# Patient Record
Sex: MALE | Race: WHITE | NOT HISPANIC OR LATINO | Employment: OTHER | URBAN - METROPOLITAN AREA
[De-identification: names, ages, dates, MRNs, and addresses within clinical notes are randomized per-mention and may not be internally consistent; named-entity substitution may affect disease eponyms.]

---

## 2018-01-04 ENCOUNTER — TRANSCRIBE ORDERS (OUTPATIENT)
Dept: ADMINISTRATIVE | Facility: HOSPITAL | Age: 63
End: 2018-01-04

## 2018-01-04 DIAGNOSIS — C78.01 SECONDARY MALIGNANT NEOPLASM OF RIGHT LUNG (HCC): Primary | ICD-10-CM

## 2018-01-10 ENCOUNTER — HOSPITAL ENCOUNTER (OUTPATIENT)
Dept: CT IMAGING | Facility: HOSPITAL | Age: 63
Discharge: HOME | End: 2018-01-10
Admitting: FAMILY MEDICINE

## 2018-01-10 ENCOUNTER — HOSPITAL ENCOUNTER (OUTPATIENT)
Dept: CT IMAGING | Facility: HOSPITAL | Age: 63
Discharge: HOME | End: 2018-01-10

## 2018-01-10 ENCOUNTER — HOSPITAL ENCOUNTER (OUTPATIENT)
Dept: GENERAL RADIOLOGY | Facility: HOSPITAL | Age: 63
Discharge: HOME | End: 2018-01-10

## 2018-01-10 ENCOUNTER — OFFICE VISIT (OUTPATIENT)
Dept: PULMONOLOGY | Facility: CLINIC | Age: 63
End: 2018-01-10

## 2018-01-10 VITALS
BODY MASS INDEX: 46.53 KG/M2 | HEART RATE: 84 BPM | SYSTOLIC BLOOD PRESSURE: 130 MMHG | OXYGEN SATURATION: 94 % | DIASTOLIC BLOOD PRESSURE: 88 MMHG | RESPIRATION RATE: 20 BRPM | TEMPERATURE: 98.8 F | HEIGHT: 60 IN | WEIGHT: 237 LBS

## 2018-01-10 VITALS — WEIGHT: 238.8 LBS | HEIGHT: 70 IN | BODY MASS INDEX: 34.19 KG/M2

## 2018-01-10 VITALS
RESPIRATION RATE: 24 BRPM | OXYGEN SATURATION: 92 % | DIASTOLIC BLOOD PRESSURE: 72 MMHG | HEART RATE: 78 BPM | SYSTOLIC BLOOD PRESSURE: 115 MMHG

## 2018-01-10 DIAGNOSIS — R05.9 COUGH: ICD-10-CM

## 2018-01-10 DIAGNOSIS — Z98.890 STATUS POST BIOPSY: ICD-10-CM

## 2018-01-10 DIAGNOSIS — Z01.818 PREOPERATIVE CLEARANCE: ICD-10-CM

## 2018-01-10 DIAGNOSIS — R93.89 ABNORMAL CHEST CT: Primary | ICD-10-CM

## 2018-01-10 DIAGNOSIS — C78.01 SECONDARY MALIGNANT NEOPLASM OF RIGHT LUNG (HCC): ICD-10-CM

## 2018-01-10 PROBLEM — Z78.9 NON-SMOKER: Status: ACTIVE | Noted: 2018-01-10

## 2018-01-10 LAB
APTT PPP: 25.6 SECONDS (ref 24–31)
INR PPP: 1.04
PROTHROMBIN TIME: 11.3 SECONDS (ref 9.6–11.5)

## 2018-01-10 PROCEDURE — 71045 X-RAY EXAM CHEST 1 VIEW: CPT

## 2018-01-10 PROCEDURE — 88341 IMHCHEM/IMCYTCHM EA ADD ANTB: CPT | Performed by: RADIOLOGY

## 2018-01-10 PROCEDURE — 85730 THROMBOPLASTIN TIME PARTIAL: CPT | Performed by: RADIOLOGY

## 2018-01-10 PROCEDURE — 94729 DIFFUSING CAPACITY: CPT | Performed by: INTERNAL MEDICINE

## 2018-01-10 PROCEDURE — 85610 PROTHROMBIN TIME: CPT | Performed by: RADIOLOGY

## 2018-01-10 PROCEDURE — 25010000002 PROMETHAZINE PER 50 MG: Performed by: RADIOLOGY

## 2018-01-10 PROCEDURE — 94375 RESPIRATORY FLOW VOLUME LOOP: CPT | Performed by: INTERNAL MEDICINE

## 2018-01-10 PROCEDURE — 88305 TISSUE EXAM BY PATHOLOGIST: CPT | Performed by: RADIOLOGY

## 2018-01-10 PROCEDURE — 88173 CYTOPATH EVAL FNA REPORT: CPT | Performed by: RADIOLOGY

## 2018-01-10 PROCEDURE — 77012 CT SCAN FOR NEEDLE BIOPSY: CPT

## 2018-01-10 PROCEDURE — 99204 OFFICE O/P NEW MOD 45 MIN: CPT | Performed by: INTERNAL MEDICINE

## 2018-01-10 PROCEDURE — 88342 IMHCHEM/IMCYTCHM 1ST ANTB: CPT | Performed by: RADIOLOGY

## 2018-01-10 PROCEDURE — 94726 PLETHYSMOGRAPHY LUNG VOLUMES: CPT | Performed by: INTERNAL MEDICINE

## 2018-01-10 RX ORDER — ALBUTEROL SULFATE 90 UG/1
2 AEROSOL, METERED RESPIRATORY (INHALATION) EVERY 4 HOURS PRN
Status: ON HOLD | COMMUNITY
End: 2018-01-20

## 2018-01-10 RX ORDER — MEPERIDINE HYDROCHLORIDE 50 MG/ML
50 INJECTION INTRAMUSCULAR; INTRAVENOUS; SUBCUTANEOUS ONCE
Status: DISCONTINUED | OUTPATIENT
Start: 2018-01-10 | End: 2018-01-11 | Stop reason: HOSPADM

## 2018-01-10 RX ORDER — MEPERIDINE HYDROCHLORIDE 50 MG/ML
50 INJECTION INTRAMUSCULAR; INTRAVENOUS; SUBCUTANEOUS ONCE
Status: DISCONTINUED | OUTPATIENT
Start: 2018-01-10 | End: 2018-01-10 | Stop reason: SDUPTHER

## 2018-01-10 RX ORDER — LIDOCAINE HYDROCHLORIDE 10 MG/ML
20 INJECTION, SOLUTION INFILTRATION; PERINEURAL ONCE
Status: COMPLETED | OUTPATIENT
Start: 2018-01-10 | End: 2018-01-10

## 2018-01-10 RX ORDER — ALPRAZOLAM 0.5 MG/1
0.5 TABLET ORAL ONCE
Status: COMPLETED | OUTPATIENT
Start: 2018-01-10 | End: 2018-01-10

## 2018-01-10 RX ORDER — PROMETHAZINE HYDROCHLORIDE 25 MG/ML
6.25 INJECTION, SOLUTION INTRAMUSCULAR; INTRAVENOUS ONCE
Status: COMPLETED | OUTPATIENT
Start: 2018-01-10 | End: 2018-01-10

## 2018-01-10 RX ADMIN — PROMETHAZINE HYDROCHLORIDE 6.25 MG: 25 INJECTION INTRAMUSCULAR; INTRAVENOUS at 17:50

## 2018-01-10 RX ADMIN — LIDOCAINE HYDROCHLORIDE 20 ML: 10 INJECTION, SOLUTION INFILTRATION; PERINEURAL at 16:15

## 2018-01-10 RX ADMIN — MEPERIDINE HYDROCHLORIDE 50 MG: 50 INJECTION, SOLUTION INTRAMUSCULAR; INTRAVENOUS; SUBCUTANEOUS at 17:50

## 2018-01-10 RX ADMIN — ALPRAZOLAM 0.5 MG: 0.5 TABLET ORAL at 13:22

## 2018-01-10 NOTE — PLAN OF CARE
Problem: Patient Care Overview (Adult)  Goal: Plan of Care Review  Outcome: Ongoing (interventions implemented as appropriate)   01/10/18 1246   Coping/Psychosocial Response Interventions   Plan Of Care Reviewed With patient;spouse;sibling   Patient Care Overview   Progress no change

## 2018-01-10 NOTE — PLAN OF CARE
Problem: Perioperative Period (Adult)  Goal: Signs and Symptoms of Listed Potential Problems Will be Absent or Manageable (Perioperative Period)  Outcome: Ongoing (interventions implemented as appropriate)   01/10/18 1244   Perioperative Period   Problems Assessed (Perioperative Period) all   Problems Present (Perioperative Period) hypoxia/hypoxemia;perioperative injury;physiologic stress response;situational response

## 2018-01-10 NOTE — PLAN OF CARE
Problem: Patient Care Overview (Adult)  Goal: Discharge Needs Assessment  Outcome: Ongoing (interventions implemented as appropriate)   01/10/18 1244   Discharge Needs Assessment   Concerns To Be Addressed no discharge needs identified   Readmission Within The Last 30 Days no previous admission in last 30 days   Equipment Needed After Discharge none   Discharge Disposition home or self-care   Current Health   Anticipated Changes Related to Illness none   Self-Care   Equipment Currently Used at Home none   Living Environment   Transportation Available family or friend will provide

## 2018-01-10 NOTE — PROGRESS NOTES
PULMONARY  NOTE    Chief Complaint     Abnormal CT scan of the chest, chest pain, dyspnea    History of Present Illness     62-year-old white male referred for evaluation of an abnormal CT scan of the chest.    About a week ago he felt dyspneic and had pleuritic chest discomfort.  The symptoms persisted and he subsequently underwent a chest x-ray which was abnormal and followed by a CT scan of the chest, abdomen, and pelvis.  He was found to have multiple bilateral well-circumscribed pulmonary nodules with one more predominant, irregular shaped lesion in the right upper lobe.  He has been referred to our office and also to see Dr. Gustavo Bang, oncology today    The patient is a lifelong nonsmoker.  He does have a history of smokeless tobacco use which stopped in 2001.    He has no prior history of known lung disease and has no chronic medical conditions that require any type of medication therapy.    He has experienced a recent upper respiratory tract infection which she feels has slowly improved.  He had a cough which is now better and some sputum production but no hemoptysis.  He was treated with a Z-Maksim and a course of prednisone for his URI symptoms.  He has had no hemoptysis.    He has noted no weight loss.    He is a .  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  He does work with hay  He doesn't have any chickens or work around birds    Patient Active Problem List   Diagnosis   • Abnormal chest CT (Bilateral nodules and RUL Mass)   • Non-smoker     Allergies   Allergen Reactions   • Ibuprofen Itching     Prickly rash on heaD   • Penicillins Rash   • Sulfa Antibiotics Rash       Current Outpatient Prescriptions:   •  albuterol (PROVENTIL HFA;VENTOLIN HFA) 108 (90 Base) MCG/ACT inhaler, Inhale 2 puffs Every 4 (Four) Hours As Needed for Wheezing., Disp: , Rfl:   •  cetirizine (zyrTEC) 10 MG tablet, Take 10 mg by mouth Daily., Disp: , Rfl:   No current  "facility-administered medications for this visit.   MEDICATION LIST AND ALLERGIES REVIEWED.    Family History   Problem Relation Age of Onset   • Cancer Mother    • Emphysema Father    • Cancer Sister    • Cancer Maternal Grandmother    • Cancer Paternal Grandmother      Social History   Substance Use Topics   • Smoking status: Never Smoker   • Smokeless tobacco: Current User     Types: Chew   • Alcohol use No     Social History     Social History Narrative        Retired from the [employer]    A .    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    Chew tobacco up to 2001     FAMILY AND SOCIAL HISTORY REVIEWED.    Review of Systems  ALSO REFER TO SCANNED ROS SHEET FROM SAME DATE.    /88 (BP Location: Right arm, Patient Position: Sitting)  Pulse 84  Temp 98.8 °F (37.1 °C)  Resp 20  Ht 69 cm (27.17\")  Wt 108 kg (237 lb)  SpO2 94%  .8 kg/m2  Physical Exam   Constitutional: He is oriented to person, place, and time. He appears well-developed. No distress.   HENT:   Head: Normocephalic and atraumatic.   Neck: No thyromegaly present.   Cardiovascular: Normal rate, regular rhythm and normal heart sounds.    No murmur heard.  Pulmonary/Chest: Effort normal and breath sounds normal. No stridor.   Abdominal: Soft. Bowel sounds are normal.   Musculoskeletal: Normal range of motion. He exhibits no edema.   Lymphadenopathy:     He has no cervical adenopathy.        Right: No supraclavicular and no epitrochlear adenopathy present.        Left: No supraclavicular and no epitrochlear adenopathy present.   Neurological: He is alert and oriented to person, place, and time.   Skin: Skin is warm and dry. He is not diaphoretic.   Psychiatric: He has a normal mood and affect. His behavior is normal.   Nursing note and vitals reviewed.      Results     PFTs reveal no airway obstruction.  A mild restriction.  And a normal diffusion capacity.    CT scan of the chest, " abdomen, and pelvis was reviewed on PACS.  These are outside films.  Noted were large bilateral pulmonary nodules with a predominant right upper lobe mass.  No bulky mediastinal adenopathy    Problem List       ICD-10-CM ICD-9-CM   1. Abnormal chest CT (Bilateral nodules and RUL Mass) R93.8 793.2   2. Non-smoker Z01.818 V72.84       Discussion     Reviewed his test results.  He has a CT scan of the chest this certainly worrisome for malignancy.  Other considerations in the differential diagnosis included a fungal infection, such as blastomycosis.  He doesn't have evidence of a renal mass, pancreatic mass, or other primary source of malignancy below the diaphragm.    We discussed diagnostic options including surgical lung biopsy, transthoracic needle aspiration, and bronchoscopy with navigational assistance.  I also talked with Dr. Talha Petty, radiologist, today who is willing to attempt a percutaneous needle biopsy under CT direction.  If this approach can establish a diagnosis, I think it is probably the safest and easiest way to approach a biopsy.    The possibility of needing an additional diagnostic procedure, such as a bronchoscopy or an open lung biopsy was discussed with the patient and his family who appear to understand.    The left iliac crest appears abnormal on the CT scan but does not appear to be a pathologic bony process by my interpretation.  I think that would be a low yield area for biopsy    We will plan for him to undergo a needle biopsy today and decide what further workup needs to be pursued based on the results of that procedure.    Gustavo Haque MD  Note electronically signed    CC: Gustavo Pruitt MD

## 2018-01-11 ENCOUNTER — CONSULT (OUTPATIENT)
Dept: ONCOLOGY | Facility: CLINIC | Age: 63
End: 2018-01-11

## 2018-01-11 ENCOUNTER — TELEPHONE (OUTPATIENT)
Dept: INTERVENTIONAL RADIOLOGY/VASCULAR | Facility: HOSPITAL | Age: 63
End: 2018-01-11

## 2018-01-11 VITALS
HEART RATE: 94 BPM | HEIGHT: 69 IN | WEIGHT: 238 LBS | SYSTOLIC BLOOD PRESSURE: 136 MMHG | DIASTOLIC BLOOD PRESSURE: 84 MMHG | RESPIRATION RATE: 20 BRPM | TEMPERATURE: 97.9 F | BODY MASS INDEX: 35.25 KG/M2

## 2018-01-11 DIAGNOSIS — Z78.9 NON-SMOKER: ICD-10-CM

## 2018-01-11 DIAGNOSIS — R93.89 ABNORMAL CHEST CT: Primary | ICD-10-CM

## 2018-01-11 PROCEDURE — 99205 OFFICE O/P NEW HI 60 MIN: CPT | Performed by: INTERNAL MEDICINE

## 2018-01-11 RX ORDER — ALPRAZOLAM 1 MG/1
1 TABLET ORAL 2 TIMES DAILY PRN
Qty: 60 TABLET | Refills: 2 | Status: ON HOLD | OUTPATIENT
Start: 2018-01-11 | End: 2018-01-20

## 2018-01-11 NOTE — TELEPHONE ENCOUNTER
@[id number])@    Other Comments:

## 2018-01-11 NOTE — PROGRESS NOTES
CHIEF COMPLAINT:   New patient with apparent metastatic right-sided lung cancer-see below.    Problem List:  Patient Active Problem List   Diagnosis   • Abnormal chest CT (Bilateral nodules and RUL Mass)   • Non-smoker           HISTORY OF PRESENT ILLNESS:    This very pleasant 62-year-old  gentleman from [facility], nonsmoker, is here due to his newly diagnosed malignancy.    He has enjoyed robust health throughout his life.  He's never been a smoker.  He's been involved in her culture.  He worked for the [employer] for close to 4 decades.  He retired from that.  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  He's had some agricultural exposure over the years but nothing major.  He has never had any kind of secondhand smoke exposure since he was a child and his father smoked.    He recently developed a dry cough and some shortness of breath.  A chest x-ray was abnormal in the right lung mass and he underwent a CT scan of the chest abdomen pelvis approximately a week ago.  This revealed a several centimeter right upper right lung field mass with associated multiple tiny bilateral nodular densities consistent with metastatic disease.  He's had no hemoptysis.  He's had no weight loss.  He's had no abdominal pain or any new bony symptoms.  He has some osteoarthritic symptoms which are unchanged from baseline.  He was placed on a course of prednisone which he has just finished.  That really didn't do anything.  He was referred and underwent a CT directed needle biopsy yesterday which shows malignancy compatible with non-small cell lung cancer, favor adenocarcinoma with full and final stains pending.    He had an incidental basal cell carcinoma excised from the posterior aspect of his neck yesterday    Past Medical History:   Diagnosis Date   • Lung nodule    • Shortness of breath     RECENT ONSET       Current Outpatient Prescriptions on File Prior to Visit   Medication Sig Dispense Refill   • albuterol (PROVENTIL  HFA;VENTOLIN HFA) 108 (90 Base) MCG/ACT inhaler Inhale 2 puffs Every 4 (Four) Hours As Needed for Wheezing.     • cetirizine (zyrTEC) 10 MG tablet Take 10 mg by mouth Daily.       Current Facility-Administered Medications on File Prior to Visit   Medication Dose Route Frequency Provider Last Rate Last Dose   • [COMPLETED] ALPRAZolam (XANAX) tablet 0.5 mg  0.5 mg Oral Once Fidel Petty MD   0.5 mg at 01/10/18 1322   • [COMPLETED] lidocaine (XYLOCAINE) 1 % injection 20 mL  20 mL Subcutaneous Once Fidel Petty MD   20 mL at 01/10/18 1615   • [COMPLETED] promethazine (PHENERGAN) injection 6.25 mg  6.25 mg Intravenous Once Fidel Petty MD   6.25 mg at 01/10/18 1750   • [DISCONTINUED] HYDROcod Polst-CPM Polst ER (TUSSIONEX PENNKINETIC) 10-8 MG/5ML ER suspension 5 mL  5 mL Oral Once Fidel Petty MD       • [COMPLETED] meperidine (DEMEROL) injection 50 mg  50 mg Intravenous Once Fidel Petty MD   50 mg at 01/10/18 1750   • [DISCONTINUED] meperidine (DEMEROL) injection 50 mg  50 mg Intravenous Once Fidel Petty MD           Allergies   Allergen Reactions   • Ibuprofen Itching     Prickly rash on heaD   • Penicillins Rash   • Sulfa Antibiotics Rash       Past Surgical History:   Procedure Laterality Date   • COLLATERAL LIGAMENT REPAIR, KNEE     • KNEE ARTHROSCOPY     • LASIK     • LUNG BIOPSY Right 01/10/2018    Dr. Petty @ [facility]   • SKIN CANCER EXCISION Bilateral     BASAL CELL ON NECK YESTERDAY       OB History   No data available       Social History     Social History   • Marital status:      Spouse name: N/A   • Number of children: N/A   • Years of education: N/A     Social History Main Topics   • Smoking status: Never Smoker   • Smokeless tobacco: Former User     Quit date: 1999   • Alcohol use No   • Drug use: No   • Sexual activity: Defer     Other Topics Concern   • None     Social History Narrative        Retired from the [employer]    A .    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    Chew tobacco up to 2001       Family History   Problem Relation Age of Onset   • Cancer Mother    • Breast cancer Mother    • Emphysema Father    • Cancer Sister    • Breast cancer Sister    • Cancer Maternal Grandmother    • Breast cancer Maternal Grandmother    • Cancer Paternal Grandmother    • Breast cancer Paternal Grandmother    • Brain cancer Paternal Grandfather        REVIEW OF SYSTEMS:  Review of Systems   Constitutional: Negative for activity change and appetite change.        Energy level is been good.  Appetite and weight are been stable.  Other than his nonproductive cough and his shortness of breath he's had no new symptoms recently.   HENT: Negative for mouth sores, sinus pressure and voice change.    Eyes: Negative for visual disturbance.   Respiratory: Negative for shortness of breath.    Cardiovascular: Negative for chest pain.   Gastrointestinal: Negative for abdominal pain and vomiting.   Genitourinary: Negative for dysuria.   Musculoskeletal: Negative for arthralgias and myalgias.   Skin: Negative for color change.   Neurological: Negative for dizziness, syncope and headaches.   Hematological: Negative for adenopathy.   Psychiatric/Behavioral: Negative for confusion, sleep disturbance and suicidal ideas. The patient is not nervous/anxious.        .  /84  Pulse 94  Temp 97.9 °F (36.6 °C)  Resp 20  Ht 175.3 cm (69\")  Wt 108 kg (238 lb)  BMI 35.15 kg/m2    Physical  Exam:  Physical Exam   Constitutional: He is oriented to person, place, and time. He appears well-developed and well-nourished. No distress.   Propulsed appearing gentleman in no distress at all.  He appears healthy.  He does not have any digital clubbing   HENT:   Head: Normocephalic.   Mouth/Throat: Oropharynx is clear and moist.   Eyes: Conjunctivae and EOM are normal. No scleral icterus.   Neck: Normal range of motion. Neck supple. No thyromegaly present.   Cardiovascular: " Normal rate, regular rhythm and normal heart sounds.    No murmur heard.  Pulmonary/Chest: Effort normal and breath sounds normal. He has no wheezes. He has no rales.   Abdominal: Soft. Bowel sounds are normal. He exhibits no distension and no mass. There is no tenderness.   Musculoskeletal: He exhibits no edema or tenderness.   Lymphadenopathy:     He has no cervical adenopathy.   Neurological: He is alert and oriented to person, place, and time. He displays normal reflexes. No cranial nerve deficit.   Skin: Skin is warm and dry. No rash noted.   He has a fresh incision is clean and dry at the base of his neck posteriorly, consistent with the excision of a basal cell carcinoma performed just yesterday.   Psychiatric: He has a normal mood and affect. Judgment normal.   Vitals reviewed.        Performance Status: 0    Assessment/Plan     Very pleasant gentleman who unfortunately has what appears to be stage IV non-small cell lung cancer, probably adenocarcinoma.  I suspect his right upper lung field nodular density is his primary and he has what appears to be multiple bilateral pulmonary metastasis.  I will complete his staging with PET/CT.  He also needs an MRI of his brain given his advanced disease at presentation.    I talked with Dr. Conte of pathology and the patient does not have enough tissues to submit for next generation sequencing, etc.  I have therefore spoken with Dr. Syed Haque of pulmonary who will see the patient and perform bronchoscopy in an effort to obtain more tissue for appropriate testing.    The patient is a nonsmoker so his possibility of a  mutation is higher.  Obviously I will look for that and also check other esoterica  such as PDL 1 expression, etc.    All the above discussed with the patient his sister and his wife.  I spent more than an hour with them.       Gustavo Catalan MD    1/11/2018

## 2018-01-15 ENCOUNTER — TELEPHONE (OUTPATIENT)
Dept: ONCOLOGY | Facility: CLINIC | Age: 63
End: 2018-01-15

## 2018-01-15 ENCOUNTER — TRANSCRIBE ORDERS (OUTPATIENT)
Dept: PULMONOLOGY | Facility: CLINIC | Age: 63
End: 2018-01-15

## 2018-01-15 ENCOUNTER — HOSPITAL ENCOUNTER (OUTPATIENT)
Facility: HOSPITAL | Age: 63
Setting detail: HOSPITAL OUTPATIENT SURGERY
End: 2018-01-15
Attending: INTERNAL MEDICINE | Admitting: INTERNAL MEDICINE

## 2018-01-15 DIAGNOSIS — R91.8 LUNG MASS: Primary | ICD-10-CM

## 2018-01-15 DIAGNOSIS — C34.91 NON-SMALL CELL LUNG CANCER, RIGHT (HCC): Primary | ICD-10-CM

## 2018-01-15 NOTE — TELEPHONE ENCOUNTER
----- Message -----     From: Fela Herrera MA     Sent: 1/12/2018   1:37 PM       To: Nichelle Finney  Subject: Bronch Ref                                       Patient's spouse, Nelida, called wanting to check status on ref to Dr. Haque for a bronchoscopy.  LOV-1/11/2018  I informed Nelida that ref is most likely still in the process of being scheduled but I will send our ref coordinator a msg and will hopefully get a call back by the end of today on update.    ----- Message from Nichelle Finney sent at 1/12/2018  1:53 PM EST -----  Regarding: RE: Bronch Ref  Contact: [phone]  That was ordered by Dr. Haque's office not us. They need to call their office. Pulmonary.     1/15/2018  Called Nelida back to notify her of above and gave Dr. Haque's office Phone#.

## 2018-01-15 NOTE — TELEPHONE ENCOUNTER
----- Message from Pari Guan sent at 1/15/2018 10:09 AM EST -----  Regarding: JOSE-PHONE CALL  Contact: [phone]  Patient called and he needs Yoko to call him back. That is all the information given said she would know what this is about.

## 2018-01-15 NOTE — TELEPHONE ENCOUNTER
Patient requesting referral to [facility].  Also, asking if Bronchoscopy would be needed or would [facility] do testing differently.  Per Dr. Catalan, approved referral to [facility].  Dr. Catalan states probably will not get into [facility] sooner than 2 weeks from now. He would go ahead and get bronchoscopy this week.  We will make sure [facility] gets all results. Mr. Chambers stated understanding and will keep bronchoscopy appt.

## 2018-01-17 ENCOUNTER — LAB (OUTPATIENT)
Dept: LAB | Facility: HOSPITAL | Age: 63
End: 2018-01-17

## 2018-01-17 ENCOUNTER — APPOINTMENT (OUTPATIENT)
Dept: CT IMAGING | Facility: HOSPITAL | Age: 63
End: 2018-01-17

## 2018-01-17 ENCOUNTER — HOSPITAL ENCOUNTER (OUTPATIENT)
Dept: PET IMAGING | Facility: HOSPITAL | Age: 63
Discharge: HOME | End: 2018-01-17
Attending: INTERNAL MEDICINE

## 2018-01-17 ENCOUNTER — HOSPITAL ENCOUNTER (INPATIENT)
Facility: HOSPITAL | Age: 63
LOS: 4 days | Discharge: HOME | End: 2018-01-21
Attending: INTERNAL MEDICINE | Admitting: INTERNAL MEDICINE

## 2018-01-17 ENCOUNTER — ANESTHESIA EVENT (OUTPATIENT)
Dept: GASTROENTEROLOGY | Facility: HOSPITAL | Age: 63
End: 2018-01-17

## 2018-01-17 ENCOUNTER — HOSPITAL ENCOUNTER (OUTPATIENT)
Dept: MRI IMAGING | Facility: HOSPITAL | Age: 63
Discharge: HOME | End: 2018-01-17
Attending: INTERNAL MEDICINE

## 2018-01-17 ENCOUNTER — HOSPITAL ENCOUNTER (OUTPATIENT)
Dept: CT IMAGING | Facility: HOSPITAL | Age: 63
End: 2018-01-17
Attending: INTERNAL MEDICINE

## 2018-01-17 DIAGNOSIS — R91.1 LUNG NODULE: ICD-10-CM

## 2018-01-17 DIAGNOSIS — R93.89 ABNORMAL CHEST CT: ICD-10-CM

## 2018-01-17 DIAGNOSIS — J95.811 POSTPROCEDURAL PNEUMOTHORAX: ICD-10-CM

## 2018-01-17 DIAGNOSIS — Z78.9 NON-SMOKER: ICD-10-CM

## 2018-01-17 DIAGNOSIS — J95.811 POSTPROCEDURAL PNEUMOTHORAX: Primary | ICD-10-CM

## 2018-01-17 PROBLEM — C79.9 METASTATIC CANCER (HCC): Status: ACTIVE | Noted: 2018-01-17

## 2018-01-17 LAB
ALBUMIN SERPL-MCNC: 4.3 G/DL (ref 3.2–4.8)
ALBUMIN/GLOB SERPL: 1.4 G/DL (ref 1.5–2.5)
ALP SERPL-CCNC: 124 U/L (ref 25–100)
ALT SERPL W P-5'-P-CCNC: 79 U/L (ref 7–40)
ANION GAP SERPL CALCULATED.3IONS-SCNC: 8 MMOL/L (ref 3–11)
AST SERPL-CCNC: 40 U/L (ref 0–33)
BASOPHILS # BLD AUTO: 0.04 10*3/MM3 (ref 0–0.2)
BASOPHILS NFR BLD AUTO: 0.5 % (ref 0–1)
BILIRUB SERPL-MCNC: 0.6 MG/DL (ref 0.3–1.2)
BUN BLD-MCNC: 15 MG/DL (ref 9–23)
BUN/CREAT SERPL: 13.6 (ref 7–25)
CALCIUM SPEC-SCNC: 9.3 MG/DL (ref 8.7–10.4)
CHLORIDE SERPL-SCNC: 105 MMOL/L (ref 99–109)
CO2 SERPL-SCNC: 27 MMOL/L (ref 20–31)
CREAT BLD-MCNC: 1.1 MG/DL (ref 0.6–1.3)
CREAT BLDA-MCNC: 1.1 MG/DL (ref 0.6–1.3)
DEPRECATED RDW RBC AUTO: 46.9 FL (ref 37–54)
EOSINOPHIL # BLD AUTO: 0.11 10*3/MM3 (ref 0–0.3)
EOSINOPHIL NFR BLD AUTO: 1.4 % (ref 0–3)
ERYTHROCYTE [DISTWIDTH] IN BLOOD BY AUTOMATED COUNT: 14 % (ref 11.3–14.5)
GFR SERPL CREATININE-BSD FRML MDRD: 68 ML/MIN/1.73
GLOBULIN UR ELPH-MCNC: 3 GM/DL
GLUCOSE BLD-MCNC: 102 MG/DL (ref 70–100)
GLUCOSE BLDC GLUCOMTR-MCNC: 108 MG/DL (ref 70–130)
HCT VFR BLD AUTO: 49 % (ref 38.9–50.9)
HGB BLD-MCNC: 16 G/DL (ref 13.1–17.5)
IMM GRANULOCYTES # BLD: 0.01 10*3/MM3 (ref 0–0.03)
IMM GRANULOCYTES NFR BLD: 0.1 % (ref 0–0.6)
LYMPHOCYTES # BLD AUTO: 1.03 10*3/MM3 (ref 0.6–4.8)
LYMPHOCYTES NFR BLD AUTO: 13.1 % (ref 24–44)
MCH RBC QN AUTO: 30 PG (ref 27–31)
MCHC RBC AUTO-ENTMCNC: 32.7 G/DL (ref 32–36)
MCV RBC AUTO: 91.8 FL (ref 80–99)
MONOCYTES # BLD AUTO: 0.85 10*3/MM3 (ref 0–1)
MONOCYTES NFR BLD AUTO: 10.8 % (ref 0–12)
NEUTROPHILS # BLD AUTO: 5.82 10*3/MM3 (ref 1.5–8.3)
NEUTROPHILS NFR BLD AUTO: 74.1 % (ref 41–71)
PLATELET # BLD AUTO: 216 10*3/MM3 (ref 150–450)
PMV BLD AUTO: 11.4 FL (ref 6–12)
POTASSIUM BLD-SCNC: 4 MMOL/L (ref 3.5–5.5)
PROT SERPL-MCNC: 7.3 G/DL (ref 5.7–8.2)
RBC # BLD AUTO: 5.34 10*6/MM3 (ref 4.2–5.76)
SODIUM BLD-SCNC: 140 MMOL/L (ref 132–146)
WBC NRBC COR # BLD: 7.86 10*3/MM3 (ref 3.5–10.8)

## 2018-01-17 PROCEDURE — 36415 COLL VENOUS BLD VENIPUNCTURE: CPT

## 2018-01-17 PROCEDURE — 82962 GLUCOSE BLOOD TEST: CPT

## 2018-01-17 PROCEDURE — 0W9930Z DRAINAGE OF RIGHT PLEURAL CAVITY WITH DRAINAGE DEVICE, PERCUTANEOUS APPROACH: ICD-10-PCS | Performed by: RADIOLOGY

## 2018-01-17 PROCEDURE — 99223 1ST HOSP IP/OBS HIGH 75: CPT | Performed by: INTERNAL MEDICINE

## 2018-01-17 PROCEDURE — A9577 INJ MULTIHANCE: HCPCS | Performed by: INTERNAL MEDICINE

## 2018-01-17 PROCEDURE — 82565 ASSAY OF CREATININE: CPT

## 2018-01-17 PROCEDURE — 0 GADOBENATE DIMEGLUMINE 529 MG/ML SOLUTION: Performed by: INTERNAL MEDICINE

## 2018-01-17 PROCEDURE — A9552 F18 FDG: HCPCS | Performed by: INTERNAL MEDICINE

## 2018-01-17 PROCEDURE — 70553 MRI BRAIN STEM W/O & W/DYE: CPT

## 2018-01-17 PROCEDURE — 80053 COMPREHEN METABOLIC PANEL: CPT

## 2018-01-17 PROCEDURE — 77012 CT SCAN FOR NEEDLE BIOPSY: CPT

## 2018-01-17 PROCEDURE — 85025 COMPLETE CBC W/AUTO DIFF WBC: CPT

## 2018-01-17 PROCEDURE — 78816 PET IMAGE W/CT FULL BODY: CPT

## 2018-01-17 PROCEDURE — 0 FLUDEOXYGLUCOSE F18 SOLUTION: Performed by: INTERNAL MEDICINE

## 2018-01-17 RX ORDER — HYDROCODONE BITARTRATE AND ACETAMINOPHEN 5; 325 MG/1; MG/1
1 TABLET ORAL EVERY 6 HOURS PRN
Status: DISCONTINUED | OUTPATIENT
Start: 2018-01-17 | End: 2018-01-21 | Stop reason: HOSPADM

## 2018-01-17 RX ORDER — ALBUTEROL SULFATE 2.5 MG/3ML
2.5 SOLUTION RESPIRATORY (INHALATION) EVERY 6 HOURS PRN
Status: DISCONTINUED | OUTPATIENT
Start: 2018-01-17 | End: 2018-01-21 | Stop reason: HOSPADM

## 2018-01-17 RX ORDER — LIDOCAINE HYDROCHLORIDE 10 MG/ML
10 INJECTION, SOLUTION EPIDURAL; INFILTRATION; INTRACAUDAL; PERINEURAL ONCE
Status: COMPLETED | OUTPATIENT
Start: 2018-01-17 | End: 2018-01-17

## 2018-01-17 RX ORDER — MAGNESIUM SULFATE HEPTAHYDRATE 40 MG/ML
4 INJECTION, SOLUTION INTRAVENOUS AS NEEDED
Status: CANCELLED | OUTPATIENT
Start: 2018-01-17

## 2018-01-17 RX ORDER — LIDOCAINE HYDROCHLORIDE 10 MG/ML
0.5 INJECTION, SOLUTION EPIDURAL; INFILTRATION; INTRACAUDAL; PERINEURAL ONCE AS NEEDED
Status: CANCELLED | OUTPATIENT
Start: 2018-01-17

## 2018-01-17 RX ORDER — CETIRIZINE HYDROCHLORIDE 10 MG/1
10 TABLET ORAL DAILY
Status: DISCONTINUED | OUTPATIENT
Start: 2018-01-17 | End: 2018-01-21 | Stop reason: HOSPADM

## 2018-01-17 RX ORDER — HYDROCODONE BITARTRATE AND ACETAMINOPHEN 5; 325 MG/1; MG/1
1 TABLET ORAL EVERY 6 HOURS PRN
Status: CANCELLED | OUTPATIENT
Start: 2018-01-17 | End: 2018-01-27

## 2018-01-17 RX ORDER — MAGNESIUM SULFATE HEPTAHYDRATE 40 MG/ML
2 INJECTION, SOLUTION INTRAVENOUS AS NEEDED
Status: CANCELLED | OUTPATIENT
Start: 2018-01-17

## 2018-01-17 RX ORDER — FAMOTIDINE 20 MG/1
20 TABLET, FILM COATED ORAL ONCE
Status: CANCELLED | OUTPATIENT
Start: 2018-01-17 | End: 2018-01-17

## 2018-01-17 RX ORDER — ALPRAZOLAM 1 MG/1
1 TABLET ORAL 2 TIMES DAILY PRN
Status: DISCONTINUED | OUTPATIENT
Start: 2018-01-17 | End: 2018-01-21 | Stop reason: HOSPADM

## 2018-01-17 RX ORDER — SODIUM CHLORIDE 0.9 % (FLUSH) 0.9 %
1-10 SYRINGE (ML) INJECTION AS NEEDED
Status: CANCELLED | OUTPATIENT
Start: 2018-01-17

## 2018-01-17 RX ORDER — MAGNESIUM SULFATE HEPTAHYDRATE 40 MG/ML
2 INJECTION, SOLUTION INTRAVENOUS AS NEEDED
Status: DISCONTINUED | OUTPATIENT
Start: 2018-01-17 | End: 2018-01-21 | Stop reason: HOSPADM

## 2018-01-17 RX ORDER — FAMOTIDINE 10 MG/ML
20 INJECTION, SOLUTION INTRAVENOUS ONCE
Status: CANCELLED | OUTPATIENT
Start: 2018-01-17 | End: 2018-01-17

## 2018-01-17 RX ORDER — SODIUM CHLORIDE, SODIUM LACTATE, POTASSIUM CHLORIDE, CALCIUM CHLORIDE 600; 310; 30; 20 MG/100ML; MG/100ML; MG/100ML; MG/100ML
9 INJECTION, SOLUTION INTRAVENOUS CONTINUOUS
Status: CANCELLED | OUTPATIENT
Start: 2018-01-17

## 2018-01-17 RX ORDER — POTASSIUM CHLORIDE 7.45 MG/ML
10 INJECTION INTRAVENOUS
Status: DISCONTINUED | OUTPATIENT
Start: 2018-01-17 | End: 2018-01-21 | Stop reason: HOSPADM

## 2018-01-17 RX ORDER — POTASSIUM CHLORIDE 7.45 MG/ML
10 INJECTION INTRAVENOUS
Status: CANCELLED | OUTPATIENT
Start: 2018-01-17

## 2018-01-17 RX ORDER — MAGNESIUM SULFATE HEPTAHYDRATE 40 MG/ML
4 INJECTION, SOLUTION INTRAVENOUS AS NEEDED
Status: DISCONTINUED | OUTPATIENT
Start: 2018-01-17 | End: 2018-01-21 | Stop reason: HOSPADM

## 2018-01-17 RX ADMIN — LIDOCAINE HYDROCHLORIDE 10 ML: 10 INJECTION, SOLUTION INFILTRATION; PERINEURAL at 16:22

## 2018-01-17 RX ADMIN — HYDROCODONE BITARTRATE AND ACETAMINOPHEN 1 TABLET: 5; 325 TABLET ORAL at 21:47

## 2018-01-17 RX ADMIN — GADOBENATE DIMEGLUMINE 20 ML: 529 INJECTION, SOLUTION INTRAVENOUS at 15:00

## 2018-01-17 RX ADMIN — FLUDEOXYGLUCOSE F18 1 DOSE: 300 INJECTION INTRAVENOUS at 11:18

## 2018-01-17 NOTE — H&P
PULMONARY ADMISSION  NOTE    Chief Complaint     Iatrogenic pneumothorax    History of Present Illness     62-year-old white male recently found to have bilateral pulmonary nodules underwent a CT FNA of a right sided lesion last week.  Pathology revealed non-small cell cancer.  As part of his workup, he underwent a PET/CT scan today which revealed a moderately sized right pneumothorax presumably related to the CT FNA done last week.  The patient is being admitted at this time for a chest tube placement and management of his pneumothorax.    He indicates that since his needle biopsy last week he has noted some dyspnea although was dyspneic even before the needle biopsy.  He had some blood streaked sputum after the biopsy procedure but that has improved.    The patient is a nonsmoker.  He underwent a PET/CT scan today which revealed hypermetabolic lesions in the lung as well as in the bone.    Due to the need for genetic testing, patient was scheduled or a navigational bronchoscopy tomorrow.  Current plans to have a navigational CT scan are on hold given his pneumothorax.    Problem List, Surgical History, Family, Social History, and ROS     Patient Active Problem List   Diagnosis   • Non-smoker   • Metastatic cancer (Lung and bone mets)   • Iatrogenic pneumothorax (Right)     Past Surgical History:   Procedure Laterality Date   • COLLATERAL LIGAMENT REPAIR, KNEE     • KNEE ARTHROSCOPY     • LASIK     • LUNG BIOPSY Right 01/10/2018    Dr. Petty @ [facility]   • SKIN CANCER EXCISION Bilateral     BASAL CELL ON NECK YESTERDAY       Allergies   Allergen Reactions   • Ibuprofen Itching     Prickly rash on heaD   • Penicillins Rash   • Sulfa Antibiotics Rash     Current Outpatient Prescriptions on File Prior to Visit   Medication Sig   • albuterol (PROVENTIL HFA;VENTOLIN HFA) 108 (90 Base) MCG/ACT inhaler Inhale 2 puffs Every 4 (Four) Hours As Needed for Wheezing.   • ALPRAZolam (XANAX) 1 MG tablet Take 1 tablet by mouth 2 (Two)  Times a Day As Needed for Anxiety.   • cetirizine (zyrTEC) 10 MG tablet Take 10 mg by mouth Daily.   • HYDROcod Polst-CPM Polst ER (TUSSIONEX PENNKINETIC ER) 10-8 MG/5ML ER suspension Take 5 mL by mouth Every 12 (Twelve) Hours As Needed for Cough.     Current Facility-Administered Medications on File Prior to Visit   Medication   • [COMPLETED] fludeoxyglucose F18 (Fludeoxyglucose F18) injection 1 dose   • [COMPLETED] gadobenate dimeglumine (MULTIHANCE) injection 20 mL     MEDICATION LIST AND ALLERGIES REVIEWED.    Family History   Problem Relation Age of Onset   • Cancer Mother    • Breast cancer Mother    • Emphysema Father    • Cancer Sister    • Breast cancer Sister    • Cancer Maternal Grandmother    • Breast cancer Maternal Grandmother    • Cancer Paternal Grandmother    • Breast cancer Paternal Grandmother    • Brain cancer Paternal Grandfather      Social History   Substance Use Topics   • Smoking status: Never Smoker   • Smokeless tobacco: Former User     Quit date: 1999   • Alcohol use No     Social History     Social History Narrative        Retired from the [employer]    A .    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    Chew tobacco up to 2001     FAMILY AND SOCIAL HISTORY REVIEWED.    Review of Systems  ALL OTHER SYSTEMS REVIEWED AND ARE NEGATIVE.    Physical Exam and Clinical Information   There were no vitals taken for this visit.  Physical Exam:   GENERAL: Awake, no distress   HEENT: No adenopathy or thyromegaly   LUNGS: Decreased breath sounds on the right compared to left, no wheezes   HEART: Regular rate and rhythm without murmurs   ABDOMEN: Soft, nontender   EXTREMITIES: Palpable pulses, no cyanosis   NEURO/PSYCH: Awake and alert.  Follows commands.  No neurologic deficit      Results from last 7 days  Lab Units 01/17/18  1145   WBC 10*3/mm3 7.86   HEMOGLOBIN g/dL 16.0   PLATELETS 10*3/mm3 216       Results from last 7 days  Lab Units  01/17/18  1413 01/17/18  1145   SODIUM mmol/L  --  140   POTASSIUM mmol/L  --  4.0   CO2 mmol/L  --  27.0   BUN mg/dL  --  15   CREATININE mg/dL 1.10 1.10   GLUCOSE mg/dL  --  102*     Estimated Creatinine Clearance: 84.3 mL/min (by C-G formula based on Cr of 1.1).          No results found for: LACTATE    IMAGES: A PET/CT scan was reviewed on PACS.  Moderate right pneumothorax noted as well as metastatic disease    I reviewed the patient's results and images    Assesment      Iatrogenic right pneumothorax  Metastatic non-small cell cancer  Nonsmoker     Plan/Recommendations     Chest tube placement radiology  Chest tube to suction/pleura Vac  Check chest x-ray in the morning and if the lung is fully expanded then obtained a [employer] CT scan and plan for navigational bronchoscopy with transbronchial biopsies  Follow-up on MRI of the brain    GIBRAN Haque MD  Pulmonary and Critical Care Medicine     CC: Gustavo Pruitt MD

## 2018-01-18 ENCOUNTER — DOCUMENTATION (OUTPATIENT)
Dept: OTHER | Facility: HOSPITAL | Age: 63
End: 2018-01-18

## 2018-01-18 ENCOUNTER — ANESTHESIA (OUTPATIENT)
Dept: GASTROENTEROLOGY | Facility: HOSPITAL | Age: 63
End: 2018-01-18

## 2018-01-18 ENCOUNTER — APPOINTMENT (OUTPATIENT)
Dept: GENERAL RADIOLOGY | Facility: HOSPITAL | Age: 63
End: 2018-01-18

## 2018-01-18 ENCOUNTER — APPOINTMENT (OUTPATIENT)
Dept: CT IMAGING | Facility: HOSPITAL | Age: 63
End: 2018-01-18

## 2018-01-18 LAB
ALBUMIN SERPL-MCNC: 4.2 G/DL (ref 3.2–4.8)
ALBUMIN/GLOB SERPL: 1.4 G/DL (ref 1.5–2.5)
ALP SERPL-CCNC: 121 U/L (ref 25–100)
ALT SERPL W P-5'-P-CCNC: 79 U/L (ref 7–40)
ANION GAP SERPL CALCULATED.3IONS-SCNC: 3 MMOL/L (ref 3–11)
AST SERPL-CCNC: 43 U/L (ref 0–33)
BILIRUB SERPL-MCNC: 0.6 MG/DL (ref 0.3–1.2)
BUN BLD-MCNC: 14 MG/DL (ref 9–23)
BUN/CREAT SERPL: 12.7 (ref 7–25)
CA-I SERPL ISE-MCNC: 1.3 MMOL/L (ref 1.12–1.32)
CALCIUM SPEC-SCNC: 9.4 MG/DL (ref 8.7–10.4)
CHLORIDE SERPL-SCNC: 107 MMOL/L (ref 99–109)
CO2 SERPL-SCNC: 29 MMOL/L (ref 20–31)
CREAT BLD-MCNC: 1.1 MG/DL (ref 0.6–1.3)
DEPRECATED RDW RBC AUTO: 46.5 FL (ref 37–54)
ERYTHROCYTE [DISTWIDTH] IN BLOOD BY AUTOMATED COUNT: 13.8 % (ref 11.3–14.5)
GFR SERPL CREATININE-BSD FRML MDRD: 68 ML/MIN/1.73
GLOBULIN UR ELPH-MCNC: 3.1 GM/DL
GLUCOSE BLD-MCNC: 124 MG/DL (ref 70–100)
HCT VFR BLD AUTO: 49 % (ref 38.9–50.9)
HGB BLD-MCNC: 16 G/DL (ref 13.1–17.5)
MAGNESIUM SERPL-MCNC: 2 MG/DL (ref 1.3–2.7)
MCH RBC QN AUTO: 30 PG (ref 27–31)
MCHC RBC AUTO-ENTMCNC: 32.7 G/DL (ref 32–36)
MCV RBC AUTO: 91.8 FL (ref 80–99)
PHOSPHATE SERPL-MCNC: 3.3 MG/DL (ref 2.4–5.1)
PLATELET # BLD AUTO: 198 10*3/MM3 (ref 150–450)
PMV BLD AUTO: 10.2 FL (ref 6–12)
POTASSIUM BLD-SCNC: 4.3 MMOL/L (ref 3.5–5.5)
PROT SERPL-MCNC: 7.3 G/DL (ref 5.7–8.2)
RBC # BLD AUTO: 5.34 10*6/MM3 (ref 4.2–5.76)
SODIUM BLD-SCNC: 139 MMOL/L (ref 132–146)
TSH SERPL DL<=0.05 MIU/L-ACNC: 1.26 MIU/ML (ref 0.35–5.35)
WBC NRBC COR # BLD: 6.88 10*3/MM3 (ref 3.5–10.8)

## 2018-01-18 PROCEDURE — 76000 FLUOROSCOPY <1 HR PHYS/QHP: CPT

## 2018-01-18 PROCEDURE — 0BDC8ZX EXTRACTION OF RIGHT UPPER LUNG LOBE, VIA NATURAL OR ARTIFICIAL OPENING ENDOSCOPIC, DIAGNOSTIC: ICD-10-PCS | Performed by: INTERNAL MEDICINE

## 2018-01-18 PROCEDURE — 87206 SMEAR FLUORESCENT/ACID STAI: CPT | Performed by: INTERNAL MEDICINE

## 2018-01-18 PROCEDURE — 31624 DX BRONCHOSCOPE/LAVAGE: CPT | Performed by: INTERNAL MEDICINE

## 2018-01-18 PROCEDURE — 87070 CULTURE OTHR SPECIMN AEROBIC: CPT | Performed by: INTERNAL MEDICINE

## 2018-01-18 PROCEDURE — 31623 DX BRONCHOSCOPE/BRUSH: CPT | Performed by: INTERNAL MEDICINE

## 2018-01-18 PROCEDURE — 88341 IMHCHEM/IMCYTCHM EA ADD ANTB: CPT | Performed by: INTERNAL MEDICINE

## 2018-01-18 PROCEDURE — 87205 SMEAR GRAM STAIN: CPT | Performed by: INTERNAL MEDICINE

## 2018-01-18 PROCEDURE — C1726 CATH, BAL DIL, NON-VASCULAR: HCPCS | Performed by: INTERNAL MEDICINE

## 2018-01-18 PROCEDURE — 80053 COMPREHEN METABOLIC PANEL: CPT | Performed by: INTERNAL MEDICINE

## 2018-01-18 PROCEDURE — 71250 CT THORAX DX C-: CPT

## 2018-01-18 PROCEDURE — 31628 BRONCHOSCOPY/LUNG BX EACH: CPT | Performed by: INTERNAL MEDICINE

## 2018-01-18 PROCEDURE — 25010000002 PROPOFOL 10 MG/ML EMULSION: Performed by: NURSE ANESTHETIST, CERTIFIED REGISTERED

## 2018-01-18 PROCEDURE — 84100 ASSAY OF PHOSPHORUS: CPT | Performed by: INTERNAL MEDICINE

## 2018-01-18 PROCEDURE — 31627 NAVIGATIONAL BRONCHOSCOPY: CPT | Performed by: INTERNAL MEDICINE

## 2018-01-18 PROCEDURE — 07D78ZX EXTRACTION OF THORAX LYMPHATIC, VIA NATURAL OR ARTIFICIAL OPENING ENDOSCOPIC, DIAGNOSTIC: ICD-10-PCS | Performed by: INTERNAL MEDICINE

## 2018-01-18 PROCEDURE — 71045 X-RAY EXAM CHEST 1 VIEW: CPT

## 2018-01-18 PROCEDURE — 31654 BRONCH EBUS IVNTJ PERPH LES: CPT | Performed by: INTERNAL MEDICINE

## 2018-01-18 PROCEDURE — 25010000002 NEOSTIGMINE PER 0.5 MG: Performed by: NURSE ANESTHETIST, CERTIFIED REGISTERED

## 2018-01-18 PROCEDURE — 87116 MYCOBACTERIA CULTURE: CPT | Performed by: INTERNAL MEDICINE

## 2018-01-18 PROCEDURE — 88173 CYTOPATH EVAL FNA REPORT: CPT | Performed by: INTERNAL MEDICINE

## 2018-01-18 PROCEDURE — 88112 CYTOPATH CELL ENHANCE TECH: CPT | Performed by: INTERNAL MEDICINE

## 2018-01-18 PROCEDURE — 25010000002 ONDANSETRON PER 1 MG: Performed by: NURSE ANESTHETIST, CERTIFIED REGISTERED

## 2018-01-18 PROCEDURE — 31633 BRONCHOSCOPY/NEEDLE BX ADDL: CPT | Performed by: INTERNAL MEDICINE

## 2018-01-18 PROCEDURE — 25010000002 DEXAMETHASONE PER 1 MG: Performed by: NURSE ANESTHETIST, CERTIFIED REGISTERED

## 2018-01-18 PROCEDURE — 83735 ASSAY OF MAGNESIUM: CPT | Performed by: INTERNAL MEDICINE

## 2018-01-18 PROCEDURE — 88342 IMHCHEM/IMCYTCHM 1ST ANTB: CPT | Performed by: INTERNAL MEDICINE

## 2018-01-18 PROCEDURE — 85027 COMPLETE CBC AUTOMATED: CPT | Performed by: INTERNAL MEDICINE

## 2018-01-18 PROCEDURE — 88305 TISSUE EXAM BY PATHOLOGIST: CPT | Performed by: INTERNAL MEDICINE

## 2018-01-18 PROCEDURE — 0BDF8ZX EXTRACTION OF RIGHT LOWER LUNG LOBE, VIA NATURAL OR ARTIFICIAL OPENING ENDOSCOPIC, DIAGNOSTIC: ICD-10-PCS | Performed by: INTERNAL MEDICINE

## 2018-01-18 PROCEDURE — 82330 ASSAY OF CALCIUM: CPT | Performed by: INTERNAL MEDICINE

## 2018-01-18 PROCEDURE — 99233 SBSQ HOSP IP/OBS HIGH 50: CPT | Performed by: INTERNAL MEDICINE

## 2018-01-18 PROCEDURE — 84443 ASSAY THYROID STIM HORMONE: CPT | Performed by: INTERNAL MEDICINE

## 2018-01-18 PROCEDURE — 87102 FUNGUS ISOLATION CULTURE: CPT | Performed by: INTERNAL MEDICINE

## 2018-01-18 PROCEDURE — 31653 BRONCH EBUS SAMPLNG 3/> NODE: CPT | Performed by: INTERNAL MEDICINE

## 2018-01-18 RX ORDER — LIDOCAINE HYDROCHLORIDE 10 MG/ML
0.5 INJECTION, SOLUTION EPIDURAL; INFILTRATION; INTRACAUDAL; PERINEURAL ONCE AS NEEDED
Status: DISCONTINUED | OUTPATIENT
Start: 2018-01-18 | End: 2018-01-18 | Stop reason: HOSPADM

## 2018-01-18 RX ORDER — PROPOFOL 10 MG/ML
VIAL (ML) INTRAVENOUS AS NEEDED
Status: DISCONTINUED | OUTPATIENT
Start: 2018-01-18 | End: 2018-01-18 | Stop reason: SURG

## 2018-01-18 RX ORDER — GLYCOPYRROLATE 0.2 MG/ML
INJECTION INTRAMUSCULAR; INTRAVENOUS AS NEEDED
Status: DISCONTINUED | OUTPATIENT
Start: 2018-01-18 | End: 2018-01-18 | Stop reason: SURG

## 2018-01-18 RX ORDER — SODIUM CHLORIDE 0.9 % (FLUSH) 0.9 %
1-10 SYRINGE (ML) INJECTION AS NEEDED
Status: DISCONTINUED | OUTPATIENT
Start: 2018-01-18 | End: 2018-01-18 | Stop reason: HOSPADM

## 2018-01-18 RX ORDER — ONDANSETRON 2 MG/ML
INJECTION INTRAMUSCULAR; INTRAVENOUS AS NEEDED
Status: DISCONTINUED | OUTPATIENT
Start: 2018-01-18 | End: 2018-01-18 | Stop reason: SURG

## 2018-01-18 RX ORDER — DEXAMETHASONE SODIUM PHOSPHATE 4 MG/ML
INJECTION, SOLUTION INTRA-ARTICULAR; INTRALESIONAL; INTRAMUSCULAR; INTRAVENOUS; SOFT TISSUE AS NEEDED
Status: DISCONTINUED | OUTPATIENT
Start: 2018-01-18 | End: 2018-01-18 | Stop reason: SURG

## 2018-01-18 RX ORDER — FAMOTIDINE 20 MG/1
20 TABLET, FILM COATED ORAL ONCE
Status: DISCONTINUED | OUTPATIENT
Start: 2018-01-18 | End: 2018-01-18

## 2018-01-18 RX ORDER — FAMOTIDINE 10 MG/ML
20 INJECTION, SOLUTION INTRAVENOUS ONCE
Status: DISCONTINUED | OUTPATIENT
Start: 2018-01-18 | End: 2018-01-18 | Stop reason: HOSPADM

## 2018-01-18 RX ORDER — ATRACURIUM BESYLATE 10 MG/ML
INJECTION, SOLUTION INTRAVENOUS AS NEEDED
Status: DISCONTINUED | OUTPATIENT
Start: 2018-01-18 | End: 2018-01-18 | Stop reason: SURG

## 2018-01-18 RX ORDER — ONDANSETRON 2 MG/ML
4 INJECTION INTRAMUSCULAR; INTRAVENOUS ONCE AS NEEDED
Status: DISCONTINUED | OUTPATIENT
Start: 2018-01-18 | End: 2018-01-18 | Stop reason: HOSPADM

## 2018-01-18 RX ORDER — SODIUM CHLORIDE, SODIUM LACTATE, POTASSIUM CHLORIDE, CALCIUM CHLORIDE 600; 310; 30; 20 MG/100ML; MG/100ML; MG/100ML; MG/100ML
9 INJECTION, SOLUTION INTRAVENOUS CONTINUOUS
Status: DISCONTINUED | OUTPATIENT
Start: 2018-01-18 | End: 2018-01-21 | Stop reason: HOSPADM

## 2018-01-18 RX ORDER — SCOLOPAMINE TRANSDERMAL SYSTEM 1 MG/1
1 PATCH, EXTENDED RELEASE TRANSDERMAL
Status: DISCONTINUED | OUTPATIENT
Start: 2018-01-18 | End: 2018-01-18 | Stop reason: HOSPADM

## 2018-01-18 RX ORDER — LIDOCAINE HYDROCHLORIDE 10 MG/ML
INJECTION, SOLUTION INFILTRATION; PERINEURAL AS NEEDED
Status: DISCONTINUED | OUTPATIENT
Start: 2018-01-18 | End: 2018-01-18 | Stop reason: SURG

## 2018-01-18 RX ADMIN — ATRACURIUM BESYLATE 10 MG: 10 INJECTION, SOLUTION INTRAVENOUS at 11:13

## 2018-01-18 RX ADMIN — GLYCOPYRROLATE 0.6 MG: 0.2 INJECTION, SOLUTION INTRAMUSCULAR; INTRAVENOUS at 11:54

## 2018-01-18 RX ADMIN — DEXAMETHASONE SODIUM PHOSPHATE 4 MG: 4 INJECTION, SOLUTION INTRAMUSCULAR; INTRAVENOUS at 10:50

## 2018-01-18 RX ADMIN — PROPOFOL 180 MG: 10 INJECTION, EMULSION INTRAVENOUS at 10:38

## 2018-01-18 RX ADMIN — HYDROCODONE POLISTIREX AND CHLORPHENIRAMINE POLISTIREX 5 ML: 10; 8 SUSPENSION, EXTENDED RELEASE ORAL at 23:33

## 2018-01-18 RX ADMIN — Medication 4 MG: at 11:54

## 2018-01-18 RX ADMIN — ATRACURIUM BESYLATE 30 MG: 10 INJECTION, SOLUTION INTRAVENOUS at 10:39

## 2018-01-18 RX ADMIN — HYDROCODONE BITARTRATE AND ACETAMINOPHEN 1 TABLET: 5; 325 TABLET ORAL at 07:46

## 2018-01-18 RX ADMIN — ONDANSETRON 4 MG: 2 INJECTION INTRAMUSCULAR; INTRAVENOUS at 11:37

## 2018-01-18 RX ADMIN — SCOPALAMINE 1 PATCH: 1 PATCH, EXTENDED RELEASE TRANSDERMAL at 10:24

## 2018-01-18 RX ADMIN — LIDOCAINE HYDROCHLORIDE 100 MG: 10 INJECTION, SOLUTION INFILTRATION; PERINEURAL at 10:38

## 2018-01-18 RX ADMIN — SODIUM CHLORIDE, POTASSIUM CHLORIDE, SODIUM LACTATE AND CALCIUM CHLORIDE 9 ML/HR: 600; 310; 30; 20 INJECTION, SOLUTION INTRAVENOUS at 10:06

## 2018-01-18 NOTE — ANESTHESIA PROCEDURE NOTES
Airway  Urgency: elective    Airway not difficult    General Information and Staff    Patient location during procedure: OR  CRNA: BRODIE LA    Indications and Patient Condition  Indications for airway management: airway protection    Preoxygenated: yes  MILS not maintained throughout  Mask difficulty assessment: 2 - vent by mask + OA or adjuvant +/- NMBA    Final Airway Details  Final airway type: endotracheal airway      Successful airway: ETT  Cuffed: yes   Successful intubation technique: direct laryngoscopy  Facilitating devices/methods: intubating stylet  Endotracheal tube insertion site: oral  Blade: Devon  Blade size: #3  ETT size: 9.0 mm  Cormack-Lehane Classification: grade IIb - view of arytenoids or posterior of glottis only  Placement verified by: chest auscultation and capnometry   Measured from: lips  ETT to lips (cm): 21  Number of attempts at approach: 1    Additional Comments  Negative epigastric sounds, Breath sound equal bilaterally with symmetric chest rise and fall

## 2018-01-18 NOTE — ANESTHESIA POSTPROCEDURE EVALUATION
Patient: Rajan DOMINGUEZ Trish    Procedure Summary     Date Anesthesia Start Anesthesia Stop Room / Location    01/18/18 1032   [facility]       Procedure Diagnosis Surgeon Provider    RANDALL AND BRONCHOSCOPY WITH ENDOBRONCHIAL ULTRASOUND WITH FLUORO (N/A Bronchus) No diagnosis on file. MD Vance Monk MD          Anesthesia Type: general  Last vitals  BP   125/88   Temp   97.2   Pulse   93   Resp   16   SpO2   96     Post Anesthesia Care and Evaluation    Patient location during evaluation: PACU  Patient participation: complete - patient participated  Level of consciousness: awake and alert  Pain score: 0  Pain management: adequate  Airway patency: patent  Anesthetic complications: No anesthetic complications  PONV Status: none  Cardiovascular status: hemodynamically stable and acceptable  Respiratory status: nonlabored ventilation, acceptable and nasal cannula  Hydration status: acceptable

## 2018-01-18 NOTE — ANESTHESIA PREPROCEDURE EVALUATION
Anesthesia Evaluation     Patient summary reviewed and Nursing notes reviewed   history of anesthetic complications: PONV  NPO Solid Status: > 8 hours  NPO Liquid Status: > 8 hours     Airway   Mallampati: II  TM distance: >3 FB  Neck ROM: full  no difficulty expected  Dental - normal exam     Pulmonary    (+) shortness of breath,     ROS comment: Lung nodule  Pneumothorax  Cardiovascular         Neuro/Psych  GI/Hepatic/Renal/Endo      Musculoskeletal     Abdominal    Substance History      OB/GYN          Other                                              Anesthesia Plan    ASA 2     general     intravenous induction   Anesthetic plan and risks discussed with patient.    Plan discussed with CRNA.

## 2018-01-18 NOTE — OP NOTE
Bronchoscopy Procedure Note    Pre-op Diagnosis: Widely metastatic Non-small cell cancer with need for additional tissue    Post-op Diagnosis: Same    Surgeon: Gustavo Haque MD    Anesthesia: Renal anesthesia    Operation: Flexible fiberoptic bronchoscopy with transbronchial biopsies from multiple lobes utilizing navigational bronchoscopy, BAL, brush, and transbronchial needle aspiration from station 7 and station 10 R    Findings: No discrete endobronchial lesions    Specimen(s): Multiple transbronchial biopsies from the right upper lobe and right lower lobe from multiple nodular densities.  BAL and cytology brushing specimen from the right upper lobe.  Transbronchial needle aspiration from station 7 and station 10 R.  Touch prep samples from right upper lobe transbronchial biopsies for immediate intraoperative pathology review    Estimated Blood Loss: Minimal/None    Complications: No immediate    Indications and History:  Rajan Chambers is a 62 y.o. male  with a recent diagnosis of widely metastatic non-small cell cancer.  Additional tissue is required to further establish a treatment plan.  The risks, benefits, complications, treatment options and expected outcomes were discussed with the patient.  The possibilities of reaction to medication, pulmonary aspiration, perforation of a viscus, bleeding, failure to diagnose a condition and creating a complication requiring transfusion or operation were discussed with the patient who freely signed the consent.      Description of Procedure:  The patient was seen in the Holding Room and the site of surgery properly noted/marked.  The patient was taken to the Endoscopy Suite, identified as Rajan Chambers  and the procedure verified as Flexible Fiberoptic Bronchoscopy.  A Time Out was held and the above information confirmed.    After the induction of general anesthesia the patient was intubated with a 9.0 endotracheal tube by the anesthesiologist.    The  therapeutic scope was introduced via the endotracheal tube which confirmed good position of the distal one third of the trachea.    The scope was advanced into the right mainstem bronchus.  The right upper lobe, bronchus intermedius, right middle lobe, right lower lobe, and superior segment of the right lower lobe was evaluated and no endobronchial lesion was identified.  Minimal clear secretions were noted.    The scope was advanced into the left mainstem bronchus.  The left upper lobe, lingula, left lower lobe, and superior segment left lower lobe revealed no endobronchial lesions were identified.  Again, minimal clear secretions.    Utilizing the navigational software and the LG probe, registration was performed.  Then utilizing several pre-plotted courses, the LG probe and the working channel were advanced out to several of the parenchymal lesions that were previously identified on CT scan.  Radial ultrasound was utilized to confirm positioning of the catheter and biopsy forceps within solid tissue.  Multiple transbronchial biopsies were obtained from the right upper lobe, several of which were submitted to pathology for immediate review.  Tumor was identified in the specimens and additional biopsies were obtained for further testing.    Same was pursued for several lesions in the right lower lobe to ensure adequate tissue sampling.  There is minimal bleeding of an 70 active bleeding at the end of this portion of the procedure.    The scope was removed and the endobronchial ultrasound scope was introduced and advanced to station 7 were multiple passes were obtained with a 19-gauge transbronchial needle.  Scope was advanced to station 10 R were additional samples were obtained as well.  Only small lymph nodes were noted at station 4.    Fluoroscopy was used throughout the procedure under my direct supervision without a radiologist present.  Fluoroscopy was also used and the procedure to exclude a  pneumothorax.    The patient tolerated the procedure well and there are no immediate complications.    The Patient was taken to the post op recovery area in satisfactory condition.    Attestation: I performed the procedure    Gustavo Haque MD, [facility]

## 2018-01-18 NOTE — PROGRESS NOTES
Pulmonary Service Follow-up      LOS: 1 day     Mr. Rajan Chambers, 62 y.o. male is followed for: Iatrogenic pneumothorax     Subjective - Interval History     No problem overnight  No air leak from CT    The patient's relevant past medical, surgical and social history were reviewed and updated in Epic as appropriate.     Objective     Medications:    [MAR Hold] cetirizine 10 mg Oral Daily     Intake/Output       01/17/18 0700 - 01/18/18 0659 01/18/18 0700 - 01/19/18 0659    Intake (ml) 240 600    Output (ml) -- --    Net (ml) 240 600    Last Weight 104 kg (230 lb) 104 kg (230 lb)        Vital Sign Min/Max for last 24 hours  Temp  Min: 97.2 °F (36.2 °C)  Max: 99 °F (37.2 °C)   BP  Min: 121/81  Max: 156/100   Pulse  Min: 79  Max: 94   Resp  Min: 16  Max: 20   SpO2  Min: 92 %  Max: 97 %   Flow (L/min)  Min: 2  Max: 4        Physical Exam:   GENERAL: Awake, no distress   HEENT: No discrete palpable adenopathy or thyromegaly   LUNGS: Few rhonchi in the right, no wheezes.  Right chest tube site without subcutaneous emphysema   HEART: Regular rate and rhythm without murmurs   ABDOMEN: Soft, nontender   EXTREMITIES: Palpable pulses.  No edema   NEURO/PSYCH: Awake and alert.  Follows commands.  No deficits      Results from last 7 days  Lab Units 01/18/18  0629 01/17/18  1145   WBC 10*3/mm3 6.88 7.86   HEMOGLOBIN g/dL 16.0 16.0   PLATELETS 10*3/mm3 198 216       Results from last 7 days  Lab Units 01/18/18  0629 01/17/18  1413 01/17/18  1145   SODIUM mmol/L 139  --  140   POTASSIUM mmol/L 4.3  --  4.0   CO2 mmol/L 29.0  --  27.0   BUN mg/dL 14  --  15   CREATININE mg/dL 1.10 1.10 1.10   MAGNESIUM mg/dL 2.0  --   --    PHOSPHORUS mg/dL 3.3  --   --    GLUCOSE mg/dL 124*  --  102*     Estimated Creatinine Clearance: 84.1 mL/min (by C-G formula based on Cr of 1.1).               Images: Lung looks fully expanded on today's film.  Planning CT scan of the chest obtained and reveals bilateral nodule and mass lesions as  previously noted    I reviewed the patient's results and images.     Impression      Hospital Problem List     * (Principal)Iatrogenic pneumothorax (Right)    Non-smoker    Metastatic cancer (Lung and bone mets)               Plan        Plans are for navigational bronchoscopy and transbronchial biopsies to obtain additional specimens for further histopathologic and genetic testing.    Initial plans were for the patient to have his chest tube removed either today or tomorrow and discharge home.  However, the family now tells me that appointment to be seen at [facility] on Tuesday and a plane reservations to fly there over the weekend.  I would not recommend flying with a recent pneumothorax.  Therefore, our plan is to probably leave the chest tube in and send him out with a Heimlich valve and for the chest tube to remain in until any flying has completed or if they want to have it removed at [facility].       I discussed the patient's findings and my recommendations with patient, family and nursing staff       GIBRAN Haque MD  Pulmonary and Critical Care Medicine

## 2018-01-18 NOTE — PLAN OF CARE
Problem: Patient Care Overview (Adult)  Goal: Plan of Care Review  Outcome: Ongoing (interventions implemented as appropriate)   01/18/18 0533   Coping/Psychosocial Response Interventions   Plan Of Care Reviewed With patient   Patient Care Overview   Progress no change   Outcome Evaluation   Outcome Summary/Follow up Plan VSS, pt has chest tube for pneumothorax has been npo since 0000 for navigational bronchoscopy later today.       Problem: Chest Tube Drainage Device (Adult)  Goal: Signs and Symptoms of Listed Potential Problems Will be Absent or Manageable (Chest Tube Drainage Device)  Outcome: Ongoing (interventions implemented as appropriate)

## 2018-01-18 NOTE — PROGRESS NOTES
Discharge Planning Assessment  [facility]     Patient Name: Rajan Chambers  MRN: [id number]  Today's Date: 1/18/2018    Admit Date: 1/17/2018          Discharge Needs Assessment       01/18/18 1456    Living Environment    Lives With spouse    Living Arrangements house    Home Accessibility stairs within home    Number of Stairs to Enter Home 1    Number of Stairs Within Home --   x 2 flights    Type of Financial/Environmental Concern none    Transportation Available car;family or friend will provide    Living Environment    Provides Primary Care For no one    Quality Of Family Relationships supportive;helpful;involved    Able to Return to Prior Living Arrangements yes    Discharge Needs Assessment    Concerns To Be Addressed no discharge needs identified;denies needs/concerns at this time    Readmission Within The Last 30 Days no previous admission in last 30 days    Anticipated Changes Related to Illness none    Equipment Currently Used at Home none    Equipment Needed After Discharge none    Discharge Disposition home or self-care    Discharge Contact Information if Applicable Nelida Chambers, spouse  [phone]  Ana Elise, sister  [phone]            Discharge Plan       01/18/18 0264    Case Management/Social Work Plan    Plan Home    Patient/Family In Agreement With Plan yes    Additional Comments Spoke with patient and family at bedside regarding discharge planning.  Patient denies use of Home Health or DME.  Patient has prescription coverage.  Patient lives with his wife in a multilevel house with 1 step to access.  Patient denies concerns regarding home safety after discharge.  Patient plan is to discharge home via car with family to transport when medically ready.        Discharge Placement     No information found        Expected Discharge Date and Time     Expected Discharge Date Expected Discharge Time    Jan 20, 2018               Demographic Summary       01/18/18 9448    Referral Information     Admission Type inpatient    Arrived From home or self-care    Referral Source admission list    Reason For Consult discharge planning    Record Reviewed clinical discipline documentation;history and physical;patient profile    Primary Care Physician Information    Name Gustavo Clifford MD            Functional Status       01/18/18 0038    Functional Status Current    Current Functional Level Comment Per Nursing Assessment    Functional Status Prior    Ambulation 0-->independent    Transferring 0-->independent    Toileting 0-->independent    Bathing 0-->independent    Dressing 0-->independent    Eating 0-->independent    Communication 0-->understands/communicates without difficulty    Swallowing 0-->swallows foods/liquids without difficulty    IADL    Medications independent    Meal Preparation independent    Housekeeping independent    Laundry independent    Shopping independent    Oral Care independent    Activity Tolerance    Current Activity Limitations none    Usual Activity Tolerance excellent    Current Activity Tolerance moderate    Employment/Financial    Employment/Finance Comments Enrique GUARDADO            Psychosocial     None            Abuse/Neglect     None            Legal     None            Substance Abuse     None            Patient Forms     None          Pari Pastor, RN

## 2018-01-18 NOTE — PAYOR COMM NOTE
"Rajan Dumas (62 y.o. Male)   Ref # [id number]  Tanya Haque RN, BSN  Phone # [phone]  Fax # [phone]    Date of Birth Social Security Number Address Home Phone MRN    1955  [address]  [place] [address] [phone] [id number]    Christianity Marital Status          Alevism        Admission Date Admission Type Admitting Provider Attending Provider Department, Room/Bed    1/17/18 Emergency Gustavo Haque MD Thompson, John Randall, MD [facility] 6A, N614/1    Discharge Date Discharge Disposition Discharge Destination                      Attending Provider: Gustavo Haque MD     Allergies:  Ibuprofen, Penicillins, Sulfa Antibiotics    Isolation:  None   Infection:  None   Code Status:  FULL    Ht:  177.8 cm (70\")   Wt:  104 kg (230 lb)    Admission Cmt:  None   Principal Problem:  Iatrogenic pneumothorax (Right) [J95.811]                 Active Insurance as of 1/17/2018     Primary Coverage     Payor Plan Insurance Group Employer/Plan Group    Critical access hospital BLUE CROSS [id number]     Payor Plan Address Payor Plan Phone Number Effective From Effective To    [address]  1/1/2016     [place] [address]       Subscriber Name Subscriber Birth Date Member ID       RAJAN DUMAS 1955 [id number]                 Emergency Contacts      (Rel.) Home Phone Work Phone Mobile Phone    Marci Dumas (Spouse) -- -- [phone]    Ana Elise (Sister) -- -- [phone]            History & Physical     No notes of this type exist for this encounter.        Emergency Department Notes     No notes of this type exist for this encounter.           Operative/Procedure Notes (last 72 hours) (Notes from 1/15/2018  3:02 PM through 1/18/2018  3:02 PM)      Gustavo Haque MD at 1/18/2018 12:45 PM  Version 1 of 1         Bronchoscopy Procedure Note    Pre-op Diagnosis: Widely metastatic Non-small cell cancer with need for additional " tissue    Post-op Diagnosis: Same    Surgeon: Gustavo Haque MD    Anesthesia: Renal anesthesia    Operation: Flexible fiberoptic bronchoscopy with transbronchial biopsies from multiple lobes utilizing navigational bronchoscopy, BAL, brush, and transbronchial needle aspiration from station 7 and station 10 R    Findings: No discrete endobronchial lesions    Specimen(s): Multiple transbronchial biopsies from the right upper lobe and right lower lobe from multiple nodular densities.  BAL and cytology brushing specimen from the right upper lobe.  Transbronchial needle aspiration from station 7 and station 10 R.  Touch prep samples from right upper lobe transbronchial biopsies for immediate intraoperative pathology review    Estimated Blood Loss: Minimal/None    Complications: No immediate    Indications and History:  Rajan Chambers is a 62 y.o. male  with a recent diagnosis of widely metastatic non-small cell cancer.  Additional tissue is required to further establish a treatment plan.  The risks, benefits, complications, treatment options and expected outcomes were discussed with the patient.  The possibilities of reaction to medication, pulmonary aspiration, perforation of a viscus, bleeding, failure to diagnose a condition and creating a complication requiring transfusion or operation were discussed with the patient who freely signed the consent.      Description of Procedure:  The patient was seen in the Holding Room and the site of surgery properly noted/marked.  The patient was taken to the Endoscopy Suite, identified as Rajan Chambers  and the procedure verified as Flexible Fiberoptic Bronchoscopy.  A Time Out was held and the above information confirmed.    After the induction of general anesthesia the patient was intubated with a 9.0 endotracheal tube by the anesthesiologist.    The therapeutic scope was introduced via the endotracheal tube which confirmed good position of the distal one third of the  trachea.    The scope was advanced into the right mainstem bronchus.  The right upper lobe, bronchus intermedius, right middle lobe, right lower lobe, and superior segment of the right lower lobe was evaluated and no endobronchial lesion was identified.  Minimal clear secretions were noted.    The scope was advanced into the left mainstem bronchus.  The left upper lobe, lingula, left lower lobe, and superior segment left lower lobe revealed no endobronchial lesions were identified.  Again, minimal clear secretions.    Utilizing the navigational software and the LG probe, registration was performed.  Then utilizing several pre-plotted courses, the LG probe and the working channel were advanced out to several of the parenchymal lesions that were previously identified on CT scan.  Radial ultrasound was utilized to confirm positioning of the catheter and biopsy forceps within solid tissue.  Multiple transbronchial biopsies were obtained from the right upper lobe, several of which were submitted to pathology for immediate review.  Tumor was identified in the specimens and additional biopsies were obtained for further testing.    Same was pursued for several lesions in the right lower lobe to ensure adequate tissue sampling.  There is minimal bleeding of an 70 active bleeding at the end of this portion of the procedure.    The scope was removed and the endobronchial ultrasound scope was introduced and advanced to station 7 were multiple passes were obtained with a 19-gauge transbronchial needle.  Scope was advanced to station 10 R were additional samples were obtained as well.  Only small lymph nodes were noted at station 4.    Fluoroscopy was used throughout the procedure under my direct supervision without a radiologist present.  Fluoroscopy was also used and the procedure to exclude a pneumothorax.    The patient tolerated the procedure well and there are no immediate complications.    The Patient was taken to the post  op recovery area in satisfactory condition.    Attestation: I performed the procedure    [facility], [facility]         Electronically signed by Gustavo Haque MD at 1/18/2018 12:52 PM           Physician Progress Notes (last 72 hours) (Notes from 1/15/2018  3:02 PM through 1/18/2018  3:02 PM)      Gustavo Haque MD at 1/18/2018 12:40 PM  Version 1 of 1         Pulmonary Service Follow-up      LOS: 1 day     Mr. Rajan Chambers, 62 y.o. male is followed for: Iatrogenic pneumothorax     Subjective - Interval History     No problem overnight  No air leak from CT    The patient's relevant past medical, surgical and social history were reviewed and updated in Epic as appropriate.     Objective     Medications:    [MAR Hold] cetirizine 10 mg Oral Daily     Intake/Output       01/17/18 0700 - 01/18/18 0659 01/18/18 0700 - 01/19/18 0659    Intake (ml) 240 600    Output (ml) -- --    Net (ml) 240 600    Last Weight 104 kg (230 lb) 104 kg (230 lb)        Vital Sign Min/Max for last 24 hours  Temp  Min: 97.2 °F (36.2 °C)  Max: 99 °F (37.2 °C)   BP  Min: 121/81  Max: 156/100   Pulse  Min: 79  Max: 94   Resp  Min: 16  Max: 20   SpO2  Min: 92 %  Max: 97 %   Flow (L/min)  Min: 2  Max: 4        Physical Exam:   GENERAL: Awake, no distress   HEENT: No discrete palpable adenopathy or thyromegaly   LUNGS: Few rhonchi in the right, no wheezes.  Right chest tube site without subcutaneous emphysema   HEART: Regular rate and rhythm without murmurs   ABDOMEN: Soft, nontender   EXTREMITIES: Palpable pulses.  No edema   NEURO/PSYCH: Awake and alert.  Follows commands.  No deficits      Results from last 7 days  Lab Units 01/18/18  0629 01/17/18  1145   WBC 10*3/mm3 6.88 7.86   HEMOGLOBIN g/dL 16.0 16.0   PLATELETS 10*3/mm3 198 216       Results from last 7 days  Lab Units 01/18/18  0629 01/17/18  1413 01/17/18  1145   SODIUM mmol/L 139  --  140   POTASSIUM mmol/L 4.3  --  4.0   CO2 mmol/L 29.0  --  27.0   BUN mg/dL 14   --  15   CREATININE mg/dL 1.10 1.10 1.10   MAGNESIUM mg/dL 2.0  --   --    PHOSPHORUS mg/dL 3.3  --   --    GLUCOSE mg/dL 124*  --  102*     Estimated Creatinine Clearance: 84.1 mL/min (by C-G formula based on Cr of 1.1).               Images: Lung looks fully expanded on today's film.  Planning CT scan of the chest obtained and reveals bilateral nodule and mass lesions as previously noted    I reviewed the patient's results and images.     Impression      Hospital Problem List     * (Principal)Iatrogenic pneumothorax (Right)    Non-smoker    Metastatic cancer (Lung and bone mets)               Plan        Plans are for navigational bronchoscopy and transbronchial biopsies to obtain additional specimens for further histopathologic and genetic testing.    Initial plans were for the patient to have his chest tube removed either today or tomorrow and discharge home.  However, the family now tells me that appointment to be seen at [facility] on Tuesday and a plane reservations to fly there over the weekend.  I would not recommend flying with a recent pneumothorax.  Therefore, our plan is to probably leave the chest tube in and send him out with a Heimlich valve and for the chest tube to remain in until any flying has completed or if they want to have it removed at [facility].       I discussed the patient's findings and my recommendations with patient, family and nursing staff       GIBRAN Haque MD  Pulmonary and Critical Care Medicine        Electronically signed by Gustavo Haque MD at 1/18/2018 12:44 PM        Consult Notes (last 72 hours) (Notes from 1/15/2018  3:02 PM through 1/18/2018  3:02 PM)     No notes of this type exist for this encounter.

## 2018-01-18 NOTE — PROGRESS NOTES
Received phone call from SAMMIE Cohn in medical oncology. She requested navigator obtain medical records from both [facility] and [facility] for patient to take to [facility]. Talked with Mariana in HIM to have medical records put on disc. She will fax records release request to 6A to have patient sign. Spoke with Magda in radiology to have imaging placed on disc, this will be done and disc delivered to patient. Also spoke with Jona in HIM at [facility], he will fax release form to 6A for patient to sign and will mail records to patient's home. Notified SAMMIE Olvera on 6A of plan, she v/u and will have patient sign forms then fax back to designated locations. Patient will be able to  [facility] records at discharge so all information is available. Nurse navigator informed patient of this and introduced role as well as provided contact information.

## 2018-01-18 NOTE — PROGRESS NOTES
"Adult Nutrition  Assessment/PES    Patient Name:  Rajan Chambers  YOB: 1955  MRN: [id number]  Admit Date:  1/17/2018    Assessment Date:  1/18/2018    Comments:            Reason for Assessment       01/18/18 1146    Reason for Assessment    Reason For Assessment/Visit identified at risk by screening criteria    Identified At Risk By Screening Criteria MST SCORE 2+   PT REPORTS HIS APPETITE & INTAKE HAS BEEN GOOD. HE CLARIFIED THE \"UNSURE IF UNPLANNED WEIGHT LOSS\". HE REPORTS HE MAY HAVE LOST 3-4# DURING HIS RECENT DX OF CANCER DUE TO STRESS OF THIS INFORMATION. HE REPORTS APPETITE NORMAL NOW; THEREFORE, PT DOES NOT     Time Spent (min) 20   MEET CRITERIA FOR FURTHER SCREENING & WILL SEE PER PROTOCOL                            Problem/Interventions:                      Electronically signed by:  Nelida Disla RD  01/18/18 2:19 PM  "

## 2018-01-19 ENCOUNTER — APPOINTMENT (OUTPATIENT)
Dept: CT IMAGING | Facility: HOSPITAL | Age: 63
End: 2018-01-19

## 2018-01-19 ENCOUNTER — APPOINTMENT (OUTPATIENT)
Dept: GENERAL RADIOLOGY | Facility: HOSPITAL | Age: 63
End: 2018-01-19

## 2018-01-19 PROCEDURE — 94760 N-INVAS EAR/PLS OXIMETRY 1: CPT

## 2018-01-19 PROCEDURE — 99233 SBSQ HOSP IP/OBS HIGH 50: CPT | Performed by: INTERNAL MEDICINE

## 2018-01-19 PROCEDURE — C1729 CATH, DRAINAGE: HCPCS

## 2018-01-19 PROCEDURE — 94799 UNLISTED PULMONARY SVC/PX: CPT

## 2018-01-19 PROCEDURE — 71045 X-RAY EXAM CHEST 1 VIEW: CPT

## 2018-01-19 PROCEDURE — 77012 CT SCAN FOR NEEDLE BIOPSY: CPT

## 2018-01-19 PROCEDURE — 94640 AIRWAY INHALATION TREATMENT: CPT

## 2018-01-19 PROCEDURE — 0W9930Z DRAINAGE OF RIGHT PLEURAL CAVITY WITH DRAINAGE DEVICE, PERCUTANEOUS APPROACH: ICD-10-PCS | Performed by: RADIOLOGY

## 2018-01-19 RX ADMIN — ALBUTEROL SULFATE 2.5 MG: 2.5 SOLUTION RESPIRATORY (INHALATION) at 23:03

## 2018-01-19 RX ADMIN — ALBUTEROL SULFATE 2.5 MG: 2.5 SOLUTION RESPIRATORY (INHALATION) at 03:23

## 2018-01-19 RX ADMIN — HYDROCODONE BITARTRATE AND ACETAMINOPHEN 1 TABLET: 5; 325 TABLET ORAL at 17:05

## 2018-01-19 RX ADMIN — HYDROCODONE BITARTRATE AND ACETAMINOPHEN 1 TABLET: 5; 325 TABLET ORAL at 22:51

## 2018-01-19 RX ADMIN — CETIRIZINE HYDROCHLORIDE 10 MG: 10 TABLET, FILM COATED ORAL at 08:17

## 2018-01-19 NOTE — PLAN OF CARE
Problem: Patient Care Overview (Adult)  Goal: Plan of Care Review  Outcome: Ongoing (interventions implemented as appropriate)   01/19/18 0808   Coping/Psychosocial Response Interventions   Plan Of Care Reviewed With patient   Patient Care Overview   Progress progress toward functional goals as expected   Outcome Evaluation   Outcome Summary/Follow up Plan Patient had no complaints of pain this shift, did complain of feeling SOA at times was concerned about wearing the 02, educated patient that it was for comfort after having procedure., also rcvd prn neb treatment and did appear to be able to sleep. At shift change did report that he was feeling better.

## 2018-01-19 NOTE — PROCEDURES
Radiology Procedure    Pre-procedure: Large right pneumothorax with leftward mediastinal shift.     Procedure Performed: CT-guided right chest tube placement.     IV Sedation and/or Anesthesia:  No    Complications: None    Preliminary Findings: Large right PTX    Specimen Removed: None.     Estimated Blood Loss:  0ml    Post-Procedure Diagnosis: Right anterolateral chest tube terminating near right lung apex.     Post-Procedure Plan: Patient to return to ordering physician and CT surgery recommendations with instructions for suction given by nursing communication.     Standard Discharge Instructions Given:yes     Jordi Stroud DO  01/19/18  4:07 PM

## 2018-01-19 NOTE — PROGRESS NOTES
Pulmonary Service Follow-up      LOS: 2 days     Mr. Rajan Chambers, 62 y.o. male is followed for: Iatrogenic pneumothorax     Subjective - Interval History     No problems overnight  No air leak from chest tube this morning  I flushed the chest tube with saline and didn't get any air back.  I attached chest tube to the [facility] and ordered a FU CXR at 1300.    The patient's relevant past medical, surgical and social history were reviewed and updated in Epic as appropriate.     Objective     Medications:    cetirizine 10 mg Oral Daily     Intake/Output       01/18/18 0700 - 01/19/18 0659 01/19/18 0700 - 01/20/18 0659    Intake (ml) 600 360    Output (ml) -- --    Net (ml) 600 360    Last Weight 104 kg (230 lb) --        Vital Sign Min/Max for last 24 hours  Temp  Min: 97.2 °F (36.2 °C)  Max: 98.3 °F (36.8 °C)   BP  Min: 131/82  Max: 188/92   Pulse  Min: 64  Max: 119   Resp  Min: 18  Max: 28   SpO2  Min: 92 %  Max: 98 %   Flow (L/min)  Min: 2  Max: 3        Physical Exam:   GENERAL: Awake, no distress    HEENT: No adenopathy or thyromegaly   LUNGS: Decreased breath sounds a left compared to the right.  No palpable subcutaneous emphysema.  Chest tube site okay   HEART: Regular rate and rhythm   ABDOMEN: Obese, soft, nontender   EXTREMITIES: No edema or cyanosis   NEURO/PSYCH: Awake and alert.  Follows commands.  No deficits      Results from last 7 days  Lab Units 01/18/18  0629 01/17/18  1145   WBC 10*3/mm3 6.88 7.86   HEMOGLOBIN g/dL 16.0 16.0   PLATELETS 10*3/mm3 198 216       Results from last 7 days  Lab Units 01/18/18  0629 01/17/18  1413 01/17/18  1145   SODIUM mmol/L 139  --  140   POTASSIUM mmol/L 4.3  --  4.0   CO2 mmol/L 29.0  --  27.0   BUN mg/dL 14  --  15   CREATININE mg/dL 1.10 1.10 1.10   MAGNESIUM mg/dL 2.0  --   --    PHOSPHORUS mg/dL 3.3  --   --    GLUCOSE mg/dL 124*  --  102*     Estimated Creatinine Clearance: 84.1 mL/min (by C-G formula based on Cr of 1.1).               Images: Chest  x-ray done at 1300 hrs. reveals total atelectasis of the right lung.  The right chest tube does not appear to be in the thorax any longer    I reviewed the patient's results and images.     Impression      Hospital Problem List     * (Principal)Iatrogenic pneumothorax (Right)    Non-smoker    Metastatic cancer (Lung and bone mets)               Plan        Recurrent right pneumothorax.  I talked with Dr. Petty whose can replace the chest tube with a longer chest tube.  At this point I think it's unlikely the patient's can be able to make his trip to [place] by Tuesday   Discussed with the patient and his family at the bedside.       I discussed the patient's findings and my recommendations with patient, family and consulting provider       GIBRAN Haque MD  Pulmonary and Critical Care Medicine

## 2018-01-19 NOTE — NURSING NOTE
Initial scans completed. Patient has significant difficulty with positioning precipitated by coughing. Some subcutaneous air noted at the insertion site. Existing tube was removed as previously noted by Dr. Stroud after review of scans and prior to beginning the case.

## 2018-01-20 ENCOUNTER — APPOINTMENT (OUTPATIENT)
Dept: GENERAL RADIOLOGY | Facility: HOSPITAL | Age: 63
End: 2018-01-20

## 2018-01-20 LAB
BACTERIA SPEC RESP CULT: NORMAL
BACTERIA SPEC RESP CULT: NORMAL
GRAM STN SPEC: NORMAL

## 2018-01-20 PROCEDURE — 71045 X-RAY EXAM CHEST 1 VIEW: CPT

## 2018-01-20 PROCEDURE — 25010000002 ENOXAPARIN PER 10 MG: Performed by: INTERNAL MEDICINE

## 2018-01-20 PROCEDURE — 99233 SBSQ HOSP IP/OBS HIGH 50: CPT | Performed by: INTERNAL MEDICINE

## 2018-01-20 RX ORDER — HYDROCODONE BITARTRATE AND ACETAMINOPHEN 5; 325 MG/1; MG/1
1 TABLET ORAL EVERY 6 HOURS PRN
Qty: 30 TABLET | Refills: 0 | Status: SHIPPED | OUTPATIENT
Start: 2018-01-20 | End: 2018-01-20

## 2018-01-20 RX ORDER — ALBUTEROL SULFATE 90 UG/1
2 AEROSOL, METERED RESPIRATORY (INHALATION) EVERY 4 HOURS PRN
Qty: 1 INHALER | Refills: 0 | Status: SHIPPED | OUTPATIENT
Start: 2018-01-20 | End: 2018-03-28

## 2018-01-20 RX ORDER — ALPRAZOLAM 1 MG/1
1 TABLET ORAL 2 TIMES DAILY PRN
Qty: 30 TABLET | Refills: 0 | Status: SHIPPED | OUTPATIENT
Start: 2018-01-20 | End: 2018-01-20

## 2018-01-20 RX ORDER — ALPRAZOLAM 1 MG/1
1 TABLET ORAL 2 TIMES DAILY PRN
Qty: 30 TABLET | Refills: 0 | Status: SHIPPED | OUTPATIENT
Start: 2018-01-20 | End: 2018-03-28 | Stop reason: ALTCHOICE

## 2018-01-20 RX ORDER — HYDROCODONE BITARTRATE AND ACETAMINOPHEN 5; 325 MG/1; MG/1
1 TABLET ORAL EVERY 6 HOURS PRN
Qty: 30 TABLET | Refills: 0 | Status: SHIPPED | OUTPATIENT
Start: 2018-01-20 | End: 2018-01-27

## 2018-01-20 RX ORDER — ALPRAZOLAM 1 MG/1
1 TABLET ORAL 3 TIMES DAILY PRN
Qty: 60 TABLET | Refills: 2 | OUTPATIENT
Start: 2018-01-20 | End: 2018-03-28 | Stop reason: ALTCHOICE

## 2018-01-20 RX ADMIN — HYDROCODONE BITARTRATE AND ACETAMINOPHEN 1 TABLET: 5; 325 TABLET ORAL at 23:00

## 2018-01-20 RX ADMIN — ALPRAZOLAM 1 MG: 1 TABLET ORAL at 03:06

## 2018-01-20 RX ADMIN — HYDROCODONE BITARTRATE AND ACETAMINOPHEN 1 TABLET: 5; 325 TABLET ORAL at 07:23

## 2018-01-20 RX ADMIN — ALPRAZOLAM 1 MG: 1 TABLET ORAL at 23:00

## 2018-01-20 RX ADMIN — HYDROCODONE BITARTRATE AND ACETAMINOPHEN 1 TABLET: 5; 325 TABLET ORAL at 13:51

## 2018-01-20 RX ADMIN — CETIRIZINE HYDROCHLORIDE 10 MG: 10 TABLET, FILM COATED ORAL at 09:38

## 2018-01-20 RX ADMIN — ENOXAPARIN SODIUM 40 MG: 40 INJECTION SUBCUTANEOUS at 09:38

## 2018-01-20 NOTE — PLAN OF CARE
Problem: Patient Care Overview (Adult)  Goal: Plan of Care Review  Outcome: Ongoing (interventions implemented as appropriate)   01/20/18 0532   Coping/Psychosocial Response Interventions   Plan Of Care Reviewed With patient   Patient Care Overview   Progress progress toward functional goals as expected   Outcome Evaluation   Outcome Summary/Follow up Plan VSS, patient requested pain medication r/t new chest tube insertion. 0300 patient called for RN feeling a little anxious worried if his lung had collapsed again. ARN listened to lung sounds could hear sound bi-laterally the R side a little diminished. PRn anxiety medicine given and patient was able to relax and sleep        Problem: Chest Tube Drainage Device (Adult)  Goal: Signs and Symptoms of Listed Potential Problems Will be Absent or Manageable (Chest Tube Drainage Device)  Outcome: Ongoing (interventions implemented as appropriate)      Problem: Pain, Acute (Adult)  Goal: Identify Related Risk Factors and Signs and Symptoms  Outcome: Ongoing (interventions implemented as appropriate)    Goal: Acceptable Pain Control/Comfort Level  Outcome: Ongoing (interventions implemented as appropriate)      Problem: Anxiety (Adult)  Goal: Reduction/Resolution  Outcome: Ongoing (interventions implemented as appropriate)

## 2018-01-20 NOTE — PROGRESS NOTES
Pulmonary Service Follow-up      LOS: 3 days     Mr. Rajan Chambers, 62 y.o. male is followed for: Iatrogenic pneumothorax     Subjective - Interval History     No significant pain at chest tube site  Overall, dyspnea improved  No air leak from chest tube    The patient's relevant past medical, surgical and social history were reviewed and updated in Epic as appropriate.     Objective     Medications:    cetirizine 10 mg Oral Daily   enoxaparin 40 mg Subcutaneous Q24H     Intake/Output       01/19/18 0700 - 01/20/18 0659 01/20/18 0700 - 01/21/18 0659    Intake (ml) 360 720    Output (ml) -- 900    Net (ml) 360 -180        Vital Sign Min/Max for last 24 hours  Temp  Min: 97.6 °F (36.4 °C)  Max: 98.2 °F (36.8 °C)   BP  Min: 115/86  Max: 145/88   Pulse  Min: 74  Max: 98   Resp  Min: 16  Max: 18   SpO2  Min: 91 %  Max: 95 %   Flow (L/min)  Min: 2  Max: 4        Physical Exam:   GENERAL: Awake, no distress   HEENT: No adenopathy or thyromegaly   LUNGS: Decreased breath sounds bilaterally without wheezes.  No significant subcutaneous emphysema   HEART: Regular rate and rhythm without murmurs   ABDOMEN: Obese, soft, nontender   EXTREMITIES: Trace edema.  No cyanosis   NEURO/PSYCH: Awake and alert.  Follows commands      Results from last 7 days  Lab Units 01/18/18  0629 01/17/18  1145   WBC 10*3/mm3 6.88 7.86   HEMOGLOBIN g/dL 16.0 16.0   PLATELETS 10*3/mm3 198 216       Results from last 7 days  Lab Units 01/18/18  0629 01/17/18  1413 01/17/18  1145   SODIUM mmol/L 139  --  140   POTASSIUM mmol/L 4.3  --  4.0   CO2 mmol/L 29.0  --  27.0   BUN mg/dL 14  --  15   CREATININE mg/dL 1.10 1.10 1.10   MAGNESIUM mg/dL 2.0  --   --    PHOSPHORUS mg/dL 3.3  --   --    GLUCOSE mg/dL 124*  --  102*     Estimated Creatinine Clearance: 84.1 mL/min (by C-G formula based on Cr of 1.1).               Images: Chest x-ray reveals no pneumothorax    I reviewed the patient's results and images.     [facility] Problem List     *  "(Principal)Iatrogenic pneumothorax (Right)    Non-smoker    Metastatic cancer (Lung and bone mets)               Plan        Throughout the day I have changed him to waterseal and then subsequently \"clamped\" the chest tube.  So far lung remains expanded.  I would like to follow him for at least 12 hours with the chest tube clamped before removing it in case there is a \"slow leak\".  At this point he is planning on driving to [place].  As he will be going to altitude I think the safest course of action is to pull this chest tube before travel  The last chest tube that he had became displaced and resulted in a recurrent pneumothorax even though he was being monitored in the hospital and moving around very little  Although this current chest tube is probably infarct within the last one, I'm still concerned that it may become displaced as he travels and again, since he isn't going to altitude, I think removing the chest tube is a safest course of action.    If his lung remains expanded at 12 hours, we'll pull the chest tube and then do a follow-up chest x-ray and probably discharge him tomorrow.     I discussed the patient's findings and my recommendations with patient, family and nursing staff       GIBRAN Haque MD  Pulmonary and Critical Care Medicine     "

## 2018-01-21 ENCOUNTER — APPOINTMENT (OUTPATIENT)
Dept: GENERAL RADIOLOGY | Facility: HOSPITAL | Age: 63
End: 2018-01-21

## 2018-01-21 VITALS
HEART RATE: 85 BPM | TEMPERATURE: 97.9 F | RESPIRATION RATE: 16 BRPM | WEIGHT: 230 LBS | DIASTOLIC BLOOD PRESSURE: 74 MMHG | HEIGHT: 70 IN | SYSTOLIC BLOOD PRESSURE: 118 MMHG | BODY MASS INDEX: 32.93 KG/M2 | OXYGEN SATURATION: 93 %

## 2018-01-21 PROCEDURE — 71045 X-RAY EXAM CHEST 1 VIEW: CPT

## 2018-01-21 PROCEDURE — 99239 HOSP IP/OBS DSCHRG MGMT >30: CPT | Performed by: INTERNAL MEDICINE

## 2018-01-21 PROCEDURE — 25010000002 ENOXAPARIN PER 10 MG: Performed by: INTERNAL MEDICINE

## 2018-01-21 RX ADMIN — ENOXAPARIN SODIUM 40 MG: 40 INJECTION SUBCUTANEOUS at 10:10

## 2018-01-21 RX ADMIN — HYDROCODONE BITARTRATE AND ACETAMINOPHEN 1 TABLET: 5; 325 TABLET ORAL at 10:51

## 2018-01-21 RX ADMIN — CETIRIZINE HYDROCHLORIDE 10 MG: 10 TABLET, FILM COATED ORAL at 10:10

## 2018-01-21 RX ADMIN — ALPRAZOLAM 1 MG: 1 TABLET ORAL at 10:51

## 2018-01-21 NOTE — DISCHARGE SUMMARY
DISCHARGE SUMMARY     Admit date: 1/17/2018  Date of Discharge:  1/21/2018    Discharge Diagnoses  Hospital Problem List     * (Principal)Iatrogenic pneumothorax (Right)    Non-smoker    Metastatic cancer (Lung and bone mets)          Past Surgical History:   Procedure Laterality Date   • BRONCHOSCOPY N/A 1/18/2018    Procedure: RANDALL AND BRONCHOSCOPY WITH ENDOBRONCHIAL ULTRASOUND WITH FLUORO;  Surgeon: Gustavo Haque MD;  Location: [facility];  Service:    • COLLATERAL LIGAMENT REPAIR, KNEE     • KNEE ARTHROSCOPY     • LASIK     • LUNG BIOPSY Right 01/10/2018    Dr. Petty @ [facility]   • SKIN CANCER EXCISION Bilateral     BASAL CELL ON NECK YESTERDAY       History of Present Illness    Patient is a 62 y.o. male presented with: Iatrogenic pneumothorax    62-year-old white male who was initially evaluated for bilateral pulmonary lesions.  He underwent a CT FNA of the largest right-sided lesion about 10 days ago.  This was accomplished without difficulty and he had no immediate complications.  Pathology was consistent with a non-small cell carcinoma but inadequate tissue specimen for molecular testing.    The patient came back for further staging including a PET scan and an MRI of the brain.  He was found to have a large right-sided pneumothorax that was presumably a late complication from his CT FNA.  Other than some modest dyspnea, the patient was relatively asymptomatic.  The patient was admitted on 1/17/2018 for a chest tube for his iatrogenic right pneumothorax.    Hospital Course    The patient underwent a CT directed chest tube placement and was admitted to the hospital.  Subsequent chest x-rays revealed complete expansion of the lungs and no air leak.  He underwent a navigational bronchoscopy with transbronchial biopsies and endobronchial ultrasound with transbronchial needle aspiration.  All specimens revealed malignancy consistent with adenocarcinoma and tissue was submitted for molecular  testing.    Initially, the plan was for the patient to fly to [facility] for evaluation and treatment.  Given his pneumothorax, I had intended on leaving his chest tube in and placing it to a Heimlich valve for him to travel  However, overnight, the chest tube became displaced and the pneumothorax reaccumulated and a new chest tube had to be placed.  At this point, the patient changes plans and will be traveling by car and not going to altitude/flying.    Therefore, I felt it was best to try and get the chest tube out for travel.  He was transitioned to water seal and then subsequently clamped for 16 hours with no evidence of reaccumulation of pneumothorax.  The chest tube was removed without difficulty and follow-up chest x-ray revealed no pneumothorax.    Discharge medications are as listed below and primarily for symptoms.  The patient family were repeatedly counseled on care to be taken given the patient's recent pneumothorax including no heavy lifting, carrying, bearing down.  Also, not to go to altitude.  Current driving plans for getting to [place] should not take him to any significant altitude.  It was recommended the patient stop frequently, every one and a half hours, while traveling to [place] to get up out of the car and walk around  He will be given a dose of Lovenox today for DVT prophylaxis prior to leaving the hospital    He has plans to follow-up with Dr. Poe on return      Procedures Performed: Procedure(s):  RANDALL AND BRONCHOSCOPY WITH ENDOBRONCHIAL ULTRASOUND WITH YQLTRK20/19 1607 CT GUIDED CHEST TUBE PLACEMENT    Consults:     Pertinent Test Results:     Results from last 7 days  Lab Units 01/18/18  0629   WBC 10*3/mm3 6.88   HEMOGLOBIN g/dL 16.0   HEMATOCRIT % 49.0   PLATELETS 10*3/mm3 198       Results from last 7 days  Lab Units 01/18/18  0629 01/17/18  1413 01/17/18  1145   SODIUM mmol/L 139  --  140   POTASSIUM mmol/L 4.3  --  4.0   CHLORIDE mmol/L 107  --  105   CO2 mmol/L 29.0  --  27.0    BUN mg/dL 14  --  15   CREATININE mg/dL 1.10 1.10 1.10   GLUCOSE mg/dL 124*  --  102*   CALCIUM mg/dL 9.4  --  9.3   PHOSPHORUS mg/dL 3.3  --   --            Condition on Discharge:  Improved/Stable    Discharge Disposition: Home or Self Care    Discharge Medications:  Trish, Rajan ALBERTO   Home Medication Instructions ANASTASIIA:[id number]    Printed on:01/21/18 1011   Medication Information                      albuterol (PROVENTIL HFA;VENTOLIN HFA) 108 (90 Base) MCG/ACT inhaler  Inhale 2 puffs Every 4 (Four) Hours As Needed for Wheezing.             ALPRAZolam (XANAX) 1 MG tablet  Take 1 tablet by mouth 3 (Three) Times a Day As Needed for Anxiety.             ALPRAZolam (XANAX) 1 MG tablet  Take 1 tablet by mouth 2 (Two) Times a Day As Needed for Anxiety.             cetirizine (zyrTEC) 10 MG tablet  Take 10 mg by mouth Daily.             HYDROcod Polst-CPM Polst ER (TUSSIONEX PENNKINETIC ER) 10-8 MG/5ML ER suspension  Take 5 mL by mouth Every 12 (Twelve) Hours As Needed for Cough for up to 20 doses.             HYDROcodone-acetaminophen (NORCO) 5-325 MG per tablet  Take 1 tablet by mouth Every 6 (Six) Hours As Needed for Moderate Pain  for up to 7 days.                 Discharge Diet: Regular    Activity at Discharge: No lifting, carrying, bearing down, going to altitude, flying    Follow-up Appointments  Future Appointments  Date Time Provider Department Center   1/30/2018 8:30 AM Gustavo Catalan MD [facility]         Test Results Pending at Discharge   Order Current Status    Fungus Culture - Wash, Bronchus In process    Fungus Smear - Wash, Bronchus In process    Non-gynecologic Cytology - Brushing, Lung, Right Lower Lobe In process    AFB Culture - Wash, Bronchus Preliminary result    Tissue Pathology Exam - Tissue, Lung, Right Upper Lobe Preliminary result           Gustavo Haque MD  01/21/18  10:11 AM    Discharge Time Spent: 40 Minutes

## 2018-01-21 NOTE — SIGNIFICANT NOTE
0500 CXR w/o evidence of PTX.      CT pulled & occlusive dressing applied.      Will follow up w/ 0800 CXR.

## 2018-01-22 ENCOUNTER — TELEPHONE (OUTPATIENT)
Dept: ONCOLOGY | Facility: CLINIC | Age: 63
End: 2018-01-22

## 2018-01-22 LAB
LAB AP CASE REPORT: NORMAL
Lab: NORMAL
PATH REPORT.FINAL DX SPEC: NORMAL

## 2018-01-22 NOTE — PAYOR COMM NOTE
"Rajan Dumas (62 y.o. Male)   Ref # [id number]  Tanya Haque RN, BSN  Phone # [phone]  Fax # [phone]    Date of Birth Social Security Number Address Home Phone MRN    1955  [address]  [place] [address] [phone] [id number]    [place] Marital Status          Nondenominational        Admission Date Admission Type Admitting Provider Attending Provider Department, Room/Bed    1/17/18 Emergency Gustavo Haque MD  [facility] 6A, N614/1    Discharge Date Discharge Disposition Discharge Destination        1/21/2018 Home or Self Care             Attending Provider: (none)    Allergies:  Ibuprofen, Penicillins, Sulfa Antibiotics    Isolation:  None   Infection:  None   Code Status:  Prior    Ht:  177.8 cm (70\")   Wt:  104 kg (230 lb)    Admission Cmt:  None   Principal Problem:  Iatrogenic pneumothorax (Right) [J95.811]                 Active Insurance as of 1/17/2018     Primary Coverage     Payor Plan Insurance Group Employer/Plan Group    [facility] BLUE CROSS [id number]     Payor Plan Address Payor Plan Phone Number Effective From Effective To    [address]  1/1/2016     [place] [address]       Subscriber Name Subscriber Birth Date Member ID       RAJAN DUMAS 1955 [id number]                 Emergency Contacts      (Rel.) Home Phone Work Phone Mobile Phone    Nelida Dumas (Spouse) -- -- [phone]    ArikAna (Sister) -- -- [phone]               Discharge Summary      Gustavo Haque MD at 1/21/2018 10:11 AM          DISCHARGE SUMMARY     Admit date: 1/17/2018  Date of Discharge:  1/21/2018    Discharge Diagnoses  Hospital Problem List     * (Principal)Iatrogenic pneumothorax (Right)    Non-smoker    Metastatic cancer (Lung and bone mets)          Past Surgical History:   Procedure Laterality Date   • BRONCHOSCOPY N/A 1/18/2018    Procedure: RANDALL AND BRONCHOSCOPY WITH ENDOBRONCHIAL ULTRASOUND WITH FLUORO;  Surgeon: " Gustavo Haque MD;  Location: [facility] ENDOSCOPY;  Service:    • COLLATERAL LIGAMENT REPAIR, KNEE     • KNEE ARTHROSCOPY     • LASIK     • LUNG BIOPSY Right 01/10/2018    Dr. Petty @ [facility]   • SKIN CANCER EXCISION Bilateral     BASAL CELL ON NECK YESTERDAY       History of Present Illness    Patient is a 62 y.o. male presented with: Iatrogenic pneumothorax    62-year-old white male who was initially evaluated for bilateral pulmonary lesions.  He underwent a CT FNA of the largest right-sided lesion about 10 days ago.  This was accomplished without difficulty and he had no immediate complications.  Pathology was consistent with a non-small cell carcinoma but inadequate tissue specimen for molecular testing.    The patient came back for further staging including a PET scan and an MRI of the brain.  He was found to have a large right-sided pneumothorax that was presumably a late complication from his CT FNA.  Other than some modest dyspnea, the patient was relatively asymptomatic.  The patient was admitted on 1/17/2018 for a chest tube for his iatrogenic right pneumothorax.    Hospital Course    The patient underwent a CT directed chest tube placement and was admitted to the hospital.  Subsequent chest x-rays revealed complete expansion of the lungs and no air leak.  He underwent a navigational bronchoscopy with transbronchial biopsies and endobronchial ultrasound with transbronchial needle aspiration.  All specimens revealed malignancy consistent with adenocarcinoma and tissue was submitted for molecular testing.    Initially, the plan [facility].  Given his pneumothorax, I had intended on leaving his chest tube in and placing it to a Heimlich valve for him to travel  However, overnight, the chest tube became displaced and the pneumothorax reaccumulated and a new chest tube had to be placed.  At this point, the patient changes plans and will be traveling  by car and not going to altitude/flying.    Therefore, I felt it was best to try and get the chest tube out for travel.  He was transitioned to water seal and then subsequently clamped for 16 hours with no evidence of reaccumulation of pneumothorax.  The chest tube was removed without difficulty and follow-up chest x-ray revealed no pneumothorax.    Discharge medications are as listed below and primarily for symptoms.  The patient family were repeatedly counseled on care to be taken given the patient's recent pneumothorax including no heavy lifting, carrying, bearing down.  Also, not to go to altitude.  Current driving plans for getting to [place] should not take him to any significant altitude.  It was recommended the patient stop frequently, every one and a half hours, while traveling to [place] to get up out of the car and walk around  He will be given a dose of Lovenox today for DVT prophylaxis prior to leaving the hospital    He has plans to follow-up with Dr. Poe on return      Procedures Performed: Procedure(s):  RANDALL AND BRONCHOSCOPY WITH ENDOBRONCHIAL ULTRASOUND WITH HFSMYL96/19 1607 CT GUIDED CHEST TUBE PLACEMENT    Consults:     Pertinent Test Results:     Results from last 7 days  Lab Units 01/18/18  0629   WBC 10*3/mm3 6.88   HEMOGLOBIN g/dL 16.0   HEMATOCRIT % 49.0   PLATELETS 10*3/mm3 198       Results from last 7 days  Lab Units 01/18/18  0629 01/17/18  1413 01/17/18  1145   SODIUM mmol/L 139  --  140   POTASSIUM mmol/L 4.3  --  4.0   CHLORIDE mmol/L 107  --  105   CO2 mmol/L 29.0  --  27.0   BUN mg/dL 14  --  15   CREATININE mg/dL 1.10 1.10 1.10   GLUCOSE mg/dL 124*  --  102*   CALCIUM mg/dL 9.4  --  9.3   PHOSPHORUS mg/dL 3.3  --   --            Condition on Discharge:  Improved/Stable    Discharge Disposition: Home or Self Care    Discharge Medications:  Rajan Chambers   Home Medication Instructions ANASTASIIA:[id number]    Printed on:01/21/18 1011   Medication Information                       albuterol (PROVENTIL HFA;VENTOLIN HFA) 108 (90 Base) MCG/ACT inhaler  Inhale 2 puffs Every 4 (Four) Hours As Needed for Wheezing.             ALPRAZolam (XANAX) 1 MG tablet  Take 1 tablet by mouth 3 (Three) Times a Day As Needed for Anxiety.             ALPRAZolam (XANAX) 1 MG tablet  Take 1 tablet by mouth 2 (Two) Times a Day As Needed for Anxiety.             cetirizine (zyrTEC) 10 MG tablet  Take 10 mg by mouth Daily.             HYDROcod Polst-CPM Polst ER (TUSSIONEX PENNKINETIC ER) 10-8 MG/5ML ER suspension  Take 5 mL by mouth Every 12 (Twelve) Hours As Needed for Cough for up to 20 doses.             HYDROcodone-acetaminophen (NORCO) 5-325 MG per tablet  Take 1 tablet by mouth Every 6 (Six) Hours As Needed for Moderate Pain  for up to 7 days.                 Discharge Diet: Regular    Activity at Discharge: No lifting, carrying, bearing down, going to altitude, flying    Follow-up Appointments  Future Appointments  Date Time Provider Department Center   1/30/2018 8:30 AM Gustavo Catalan MD [facility]         Test Results Pending at Discharge   Order Current Status    Fungus Culture - Wash, Bronchus In process    Fungus Smear - Wash, Bronchus In process    Non-gynecologic Cytology - Brushing, Lung, Right Lower Lobe In process    AFB Culture - Wash, Bronchus Preliminary result    Tissue Pathology Exam - Tissue, Lung, Right Upper Lobe Preliminary result           Gustavo Haque MD  01/21/18  10:11 AM    Discharge Time Spent: 40 Minutes     Electronically signed by Gustavo Haque MD at 1/21/2018 10:18 AM        Discharge Order     Start     Ordered    01/21/18 1012  Discharge patient  Once     Expected Discharge Date:  01/21/18    Discharge Disposition:  Home or Self Care        01/21/18 1011

## 2018-01-23 LAB
GIE STN SPEC: NORMAL
GIE STN SPEC: NORMAL

## 2018-01-30 ENCOUNTER — OFFICE VISIT (OUTPATIENT)
Dept: ONCOLOGY | Facility: CLINIC | Age: 63
End: 2018-01-30

## 2018-01-30 ENCOUNTER — SPECIALTY PHARMACY (OUTPATIENT)
Dept: ONCOLOGY | Facility: HOSPITAL | Age: 63
End: 2018-01-30

## 2018-01-30 ENCOUNTER — HOSPITAL ENCOUNTER (OUTPATIENT)
Dept: CARDIOLOGY | Facility: HOSPITAL | Age: 63
Discharge: HOME | End: 2018-01-30
Attending: INTERNAL MEDICINE | Admitting: INTERNAL MEDICINE

## 2018-01-30 ENCOUNTER — HOSPITAL ENCOUNTER (OUTPATIENT)
Dept: CARDIOLOGY | Facility: HOSPITAL | Age: 63
End: 2018-01-30
Attending: INTERNAL MEDICINE

## 2018-01-30 ENCOUNTER — TRANSCRIBE ORDERS (OUTPATIENT)
Dept: ADMINISTRATIVE | Facility: HOSPITAL | Age: 63
End: 2018-01-30

## 2018-01-30 VITALS
RESPIRATION RATE: 24 BRPM | TEMPERATURE: 97.4 F | BODY MASS INDEX: 32.93 KG/M2 | WEIGHT: 230 LBS | HEIGHT: 70 IN | SYSTOLIC BLOOD PRESSURE: 144 MMHG | DIASTOLIC BLOOD PRESSURE: 87 MMHG | HEART RATE: 80 BPM | OXYGEN SATURATION: 95 %

## 2018-01-30 DIAGNOSIS — C79.9 METASTATIC CANCER (HCC): Primary | ICD-10-CM

## 2018-01-30 DIAGNOSIS — C79.9 METASTATIC CANCER (HCC): ICD-10-CM

## 2018-01-30 PROCEDURE — 99214 OFFICE O/P EST MOD 30 MIN: CPT | Performed by: INTERNAL MEDICINE

## 2018-01-30 PROCEDURE — 93010 ELECTROCARDIOGRAM REPORT: CPT | Performed by: INTERNAL MEDICINE

## 2018-01-30 PROCEDURE — 93005 ELECTROCARDIOGRAM TRACING: CPT | Performed by: INTERNAL MEDICINE

## 2018-01-30 RX ORDER — ONDANSETRON HYDROCHLORIDE 8 MG/1
8 TABLET, FILM COATED ORAL EVERY 8 HOURS PRN
Qty: 30 TABLET | Refills: 3 | Status: SHIPPED | OUTPATIENT
Start: 2018-01-30 | End: 2018-09-26 | Stop reason: HOSPADM

## 2018-01-30 NOTE — PROGRESS NOTES
Chief Complaint   Follow-up newly diagnosed metastatic non-small cell lung cancer, nonsmoker.  Right upper lung field mass with bilateral small pulmonary metastasis, asymptomatic bone metastasis, right paratracheal lymphadenopathy, probable bilateral supraclavicular adenopathy, multiple asymptomatic tiny brain metastasis.  Tumor is EGFR L858R mutated.    PROBLEM LIST   Patient Active Problem List   Diagnosis   • Non-smoker   • Metastatic cancer (Lung and bone mets)   • Iatrogenic pneumothorax (Right)       HISTORY OF PRESENT ILLNESS:   Patient feels relatively well.  He requested and was referred to [facility] for a quick visit there last week.  His Caris testing returned with the above-mentioned mutation.  Tagrisso  was recommended.     Patient was experiencing some depression but that's actually better now.    No headaches nausea or vomiting.  Appetite okay.    Past Medical History, Past Surgical History, Social History, Family History have been reviewed and are without significant changes except as mentioned.      Medications:      Current Outpatient Prescriptions:   •  albuterol (PROVENTIL HFA;VENTOLIN HFA) 108 (90 Base) MCG/ACT inhaler, Inhale 2 puffs Every 4 (Four) Hours As Needed for Wheezing., Disp: 1 inhaler, Rfl: 0  •  ALPRAZolam (XANAX) 1 MG tablet, Take 1 tablet by mouth 3 (Three) Times a Day As Needed for Anxiety., Disp: 60 tablet, Rfl: 2  •  ALPRAZolam (XANAX) 1 MG tablet, Take 1 tablet by mouth 2 (Two) Times a Day As Needed for Anxiety., Disp: 30 tablet, Rfl: 0  •  cetirizine (zyrTEC) 10 MG tablet, Take 10 mg by mouth Daily., Disp: , Rfl:   •  HYDROcod Polst-CPM Polst ER (TUSSIONEX PENNKINETIC ER) 10-8 MG/5ML ER suspension, Take 5 mL by mouth Every 12 (Twelve) Hours As Needed for Cough for up to 20 doses., Disp: 473 mL, Rfl: 0  •  ondansetron (ZOFRAN) 8 MG tablet, Take 1 tablet by mouth Every 8 (Eight) Hours As Needed for Nausea or Vomiting., Disp: 30 tablet, Rfl: 3  •  Osimertinib Mesylate 80 MG  "tablet, Take 80 mg by mouth Daily., Disp: 30 tablet, Rfl: 3    ALLERGIES:    Allergies   Allergen Reactions   • Ibuprofen Itching and Dermatitis     Prickly rash on heaD   • Penicillin G Hives   • Penicillins Rash   • Sulfa Antibiotics Rash and Itching       ROS:  Review of Systems   Constitutional: Negative for activity change and appetite change.        In general feels well.   HENT: Negative for mouth sores, sinus pressure and voice change.    Eyes: Negative for visual disturbance.   Respiratory: Negative for shortness of breath.         Has mild dyspnea on exertion.   Cardiovascular: Negative for chest pain.   Gastrointestinal: Negative for abdominal pain and vomiting.   Genitourinary: Negative for dysuria.   Musculoskeletal: Negative for arthralgias and myalgias.   Skin: Negative for color change.   Neurological: Negative for dizziness, syncope and headaches.   Hematological: Negative for adenopathy.   Psychiatric/Behavioral: Negative for confusion, sleep disturbance and suicidal ideas. The patient is not nervous/anxious.            Objective:    /87 Comment: LUE  Pulse 80  Temp 97.4 °F (36.3 °C) (Temporal Artery )   Resp 24  Ht 177.8 cm (70\")  Wt 104 kg (230 lb)  SpO2 95% Comment: RA  BMI 33 kg/m2    Physical Exam   Constitutional: He is oriented to person, place, and time. He appears well-developed and well-nourished. No distress.   Alert oriented and healthy in appearance   HENT:   Head: Normocephalic.   Mouth/Throat: Oropharynx is clear and moist.   Eyes: Conjunctivae and EOM are normal. No scleral icterus.   Neck: Normal range of motion. Neck supple. No thyromegaly present.   Cardiovascular: Normal rate, regular rhythm and normal heart sounds.    No murmur heard.  Pulmonary/Chest: Effort normal and breath sounds normal. He has no wheezes. He has no rales.   Abdominal: Soft. Bowel sounds are normal. He exhibits no distension and no mass. There is no tenderness.   Musculoskeletal: He exhibits no " edema or tenderness.   Lymphadenopathy:     He has no cervical adenopathy.   Neurological: He is alert and oriented to person, place, and time. He displays normal reflexes. No cranial nerve deficit.   Skin: Skin is warm and dry. No rash noted.   Psychiatric: He has a normal mood and affect. Judgment normal.   Vitals reviewed.             RECENT LABS:  Hematology WBC   Date Value Ref Range Status   01/18/2018 6.88 3.50 - 10.80 10*3/mm3 Final     Hemoglobin   Date Value Ref Range Status   01/18/2018 16.0 13.1 - 17.5 g/dL Final     Hematocrit   Date Value Ref Range Status   01/18/2018 49.0 38.9 - 50.9 % Final     MCV   Date Value Ref Range Status   01/18/2018 91.8 80.0 - 99.0 fL Final     RDW   Date Value Ref Range Status   01/18/2018 13.8 11.3 - 14.5 % Final     MPV   Date Value Ref Range Status   01/18/2018 10.2 6.0 - 12.0 fL Final     Platelets   Date Value Ref Range Status   01/18/2018 198 150 - 450 10*3/mm3 Final     Immature Grans %   Date Value Ref Range Status   01/17/2018 0.1 0.0 - 0.6 % Final     Neutrophils, Absolute   Date Value Ref Range Status   01/17/2018 5.82 1.50 - 8.30 10*3/mm3 Final     Lymphocytes, Absolute   Date Value Ref Range Status   01/17/2018 1.03 0.60 - 4.80 10*3/mm3 Final     Monocytes, Absolute   Date Value Ref Range Status   01/17/2018 0.85 0.00 - 1.00 10*3/mm3 Final     Eosinophils, Absolute   Date Value Ref Range Status   01/17/2018 0.11 0.00 - 0.30 10*3/mm3 Final     Basophils, Absolute   Date Value Ref Range Status   01/17/2018 0.04 0.00 - 0.20 10*3/mm3 Final     Immature Grans, Absolute   Date Value Ref Range Status   01/17/2018 0.01 0.00 - 0.03 10*3/mm3 Final       Glucose   Date Value Ref Range Status   01/18/2018 124 (H) 70 - 100 mg/dL Final     Sodium   Date Value Ref Range Status   01/18/2018 139 132 - 146 mmol/L Final     Potassium   Date Value Ref Range Status   01/18/2018 4.3 3.5 - 5.5 mmol/L Final     CO2   Date Value Ref Range Status   01/18/2018 29.0 20.0 - 31.0 mmol/L  Final     Chloride   Date Value Ref Range Status   01/18/2018 107 99 - 109 mmol/L Final     Anion Gap   Date Value Ref Range Status   01/18/2018 3.0 3.0 - 11.0 mmol/L Final     Creatinine   Date Value Ref Range Status   01/18/2018 1.10 0.60 - 1.30 mg/dL Final   01/17/2018 1.10 0.60 - 1.30 mg/dL Final     Comment:     Serial Number: [id number]:  [id number]     BUN   Date Value Ref Range Status   01/18/2018 14 9 - 23 mg/dL Final     BUN/Creatinine Ratio   Date Value Ref Range Status   01/18/2018 12.7 7.0 - 25.0 Final     Calcium   Date Value Ref Range Status   01/18/2018 9.4 8.7 - 10.4 mg/dL Final     eGFR Non  Amer   Date Value Ref Range Status   01/18/2018 68 >60 mL/min/1.73 Final     Alkaline Phosphatase   Date Value Ref Range Status   01/18/2018 121 (H) 25 - 100 U/L Final     Total Protein   Date Value Ref Range Status   01/18/2018 7.3 5.7 - 8.2 g/dL Final     ALT (SGPT)   Date Value Ref Range Status   01/18/2018 79 (H) 7 - 40 U/L Final     AST (SGOT)   Date Value Ref Range Status   01/18/2018 43 (H) 0 - 33 U/L Final     Total Bilirubin   Date Value Ref Range Status   01/18/2018 0.6 0.3 - 1.2 mg/dL Final     Albumin   Date Value Ref Range Status   01/18/2018 4.20 3.20 - 4.80 g/dL Final     Globulin   Date Value Ref Range Status   01/18/2018 3.1 gm/dL Final     A/G Ratio   Date Value Ref Range Status   01/18/2018 1.4 (L) 1.5 - 2.5 g/dL Final          Perf Status:0    Assessment/Plan    Diagnoses and all orders for this visit:    Metastatic cancer (Lung and bone mets)  -     Comprehensive Metabolic Panel; Future  -     CBC & Differential; Future      Stage IV non-small cell lung cancer.  His only symptom is very mild exertional dyspnea.  This is not due to hypoxemia.  It is no doubt due to the sensory neuron stimulation  that his multiple tiny pulmonary metastasis exert on his body.    I agree with the above-mentioned recommended drug.  I went over the side effects with patient.  His family was also present.   I would treat him for about 2 months and then repeat his scans.    His depression seems to have actually improved somewhat spontaneously.  I offered to put him on a low dose of an antidepressant but he like to hold on that for the present.  I'll see him back in a few weeks.  He will be starting his Tagrisso in the next week.  Greater than half an hour spent with him and his family      Gustavo Catalan MD , 1/30/2018    CC:

## 2018-02-02 DIAGNOSIS — C79.9 METASTATIC CANCER (HCC): Primary | ICD-10-CM

## 2018-02-05 ENCOUNTER — HOSPITAL ENCOUNTER (OUTPATIENT)
Dept: CARDIOLOGY | Facility: HOSPITAL | Age: 63
Discharge: HOME | End: 2018-02-05
Attending: INTERNAL MEDICINE | Admitting: INTERNAL MEDICINE

## 2018-02-05 VITALS
DIASTOLIC BLOOD PRESSURE: 91 MMHG | BODY MASS INDEX: 33.36 KG/M2 | WEIGHT: 233 LBS | HEIGHT: 70 IN | SYSTOLIC BLOOD PRESSURE: 152 MMHG

## 2018-02-05 DIAGNOSIS — C79.9 METASTATIC CANCER (HCC): ICD-10-CM

## 2018-02-05 LAB
BH CV ECHO MEAS - AO ROOT AREA (BSA CORRECTED): 1.3
BH CV ECHO MEAS - AO ROOT AREA: 6.8 CM^2
BH CV ECHO MEAS - AO ROOT DIAM: 3 CM
BH CV ECHO MEAS - BSA(HAYCOCK): 2.3 M^2
BH CV ECHO MEAS - BSA: 2.2 M^2
BH CV ECHO MEAS - BZI_BMI: 33.4 KILOGRAMS/M^2
BH CV ECHO MEAS - BZI_METRIC_HEIGHT: 177.8 CM
BH CV ECHO MEAS - BZI_METRIC_WEIGHT: 105.7 KG
BH CV ECHO MEAS - CONTRAST EF (2CH): 58.6 ML/M^2
BH CV ECHO MEAS - CONTRAST EF 4CH: 59.1 ML/M^2
BH CV ECHO MEAS - EDV(CUBED): 101.8 ML
BH CV ECHO MEAS - EDV(MOD-SP2): 87 ML
BH CV ECHO MEAS - EDV(MOD-SP4): 88 ML
BH CV ECHO MEAS - EDV(TEICH): 100.8 ML
BH CV ECHO MEAS - EF(CUBED): 71.6 %
BH CV ECHO MEAS - EF(MOD-SP2): 58.6 %
BH CV ECHO MEAS - EF(MOD-SP4): 59.1 %
BH CV ECHO MEAS - EF(TEICH): 63.3 %
BH CV ECHO MEAS - ESV(CUBED): 28.9 ML
BH CV ECHO MEAS - ESV(MOD-SP2): 36 ML
BH CV ECHO MEAS - ESV(MOD-SP4): 36 ML
BH CV ECHO MEAS - ESV(TEICH): 37 ML
BH CV ECHO MEAS - FS: 34.3 %
BH CV ECHO MEAS - IVS/LVPW: 1
BH CV ECHO MEAS - IVSD: 0.96 CM
BH CV ECHO MEAS - LA DIMENSION: 4.5 CM
BH CV ECHO MEAS - LA/AO: 1.5
BH CV ECHO MEAS - LV DIASTOLIC VOL/BSA (35-75): 39.5 ML/M^2
BH CV ECHO MEAS - LV MASS(C)D: 153.4 GRAMS
BH CV ECHO MEAS - LV MASS(C)DI: 68.9 GRAMS/M^2
BH CV ECHO MEAS - LV MAX PG: 3.4 MMHG
BH CV ECHO MEAS - LV MEAN PG: 2 MMHG
BH CV ECHO MEAS - LV SYSTOLIC VOL/BSA (12-30): 16.2 ML/M^2
BH CV ECHO MEAS - LV V1 MAX: 91.9 CM/SEC
BH CV ECHO MEAS - LV V1 MEAN: 60 CM/SEC
BH CV ECHO MEAS - LV V1 VTI: 16.7 CM
BH CV ECHO MEAS - LVIDD: 4.7 CM
BH CV ECHO MEAS - LVIDS: 3.1 CM
BH CV ECHO MEAS - LVLD AP2: 8.4 CM
BH CV ECHO MEAS - LVLD AP4: 7.7 CM
BH CV ECHO MEAS - LVLS AP2: 6.1 CM
BH CV ECHO MEAS - LVLS AP4: 6.5 CM
BH CV ECHO MEAS - LVOT AREA (M): 4.2 CM^2
BH CV ECHO MEAS - LVOT AREA: 4.2 CM^2
BH CV ECHO MEAS - LVOT DIAM: 2.3 CM
BH CV ECHO MEAS - LVPWD: 0.95 CM
BH CV ECHO MEAS - MV A MAX VEL: 87.8 CM/SEC
BH CV ECHO MEAS - MV E MAX VEL: 54.2 CM/SEC
BH CV ECHO MEAS - MV E/A: 0.62
BH CV ECHO MEAS - PA ACC SLOPE: 2184 CM/SEC^2
BH CV ECHO MEAS - PA ACC TIME: 0.06 SEC
BH CV ECHO MEAS - PA PR(ACCEL): 51.6 MMHG
BH CV ECHO MEAS - PI END-D VEL: 126 CM/SEC
BH CV ECHO MEAS - RAP SYSTOLE: 8 MMHG
BH CV ECHO MEAS - RVDD: 3.3 CM
BH CV ECHO MEAS - RVSP: 18 MMHG
BH CV ECHO MEAS - SI(CUBED): 32.7 ML/M^2
BH CV ECHO MEAS - SI(LVOT): 31.2 ML/M^2
BH CV ECHO MEAS - SI(MOD-SP2): 22.9 ML/M^2
BH CV ECHO MEAS - SI(MOD-SP4): 23.3 ML/M^2
BH CV ECHO MEAS - SI(TEICH): 28.6 ML/M^2
BH CV ECHO MEAS - SV(CUBED): 72.9 ML
BH CV ECHO MEAS - SV(LVOT): 69.4 ML
BH CV ECHO MEAS - SV(MOD-SP2): 51 ML
BH CV ECHO MEAS - SV(MOD-SP4): 52 ML
BH CV ECHO MEAS - SV(TEICH): 63.8 ML
BH CV ECHO MEAS - TAPSE (>1.6): 2.4 CM2
BH CV ECHO MEAS - TR MAX VEL: 158.3 CM/SEC
BH CV VAS BP LEFT ARM: NORMAL MMHG
BH CV XLRA - RV BASE: 3.3 CM
BH CV XLRA - RV LENGTH: 6.4 CM
BH CV XLRA - RV MID: 3.5 CM
LEFT ATRIUM VOLUME INDEX: 12 ML/M2
LV EF 2D ECHO EST: 60 %

## 2018-02-05 PROCEDURE — 93306 TTE W/DOPPLER COMPLETE: CPT

## 2018-02-05 PROCEDURE — 93306 TTE W/DOPPLER COMPLETE: CPT | Performed by: INTERNAL MEDICINE

## 2018-02-07 LAB
CYTO UR: NORMAL
LAB AP CASE REPORT: NORMAL
LAB AP CLINICAL INFORMATION: NORMAL
LAB AP DIAGNOSIS COMMENT: NORMAL
LAB AP SPECIAL STAINS: NORMAL
Lab: NORMAL
Lab: NORMAL
PATH REPORT.ADDENDUM SPEC: NORMAL
PATH REPORT.FINAL DX SPEC: NORMAL
PATH REPORT.GROSS SPEC: NORMAL

## 2018-02-08 ENCOUNTER — SPECIALTY PHARMACY (OUTPATIENT)
Dept: ONCOLOGY | Facility: HOSPITAL | Age: 63
End: 2018-02-08

## 2018-02-08 NOTE — PROGRESS NOTES
Patient's wife Nelida called, requesting updated of status on Osimertinib. Informed patient that medication had been processed and prior authorization had been denied citing that its FDA approved indication is in second line after progression. Appeal letter has been submitted along with recent results of the FLAURA trial showing improved efficacy in first line setting compared to standard EGFR TKI use. Listed as expedited on appeal. Appeal submitted 2/7/18.

## 2018-02-09 ENCOUNTER — SPECIALTY PHARMACY (OUTPATIENT)
Dept: ONCOLOGY | Facility: HOSPITAL | Age: 63
End: 2018-02-09

## 2018-02-09 NOTE — PROGRESS NOTES
Pt with FEP insurance, required fill through [facility], called and verified location for script for fill. Per Samra (pharmacist) script to be filled through mail order [facility] . [facility] located in [facility]. Can be reached via the [facility] phone line [phone] opt (2). Reports that they have on file that PA has been denied. Confirms they have received appeal from providers office. Also reports that a letter from patient required. Discussed this has been faxed in today 2/9/18.

## 2018-02-12 ENCOUNTER — TELEPHONE (OUTPATIENT)
Dept: ONCOLOGY | Facility: CLINIC | Age: 63
End: 2018-02-12

## 2018-02-12 ENCOUNTER — SPECIALTY PHARMACY (OUTPATIENT)
Dept: ONCOLOGY | Facility: HOSPITAL | Age: 63
End: 2018-02-12

## 2018-02-12 NOTE — TELEPHONE ENCOUNTER
Returned call, gave patient's wife Nelida code that was under patient's problem list. She informed me at this time that the code that I gave her was not specific enough. I informed her that was what I had she asked to speak to manager. Gave her the number to call to get with clinical manager.

## 2018-02-12 NOTE — TELEPHONE ENCOUNTER
Called patient informed him that insurance had been called and they had informed us they will be calling to talk with Dr. Catalan. Was informed it could be 24-48 hours but they are hoping to call tomorrow morning.

## 2018-02-12 NOTE — PROGRESS NOTES
[facility] called and reported that at PA was required for Tagrisso. Alerted that PA had been completed and pending review. Called insurance at [phone]. Confirms that medication currently under appeal. Report that the appeal has been reviewed internally and submitted to third party per insurance for review. Unable to provide timeline for answer from review process. Currently awaiting confirmation of appeal.

## 2018-02-12 NOTE — TELEPHONE ENCOUNTER
----- Message from Jeanie Vallejo sent at 2/12/2018 11:45 AM EST -----  Regarding: JOSE - NEEDS THE DIAGNOSIS    Contact: [phone]  MI, SPOUSE IS NEEDING THE DIAGNOSIS CODE TO GET HELP WITH HIS NEW MEDICATION, SHE IS TRYING TO GO THROUGH [facility].      PLEASE CALL HER BACK TO LET HER KNOW

## 2018-02-15 ENCOUNTER — SPECIALTY PHARMACY (OUTPATIENT)
Dept: ONCOLOGY | Facility: HOSPITAL | Age: 63
End: 2018-02-15

## 2018-02-15 DIAGNOSIS — C79.9 METASTATIC CANCER (HCC): Primary | ICD-10-CM

## 2018-02-15 NOTE — PROGRESS NOTES
Called [facility] Tagrisso team at [phone]; whom confirmed Medication Tagrisso 80mg PO daily had been scheduled and shipped out on 2/14/18 via UPS for delivery to patient on 2/15/18.

## 2018-02-16 ENCOUNTER — EDUCATION (OUTPATIENT)
Dept: ONCOLOGY | Facility: HOSPITAL | Age: 63
End: 2018-02-16

## 2018-03-01 LAB
FUNGUS WND CULT: NORMAL
MYCOBACTERIUM SPEC CULT: NORMAL
NIGHT BLUE STAIN TISS: NORMAL

## 2018-03-06 ENCOUNTER — LAB (OUTPATIENT)
Dept: LAB | Facility: HOSPITAL | Age: 63
End: 2018-03-06
Attending: INTERNAL MEDICINE

## 2018-03-06 ENCOUNTER — HOSPITAL ENCOUNTER (OUTPATIENT)
Dept: CARDIOLOGY | Facility: HOSPITAL | Age: 63
Discharge: HOME | End: 2018-03-06
Attending: INTERNAL MEDICINE

## 2018-03-06 ENCOUNTER — HOSPITAL ENCOUNTER (OUTPATIENT)
Dept: CARDIOLOGY | Facility: HOSPITAL | Age: 63
Discharge: HOME | End: 2018-03-06
Attending: INTERNAL MEDICINE | Admitting: INTERNAL MEDICINE

## 2018-03-06 ENCOUNTER — DOCUMENTATION (OUTPATIENT)
Dept: ONCOLOGY | Facility: CLINIC | Age: 63
End: 2018-03-06

## 2018-03-06 ENCOUNTER — TELEPHONE (OUTPATIENT)
Dept: ONCOLOGY | Facility: CLINIC | Age: 63
End: 2018-03-06

## 2018-03-06 ENCOUNTER — OFFICE VISIT (OUTPATIENT)
Dept: ONCOLOGY | Facility: CLINIC | Age: 63
End: 2018-03-06

## 2018-03-06 VITALS
HEART RATE: 90 BPM | HEIGHT: 70 IN | BODY MASS INDEX: 32.78 KG/M2 | TEMPERATURE: 97.6 F | WEIGHT: 229 LBS | RESPIRATION RATE: 18 BRPM | SYSTOLIC BLOOD PRESSURE: 125 MMHG | DIASTOLIC BLOOD PRESSURE: 79 MMHG

## 2018-03-06 DIAGNOSIS — C79.9 METASTATIC CANCER (HCC): ICD-10-CM

## 2018-03-06 DIAGNOSIS — M79.89 SWELLING OF RIGHT LOWER EXTREMITY: Primary | ICD-10-CM

## 2018-03-06 DIAGNOSIS — M79.89 SWELLING OF RIGHT LOWER EXTREMITY: ICD-10-CM

## 2018-03-06 DIAGNOSIS — C79.9 METASTATIC CANCER (HCC): Primary | ICD-10-CM

## 2018-03-06 LAB
ALBUMIN SERPL-MCNC: 4.3 G/DL (ref 3.2–4.8)
ALBUMIN/GLOB SERPL: 1.4 G/DL (ref 1.5–2.5)
ALP SERPL-CCNC: 150 U/L (ref 25–100)
ALT SERPL W P-5'-P-CCNC: 38 U/L (ref 7–40)
ANION GAP SERPL CALCULATED.3IONS-SCNC: 10 MMOL/L (ref 3–11)
AST SERPL-CCNC: 27 U/L (ref 0–33)
BASOPHILS # BLD AUTO: 0.04 10*3/MM3 (ref 0–0.2)
BASOPHILS NFR BLD AUTO: 0.4 % (ref 0–1)
BH CV LOW VAS RIGHT DISTAL FEMORAL SPONT: 1
BH CV LOW VAS RIGHT GASTRONEMIUS VESSEL: 1
BH CV LOW VAS RIGHT LESSER SAPH VESSEL: 1
BH CV LOW VAS RIGHT MID FEMORAL SPONT: 1
BH CV LOW VAS RIGHT PERONEAL VESSEL: 1
BH CV LOW VAS RIGHT POPLITEAL SPONT: 1
BH CV LOW VAS RIGHT POSTERIOR TIBIAL VESSEL: 1
BH CV LOWER VASCULAR LEFT COMMON FEMORAL AUGMENT: NORMAL
BH CV LOWER VASCULAR LEFT COMMON FEMORAL COMPRESS: NORMAL
BH CV LOWER VASCULAR LEFT COMMON FEMORAL PHASIC: NORMAL
BH CV LOWER VASCULAR LEFT COMMON FEMORAL SPONT: NORMAL
BH CV LOWER VASCULAR RIGHT COMMON FEMORAL AUGMENT: NORMAL
BH CV LOWER VASCULAR RIGHT COMMON FEMORAL COMPRESS: NORMAL
BH CV LOWER VASCULAR RIGHT COMMON FEMORAL PHASIC: NORMAL
BH CV LOWER VASCULAR RIGHT COMMON FEMORAL SPONT: NORMAL
BH CV LOWER VASCULAR RIGHT DISTAL FEMORAL AUGMENT: NORMAL
BH CV LOWER VASCULAR RIGHT DISTAL FEMORAL COMPETENT: NORMAL
BH CV LOWER VASCULAR RIGHT DISTAL FEMORAL COMPRESS: NORMAL
BH CV LOWER VASCULAR RIGHT DISTAL FEMORAL PHASIC: NORMAL
BH CV LOWER VASCULAR RIGHT DISTAL FEMORAL SPONT: NORMAL
BH CV LOWER VASCULAR RIGHT GASTRONEMIUS COMPRESS: NORMAL
BH CV LOWER VASCULAR RIGHT GREATER SAPH AK COMPRESS: NORMAL
BH CV LOWER VASCULAR RIGHT GREATER SAPH BK COMPRESS: NORMAL
BH CV LOWER VASCULAR RIGHT LESSER SAPH COMPRESS: NORMAL
BH CV LOWER VASCULAR RIGHT MID FEMORAL AUGMENT: NORMAL
BH CV LOWER VASCULAR RIGHT MID FEMORAL COMPRESS: NORMAL
BH CV LOWER VASCULAR RIGHT MID FEMORAL PHASIC: NORMAL
BH CV LOWER VASCULAR RIGHT MID FEMORAL SPONT: NORMAL
BH CV LOWER VASCULAR RIGHT PERONEAL COMPRESS: NORMAL
BH CV LOWER VASCULAR RIGHT POPLITEAL AUGMENT: NORMAL
BH CV LOWER VASCULAR RIGHT POPLITEAL COMPETENT: NORMAL
BH CV LOWER VASCULAR RIGHT POPLITEAL COMPRESS: NORMAL
BH CV LOWER VASCULAR RIGHT POPLITEAL PHASIC: NORMAL
BH CV LOWER VASCULAR RIGHT POPLITEAL SPONT: NORMAL
BH CV LOWER VASCULAR RIGHT POSTERIOR TIBIAL COMPRESS: NORMAL
BH CV LOWER VASCULAR RIGHT PROXIMAL FEMORAL COMPRESS: NORMAL
BH CV LOWER VASCULAR RIGHT SAPHENOFEMORAL JUNCTION AUGMENT: NORMAL
BH CV LOWER VASCULAR RIGHT SAPHENOFEMORAL JUNCTION COMPRESS: NORMAL
BH CV LOWER VASCULAR RIGHT SAPHENOFEMORAL JUNCTION PHASIC: NORMAL
BH CV LOWER VASCULAR RIGHT SAPHENOFEMORAL JUNCTION SPONT: NORMAL
BILIRUB SERPL-MCNC: 0.5 MG/DL (ref 0.3–1.2)
BUN BLD-MCNC: 14 MG/DL (ref 9–23)
BUN/CREAT SERPL: 11.7 (ref 7–25)
CALCIUM SPEC-SCNC: 8.9 MG/DL (ref 8.7–10.4)
CHLORIDE SERPL-SCNC: 102 MMOL/L (ref 99–109)
CO2 SERPL-SCNC: 29 MMOL/L (ref 20–31)
CREAT BLD-MCNC: 1.2 MG/DL (ref 0.6–1.3)
DEPRECATED RDW RBC AUTO: 48.2 FL (ref 37–54)
EOSINOPHIL # BLD AUTO: 0.1 10*3/MM3 (ref 0–0.3)
EOSINOPHIL NFR BLD AUTO: 1.1 % (ref 0–3)
ERYTHROCYTE [DISTWIDTH] IN BLOOD BY AUTOMATED COUNT: 14.4 % (ref 11.3–14.5)
GFR SERPL CREATININE-BSD FRML MDRD: 61 ML/MIN/1.73
GLOBULIN UR ELPH-MCNC: 3 GM/DL
GLUCOSE BLD-MCNC: 103 MG/DL (ref 70–100)
HCT VFR BLD AUTO: 47.4 % (ref 38.9–50.9)
HGB BLD-MCNC: 15.5 G/DL (ref 13.1–17.5)
IMM GRANULOCYTES # BLD: 0.02 10*3/MM3 (ref 0–0.03)
IMM GRANULOCYTES NFR BLD: 0.2 % (ref 0–0.6)
LYMPHOCYTES # BLD AUTO: 1.16 10*3/MM3 (ref 0.6–4.8)
LYMPHOCYTES NFR BLD AUTO: 13 % (ref 24–44)
MCH RBC QN AUTO: 29.9 PG (ref 27–31)
MCHC RBC AUTO-ENTMCNC: 32.7 G/DL (ref 32–36)
MCV RBC AUTO: 91.3 FL (ref 80–99)
MONOCYTES # BLD AUTO: 1.14 10*3/MM3 (ref 0–1)
MONOCYTES NFR BLD AUTO: 12.7 % (ref 0–12)
NEUTROPHILS # BLD AUTO: 6.49 10*3/MM3 (ref 1.5–8.3)
NEUTROPHILS NFR BLD AUTO: 72.6 % (ref 41–71)
PLATELET # BLD AUTO: 134 10*3/MM3 (ref 150–450)
PMV BLD AUTO: 11.2 FL (ref 6–12)
POTASSIUM BLD-SCNC: 4.2 MMOL/L (ref 3.5–5.5)
PROT SERPL-MCNC: 7.3 G/DL (ref 5.7–8.2)
RBC # BLD AUTO: 5.19 10*6/MM3 (ref 4.2–5.76)
SODIUM BLD-SCNC: 141 MMOL/L (ref 132–146)
WBC NRBC COR # BLD: 8.95 10*3/MM3 (ref 3.5–10.8)

## 2018-03-06 PROCEDURE — 80053 COMPREHEN METABOLIC PANEL: CPT

## 2018-03-06 PROCEDURE — 85025 COMPLETE CBC W/AUTO DIFF WBC: CPT

## 2018-03-06 PROCEDURE — 99214 OFFICE O/P EST MOD 30 MIN: CPT | Performed by: INTERNAL MEDICINE

## 2018-03-06 PROCEDURE — 93971 EXTREMITY STUDY: CPT

## 2018-03-06 PROCEDURE — 36415 COLL VENOUS BLD VENIPUNCTURE: CPT

## 2018-03-06 PROCEDURE — 93971 EXTREMITY STUDY: CPT | Performed by: INTERNAL MEDICINE

## 2018-03-06 PROCEDURE — 93010 ELECTROCARDIOGRAM REPORT: CPT | Performed by: INTERNAL MEDICINE

## 2018-03-06 PROCEDURE — 93005 ELECTROCARDIOGRAM TRACING: CPT | Performed by: INTERNAL MEDICINE

## 2018-03-06 RX ORDER — ESCITALOPRAM OXALATE 20 MG/1
20 TABLET ORAL DAILY
COMMUNITY
End: 2018-03-06

## 2018-03-06 RX ORDER — ESCITALOPRAM OXALATE 20 MG/1
20 TABLET ORAL DAILY
Qty: 30 TABLET | Refills: 5 | Status: SHIPPED | OUTPATIENT
Start: 2018-03-06 | End: 2018-09-04 | Stop reason: SDUPTHER

## 2018-03-06 NOTE — PROGRESS NOTES
Patient's study positive for right lower extremity DVT.  He will be started on Xarelto.  I talked with him on the phone regarding this.  I also have reduced his activity to bed rest and up  on the couch at the present.

## 2018-03-06 NOTE — PROGRESS NOTES
Chief Complaint   Follow-up recently diagnosed metastatic non-small cell lung cancer, nonsmoker.  EGFR mutation-L858R    PROBLEM LIST   Patient Active Problem List   Diagnosis   • Non-smoker   • Metastatic cancer (Lung and bone mets)   • Iatrogenic pneumothorax (Right)       HISTORY OF PRESENT ILLNESS:   For the most part feeling well.  He is having some issues with depression.  I talked with him about this before but he declined an antidepressant at this time.  On the other hand, the shortness of breath that he was beginning to experience has resolved.    He's developed a rash on his thorax, anterior lower posterior but is not pruritic and it really doesn't bother him.  He has now been on Tagriso  for 3 weeks.  He has had no fevers or chills.    He has noticed some pain with a little bit of swelling in his right calf for the last 36 hours or so.  He's had no pulmonary symptoms.    Past Medical History, Past Surgical History, Social History, Family History have been reviewed and are without significant changes except as mentioned.      Medications:      Current Outpatient Prescriptions:   •  albuterol (PROVENTIL HFA;VENTOLIN HFA) 108 (90 Base) MCG/ACT inhaler, Inhale 2 puffs Every 4 (Four) Hours As Needed for Wheezing., Disp: 1 inhaler, Rfl: 0  •  ALPRAZolam (XANAX) 1 MG tablet, Take 1 tablet by mouth 3 (Three) Times a Day As Needed for Anxiety., Disp: 60 tablet, Rfl: 2  •  ALPRAZolam (XANAX) 1 MG tablet, Take 1 tablet by mouth 2 (Two) Times a Day As Needed for Anxiety., Disp: 30 tablet, Rfl: 0  •  cetirizine (zyrTEC) 10 MG tablet, Take 10 mg by mouth Daily., Disp: , Rfl:   •  HYDROcod Polst-CPM Polst ER (TUSSIONEX PENNKINETIC ER) 10-8 MG/5ML ER suspension, Take 5 mL by mouth Every 12 (Twelve) Hours As Needed for Cough for up to 20 doses., Disp: 473 mL, Rfl: 0  •  ondansetron (ZOFRAN) 8 MG tablet, Take 1 tablet by mouth Every 8 (Eight) Hours As Needed for Nausea or Vomiting., Disp: 30 tablet, Rfl: 3  •  Osimertinib  "Mesylate 80 MG tablet, Take 80 mg by mouth Daily., Disp: 30 tablet, Rfl: 3  •  Osimertinib Mesylate 80 MG tablet, Take 80 mg by mouth Daily., Disp: 30 tablet, Rfl: 3    ALLERGIES:    Allergies   Allergen Reactions   • Ibuprofen Itching and Dermatitis     Prickly rash on heaD   • Penicillin G Hives   • Penicillins Rash   • Sulfa Antibiotics Rash and Itching       ROS:  Review of Systems   Constitutional: Negative for activity change and appetite change.   HENT: Negative for mouth sores, sinus pressure and voice change.    Eyes: Negative for visual disturbance.   Respiratory: Negative for shortness of breath.    Cardiovascular: Negative for chest pain.   Gastrointestinal: Negative for abdominal pain and vomiting.   Genitourinary: Negative for dysuria.   Musculoskeletal: Negative for arthralgias and myalgias.   Skin: Negative for color change.   Neurological: Negative for dizziness, syncope and headaches.   Hematological: Negative for adenopathy.   Psychiatric/Behavioral: Negative for confusion, sleep disturbance and suicidal ideas. The patient is not nervous/anxious.    All other systems reviewed and are negative.          Objective:    /79  Pulse 90  Temp 97.6 °F (36.4 °C)  Resp 18  Ht 177.8 cm (70\")  Wt 104 kg (229 lb)  BMI 32.86 kg/m2    Physical Exam   Constitutional: He is oriented to person, place, and time. He appears well-developed and well-nourished. No distress.   Alert and oriented and in no distress   HENT:   Head: Normocephalic.   Mouth/Throat: Oropharynx is clear and moist.   Eyes: Conjunctivae and EOM are normal. No scleral icterus.   Neck: Normal range of motion. Neck supple. No thyromegaly present.   Cardiovascular: Normal rate, regular rhythm and normal heart sounds.    No murmur heard.  Pulmonary/Chest: Effort normal and breath sounds normal. He has no wheezes. He has no rales.   Abdominal: Soft. Bowel sounds are normal. He exhibits no distension and no mass. There is no tenderness. "   Musculoskeletal: He exhibits no edema or tenderness.   Right calf non-tender but slightly swollen without erythema compared to the left   Lymphadenopathy:     He has no cervical adenopathy.   Neurological: He is alert and oriented to person, place, and time. He displays normal reflexes. No cranial nerve deficit.   Skin: Skin is warm and dry. No rash noted.   Typical rash involving anterior and posterior thorax noted area   Psychiatric: He has a normal mood and affect. Judgment normal.   Vitals reviewed.             RECENT LABS:  Hematology WBC   Date Value Ref Range Status   03/06/2018 8.95 3.50 - 10.80 10*3/mm3 Final     Hemoglobin   Date Value Ref Range Status   03/06/2018 15.5 13.1 - 17.5 g/dL Final     Hematocrit   Date Value Ref Range Status   03/06/2018 47.4 38.9 - 50.9 % Final     MCV   Date Value Ref Range Status   03/06/2018 91.3 80.0 - 99.0 fL Final     RDW   Date Value Ref Range Status   03/06/2018 14.4 11.3 - 14.5 % Final     MPV   Date Value Ref Range Status   03/06/2018 11.2 6.0 - 12.0 fL Final     Platelets   Date Value Ref Range Status   03/06/2018 134 (L) 150 - 450 10*3/mm3 Final     Immature Grans %   Date Value Ref Range Status   03/06/2018 0.2 0.0 - 0.6 % Final     Neutrophils, Absolute   Date Value Ref Range Status   03/06/2018 6.49 1.50 - 8.30 10*3/mm3 Final     Lymphocytes, Absolute   Date Value Ref Range Status   03/06/2018 1.16 0.60 - 4.80 10*3/mm3 Final     Monocytes, Absolute   Date Value Ref Range Status   03/06/2018 1.14 (H) 0.00 - 1.00 10*3/mm3 Final     Eosinophils, Absolute   Date Value Ref Range Status   03/06/2018 0.10 0.00 - 0.30 10*3/mm3 Final     Basophils, Absolute   Date Value Ref Range Status   03/06/2018 0.04 0.00 - 0.20 10*3/mm3 Final     Immature Grans, Absolute   Date Value Ref Range Status   03/06/2018 0.02 0.00 - 0.03 10*3/mm3 Final       Glucose   Date Value Ref Range Status   03/06/2018 103 (H) 70 - 100 mg/dL Final     Sodium   Date Value Ref Range Status    03/06/2018 141 132 - 146 mmol/L Final     Potassium   Date Value Ref Range Status   03/06/2018 4.2 3.5 - 5.5 mmol/L Final     CO2   Date Value Ref Range Status   03/06/2018 29.0 20.0 - 31.0 mmol/L Final     Chloride   Date Value Ref Range Status   03/06/2018 102 99 - 109 mmol/L Final     Anion Gap   Date Value Ref Range Status   03/06/2018 10.0 3.0 - 11.0 mmol/L Final     Creatinine   Date Value Ref Range Status   03/06/2018 1.20 0.60 - 1.30 mg/dL Final   01/17/2018 1.10 0.60 - 1.30 mg/dL Final     Comment:     Serial Number: [id number]:  [id number]     BUN   Date Value Ref Range Status   03/06/2018 14 9 - 23 mg/dL Final     BUN/Creatinine Ratio   Date Value Ref Range Status   03/06/2018 11.7 7.0 - 25.0 Final     Calcium   Date Value Ref Range Status   03/06/2018 8.9 8.7 - 10.4 mg/dL Final     eGFR Non  Amer   Date Value Ref Range Status   03/06/2018 61 >60 mL/min/1.73 Final     Alkaline Phosphatase   Date Value Ref Range Status   03/06/2018 150 (H) 25 - 100 U/L Final     Total Protein   Date Value Ref Range Status   03/06/2018 7.3 5.7 - 8.2 g/dL Final     ALT (SGPT)   Date Value Ref Range Status   03/06/2018 38 7 - 40 U/L Final     AST (SGOT)   Date Value Ref Range Status   03/06/2018 27 0 - 33 U/L Final     Total Bilirubin   Date Value Ref Range Status   03/06/2018 0.5 0.3 - 1.2 mg/dL Final     Albumin   Date Value Ref Range Status   03/06/2018 4.30 3.20 - 4.80 g/dL Final     Globulin   Date Value Ref Range Status   03/06/2018 3.0 gm/dL Final     A/G Ratio   Date Value Ref Range Status   03/06/2018 1.4 (L) 1.5 - 2.5 g/dL Final          Perf Status:1    Assessment/Plan    Diagnoses and all orders for this visit:    Metastatic cancer (Lung and bone mets)      The patient is tolerating his therapy well for the most part.  He has a rash which is relatively asymptomatic.    Right calf pain and swelling-I will rule out a DVT below the knee with ultrasound today.    Depression-I will treat with Lexapro 20 mg  once daily    I spent greater than 30 minutes with the patient his family, more than half of which was counseling him.  I will see him back in about 3 weeks.  After that I will rescan him.          Gustavo Catalan MD , 3/6/2018    CC:

## 2018-03-09 ENCOUNTER — TELEPHONE (OUTPATIENT)
Dept: ONCOLOGY | Facility: CLINIC | Age: 63
End: 2018-03-09

## 2018-03-12 ENCOUNTER — TELEPHONE (OUTPATIENT)
Dept: ONCOLOGY | Facility: CLINIC | Age: 63
End: 2018-03-12

## 2018-03-12 NOTE — TELEPHONE ENCOUNTER
----- Message from Jeanie Vallejo sent at 3/12/2018  9:38 AM EDT -----  Regarding: [facility] - CARIS TEST DENIED BY INSURANCE  Contact: [phone]  MI DUMAS, SPOUSE CALLED AND SAID THEY RECEIVED A LETTER FROM INSURANCE SAYING THEY DENIED THE CARIS LIFE SCIENCE TESTING.     SHE WANTS TO TALK TO SOMEONE REGARDING THIS.

## 2018-03-12 NOTE — TELEPHONE ENCOUNTER
Returned call and informed patient's wife, that [employer] sometimes denies coverage of the CARIS TEST however they will appeal up to 3 times.

## 2018-03-15 ENCOUNTER — OFFICE VISIT (OUTPATIENT)
Dept: ONCOLOGY | Facility: CLINIC | Age: 63
End: 2018-03-15

## 2018-03-15 ENCOUNTER — LAB (OUTPATIENT)
Dept: LAB | Facility: HOSPITAL | Age: 63
End: 2018-03-15

## 2018-03-15 VITALS
HEART RATE: 77 BPM | RESPIRATION RATE: 16 BRPM | DIASTOLIC BLOOD PRESSURE: 80 MMHG | OXYGEN SATURATION: 96 % | WEIGHT: 228.9 LBS | TEMPERATURE: 97.7 F | BODY MASS INDEX: 32.84 KG/M2 | SYSTOLIC BLOOD PRESSURE: 125 MMHG

## 2018-03-15 DIAGNOSIS — C79.9 METASTATIC CANCER (HCC): Primary | ICD-10-CM

## 2018-03-15 DIAGNOSIS — C79.9 METASTATIC CANCER (HCC): ICD-10-CM

## 2018-03-15 LAB
ALBUMIN SERPL-MCNC: 4.2 G/DL (ref 3.2–4.8)
ALBUMIN/GLOB SERPL: 1.4 G/DL (ref 1.5–2.5)
ALP SERPL-CCNC: 158 U/L (ref 25–100)
ALT SERPL W P-5'-P-CCNC: 39 U/L (ref 7–40)
ANION GAP SERPL CALCULATED.3IONS-SCNC: 7 MMOL/L (ref 3–11)
AST SERPL-CCNC: 24 U/L (ref 0–33)
BILIRUB SERPL-MCNC: 0.4 MG/DL (ref 0.3–1.2)
BUN BLD-MCNC: 15 MG/DL (ref 9–23)
BUN/CREAT SERPL: 11.5 (ref 7–25)
CALCIUM SPEC-SCNC: 8.8 MG/DL (ref 8.7–10.4)
CHLORIDE SERPL-SCNC: 103 MMOL/L (ref 99–109)
CO2 SERPL-SCNC: 29 MMOL/L (ref 20–31)
CREAT BLD-MCNC: 1.3 MG/DL (ref 0.6–1.3)
ERYTHROCYTE [DISTWIDTH] IN BLOOD BY AUTOMATED COUNT: 15.8 % (ref 11.3–14.5)
GFR SERPL CREATININE-BSD FRML MDRD: 56 ML/MIN/1.73
GLOBULIN UR ELPH-MCNC: 3.1 GM/DL
GLUCOSE BLD-MCNC: 83 MG/DL (ref 70–100)
HCT VFR BLD AUTO: 48.2 % (ref 38.9–50.9)
HGB BLD-MCNC: 15.5 G/DL (ref 13.1–17.5)
LYMPHOCYTES # BLD AUTO: 1.2 10*3/MM3 (ref 0.6–4.8)
LYMPHOCYTES NFR BLD AUTO: 16.9 % (ref 24–44)
MCH RBC QN AUTO: 29.1 PG (ref 27–31)
MCHC RBC AUTO-ENTMCNC: 32.2 G/DL (ref 32–36)
MCV RBC AUTO: 90.4 FL (ref 80–99)
MONOCYTES # BLD AUTO: 0.4 10*3/MM3 (ref 0–1)
MONOCYTES NFR BLD AUTO: 6.1 % (ref 0–12)
NEUTROPHILS # BLD AUTO: 5.5 10*3/MM3 (ref 1.5–8.3)
NEUTROPHILS NFR BLD AUTO: 77 % (ref 41–71)
PLATELET # BLD AUTO: 217 10*3/MM3 (ref 150–450)
PMV BLD AUTO: 7.5 FL (ref 6–12)
POTASSIUM BLD-SCNC: 4.5 MMOL/L (ref 3.5–5.5)
PROT SERPL-MCNC: 7.3 G/DL (ref 5.7–8.2)
RBC # BLD AUTO: 5.33 10*6/MM3 (ref 4.2–5.76)
SODIUM BLD-SCNC: 139 MMOL/L (ref 132–146)
WBC NRBC COR # BLD: 7.2 10*3/MM3 (ref 3.5–10.8)

## 2018-03-15 PROCEDURE — 80053 COMPREHEN METABOLIC PANEL: CPT

## 2018-03-15 PROCEDURE — 99213 OFFICE O/P EST LOW 20 MIN: CPT | Performed by: INTERNAL MEDICINE

## 2018-03-15 PROCEDURE — 36415 COLL VENOUS BLD VENIPUNCTURE: CPT

## 2018-03-15 PROCEDURE — 85025 COMPLETE CBC W/AUTO DIFF WBC: CPT

## 2018-03-15 NOTE — PROGRESS NOTES
Chief Complaint   Follow-up recently diagnosed metastatic non-small cell lung cancer, nonsmoker.  EGFR mutation-L858 are.  Acute DVT right lower extremity diagnosed March 6, 2018-Xarelto    PROBLEM LIST   Patient Active Problem List   Diagnosis   • Non-smoker   • Metastatic cancer (Lung and bone mets)   • Iatrogenic pneumothorax (Right)       HISTORY OF PRESENT ILLNESS:   Right lower extremity feeling considerably better.  He has been very good at limiting his activity and keeping his leg elevated.  He's had no bleeding or bruising associated with his Xarelto.  He is completing week #1, 15 mg twice a day.    He is breathing comfortably.  He's had no hemoptysis.  He's had no headaches.  He still has a little bit of rash on the anterior thorax.  He's had no diarrhea.  His depression is better on the low dose Lexapro    Past Medical History, Past Surgical History, Social History, Family History have been reviewed and are without significant changes except as mentioned.      Medications:      Current Outpatient Prescriptions:   •  ALPRAZolam (XANAX) 1 MG tablet, Take 1 tablet by mouth 3 (Three) Times a Day As Needed for Anxiety., Disp: 60 tablet, Rfl: 2  •  ALPRAZolam (XANAX) 1 MG tablet, Take 1 tablet by mouth 2 (Two) Times a Day As Needed for Anxiety., Disp: 30 tablet, Rfl: 0  •  cetirizine (zyrTEC) 10 MG tablet, Take 10 mg by mouth Daily., Disp: , Rfl:   •  ondansetron (ZOFRAN) 8 MG tablet, Take 1 tablet by mouth Every 8 (Eight) Hours As Needed for Nausea or Vomiting., Disp: 30 tablet, Rfl: 3  •  Osimertinib Mesylate 80 MG tablet, Take 80 mg by mouth Daily., Disp: 30 tablet, Rfl: 3  •  Osimertinib Mesylate 80 MG tablet, Take 80 mg by mouth Daily., Disp: 30 tablet, Rfl: 3  •  rivaroxaban (XARELTO) 20 MG tablet, Take 1 tablet by mouth Daily With Dinner. (Patient taking differently: Take 20 mg by mouth 2 (Two) Times a Day With Meals.), Disp: 30 tablet, Rfl: 5  •  albuterol (PROVENTIL HFA;VENTOLIN HFA) 108 (90 Base)  MCG/ACT inhaler, Inhale 2 puffs Every 4 (Four) Hours As Needed for Wheezing., Disp: 1 inhaler, Rfl: 0  •  escitalopram (LEXAPRO) 20 MG tablet, Take 1 tablet by mouth Daily., Disp: 30 tablet, Rfl: 5  •  HYDROcod Polst-CPM Polst ER (TUSSIONEX PENNKINETIC ER) 10-8 MG/5ML ER suspension, Take 5 mL by mouth Every 12 (Twelve) Hours As Needed for Cough for up to 20 doses., Disp: 473 mL, Rfl: 0  •  rivaroxaban (XARELTO) 15 MG tablet, Take 1 tablet by mouth 2 (Two) Times a Day With Meals., Disp: 42 tablet, Rfl: 0    ALLERGIES:    Allergies   Allergen Reactions   • Ibuprofen Itching and Dermatitis     Prickly rash on heaD   • Penicillin G Hives   • Penicillins Rash   • Sulfa Antibiotics Rash and Itching       ROS:  Review of Systems   Constitutional: Negative for activity change and appetite change.   HENT: Negative for mouth sores, sinus pressure and voice change.    Eyes: Negative for visual disturbance.   Respiratory: Negative for shortness of breath.    Cardiovascular: Negative for chest pain.   Gastrointestinal: Negative for abdominal pain and vomiting.   Genitourinary: Negative for dysuria.   Musculoskeletal: Negative for arthralgias and myalgias.   Skin: Negative for color change.   Neurological: Negative for dizziness, syncope and headaches.   Hematological: Negative for adenopathy.   Psychiatric/Behavioral: Negative for confusion, sleep disturbance and suicidal ideas. The patient is not nervous/anxious.            Objective:    /80   Pulse 77   Temp 97.7 °F (36.5 °C) (Temporal Artery )   Resp 16   Wt 104 kg (228 lb 14.4 oz)   SpO2 96%   BMI 32.84 kg/m²     Physical Exam   Constitutional: He is oriented to person, place, and time. He appears well-developed and well-nourished. No distress.   Alert oriented healthy in appearance   HENT:   Head: Normocephalic.   Mouth/Throat: Oropharynx is clear and moist.   Eyes: Conjunctivae and EOM are normal. No scleral icterus.   Neck: Normal range of motion. Neck supple.  No thyromegaly present.   Cardiovascular: Normal rate, regular rhythm and normal heart sounds.    No murmur heard.  Pulmonary/Chest: Effort normal and breath sounds normal. He has no wheezes. He has no rales.   Abdominal: Soft. Bowel sounds are normal. He exhibits no distension and no mass. There is no tenderness.   Musculoskeletal: He exhibits no edema or tenderness.   Right lower extremity soft and nontender from the thigh through the foot.  Color is normal.  He has no edema at all.   Lymphadenopathy:     He has no cervical adenopathy.   Neurological: He is alert and oriented to person, place, and time. He displays normal reflexes. No cranial nerve deficit.   Skin: Skin is warm and dry. No rash noted.   Psychiatric: He has a normal mood and affect. Judgment normal.   Vitals reviewed.             RECENT LABS:  Hematology WBC   Date Value Ref Range Status   03/15/2018 7.20 3.50 - 10.80 10*3/mm3 Final     Hemoglobin   Date Value Ref Range Status   03/15/2018 15.5 13.1 - 17.5 g/dL Final     Hematocrit   Date Value Ref Range Status   03/15/2018 48.2 38.9 - 50.9 % Final     MCV   Date Value Ref Range Status   03/15/2018 90.4 80.0 - 99.0 fL Final     RDW   Date Value Ref Range Status   03/15/2018 15.8 (H) 11.3 - 14.5 % Final     MPV   Date Value Ref Range Status   03/15/2018 7.5 6.0 - 12.0 fL Final     Platelets   Date Value Ref Range Status   03/15/2018 217 150 - 450 10*3/mm3 Final     Immature Grans %   Date Value Ref Range Status   03/06/2018 0.2 0.0 - 0.6 % Final     Neutrophils, Absolute   Date Value Ref Range Status   03/15/2018 5.50 1.50 - 8.30 10*3/mm3 Final     Lymphocytes, Absolute   Date Value Ref Range Status   03/15/2018 1.20 0.60 - 4.80 10*3/mm3 Final     Monocytes, Absolute   Date Value Ref Range Status   03/15/2018 0.40 0.00 - 1.00 10*3/mm3 Final     Eosinophils, Absolute   Date Value Ref Range Status   03/06/2018 0.10 0.00 - 0.30 10*3/mm3 Final     Basophils, Absolute   Date Value Ref Range Status    03/06/2018 0.04 0.00 - 0.20 10*3/mm3 Final     Immature Grans, Absolute   Date Value Ref Range Status   03/06/2018 0.02 0.00 - 0.03 10*3/mm3 Final       Glucose   Date Value Ref Range Status   03/15/2018 83 70 - 100 mg/dL Final     Sodium   Date Value Ref Range Status   03/15/2018 139 132 - 146 mmol/L Final     Potassium   Date Value Ref Range Status   03/15/2018 4.5 3.5 - 5.5 mmol/L Final     CO2   Date Value Ref Range Status   03/15/2018 29.0 20.0 - 31.0 mmol/L Final     Chloride   Date Value Ref Range Status   03/15/2018 103 99 - 109 mmol/L Final     Anion Gap   Date Value Ref Range Status   03/15/2018 7.0 3.0 - 11.0 mmol/L Final     Creatinine   Date Value Ref Range Status   03/15/2018 1.30 0.60 - 1.30 mg/dL Final   01/17/2018 1.10 0.60 - 1.30 mg/dL Final     Comment:     Serial Number: [id number]:  [id number]     BUN   Date Value Ref Range Status   03/15/2018 15 9 - 23 mg/dL Final     BUN/Creatinine Ratio   Date Value Ref Range Status   03/15/2018 11.5 7.0 - 25.0 Final     Calcium   Date Value Ref Range Status   03/15/2018 8.8 8.7 - 10.4 mg/dL Final     eGFR Non  Amer   Date Value Ref Range Status   03/15/2018 56 (L) >60 mL/min/1.73 Final     Alkaline Phosphatase   Date Value Ref Range Status   03/15/2018 158 (H) 25 - 100 U/L Final     Total Protein   Date Value Ref Range Status   03/15/2018 7.3 5.7 - 8.2 g/dL Final     ALT (SGPT)   Date Value Ref Range Status   03/15/2018 39 7 - 40 U/L Final     AST (SGOT)   Date Value Ref Range Status   03/15/2018 24 0 - 33 U/L Final     Total Bilirubin   Date Value Ref Range Status   03/15/2018 0.4 0.3 - 1.2 mg/dL Final     Albumin   Date Value Ref Range Status   03/15/2018 4.20 3.20 - 4.80 g/dL Final     Globulin   Date Value Ref Range Status   03/15/2018 3.1 gm/dL Final     A/G Ratio   Date Value Ref Range Status   03/15/2018 1.4 (L) 1.5 - 2.5 g/dL Final          Perf Status: 0    Assessment/Plan    Diagnoses and all orders for this visit:    Metastatic cancer  (Lung and bone mets)      The patient is tolerating his Tagrisso  very nicely.  His breathing is actually improved in the last several weeks.  He has no obvious evidence of progression by history or physical examination.    Acute DVT right lower extremity associated with lung cancer.  He will continue his Xarelto.  He is symptomatically improved.    I'll see the patient back in about 2 weeks.  He's now been on his Tagrisso  for about a month and we will decide at his next visit, when to repeat his scans.    Greater than 25 minutes with patient.  More than half counseling.      Gustavo Catalan MD , 3/15/2018    CC:

## 2018-03-16 ENCOUNTER — TELEPHONE (OUTPATIENT)
Dept: ONCOLOGY | Facility: CLINIC | Age: 63
End: 2018-03-16

## 2018-03-16 NOTE — TELEPHONE ENCOUNTER
----- Message from Jeanie Vallejo sent at 3/16/2018 10:41 AM EDT -----  Regarding: JOSE - [employer] LETTER NEEDED  Contact: [phone]  PATIENT CALLED REGARDING NEEDING A LETTER TO GET OUT OF JURY DUTY. HE SAID HE SPOKE TO HA ABOUT THIS YESTERDAY.     ATTENTION JOY MASSEY  JUROR ID # [id number]  [facility]  [address]   [address] [place] [address]    FAX NUMBER [phone]    PLEASE CALL PATIENT AND LET HIM KNOW WHEN ITS BEEN SENT.

## 2018-03-28 ENCOUNTER — OFFICE VISIT (OUTPATIENT)
Dept: ONCOLOGY | Facility: CLINIC | Age: 63
End: 2018-03-28

## 2018-03-28 ENCOUNTER — LAB (OUTPATIENT)
Dept: LAB | Facility: HOSPITAL | Age: 63
End: 2018-03-28

## 2018-03-28 VITALS
DIASTOLIC BLOOD PRESSURE: 79 MMHG | HEART RATE: 75 BPM | BODY MASS INDEX: 32.64 KG/M2 | SYSTOLIC BLOOD PRESSURE: 120 MMHG | WEIGHT: 228 LBS | RESPIRATION RATE: 16 BRPM | HEIGHT: 70 IN | TEMPERATURE: 97.3 F

## 2018-03-28 DIAGNOSIS — C79.9 METASTATIC CANCER (HCC): ICD-10-CM

## 2018-03-28 DIAGNOSIS — C79.9 METASTATIC CANCER (HCC): Primary | ICD-10-CM

## 2018-03-28 LAB
ALBUMIN SERPL-MCNC: 4.1 G/DL (ref 3.2–4.8)
ALBUMIN/GLOB SERPL: 1.4 G/DL (ref 1.5–2.5)
ALP SERPL-CCNC: 139 U/L (ref 25–100)
ALT SERPL W P-5'-P-CCNC: 32 U/L (ref 7–40)
ANION GAP SERPL CALCULATED.3IONS-SCNC: 10 MMOL/L (ref 3–11)
AST SERPL-CCNC: 26 U/L (ref 0–33)
BILIRUB SERPL-MCNC: 0.5 MG/DL (ref 0.3–1.2)
BUN BLD-MCNC: 17 MG/DL (ref 9–23)
BUN/CREAT SERPL: 13.1 (ref 7–25)
CALCIUM SPEC-SCNC: 9 MG/DL (ref 8.7–10.4)
CHLORIDE SERPL-SCNC: 105 MMOL/L (ref 99–109)
CO2 SERPL-SCNC: 25 MMOL/L (ref 20–31)
CREAT BLD-MCNC: 1.3 MG/DL (ref 0.6–1.3)
ERYTHROCYTE [DISTWIDTH] IN BLOOD BY AUTOMATED COUNT: 15.6 % (ref 11.3–14.5)
GFR SERPL CREATININE-BSD FRML MDRD: 56 ML/MIN/1.73
GLOBULIN UR ELPH-MCNC: 2.9 GM/DL
GLUCOSE BLD-MCNC: 120 MG/DL (ref 70–100)
HCT VFR BLD AUTO: 46.4 % (ref 38.9–50.9)
HGB BLD-MCNC: 15 G/DL (ref 13.1–17.5)
LYMPHOCYTES # BLD AUTO: 0.9 10*3/MM3 (ref 0.6–4.8)
LYMPHOCYTES NFR BLD AUTO: 15 % (ref 24–44)
MCH RBC QN AUTO: 29 PG (ref 27–31)
MCHC RBC AUTO-ENTMCNC: 32.3 G/DL (ref 32–36)
MCV RBC AUTO: 89.7 FL (ref 80–99)
MONOCYTES # BLD AUTO: 0.1 10*3/MM3 (ref 0–1)
MONOCYTES NFR BLD AUTO: 2.6 % (ref 0–12)
NEUTROPHILS # BLD AUTO: 4.7 10*3/MM3 (ref 1.5–8.3)
NEUTROPHILS NFR BLD AUTO: 82.4 % (ref 41–71)
PLATELET # BLD AUTO: 152 10*3/MM3 (ref 150–450)
PMV BLD AUTO: 8.1 FL (ref 6–12)
POTASSIUM BLD-SCNC: 4 MMOL/L (ref 3.5–5.5)
PROT SERPL-MCNC: 7 G/DL (ref 5.7–8.2)
RBC # BLD AUTO: 5.17 10*6/MM3 (ref 4.2–5.76)
SODIUM BLD-SCNC: 140 MMOL/L (ref 132–146)
WBC NRBC COR # BLD: 5.7 10*3/MM3 (ref 3.5–10.8)

## 2018-03-28 PROCEDURE — 85025 COMPLETE CBC W/AUTO DIFF WBC: CPT

## 2018-03-28 PROCEDURE — 36415 COLL VENOUS BLD VENIPUNCTURE: CPT

## 2018-03-28 PROCEDURE — 99214 OFFICE O/P EST MOD 30 MIN: CPT | Performed by: INTERNAL MEDICINE

## 2018-03-28 PROCEDURE — 80053 COMPREHEN METABOLIC PANEL: CPT

## 2018-03-28 RX ORDER — CETIRIZINE HYDROCHLORIDE 10 MG/1
10 TABLET ORAL DAILY
Qty: 30 TABLET | Refills: 5 | Status: SHIPPED | OUTPATIENT
Start: 2018-03-28 | End: 2018-10-04 | Stop reason: SDUPTHER

## 2018-03-28 NOTE — PROGRESS NOTES
Chief Complaint   Follow-up recently diagnosed metastatic non-small cell lung cancer, nonsmoker, EGFR mutation present-Tagrisso (February 15, 2018.  Acute right lower extremity DVT diagnosed March 6, 2018-Xarelto    PROBLEM LIST   Patient Active Problem List   Diagnosis   • Non-smoker   • Metastatic cancer (Lung and bone mets)   • Iatrogenic pneumothorax (Right)       HISTORY OF PRESENT ILLNESS:   In general feeling well.  He cut some grass using his riding lawn more yesterday.  He's been using compression stockings with excellent benefit.  He bleeds a little more strongly when he cuts himself now that he is on Xarelto.  It's nothing more than a nuisance.  No spontaneous hemorrhage.  No fevers or chills.  Breathing comfortably.  Has noticed a little bit more in the way of sinus symptoms without any purulence or fevers or headaches.    Past Medical History, Past Surgical History, Social History, Family History have been reviewed and are without significant changes except as mentioned.      Medications:      Current Outpatient Prescriptions:   •  albuterol (PROVENTIL HFA;VENTOLIN HFA) 108 (90 Base) MCG/ACT inhaler, Inhale 2 puffs Every 4 (Four) Hours As Needed for Wheezing., Disp: 1 inhaler, Rfl: 0  •  ALPRAZolam (XANAX) 1 MG tablet, Take 1 tablet by mouth 3 (Three) Times a Day As Needed for Anxiety., Disp: 60 tablet, Rfl: 2  •  ALPRAZolam (XANAX) 1 MG tablet, Take 1 tablet by mouth 2 (Two) Times a Day As Needed for Anxiety., Disp: 30 tablet, Rfl: 0  •  cetirizine (zyrTEC) 10 MG tablet, Take 10 mg by mouth Daily., Disp: , Rfl:   •  escitalopram (LEXAPRO) 20 MG tablet, Take 1 tablet by mouth Daily., Disp: 30 tablet, Rfl: 5  •  HYDROcod Polst-CPM Polst ER (TUSSIONEX PENNKINETIC ER) 10-8 MG/5ML ER suspension, Take 5 mL by mouth Every 12 (Twelve) Hours As Needed for Cough for up to 20 doses., Disp: 473 mL, Rfl: 0  •  ondansetron (ZOFRAN) 8 MG tablet, Take 1 tablet by mouth Every 8 (Eight) Hours As Needed for Nausea or  "Vomiting., Disp: 30 tablet, Rfl: 3  •  Osimertinib Mesylate 80 MG tablet, Take 80 mg by mouth Daily., Disp: 30 tablet, Rfl: 3  •  Osimertinib Mesylate 80 MG tablet, Take 80 mg by mouth Daily., Disp: 30 tablet, Rfl: 3  •  rivaroxaban (XARELTO) 15 MG tablet, Take 1 tablet by mouth 2 (Two) Times a Day With Meals., Disp: 42 tablet, Rfl: 0  •  rivaroxaban (XARELTO) 20 MG tablet, Take 1 tablet by mouth Daily With Dinner. (Patient taking differently: Take 20 mg by mouth 2 (Two) Times a Day With Meals.), Disp: 30 tablet, Rfl: 5    ALLERGIES:    Allergies   Allergen Reactions   • Ibuprofen Itching and Dermatitis     Prickly rash on heaD   • Penicillin G Hives   • Penicillins Rash   • Sulfa Antibiotics Rash and Itching       ROS:  Review of Systems   Constitutional: Negative for activity change and appetite change.        In general feels fairly well.  Activity level about 65-70% of what would ordinarily be   HENT: Negative for mouth sores, sinus pressure and voice change.         Sinus stuffiness mentioned above.   Eyes: Negative for visual disturbance.   Respiratory: Negative for shortness of breath.    Cardiovascular: Negative for chest pain.   Gastrointestinal: Negative for abdominal pain and vomiting.   Genitourinary: Negative for dysuria.   Musculoskeletal: Negative for arthralgias and myalgias.   Skin: Negative for color change.   Neurological: Negative for dizziness, syncope and headaches.   Hematological: Negative for adenopathy.   Psychiatric/Behavioral: Negative for confusion, sleep disturbance and suicidal ideas. The patient is not nervous/anxious.         Depression much much better.  Taking Lexapro regularly.  Not using any Xanax.           Objective:    /79   Pulse 75   Temp 97.3 °F (36.3 °C)   Resp 16   Ht 177.8 cm (70\")   Wt 103 kg (228 lb)   BMI 32.71 kg/m²     Physical Exam   Constitutional: He is oriented to person, place, and time. He appears well-developed and well-nourished. No distress. "   Alert oriented smiles readily appears healthy   HENT:   Head: Normocephalic.   Mouth/Throat: Oropharynx is clear and moist.   Eyes: Conjunctivae and EOM are normal. No scleral icterus.   Neck: Normal range of motion. Neck supple. No thyromegaly present.   Cardiovascular: Normal rate, regular rhythm and normal heart sounds.    No murmur heard.  Pulmonary/Chest: Effort normal and breath sounds normal. He has no wheezes. He has no rales.   Lung fields are clear   Abdominal: Soft. Bowel sounds are normal. He exhibits no distension and no mass. There is no tenderness.   Musculoskeletal: He exhibits no edema or tenderness.   Lymphadenopathy:     He has no cervical adenopathy.   Neurological: He is alert and oriented to person, place, and time. He displays normal reflexes. No cranial nerve deficit.   Skin: Skin is warm and dry. No rash noted.   Psychiatric: He has a normal mood and affect. Judgment normal.   Vitals reviewed.             RECENT LABS:  Hematology WBC   Date Value Ref Range Status   03/28/2018 5.70 3.50 - 10.80 10*3/mm3 Final     Hemoglobin   Date Value Ref Range Status   03/28/2018 15.0 13.1 - 17.5 g/dL Final     Hematocrit   Date Value Ref Range Status   03/28/2018 46.4 38.9 - 50.9 % Final     MCV   Date Value Ref Range Status   03/28/2018 89.7 80.0 - 99.0 fL Final     RDW   Date Value Ref Range Status   03/28/2018 15.6 (H) 11.3 - 14.5 % Final     MPV   Date Value Ref Range Status   03/28/2018 8.1 6.0 - 12.0 fL Final     Platelets   Date Value Ref Range Status   03/28/2018 152 150 - 450 10*3/mm3 Final     Immature Grans %   Date Value Ref Range Status   03/06/2018 0.2 0.0 - 0.6 % Final     Neutrophils, Absolute   Date Value Ref Range Status   03/28/2018 4.70 1.50 - 8.30 10*3/mm3 Final     Lymphocytes, Absolute   Date Value Ref Range Status   03/28/2018 0.90 0.60 - 4.80 10*3/mm3 Final     Monocytes, Absolute   Date Value Ref Range Status   03/28/2018 0.10 0.00 - 1.00 10*3/mm3 Final     Eosinophils,  Absolute   Date Value Ref Range Status   03/06/2018 0.10 0.00 - 0.30 10*3/mm3 Final     Basophils, Absolute   Date Value Ref Range Status   03/06/2018 0.04 0.00 - 0.20 10*3/mm3 Final     Immature Grans, Absolute   Date Value Ref Range Status   03/06/2018 0.02 0.00 - 0.03 10*3/mm3 Final       Glucose   Date Value Ref Range Status   03/15/2018 83 70 - 100 mg/dL Final     Sodium   Date Value Ref Range Status   03/15/2018 139 132 - 146 mmol/L Final     Potassium   Date Value Ref Range Status   03/15/2018 4.5 3.5 - 5.5 mmol/L Final     CO2   Date Value Ref Range Status   03/15/2018 29.0 20.0 - 31.0 mmol/L Final     Chloride   Date Value Ref Range Status   03/15/2018 103 99 - 109 mmol/L Final     Anion Gap   Date Value Ref Range Status   03/15/2018 7.0 3.0 - 11.0 mmol/L Final     Creatinine   Date Value Ref Range Status   03/15/2018 1.30 0.60 - 1.30 mg/dL Final   01/17/2018 1.10 0.60 - 1.30 mg/dL Final     Comment:     Serial Number: [id number]:  [id number]     BUN   Date Value Ref Range Status   03/15/2018 15 9 - 23 mg/dL Final     BUN/Creatinine Ratio   Date Value Ref Range Status   03/15/2018 11.5 7.0 - 25.0 Final     Calcium   Date Value Ref Range Status   03/15/2018 8.8 8.7 - 10.4 mg/dL Final     eGFR Non  Amer   Date Value Ref Range Status   03/15/2018 56 (L) >60 mL/min/1.73 Final     Alkaline Phosphatase   Date Value Ref Range Status   03/15/2018 158 (H) 25 - 100 U/L Final     Total Protein   Date Value Ref Range Status   03/15/2018 7.3 5.7 - 8.2 g/dL Final     ALT (SGPT)   Date Value Ref Range Status   03/15/2018 39 7 - 40 U/L Final     AST (SGOT)   Date Value Ref Range Status   03/15/2018 24 0 - 33 U/L Final     Total Bilirubin   Date Value Ref Range Status   03/15/2018 0.4 0.3 - 1.2 mg/dL Final     Albumin   Date Value Ref Range Status   03/15/2018 4.20 3.20 - 4.80 g/dL Final     Globulin   Date Value Ref Range Status   03/15/2018 3.1 gm/dL Final     A/G Ratio   Date Value Ref Range Status   03/15/2018  1.4 (L) 1.5 - 2.5 g/dL Final          Perf Status:0    Assessment/Plan    Diagnoses and all orders for this visit:    Metastatic cancer (Lung and bone mets)      Patient continues to tolerate his medication well.  I will rescan him in about 2 weeks.  This is to assess his response to therapy both in terms of his systemic disease and his early intracranial disease.    DVT-now transitioning to Xarelto 20 mg once daily.  He's doing well with that.  I have told him that he can do whatever he likes activity wise.  He will continue to use his compression stockings.      Gustavo Catalan MD , 3/28/2018    CC:

## 2018-03-29 ENCOUNTER — SPECIALTY PHARMACY (OUTPATIENT)
Dept: ONCOLOGY | Facility: HOSPITAL | Age: 63
End: 2018-03-29

## 2018-03-29 NOTE — PROGRESS NOTES
Spoke with Mr. Chambers to follow-up on potential side effects. Mr Chambers reports no issues with medication side effects or medication availability. He states he has 1 refill left on his prescription and will contact his mail order pharmacy 10 days before running out of last fill. We discussed his recent dose decrease in Xarelto from 15 mg BID to 20 mg Daily.

## 2018-04-16 ENCOUNTER — HOSPITAL ENCOUNTER (OUTPATIENT)
Dept: MRI IMAGING | Facility: HOSPITAL | Age: 63
Discharge: HOME | End: 2018-04-16
Attending: INTERNAL MEDICINE

## 2018-04-16 ENCOUNTER — HOSPITAL ENCOUNTER (OUTPATIENT)
Dept: PET IMAGING | Facility: HOSPITAL | Age: 63
Discharge: HOME | End: 2018-04-16
Attending: INTERNAL MEDICINE | Admitting: INTERNAL MEDICINE

## 2018-04-16 ENCOUNTER — HOSPITAL ENCOUNTER (OUTPATIENT)
Dept: PET IMAGING | Facility: HOSPITAL | Age: 63
Discharge: HOME | End: 2018-04-16
Attending: INTERNAL MEDICINE

## 2018-04-16 ENCOUNTER — TELEPHONE (OUTPATIENT)
Dept: OTHER | Facility: HOSPITAL | Age: 63
End: 2018-04-16

## 2018-04-16 DIAGNOSIS — C79.9 METASTATIC CANCER (HCC): ICD-10-CM

## 2018-04-16 LAB — GLUCOSE BLDC GLUCOMTR-MCNC: 98 MG/DL (ref 70–130)

## 2018-04-16 PROCEDURE — 0 FLUDEOXYGLUCOSE F18 SOLUTION: Performed by: INTERNAL MEDICINE

## 2018-04-16 PROCEDURE — 70553 MRI BRAIN STEM W/O & W/DYE: CPT

## 2018-04-16 PROCEDURE — 0 GADOBENATE DIMEGLUMINE 529 MG/ML SOLUTION: Performed by: INTERNAL MEDICINE

## 2018-04-16 PROCEDURE — 82962 GLUCOSE BLOOD TEST: CPT

## 2018-04-16 PROCEDURE — A9552 F18 FDG: HCPCS | Performed by: INTERNAL MEDICINE

## 2018-04-16 PROCEDURE — 78816 PET IMAGE W/CT FULL BODY: CPT

## 2018-04-16 PROCEDURE — A9577 INJ MULTIHANCE: HCPCS | Performed by: INTERNAL MEDICINE

## 2018-04-16 RX ADMIN — FLUDEOXYGLUCOSE F18 1 DOSE: 300 INJECTION INTRAVENOUS at 08:57

## 2018-04-16 RX ADMIN — GADOBENATE DIMEGLUMINE 19 ML: 529 INJECTION, SOLUTION INTRAVENOUS at 11:23

## 2018-04-16 NOTE — TELEPHONE ENCOUNTER
Wife called stating patient is experiencing depression. Educated her about [facility] and encouraged her to discuss options at [facility] later this week. If she speaks with  and he would like to proceed with referral to [facility] she can either contact nurse navigator or this can be arranged at appointment with Dr. Catalan. She v/u and agreeable. Encouraged her to call with questions or concerns.

## 2018-04-18 ENCOUNTER — LAB (OUTPATIENT)
Dept: LAB | Facility: HOSPITAL | Age: 63
End: 2018-04-18

## 2018-04-18 ENCOUNTER — SPECIALTY PHARMACY (OUTPATIENT)
Dept: ONCOLOGY | Facility: HOSPITAL | Age: 63
End: 2018-04-18

## 2018-04-18 ENCOUNTER — OFFICE VISIT (OUTPATIENT)
Dept: ONCOLOGY | Facility: CLINIC | Age: 63
End: 2018-04-18

## 2018-04-18 VITALS
TEMPERATURE: 97.4 F | BODY MASS INDEX: 32.83 KG/M2 | HEART RATE: 73 BPM | RESPIRATION RATE: 16 BRPM | SYSTOLIC BLOOD PRESSURE: 132 MMHG | DIASTOLIC BLOOD PRESSURE: 77 MMHG | OXYGEN SATURATION: 96 % | WEIGHT: 229.3 LBS | HEIGHT: 70 IN

## 2018-04-18 DIAGNOSIS — C79.9 METASTATIC CANCER (HCC): Primary | ICD-10-CM

## 2018-04-18 DIAGNOSIS — C79.9 METASTATIC CANCER (HCC): ICD-10-CM

## 2018-04-18 DIAGNOSIS — H53.9 VISUAL CHANGES: Primary | ICD-10-CM

## 2018-04-18 LAB
ALBUMIN SERPL-MCNC: 4.2 G/DL (ref 3.2–4.8)
ALBUMIN/GLOB SERPL: 1.4 G/DL (ref 1.5–2.5)
ALP SERPL-CCNC: 137 U/L (ref 25–100)
ALT SERPL W P-5'-P-CCNC: 33 U/L (ref 7–40)
ANION GAP SERPL CALCULATED.3IONS-SCNC: 8 MMOL/L (ref 3–11)
AST SERPL-CCNC: 26 U/L (ref 0–33)
BILIRUB SERPL-MCNC: 0.5 MG/DL (ref 0.3–1.2)
BUN BLD-MCNC: 15 MG/DL (ref 9–23)
BUN/CREAT SERPL: 12.5 (ref 7–25)
CALCIUM SPEC-SCNC: 9 MG/DL (ref 8.7–10.4)
CHLORIDE SERPL-SCNC: 106 MMOL/L (ref 99–109)
CO2 SERPL-SCNC: 26 MMOL/L (ref 20–31)
CREAT BLD-MCNC: 1.2 MG/DL (ref 0.6–1.3)
ERYTHROCYTE [DISTWIDTH] IN BLOOD BY AUTOMATED COUNT: 15.9 % (ref 11.3–14.5)
GFR SERPL CREATININE-BSD FRML MDRD: 61 ML/MIN/1.73
GLOBULIN UR ELPH-MCNC: 3 GM/DL
GLUCOSE BLD-MCNC: 109 MG/DL (ref 70–100)
HCT VFR BLD AUTO: 47.2 % (ref 38.9–50.9)
HGB BLD-MCNC: 14.8 G/DL (ref 13.1–17.5)
LYMPHOCYTES # BLD AUTO: 0.9 10*3/MM3 (ref 0.6–4.8)
LYMPHOCYTES NFR BLD AUTO: 18.9 % (ref 24–44)
MCH RBC QN AUTO: 28.6 PG (ref 27–31)
MCHC RBC AUTO-ENTMCNC: 31.3 G/DL (ref 32–36)
MCV RBC AUTO: 91.2 FL (ref 80–99)
MONOCYTES # BLD AUTO: 0.3 10*3/MM3 (ref 0–1)
MONOCYTES NFR BLD AUTO: 6.9 % (ref 0–12)
NEUTROPHILS # BLD AUTO: 3.7 10*3/MM3 (ref 1.5–8.3)
NEUTROPHILS NFR BLD AUTO: 74.2 % (ref 41–71)
PLATELET # BLD AUTO: 169 10*3/MM3 (ref 150–450)
PMV BLD AUTO: 8 FL (ref 6–12)
POTASSIUM BLD-SCNC: 4.1 MMOL/L (ref 3.5–5.5)
PROT SERPL-MCNC: 7.2 G/DL (ref 5.7–8.2)
RBC # BLD AUTO: 5.17 10*6/MM3 (ref 4.2–5.76)
SODIUM BLD-SCNC: 140 MMOL/L (ref 132–146)
WBC NRBC COR # BLD: 5 10*3/MM3 (ref 3.5–10.8)

## 2018-04-18 PROCEDURE — 99214 OFFICE O/P EST MOD 30 MIN: CPT | Performed by: INTERNAL MEDICINE

## 2018-04-18 PROCEDURE — 36415 COLL VENOUS BLD VENIPUNCTURE: CPT

## 2018-04-18 PROCEDURE — 85025 COMPLETE CBC W/AUTO DIFF WBC: CPT

## 2018-04-18 PROCEDURE — 80053 COMPREHEN METABOLIC PANEL: CPT

## 2018-04-18 NOTE — PROGRESS NOTES
Oral Chemotherapy Teaching      Patient Name/:  Rajan Chambers   1955  Oral Chemotherapy Regimen:  Osimertinib 80mg PO daily  Date Started Medication: Therapy initiated 2/15/18    Additional Notes: Pt request refill of Tagrisso (Osimertinib), evaluated by MD in clinic today. Submitted refill for Osimertinib 80mg PO daily #30 with 11 RF to [facility] pharmacy

## 2018-04-18 NOTE — PROGRESS NOTES
Chief Complaint   Follow-up recently diagnosed metastatic non-small cell lung cancer, EGFR mutation present, Tagriso  begun mid February 2018 with excellent response by repeat MRI of the brain and PET/CT performed approximately 4/16/18.  Acute right lower extremity DVT diagnosed March 6, 2018-Xarelto    PROBLEM LIST   Patient Active Problem List   Diagnosis   • Non-smoker   • Metastatic cancer (Lung and bone mets)   • Iatrogenic pneumothorax (Right)       HISTORY OF PRESENT ILLNESS:   Feels well.  No leg pain.  No shortness of breath.  Occasional headache that's no different from when he is experienced all of his life.  Has noticed a little bit in decrease in distant vision-not alarming area hasn't seen an eye doctor in quite some time.  Has a history of Lasix surgery years ago has some dry skin and some acne but is nothing major    Past Medical History, Past Surgical History, Social History, Family History have been reviewed and are without significant changes except as mentioned.      Medications:      Current Outpatient Prescriptions:   •  cetirizine (zyrTEC) 10 MG tablet, Take 1 tablet by mouth Daily., Disp: 30 tablet, Rfl: 5  •  escitalopram (LEXAPRO) 20 MG tablet, Take 1 tablet by mouth Daily., Disp: 30 tablet, Rfl: 5  •  HYDROcod Polst-CPM Polst ER (TUSSIONEX PENNKINETIC ER) 10-8 MG/5ML ER suspension, Take 5 mL by mouth Every 12 (Twelve) Hours As Needed for Cough for up to 20 doses., Disp: 473 mL, Rfl: 0  •  ondansetron (ZOFRAN) 8 MG tablet, Take 1 tablet by mouth Every 8 (Eight) Hours As Needed for Nausea or Vomiting., Disp: 30 tablet, Rfl: 3  •  Osimertinib Mesylate 80 MG tablet, Take 80 mg by mouth Daily., Disp: 30 tablet, Rfl: 3  •  Osimertinib Mesylate 80 MG tablet, Take 80 mg by mouth Daily., Disp: 30 tablet, Rfl: 3  •  rivaroxaban (XARELTO) 15 MG tablet, Take 1 tablet by mouth 2 (Two) Times a Day With Meals., Disp: 42 tablet, Rfl: 0  •  rivaroxaban (XARELTO) 20 MG tablet, Take 1 tablet by mouth Daily  "With Dinner. (Patient taking differently: Take 20 mg by mouth 2 (Two) Times a Day With Meals.), Disp: 30 tablet, Rfl: 5    ALLERGIES:    Allergies   Allergen Reactions   • Ibuprofen Itching and Dermatitis     Prickly rash on heaD   • Penicillin G Hives   • Penicillins Rash   • Sulfa Antibiotics Rash and Itching       ROS:  Review of Systems   Constitutional: Negative for activity change and appetite change.        Very mild fatigue   HENT: Negative for mouth sores, sinus pressure and voice change.    Eyes: Negative for visual disturbance.   Respiratory: Negative for shortness of breath.    Cardiovascular: Negative for chest pain.   Gastrointestinal: Negative for abdominal pain and vomiting.        Mild diarrhea, controllable   Genitourinary: Negative for dysuria.   Musculoskeletal: Negative for arthralgias and myalgias.   Skin: Negative for color change.        Acneform rash-mild   Neurological: Negative for dizziness, syncope and headaches.   Hematological: Negative for adenopathy.   Psychiatric/Behavioral: Negative for confusion, sleep disturbance and suicidal ideas. The patient is not nervous/anxious.            Objective:    /77   Pulse 73   Temp 97.4 °F (36.3 °C) (Temporal Artery )   Resp 16   Ht 177.8 cm (70\")   Wt 104 kg (229 lb 4.8 oz)   SpO2 96%   BMI 32.90 kg/m²     Physical Exam   Constitutional: He is oriented to person, place, and time. He appears well-developed and well-nourished. No distress.   Alert oriented in no distress appears healthy no noticeable alopecia   HENT:   Head: Normocephalic.   Mouth/Throat: Oropharynx is clear and moist.   Eyes: Conjunctivae and EOM are normal. No scleral icterus.   Neck: Normal range of motion. Neck supple. No thyromegaly present.   Cardiovascular: Normal rate, regular rhythm and normal heart sounds.    No murmur heard.  Pulmonary/Chest: Effort normal and breath sounds normal. He has no wheezes. He has no rales.   Abdominal: Soft. Bowel sounds are " normal. He exhibits no distension and no mass. There is no tenderness.   Musculoskeletal: He exhibits no edema or tenderness.   Lymphadenopathy:     He has no cervical adenopathy.   Neurological: He is alert and oriented to person, place, and time. He displays normal reflexes. No cranial nerve deficit.   Skin: Skin is warm and dry. No rash noted.   Skin actually looks excellent.  Acneform rash absolutely minimal   Psychiatric: He has a normal mood and affect. Judgment normal.   Vitals reviewed.             RECENT LABS:  Hematology WBC   Date Value Ref Range Status   04/18/2018 5.00 3.50 - 10.80 10*3/mm3 Final     Hemoglobin   Date Value Ref Range Status   04/18/2018 14.8 13.1 - 17.5 g/dL Final     Hematocrit   Date Value Ref Range Status   04/18/2018 47.2 38.9 - 50.9 % Final     MCV   Date Value Ref Range Status   04/18/2018 91.2 80.0 - 99.0 fL Final     RDW   Date Value Ref Range Status   04/18/2018 15.9 (H) 11.3 - 14.5 % Final     MPV   Date Value Ref Range Status   04/18/2018 8.0 6.0 - 12.0 fL Final     Platelets   Date Value Ref Range Status   04/18/2018 169 150 - 450 10*3/mm3 Final     Immature Grans %   Date Value Ref Range Status   03/06/2018 0.2 0.0 - 0.6 % Final     Neutrophils, Absolute   Date Value Ref Range Status   04/18/2018 3.70 1.50 - 8.30 10*3/mm3 Final     Lymphocytes, Absolute   Date Value Ref Range Status   04/18/2018 0.90 0.60 - 4.80 10*3/mm3 Final     Monocytes, Absolute   Date Value Ref Range Status   04/18/2018 0.30 0.00 - 1.00 10*3/mm3 Final     Eosinophils, Absolute   Date Value Ref Range Status   03/06/2018 0.10 0.00 - 0.30 10*3/mm3 Final     Basophils, Absolute   Date Value Ref Range Status   03/06/2018 0.04 0.00 - 0.20 10*3/mm3 Final     Immature Grans, Absolute   Date Value Ref Range Status   03/06/2018 0.02 0.00 - 0.03 10*3/mm3 Final       Glucose   Date Value Ref Range Status   03/28/2018 120 (H) 70 - 100 mg/dL Final     Sodium   Date Value Ref Range Status   03/28/2018 140 132 - 396  mmol/L Final     Potassium   Date Value Ref Range Status   03/28/2018 4.0 3.5 - 5.5 mmol/L Final     CO2   Date Value Ref Range Status   03/28/2018 25.0 20.0 - 31.0 mmol/L Final     Chloride   Date Value Ref Range Status   03/28/2018 105 99 - 109 mmol/L Final     Anion Gap   Date Value Ref Range Status   03/28/2018 10.0 3.0 - 11.0 mmol/L Final     Creatinine   Date Value Ref Range Status   03/28/2018 1.30 0.60 - 1.30 mg/dL Final   01/17/2018 1.10 0.60 - 1.30 mg/dL Final     Comment:     Serial Number: [id number]  [id number]     BUN   Date Value Ref Range Status   03/28/2018 17 9 - 23 mg/dL Final     BUN/Creatinine Ratio   Date Value Ref Range Status   03/28/2018 13.1 7.0 - 25.0 Final     Calcium   Date Value Ref Range Status   03/28/2018 9.0 8.7 - 10.4 mg/dL Final     eGFR Non  Amer   Date Value Ref Range Status   03/28/2018 56 (L) >60 mL/min/1.73 Final     Alkaline Phosphatase   Date Value Ref Range Status   03/28/2018 139 (H) 25 - 100 U/L Final     Total Protein   Date Value Ref Range Status   03/28/2018 7.0 5.7 - 8.2 g/dL Final     ALT (SGPT)   Date Value Ref Range Status   03/28/2018 32 7 - 40 U/L Final     AST (SGOT)   Date Value Ref Range Status   03/28/2018 26 0 - 33 U/L Final     Total Bilirubin   Date Value Ref Range Status   03/28/2018 0.5 0.3 - 1.2 mg/dL Final     Albumin   Date Value Ref Range Status   03/28/2018 4.10 3.20 - 4.80 g/dL Final     Globulin   Date Value Ref Range Status   03/28/2018 2.9 gm/dL Final     A/G Ratio   Date Value Ref Range Status   03/28/2018 1.4 (L) 1.5 - 2.5 g/dL Final          Perf Status:0    Assessment/Plan    Diagnoses and all orders for this visit:    Metastatic cancer (Lung and bone mets)      Patient is responding beautifully to treatment.  I went over his MRI of the brain as well as his PET/CT with him and his family.  I have previously reviewed this personally with Dr. Hooper of radiology.    I am delighted for this patient.  His symptoms are minimum in terms of  side effects.  He's had a great response.  He'll continue his pill.    DVT-exam is normal of the both lower extremities.  He will continue his Xarelto for now    Visual disturbance.  I don't think this is anything major but I will refer him to ophthalmology.  Gustavo Catalan MD , 4/18/2018    CC:

## 2018-04-20 ENCOUNTER — TELEPHONE (OUTPATIENT)
Dept: ONCOLOGY | Facility: CLINIC | Age: 63
End: 2018-04-20

## 2018-04-20 NOTE — TELEPHONE ENCOUNTER
Patient has 5 tabs left on Xarelto.  He would like to switch to [facility] BCBS Fed Program.  [facility] patient before he runs out so patient will get 1 refill at [facility] in [place].  I called [employer] [place] and set up 90 day supply.  Patient is aware [employer]  will notify him for next due date/shipment.

## 2018-04-20 NOTE — TELEPHONE ENCOUNTER
----- Message from Pari ALBERTO Guan sent at 4/20/2018 12:16 PM EDT -----  Regarding: JOSE-PRESCRIPTION  Contact: [phone]  Nelida patient's wife called they were here on Wednesday and they didn't realize they were almost out of the Xarelto only 5 pills left can this be called into the [facility] federal employee program this is cheaper than the pharmacy the number is [phone] this needs to be called in today so he can receive this on time. Please call.

## 2018-05-30 ENCOUNTER — LAB (OUTPATIENT)
Dept: LAB | Facility: HOSPITAL | Age: 63
End: 2018-05-30

## 2018-05-30 ENCOUNTER — OFFICE VISIT (OUTPATIENT)
Dept: ONCOLOGY | Facility: CLINIC | Age: 63
End: 2018-05-30

## 2018-05-30 VITALS
HEART RATE: 66 BPM | SYSTOLIC BLOOD PRESSURE: 115 MMHG | RESPIRATION RATE: 16 BRPM | DIASTOLIC BLOOD PRESSURE: 78 MMHG | HEIGHT: 70 IN | TEMPERATURE: 98.5 F | BODY MASS INDEX: 32.64 KG/M2 | WEIGHT: 228 LBS

## 2018-05-30 DIAGNOSIS — C79.9 METASTATIC CANCER (HCC): Primary | ICD-10-CM

## 2018-05-30 DIAGNOSIS — C79.9 METASTATIC CANCER (HCC): ICD-10-CM

## 2018-05-30 LAB
ALBUMIN SERPL-MCNC: 4.3 G/DL (ref 3.2–4.8)
ALBUMIN/GLOB SERPL: 1.5 G/DL (ref 1.5–2.5)
ALP SERPL-CCNC: 134 U/L (ref 25–100)
ALT SERPL W P-5'-P-CCNC: 34 U/L (ref 7–40)
ANION GAP SERPL CALCULATED.3IONS-SCNC: 5 MMOL/L (ref 3–11)
AST SERPL-CCNC: 35 U/L (ref 0–33)
BILIRUB SERPL-MCNC: 0.5 MG/DL (ref 0.3–1.2)
BUN BLD-MCNC: 17 MG/DL (ref 9–23)
BUN/CREAT SERPL: 14.2 (ref 7–25)
CALCIUM SPEC-SCNC: 8.9 MG/DL (ref 8.7–10.4)
CHLORIDE SERPL-SCNC: 107 MMOL/L (ref 99–109)
CO2 SERPL-SCNC: 28 MMOL/L (ref 20–31)
CREAT BLD-MCNC: 1.2 MG/DL (ref 0.6–1.3)
ERYTHROCYTE [DISTWIDTH] IN BLOOD BY AUTOMATED COUNT: 16 % (ref 11.3–14.5)
GFR SERPL CREATININE-BSD FRML MDRD: 61 ML/MIN/1.73
GLOBULIN UR ELPH-MCNC: 2.8 GM/DL
GLUCOSE BLD-MCNC: 135 MG/DL (ref 70–100)
HCT VFR BLD AUTO: 49.2 % (ref 38.9–50.9)
HGB BLD-MCNC: 15.3 G/DL (ref 13.1–17.5)
LYMPHOCYTES # BLD AUTO: 0.9 10*3/MM3 (ref 0.6–4.8)
LYMPHOCYTES NFR BLD AUTO: 20.3 % (ref 24–44)
MCH RBC QN AUTO: 28.9 PG (ref 27–31)
MCHC RBC AUTO-ENTMCNC: 31.1 G/DL (ref 32–36)
MCV RBC AUTO: 93.1 FL (ref 80–99)
MONOCYTES # BLD AUTO: 0.2 10*3/MM3 (ref 0–1)
MONOCYTES NFR BLD AUTO: 3.8 % (ref 0–12)
NEUTROPHILS # BLD AUTO: 3.4 10*3/MM3 (ref 1.5–8.3)
NEUTROPHILS NFR BLD AUTO: 75.9 % (ref 41–71)
PLATELET # BLD AUTO: 139 10*3/MM3 (ref 150–450)
PMV BLD AUTO: 8.2 FL (ref 6–12)
POTASSIUM BLD-SCNC: 4 MMOL/L (ref 3.5–5.5)
PROT SERPL-MCNC: 7.1 G/DL (ref 5.7–8.2)
RBC # BLD AUTO: 5.28 10*6/MM3 (ref 4.2–5.76)
SODIUM BLD-SCNC: 140 MMOL/L (ref 132–146)
WBC NRBC COR # BLD: 4.5 10*3/MM3 (ref 3.5–10.8)

## 2018-05-30 PROCEDURE — 80053 COMPREHEN METABOLIC PANEL: CPT

## 2018-05-30 PROCEDURE — 36415 COLL VENOUS BLD VENIPUNCTURE: CPT

## 2018-05-30 PROCEDURE — 85025 COMPLETE CBC W/AUTO DIFF WBC: CPT

## 2018-05-30 PROCEDURE — 99214 OFFICE O/P EST MOD 30 MIN: CPT | Performed by: INTERNAL MEDICINE

## 2018-05-30 NOTE — PROGRESS NOTES
Chief Complaint   Follow-up metastatic non-small cell lung cancer, EGFR mutation present, Tagrisso  begun at diagnosis mid February 2018.  Sites of disease including right  lung primary with multiple bony metastasis, multiple pulmonary nodules, multiple asymptomatic brain metastasis.  Huge response by repeat MRI of the brain and PET/CT on April 16, 2018.     Acute right lower extremity DVT diagnosed March 6, 2018-Xarelto to date    PROBLEM LIST   Patient Active Problem List   Diagnosis   • Non-smoker   • Metastatic cancer (Lung and bone mets)   • Iatrogenic pneumothorax (Right)       HISTORY OF PRESENT ILLNESS:   Feels well.  Very busy farming.  Some allergy type symptoms present which she always gets this time of year.  Breathing comfortably.  Energy level good.  No central nervous system symptoms.  Continues to have very mild acneform rash on the trunk and the dorsum of the forearms associated with his chemotherapy.  Noted that he has more axillary and body hair on this drug.    Past Medical History, Past Surgical History, Social History, Family History have been reviewed and are without significant changes except as mentioned.      Medications:      Current Outpatient Prescriptions:   •  cetirizine (zyrTEC) 10 MG tablet, Take 1 tablet by mouth Daily., Disp: 30 tablet, Rfl: 5  •  escitalopram (LEXAPRO) 20 MG tablet, Take 1 tablet by mouth Daily., Disp: 30 tablet, Rfl: 5  •  HYDROcod Polst-CPM Polst ER (TUSSIONEX PENNKINETIC ER) 10-8 MG/5ML ER suspension, Take 5 mL by mouth Every 12 (Twelve) Hours As Needed for Cough for up to 20 doses., Disp: 473 mL, Rfl: 0  •  ondansetron (ZOFRAN) 8 MG tablet, Take 1 tablet by mouth Every 8 (Eight) Hours As Needed for Nausea or Vomiting., Disp: 30 tablet, Rfl: 3  •  Osimertinib Mesylate 80 MG tablet, Take 80 mg by mouth Daily., Disp: 30 tablet, Rfl: 3  •  Osimertinib Mesylate 80 MG tablet, Take 80 mg by mouth Daily., Disp: 30 tablet, Rfl: 3  •  Osimertinib Mesylate 80 MG tablet,  "Take 80 mg by mouth Daily., Disp: 30 tablet, Rfl: 11  •  rivaroxaban (XARELTO) 20 MG tablet, Take 1 tablet by mouth Daily With Dinner., Disp: 90 tablet, Rfl: 3    ALLERGIES:    Allergies   Allergen Reactions   • Ibuprofen Itching and Dermatitis     Prickly rash on heaD   • Penicillin G Hives   • Penicillins Rash   • Sulfa Antibiotics Rash and Itching       ROS:  Review of Systems   Constitutional: Negative for activity change and appetite change.        Feels well in general.  His spirits are better than than they have been since diagnosis   HENT: Negative for mouth sores, sinus pressure and voice change.    Eyes: Negative for visual disturbance.   Respiratory: Negative for shortness of breath.    Cardiovascular: Negative for chest pain.   Gastrointestinal: Negative for abdominal pain and vomiting.   Genitourinary: Negative for dysuria.   Musculoskeletal: Negative for arthralgias and myalgias.   Skin: Negative for color change.   Neurological: Negative for dizziness, syncope and headaches.   Hematological: Negative for adenopathy.   Psychiatric/Behavioral: Negative for confusion, sleep disturbance and suicidal ideas. The patient is not nervous/anxious.            Objective:    /78   Pulse 66   Temp 98.5 °F (36.9 °C)   Resp 16   Ht 177.8 cm (70\")   Wt 103 kg (228 lb)   BMI 32.71 kg/m²     Physical Exam   Constitutional:   Looks robust and healthy.  He is well tanned in sun exposed areas.  His  acneform rash is very subtle.   Skin:   I would not of gas that he is any more hirsute now than he was months ago.  He's a pretty hairy yennifer.               RECENT LABS:  Hematology WBC   Date Value Ref Range Status   05/30/2018 4.50 3.50 - 10.80 10*3/mm3 Final     Hemoglobin   Date Value Ref Range Status   05/30/2018 15.3 13.1 - 17.5 g/dL Final     Hematocrit   Date Value Ref Range Status   05/30/2018 49.2 38.9 - 50.9 % Final     MCV   Date Value Ref Range Status   05/30/2018 93.1 80.0 - 99.0 fL Final     RDW   Date " Value Ref Range Status   05/30/2018 16.0 (H) 11.3 - 14.5 % Final     MPV   Date Value Ref Range Status   05/30/2018 8.2 6.0 - 12.0 fL Final     Platelets   Date Value Ref Range Status   05/30/2018 139 (L) 150 - 450 10*3/mm3 Final     Immature Grans %   Date Value Ref Range Status   03/06/2018 0.2 0.0 - 0.6 % Final     Neutrophils, Absolute   Date Value Ref Range Status   05/30/2018 3.40 1.50 - 8.30 10*3/mm3 Final     Lymphocytes, Absolute   Date Value Ref Range Status   05/30/2018 0.90 0.60 - 4.80 10*3/mm3 Final     Monocytes, Absolute   Date Value Ref Range Status   05/30/2018 0.20 0.00 - 1.00 10*3/mm3 Final     Eosinophils, Absolute   Date Value Ref Range Status   03/06/2018 0.10 0.00 - 0.30 10*3/mm3 Final     Basophils, Absolute   Date Value Ref Range Status   03/06/2018 0.04 0.00 - 0.20 10*3/mm3 Final     Immature Grans, Absolute   Date Value Ref Range Status   03/06/2018 0.02 0.00 - 0.03 10*3/mm3 Final       Glucose   Date Value Ref Range Status   05/30/2018 135 (H) 70 - 100 mg/dL Final     Sodium   Date Value Ref Range Status   05/30/2018 140 132 - 146 mmol/L Final     Potassium   Date Value Ref Range Status   05/30/2018 4.0 3.5 - 5.5 mmol/L Final     CO2   Date Value Ref Range Status   05/30/2018 28.0 20.0 - 31.0 mmol/L Final     Chloride   Date Value Ref Range Status   05/30/2018 107 99 - 109 mmol/L Final     Anion Gap   Date Value Ref Range Status   05/30/2018 5.0 3.0 - 11.0 mmol/L Final     Creatinine   Date Value Ref Range Status   05/30/2018 1.20 0.60 - 1.30 mg/dL Final   01/17/2018 1.10 0.60 - 1.30 mg/dL Final     Comment:     Serial Number: [id number]:  [id number]     BUN   Date Value Ref Range Status   05/30/2018 17 9 - 23 mg/dL Final     BUN/Creatinine Ratio   Date Value Ref Range Status   05/30/2018 14.2 7.0 - 25.0 Final     Calcium   Date Value Ref Range Status   05/30/2018 8.9 8.7 - 10.4 mg/dL Final     eGFR Non  Amer   Date Value Ref Range Status   05/30/2018 61 >60 mL/min/1.73 Final      Alkaline Phosphatase   Date Value Ref Range Status   05/30/2018 134 (H) 25 - 100 U/L Final     Total Protein   Date Value Ref Range Status   05/30/2018 7.1 5.7 - 8.2 g/dL Final     ALT (SGPT)   Date Value Ref Range Status   05/30/2018 34 7 - 40 U/L Final     AST (SGOT)   Date Value Ref Range Status   05/30/2018 35 (H) 0 - 33 U/L Final     Total Bilirubin   Date Value Ref Range Status   05/30/2018 0.5 0.3 - 1.2 mg/dL Final     Albumin   Date Value Ref Range Status   05/30/2018 4.30 3.20 - 4.80 g/dL Final     Globulin   Date Value Ref Range Status   05/30/2018 2.8 gm/dL Final     A/G Ratio   Date Value Ref Range Status   05/30/2018 1.5 1.5 - 2.5 g/dL Final          Perf Status:0    Assessment/Plan    Diagnoses and all orders for this visit:    Metastatic cancer (Lung and bone mets)      The patient is doing swell.  He's had a great response to treatment and his tolerance is superb.  His quality of life is nearly normal.  What more could a person dream for treatment of lung cancer.  He will continue his current therapy.  He'll return in about 6 weeks or so and I'll rescan him at that time      Gustavo Catalan MD , 5/30/2018    CC:

## 2018-07-10 ENCOUNTER — HOSPITAL ENCOUNTER (OUTPATIENT)
Dept: PET IMAGING | Facility: HOSPITAL | Age: 63
Discharge: HOME | End: 2018-07-10
Attending: INTERNAL MEDICINE

## 2018-07-10 ENCOUNTER — HOSPITAL ENCOUNTER (OUTPATIENT)
Dept: PET IMAGING | Facility: HOSPITAL | Age: 63
Discharge: HOME | End: 2018-07-10
Attending: INTERNAL MEDICINE | Admitting: INTERNAL MEDICINE

## 2018-07-10 ENCOUNTER — HOSPITAL ENCOUNTER (OUTPATIENT)
Dept: MRI IMAGING | Facility: HOSPITAL | Age: 63
Discharge: HOME | End: 2018-07-10
Attending: INTERNAL MEDICINE

## 2018-07-10 DIAGNOSIS — C79.9 METASTATIC CANCER (HCC): Primary | ICD-10-CM

## 2018-07-10 DIAGNOSIS — C79.9 METASTATIC CANCER (HCC): ICD-10-CM

## 2018-07-10 LAB
CREAT BLDA-MCNC: 1.2 MG/DL (ref 0.6–1.3)
GLUCOSE BLDC GLUCOMTR-MCNC: 101 MG/DL (ref 70–130)

## 2018-07-10 PROCEDURE — A9577 INJ MULTIHANCE: HCPCS | Performed by: INTERNAL MEDICINE

## 2018-07-10 PROCEDURE — 82962 GLUCOSE BLOOD TEST: CPT

## 2018-07-10 PROCEDURE — 78816 PET IMAGE W/CT FULL BODY: CPT

## 2018-07-10 PROCEDURE — A9552 F18 FDG: HCPCS | Performed by: INTERNAL MEDICINE

## 2018-07-10 PROCEDURE — 0 FLUDEOXYGLUCOSE F18 SOLUTION: Performed by: INTERNAL MEDICINE

## 2018-07-10 PROCEDURE — 70553 MRI BRAIN STEM W/O & W/DYE: CPT

## 2018-07-10 PROCEDURE — 0 GADOBENATE DIMEGLUMINE 529 MG/ML SOLUTION: Performed by: INTERNAL MEDICINE

## 2018-07-10 PROCEDURE — 82565 ASSAY OF CREATININE: CPT

## 2018-07-10 RX ADMIN — GADOBENATE DIMEGLUMINE 20 ML: 529 INJECTION, SOLUTION INTRAVENOUS at 12:30

## 2018-07-10 RX ADMIN — FLUDEOXYGLUCOSE F18 1 DOSE: 300 INJECTION INTRAVENOUS at 08:45

## 2018-07-11 ENCOUNTER — LAB (OUTPATIENT)
Dept: LAB | Facility: HOSPITAL | Age: 63
End: 2018-07-11

## 2018-07-11 ENCOUNTER — OFFICE VISIT (OUTPATIENT)
Dept: ONCOLOGY | Facility: CLINIC | Age: 63
End: 2018-07-11

## 2018-07-11 VITALS
HEIGHT: 70 IN | SYSTOLIC BLOOD PRESSURE: 114 MMHG | TEMPERATURE: 97.5 F | DIASTOLIC BLOOD PRESSURE: 77 MMHG | BODY MASS INDEX: 32.78 KG/M2 | RESPIRATION RATE: 18 BRPM | HEART RATE: 71 BPM | WEIGHT: 229 LBS

## 2018-07-11 DIAGNOSIS — C79.9 METASTATIC CANCER (HCC): ICD-10-CM

## 2018-07-11 DIAGNOSIS — C79.9 METASTATIC CANCER (HCC): Primary | ICD-10-CM

## 2018-07-11 LAB
ALBUMIN SERPL-MCNC: 4.19 G/DL (ref 3.2–4.8)
ALBUMIN/GLOB SERPL: 1.6 G/DL (ref 1.5–2.5)
ALP SERPL-CCNC: 145 U/L (ref 25–100)
ALT SERPL W P-5'-P-CCNC: 25 U/L (ref 7–40)
ANION GAP SERPL CALCULATED.3IONS-SCNC: 8 MMOL/L (ref 3–11)
AST SERPL-CCNC: 24 U/L (ref 0–33)
BILIRUB SERPL-MCNC: 0.4 MG/DL (ref 0.3–1.2)
BUN BLD-MCNC: 16 MG/DL (ref 9–23)
BUN/CREAT SERPL: 13.1 (ref 7–25)
CALCIUM SPEC-SCNC: 8.6 MG/DL (ref 8.7–10.4)
CHLORIDE SERPL-SCNC: 108 MMOL/L (ref 99–109)
CO2 SERPL-SCNC: 26 MMOL/L (ref 20–31)
CREAT BLD-MCNC: 1.22 MG/DL (ref 0.6–1.3)
ERYTHROCYTE [DISTWIDTH] IN BLOOD BY AUTOMATED COUNT: 14.4 % (ref 11.3–14.5)
GFR SERPL CREATININE-BSD FRML MDRD: 60 ML/MIN/1.73
GLOBULIN UR ELPH-MCNC: 2.6 GM/DL
GLUCOSE BLD-MCNC: 131 MG/DL (ref 70–100)
HCT VFR BLD AUTO: 48.4 % (ref 38.9–50.9)
HGB BLD-MCNC: 15.6 G/DL (ref 13.1–17.5)
LYMPHOCYTES # BLD AUTO: 0.9 10*3/MM3 (ref 0.6–4.8)
LYMPHOCYTES NFR BLD AUTO: 19.1 % (ref 24–44)
MCH RBC QN AUTO: 30.5 PG (ref 27–31)
MCHC RBC AUTO-ENTMCNC: 32.3 G/DL (ref 32–36)
MCV RBC AUTO: 94.6 FL (ref 80–99)
MONOCYTES # BLD AUTO: 0.2 10*3/MM3 (ref 0–1)
MONOCYTES NFR BLD AUTO: 3.5 % (ref 0–12)
NEUTROPHILS # BLD AUTO: 3.6 10*3/MM3 (ref 1.5–8.3)
NEUTROPHILS NFR BLD AUTO: 77.4 % (ref 41–71)
PLATELET # BLD AUTO: 131 10*3/MM3 (ref 150–450)
PMV BLD AUTO: 8.1 FL (ref 6–12)
POTASSIUM BLD-SCNC: 4.1 MMOL/L (ref 3.5–5.5)
PROT SERPL-MCNC: 6.8 G/DL (ref 5.7–8.2)
RBC # BLD AUTO: 5.11 10*6/MM3 (ref 4.2–5.76)
SODIUM BLD-SCNC: 142 MMOL/L (ref 132–146)
WBC NRBC COR # BLD: 4.6 10*3/MM3 (ref 3.5–10.8)

## 2018-07-11 PROCEDURE — 80053 COMPREHEN METABOLIC PANEL: CPT

## 2018-07-11 PROCEDURE — 99213 OFFICE O/P EST LOW 20 MIN: CPT | Performed by: INTERNAL MEDICINE

## 2018-07-11 PROCEDURE — 36415 COLL VENOUS BLD VENIPUNCTURE: CPT

## 2018-07-11 PROCEDURE — 85025 COMPLETE CBC W/AUTO DIFF WBC: CPT

## 2018-08-22 ENCOUNTER — LAB (OUTPATIENT)
Dept: LAB | Facility: HOSPITAL | Age: 63
End: 2018-08-22

## 2018-08-22 ENCOUNTER — OFFICE VISIT (OUTPATIENT)
Dept: ONCOLOGY | Facility: CLINIC | Age: 63
End: 2018-08-22

## 2018-08-22 VITALS
TEMPERATURE: 98.1 F | RESPIRATION RATE: 16 BRPM | BODY MASS INDEX: 32.78 KG/M2 | WEIGHT: 229 LBS | DIASTOLIC BLOOD PRESSURE: 66 MMHG | HEIGHT: 70 IN | SYSTOLIC BLOOD PRESSURE: 117 MMHG | HEART RATE: 66 BPM

## 2018-08-22 DIAGNOSIS — C79.9 METASTATIC CANCER (HCC): Primary | ICD-10-CM

## 2018-08-22 DIAGNOSIS — C79.9 METASTATIC CANCER (HCC): ICD-10-CM

## 2018-08-22 LAB
ALBUMIN SERPL-MCNC: 4.47 G/DL (ref 3.2–4.8)
ALBUMIN/GLOB SERPL: 1.4 G/DL (ref 1.5–2.5)
ALP SERPL-CCNC: 107 U/L (ref 25–100)
ALT SERPL W P-5'-P-CCNC: 25 U/L (ref 7–40)
ANION GAP SERPL CALCULATED.3IONS-SCNC: 7 MMOL/L (ref 3–11)
AST SERPL-CCNC: 27 U/L (ref 0–33)
BILIRUB SERPL-MCNC: 0.7 MG/DL (ref 0.3–1.2)
BUN BLD-MCNC: 22 MG/DL (ref 9–23)
BUN/CREAT SERPL: 17.5 (ref 7–25)
CALCIUM SPEC-SCNC: 9 MG/DL (ref 8.7–10.4)
CHLORIDE SERPL-SCNC: 106 MMOL/L (ref 99–109)
CO2 SERPL-SCNC: 26 MMOL/L (ref 20–31)
CREAT BLD-MCNC: 1.26 MG/DL (ref 0.6–1.3)
ERYTHROCYTE [DISTWIDTH] IN BLOOD BY AUTOMATED COUNT: 15.3 % (ref 11.3–14.5)
GFR SERPL CREATININE-BSD FRML MDRD: 58 ML/MIN/1.73
GLOBULIN UR ELPH-MCNC: 3.1 GM/DL
GLUCOSE BLD-MCNC: 98 MG/DL (ref 70–100)
HCT VFR BLD AUTO: 51.3 % (ref 38.9–50.9)
HGB BLD-MCNC: 16.4 G/DL (ref 13.1–17.5)
LYMPHOCYTES # BLD AUTO: 1.2 10*3/MM3 (ref 0.6–4.8)
LYMPHOCYTES NFR BLD AUTO: 23.3 % (ref 24–44)
MCH RBC QN AUTO: 29.9 PG (ref 27–31)
MCHC RBC AUTO-ENTMCNC: 32 G/DL (ref 32–36)
MCV RBC AUTO: 93.7 FL (ref 80–99)
MONOCYTES # BLD AUTO: 0.3 10*3/MM3 (ref 0–1)
MONOCYTES NFR BLD AUTO: 6 % (ref 0–12)
NEUTROPHILS # BLD AUTO: 3.5 10*3/MM3 (ref 1.5–8.3)
NEUTROPHILS NFR BLD AUTO: 70.7 % (ref 41–71)
PLATELET # BLD AUTO: 143 10*3/MM3 (ref 150–450)
PMV BLD AUTO: 8.2 FL (ref 6–12)
POTASSIUM BLD-SCNC: 4.2 MMOL/L (ref 3.5–5.5)
PROT SERPL-MCNC: 7.6 G/DL (ref 5.7–8.2)
RBC # BLD AUTO: 5.48 10*6/MM3 (ref 4.2–5.76)
SODIUM BLD-SCNC: 139 MMOL/L (ref 132–146)
WBC NRBC COR # BLD: 5 10*3/MM3 (ref 3.5–10.8)

## 2018-08-22 PROCEDURE — 85025 COMPLETE CBC W/AUTO DIFF WBC: CPT

## 2018-08-22 PROCEDURE — 80053 COMPREHEN METABOLIC PANEL: CPT

## 2018-08-22 PROCEDURE — 36415 COLL VENOUS BLD VENIPUNCTURE: CPT

## 2018-08-22 PROCEDURE — 99214 OFFICE O/P EST MOD 30 MIN: CPT | Performed by: INTERNAL MEDICINE

## 2018-08-22 NOTE — PROGRESS NOTES
Chief Complaint   Follow-up EGFR related non-small cell lung cancer-metastatic with widespread bony metastasis and multiple pulmonary nodules, diagnosed February 2018-superb response to Tagrisso.  Sites of disease include right lung primary with multiple bony metastases, multiple pulmonary nodules, multiple asymptomatic brain metastasis.  MRI of the brain now normal and PET CT without active disease in July 2018    History of right lower extremity DVT diagnosed in March 2018-Xarelto.  Unprovoked DVT-suspect secondary to malignancy-plans for indefinite therapy.    PROBLEM LIST   Patient Active Problem List   Diagnosis   • Non-smoker   • Metastatic cancer (Lung and bone mets)   • Iatrogenic pneumothorax (Right)       HISTORY OF PRESENT ILLNESS:   The patient feels pretty much normal.  He notices that he seems to have a little more issue with somnolence during the day but that's nothing new and is nothing major.  He leads a very very active lifestyle.  He gets plenty of sleep at night.  His breathing is comfortable.  He's had no headaches.  He's had no unexplained fevers or night sweats.  He's now been on his Tagrisso for close to 6 months    Past Medical History, Past Surgical History, Social History, Family History have been reviewed and are without significant changes except as mentioned.      Medications:      Current Outpatient Prescriptions:   •  cetirizine (zyrTEC) 10 MG tablet, Take 1 tablet by mouth Daily., Disp: 30 tablet, Rfl: 5  •  escitalopram (LEXAPRO) 20 MG tablet, Take 1 tablet by mouth Daily., Disp: 30 tablet, Rfl: 5  •  HYDROcod Polst-CPM Polst ER (TUSSIONEX PENNKINETIC ER) 10-8 MG/5ML ER suspension, Take 5 mL by mouth Every 12 (Twelve) Hours As Needed for Cough for up to 20 doses., Disp: 473 mL, Rfl: 0  •  ondansetron (ZOFRAN) 8 MG tablet, Take 1 tablet by mouth Every 8 (Eight) Hours As Needed for Nausea or Vomiting., Disp: 30 tablet, Rfl: 3  •  Osimertinib Mesylate 80 MG tablet, Take 80 mg by mouth  "Daily., Disp: 30 tablet, Rfl: 3  •  Osimertinib Mesylate 80 MG tablet, Take 80 mg by mouth Daily., Disp: 30 tablet, Rfl: 3  •  Osimertinib Mesylate 80 MG tablet, Take 80 mg by mouth Daily., Disp: 30 tablet, Rfl: 11  •  rivaroxaban (XARELTO) 20 MG tablet, Take 1 tablet by mouth Daily With Dinner., Disp: 90 tablet, Rfl: 3    ALLERGIES:    Allergies   Allergen Reactions   • Ibuprofen Itching and Dermatitis     Prickly rash on heaD   • Penicillin G Hives   • Penicillins Rash   • Sulfa Antibiotics Rash and Itching       ROS:  Review of Systems   Constitutional: Negative for activity change and appetite change.        Overall vigor is good.  Skin rash secondary to therapy waxes and wanes and it's not progressive and not really bothersome to him   HENT: Negative for mouth sores, sinus pressure and voice change.    Eyes: Negative for visual disturbance.   Respiratory: Negative for shortness of breath.    Cardiovascular: Negative for chest pain.   Gastrointestinal: Negative for abdominal pain and vomiting.   Genitourinary: Negative for dysuria.   Musculoskeletal: Negative for arthralgias and myalgias.   Skin: Negative for color change.   Neurological: Negative for dizziness, syncope and headaches.   Hematological: Negative for adenopathy.   Psychiatric/Behavioral: Negative for confusion, sleep disturbance and suicidal ideas. The patient is not nervous/anxious.            Objective:    /66   Pulse 66   Temp 98.1 °F (36.7 °C)   Resp 16   Ht 177.8 cm (70\")   Wt 104 kg (229 lb)   BMI 32.86 kg/m²     Physical Exam   Constitutional: He is oriented to person, place, and time. He appears well-developed and well-nourished. No distress.   Appears relaxed comfortable and quite healthy.  He is in no distress at all.   HENT:   Head: Normocephalic.   Mouth/Throat: Oropharynx is clear and moist.   Eyes: Conjunctivae and EOM are normal. No scleral icterus.   Neck: Normal range of motion. Neck supple. No thyromegaly present. "   Cardiovascular: Normal rate, regular rhythm and normal heart sounds.    No murmur heard.  Pulmonary/Chest: Effort normal and breath sounds normal. He has no wheezes. He has no rales.   Abdominal: Soft. Bowel sounds are normal. He exhibits no distension and no mass. There is no tenderness.   Musculoskeletal: He exhibits no edema or tenderness.   Lymphadenopathy:     He has no cervical adenopathy.   Neurological: He is alert and oriented to person, place, and time. He displays normal reflexes. No cranial nerve deficit.   Skin: Skin is warm and dry. No rash noted.   Mild erythematous rash on upper trunk at baseline   Psychiatric: He has a normal mood and affect. Judgment normal.   Vitals reviewed.             RECENT LABS:  Hematology WBC   Date Value Ref Range Status   08/22/2018 5.00 3.50 - 10.80 10*3/mm3 Final     Hemoglobin   Date Value Ref Range Status   08/22/2018 16.4 13.1 - 17.5 g/dL Final     Hematocrit   Date Value Ref Range Status   08/22/2018 51.3 (H) 38.9 - 50.9 % Final     MCV   Date Value Ref Range Status   08/22/2018 93.7 80.0 - 99.0 fL Final     RDW   Date Value Ref Range Status   08/22/2018 15.3 (H) 11.3 - 14.5 % Final     MPV   Date Value Ref Range Status   08/22/2018 8.2 6.0 - 12.0 fL Final     Platelets   Date Value Ref Range Status   08/22/2018 143 (L) 150 - 450 10*3/mm3 Final     Immature Grans %   Date Value Ref Range Status   03/06/2018 0.2 0.0 - 0.6 % Final     Neutrophils, Absolute   Date Value Ref Range Status   08/22/2018 3.50 1.50 - 8.30 10*3/mm3 Final     Lymphocytes, Absolute   Date Value Ref Range Status   08/22/2018 1.20 0.60 - 4.80 10*3/mm3 Final     Monocytes, Absolute   Date Value Ref Range Status   08/22/2018 0.30 0.00 - 1.00 10*3/mm3 Final     Eosinophils, Absolute   Date Value Ref Range Status   03/06/2018 0.10 0.00 - 0.30 10*3/mm3 Final     Basophils, Absolute   Date Value Ref Range Status   03/06/2018 0.04 0.00 - 0.20 10*3/mm3 Final     Immature Grans, Absolute   Date Value  Ref Range Status   03/06/2018 0.02 0.00 - 0.03 10*3/mm3 Final       Glucose   Date Value Ref Range Status   07/11/2018 131 (H) 70 - 100 mg/dL Final     Sodium   Date Value Ref Range Status   07/11/2018 142 132 - 146 mmol/L Final     Potassium   Date Value Ref Range Status   07/11/2018 4.1 3.5 - 5.5 mmol/L Final     CO2   Date Value Ref Range Status   07/11/2018 26.0 20.0 - 31.0 mmol/L Final     Chloride   Date Value Ref Range Status   07/11/2018 108 99 - 109 mmol/L Final     Anion Gap   Date Value Ref Range Status   07/11/2018 8.0 3.0 - 11.0 mmol/L Final     Creatinine   Date Value Ref Range Status   07/11/2018 1.22 0.60 - 1.30 mg/dL Final   07/10/2018 1.20 0.60 - 1.30 mg/dL Final     Comment:     Serial Number: [id number]:  [id number]     BUN   Date Value Ref Range Status   07/11/2018 16 9 - 23 mg/dL Final     BUN/Creatinine Ratio   Date Value Ref Range Status   07/11/2018 13.1 7.0 - 25.0 Final     Calcium   Date Value Ref Range Status   07/11/2018 8.6 (L) 8.7 - 10.4 mg/dL Final     eGFR Non  Amer   Date Value Ref Range Status   07/11/2018 60 (L) >60 mL/min/1.73 Final     Alkaline Phosphatase   Date Value Ref Range Status   07/11/2018 145 (H) 25 - 100 U/L Final     Total Protein   Date Value Ref Range Status   07/11/2018 6.8 5.7 - 8.2 g/dL Final     ALT (SGPT)   Date Value Ref Range Status   07/11/2018 25 7 - 40 U/L Final     AST (SGOT)   Date Value Ref Range Status   07/11/2018 24 0 - 33 U/L Final     Total Bilirubin   Date Value Ref Range Status   07/11/2018 0.4 0.3 - 1.2 mg/dL Final     Albumin   Date Value Ref Range Status   07/11/2018 4.19 3.20 - 4.80 g/dL Final     Globulin   Date Value Ref Range Status   07/11/2018 2.6 gm/dL Final          Perf Status:1    Assessment/Plan    Diagnoses and all orders for this visit:    Metastatic cancer (Lung and bone mets)      The patient is doing superbly.  He's had a fabulous response to his therapy which she will continue.  He'll return in about 6 weeks and he'll  see Dr. Vicente.  Prior to that visit I'll get a CT scan of chest abdomen pelvis.  He's had PET CTs prior to this but there is no reason at this point to continue those.  I will omit an MRI of the brain because the last 2 have looked excellent and I personally would probably repeat an MRI of the brain 6 months from his last study which was in July.    The patient had some questions regarding prognosis.  Obviously that's unknown but I feel that this patient obviously has had a great response and I anticipate that we'll continue.    He also ask about seen his oncologist at [facility] in follow-up.  He was seen there for a second opinion and recommendation regarding therapy in February and I would welcome yearly visits with that doctor.  I personally would do that if I was in this patient's situation.  I told him that it is  similar to seeing a cardiologist once a year.      Gustavo Catalan MD , 8/22/2018    CC:

## 2018-09-01 RX ORDER — ESCITALOPRAM OXALATE 20 MG/1
TABLET ORAL
Qty: 30 TABLET | Refills: 5 | Status: CANCELLED | OUTPATIENT
Start: 2018-09-01

## 2018-09-04 RX ORDER — ESCITALOPRAM OXALATE 20 MG/1
20 TABLET ORAL DAILY
Qty: 30 TABLET | Refills: 5 | Status: SHIPPED | OUTPATIENT
Start: 2018-09-04 | End: 2019-03-01 | Stop reason: SDUPTHER

## 2018-09-18 ENCOUNTER — TELEPHONE (OUTPATIENT)
Dept: ONCOLOGY | Facility: HOSPITAL | Age: 63
End: 2018-09-18

## 2018-09-18 DIAGNOSIS — C79.9 METASTATIC CANCER (HCC): ICD-10-CM

## 2018-09-18 NOTE — TELEPHONE ENCOUNTER
Called [facility] and discontinued script under . Called in script for Tagrisso 80mg PO daily under     ----- Message from Adry Baldwin RN sent at 9/18/2018 11:48 AM EDT -----  Regarding: FW: ROXIE - NEW RX NEEDED  Contact: [phone]      ----- Message -----  From: Rebecca Kincaid  Sent: 9/18/2018  11:46 AM  To: [facility]  Subject: ROXIE - NEW RX NEEDED                            RONALD AT [facility] IS CALLING ABOUT THE RX FOR TAGRISSO 80MG TABLET.  THE OLD SCRIPT WAS UNDER JOSE AND NOW NEEDS TO BE WRITTEN UNDER DR FAUSTIN.  FAX: [phone]

## 2018-09-24 ENCOUNTER — HOSPITAL ENCOUNTER (OUTPATIENT)
Dept: CT IMAGING | Facility: HOSPITAL | Age: 63
Discharge: HOME | End: 2018-09-24
Attending: INTERNAL MEDICINE | Admitting: INTERNAL MEDICINE

## 2018-09-24 DIAGNOSIS — C79.9 METASTATIC CANCER (HCC): ICD-10-CM

## 2018-09-24 LAB
ALBUMIN SERPL-MCNC: 4 G/DL (ref 3.5–5)
ALBUMIN/GLOB SERPL: 1.4 G/DL (ref 1–2)
ALP SERPL-CCNC: 90 U/L (ref 38–126)
ALT SERPL-CCNC: 30 U/L (ref 13–69)
AST SERPL-CCNC: 31 U/L (ref 15–46)
BASOPHILS # BLD AUTO: 0.03 10*3/MM3 (ref 0–0.2)
BASOPHILS NFR BLD AUTO: 0.5 % (ref 0–2.5)
BILIRUB SERPL-MCNC: 0.6 MG/DL (ref 0.2–1.3)
BUN SERPL-MCNC: 12 MG/DL (ref 7–20)
BUN/CREAT SERPL: 10 (ref 6.3–21.9)
CALCIUM SERPL-MCNC: 9.1 MG/DL (ref 8.4–10.2)
CHLORIDE SERPL-SCNC: 108 MMOL/L (ref 98–107)
CO2 SERPL-SCNC: 27 MMOL/L (ref 26–30)
CREAT BLDA-MCNC: 1.2 MG/DL (ref 0.6–1.3)
CREAT SERPL-MCNC: 1.2 MG/DL (ref 0.6–1.3)
EOSINOPHIL # BLD AUTO: 0.1 10*3/MM3 (ref 0–0.7)
EOSINOPHIL NFR BLD AUTO: 1.5 % (ref 0–7)
ERYTHROCYTE [DISTWIDTH] IN BLOOD BY AUTOMATED COUNT: 14.5 % (ref 11.5–14.5)
GLOBULIN SER CALC-MCNC: 2.9 GM/DL
GLUCOSE SERPL-MCNC: 100 MG/DL (ref 74–98)
HCT VFR BLD AUTO: 48 % (ref 42–52)
HGB BLD-MCNC: 16.1 G/DL (ref 14–18)
IMM GRANULOCYTES # BLD: 0.02 10*3/MM3 (ref 0–0.06)
IMM GRANULOCYTES NFR BLD: 0.3 % (ref 0–0.6)
LYMPHOCYTES # BLD AUTO: 0.92 10*3/MM3 (ref 0.6–3.4)
LYMPHOCYTES NFR BLD AUTO: 14 % (ref 10–50)
MCH RBC QN AUTO: 32.1 PG (ref 27–31)
MCHC RBC AUTO-ENTMCNC: 33.5 G/DL (ref 30–37)
MCV RBC AUTO: 95.8 FL (ref 80–94)
MONOCYTES # BLD AUTO: 0.73 10*3/MM3 (ref 0–0.9)
MONOCYTES NFR BLD AUTO: 11.1 % (ref 0–12)
NEUTROPHILS # BLD AUTO: 4.79 10*3/MM3 (ref 2–6.9)
NEUTROPHILS NFR BLD AUTO: 72.6 % (ref 37–80)
NRBC BLD AUTO-RTO: 0 /100 WBC (ref 0–0)
PLATELET # BLD AUTO: 123 10*3/MM3 (ref 130–400)
POTASSIUM SERPL-SCNC: 4.3 MMOL/L (ref 3.5–5.1)
PROT SERPL-MCNC: 6.9 G/DL (ref 6.3–8.2)
RBC # BLD AUTO: 5.01 10*6/MM3 (ref 4.7–6.1)
SODIUM SERPL-SCNC: 142 MMOL/L (ref 137–145)
WBC # BLD AUTO: 6.59 10*3/MM3 (ref 4.8–10.8)

## 2018-09-24 PROCEDURE — 25010000002 IOPAMIDOL 61 % SOLUTION: Performed by: INTERNAL MEDICINE

## 2018-09-24 PROCEDURE — 71260 CT THORAX DX C+: CPT

## 2018-09-24 PROCEDURE — 82565 ASSAY OF CREATININE: CPT

## 2018-09-24 PROCEDURE — 74177 CT ABD & PELVIS W/CONTRAST: CPT

## 2018-09-24 RX ADMIN — BARIUM SULFATE 450 ML: 21 SUSPENSION ORAL at 08:25

## 2018-09-24 RX ADMIN — IOPAMIDOL 95 ML: 612 INJECTION, SOLUTION INTRAVENOUS at 09:35

## 2018-09-26 ENCOUNTER — OFFICE VISIT (OUTPATIENT)
Dept: ONCOLOGY | Facility: CLINIC | Age: 63
End: 2018-09-26

## 2018-09-26 VITALS
BODY MASS INDEX: 32.93 KG/M2 | HEART RATE: 89 BPM | RESPIRATION RATE: 13 BRPM | DIASTOLIC BLOOD PRESSURE: 78 MMHG | HEIGHT: 70 IN | TEMPERATURE: 97.8 F | WEIGHT: 230 LBS | SYSTOLIC BLOOD PRESSURE: 146 MMHG

## 2018-09-26 DIAGNOSIS — C79.9 METASTATIC CANCER (HCC): Primary | ICD-10-CM

## 2018-09-26 PROCEDURE — 99214 OFFICE O/P EST MOD 30 MIN: CPT | Performed by: INTERNAL MEDICINE

## 2018-09-26 NOTE — PROGRESS NOTES
DATE OF VISIT: 9/26/2018    REASON FOR VISIT: Followup for Non-small cell lung cancer-metastatic      HISTORY OF PRESENT ILLNESS: The patient is a very pleasant 63 y.o. male with past medical history significant for EGFR related non-small cell lung cancer-metastatic with widespread bony metastasis and multiple pulmonary nodules, diagnosed February 2018.  He developed a dry cough and shortness of breath in Dec 2017.  A chest XR in January 2018 revealed a right lung mass. A CT scan was ordered and revealed several centimeter right upper  Lung field with associated multiple bilateral nodular densities consistent with metastatic disease.  He was referred and underwent a CT directed needle biopsy on 01/10/2018 which showed malignancy compatible with non-small cell lung cancer.  On January 17, 2018 he underwent a CT FNA.  Pathology was consistent with a non-small cell carcinoma but inadequate tissue specimen for molecular testing.  As part of his workup, he underwent a PET/CT scan today which revealed a moderately sized right pneumothorax. He was also found to have numerous small brain mets. The patient was admitted for a chest tube placement and management of his pneumothorax.  He saw Dr. Silva at [facility] in January 2018. Caris testing performed.Tumor is EGFR L858R mutated.  Tagrisso was recommended and patient started on 02/2018.  The patient is here today for follow up.    SUBJECTIVE: The patient has been doing fairly well. he is able to tolerate  his treatment without any serious side effects. he denied any fever or  chills, no night sweats, denied any headaches    PAST MEDICAL HISTORY/SOCIAL HISTORY/FAMILY HISTORY: Unchanged from prior documentation done on 01/11/2018.     Review of Systems   Constitutional: Negative for activity change, appetite change, chills, fatigue, fever and unexpected weight change.   HENT: Negative for hearing loss, mouth sores, nosebleeds, sore throat and trouble swallowing.    Eyes:  "Negative for visual disturbance.   Respiratory: Negative for cough, chest tightness, shortness of breath and wheezing.    Cardiovascular: Negative for chest pain, palpitations and leg swelling.   Gastrointestinal: Negative for abdominal distention, abdominal pain, blood in stool, constipation, diarrhea, nausea, rectal pain and vomiting.   Endocrine: Negative for cold intolerance and heat intolerance.   Genitourinary: Negative for difficulty urinating, dysuria, frequency and urgency.   Musculoskeletal: Positive for back pain. Negative for arthralgias, gait problem, joint swelling and myalgias.   Skin: Negative for rash.   Neurological: Negative for dizziness, tremors, syncope, weakness, light-headedness, numbness and headaches.   Hematological: Negative for adenopathy. Does not bruise/bleed easily.   Psychiatric/Behavioral: Negative for confusion, sleep disturbance and suicidal ideas. The patient is not nervous/anxious.          Current Outpatient Prescriptions:   •  cetirizine (zyrTEC) 10 MG tablet, Take 1 tablet by mouth Daily., Disp: 30 tablet, Rfl: 5  •  escitalopram (LEXAPRO) 20 MG tablet, Take 1 tablet by mouth Daily., Disp: 30 tablet, Rfl: 5  •  Osimertinib Mesylate 80 MG tablet, Take 80 mg by mouth Daily., Disp: 30 tablet, Rfl: 11  •  rivaroxaban (XARELTO) 20 MG tablet, Take 1 tablet by mouth Daily With Dinner., Disp: 90 tablet, Rfl: 3    PHYSICAL EXAMINATION:   /78   Pulse 89   Temp 97.8 °F (36.6 °C) (Temporal Artery )   Resp 13   Ht 177.8 cm (70\")   Wt 104 kg (230 lb)   BMI 33.00 kg/m²    ECOG Performance Status: 1 - Symptomatic but completely ambulatory  General Appearance:  alert, cooperative, no apparent distress and appears stated age   Neurologic/Psychiatric: A&O x 3, gait steady, appropriate affect, strength 5/5 in all muscle groups   HEENT:  Normocephalic, without obvious abnormality, mucous membranes moist   Neck: Supple, symmetrical, trachea midline, no adenopathy;  No thyromegaly, " masses, or tenderness   Lungs:   Clear to auscultation bilaterally; respirations regular, even, and unlabored bilaterally   Heart:  Regular rate and rhythm, no murmurs appreciated   Abdomen:   Soft, non-tender, non-distended and no organomegaly   Lymph nodes: No cervical, supraclavicular, inguinal or axillary adenopathy noted   Extremities: Normal, atraumatic; no clubbing, cyanosis, or edema    Skin: No rashes, ulcers, or suspicious lesions noted     Orders Only on 09/24/2018   Component Date Value Ref Range Status   • WBC 09/24/2018 6.59  4.80 - 10.80 10*3/mm3 Final   • RBC 09/24/2018 5.01  4.70 - 6.10 10*6/mm3 Final   • Hemoglobin 09/24/2018 16.1  14.0 - 18.0 g/dL Final   • Hematocrit 09/24/2018 48.0  42.0 - 52.0 % Final   • MCV 09/24/2018 95.8* 80.0 - 94.0 fL Final   • MCH 09/24/2018 32.1* 27.0 - 31.0 pg Final   • MCHC 09/24/2018 33.5  30.0 - 37.0 g/dL Final   • RDW 09/24/2018 14.5  11.5 - 14.5 % Final   • Platelets 09/24/2018 123* 130 - 400 10*3/mm3 Final   • Neutrophil Rel % 09/24/2018 72.6  37.0 - 80.0 % Final   • Lymphocyte Rel % 09/24/2018 14.0  10.0 - 50.0 % Final   • Monocyte Rel % 09/24/2018 11.1  0.0 - 12.0 % Final   • Eosinophil Rel % 09/24/2018 1.5  0.0 - 7.0 % Final   • Basophil Rel % 09/24/2018 0.5  0.0 - 2.5 % Final   • Neutrophils Absolute 09/24/2018 4.79  2.00 - 6.90 10*3/mm3 Final   • Lymphocytes Absolute 09/24/2018 0.92  0.60 - 3.40 10*3/mm3 Final   • Monocytes Absolute 09/24/2018 0.73  0.00 - 0.90 10*3/mm3 Final   • Eosinophils Absolute 09/24/2018 0.10  0.00 - 0.70 10*3/mm3 Final   • Basophils Absolute 09/24/2018 0.03  0.00 - 0.20 10*3/mm3 Final   • Immature Granulocyte Rel % 09/24/2018 0.3  0.0 - 0.6 % Final   • Immature Grans Absolute 09/24/2018 0.02  0.00 - 0.06 10*3/mm3 Final   • nRBC 09/24/2018 0.0  0.0 - 0.0 /100 WBC Final   • Glucose 09/24/2018 100* 74 - 98 mg/dL Final   • BUN 09/24/2018 12  7 - 20 mg/dL Final   • Creatinine 09/24/2018 1.20  0.60 - 1.30 mg/dL Final   • eGFR Non African  Am 09/24/2018 61  >60 mL/min/1.73 Final    Comment: GFR Normal >60  Chronic Kidney Disease <60  Kidney Failure <15     • eGFR  Am 09/24/2018 74  >60 mL/min/1.73 Final   • BUN/Creatinine Ratio 09/24/2018 10.0  6.3 - 21.9 Final   • Sodium 09/24/2018 142  137 - 145 mmol/L Final   • Potassium 09/24/2018 4.3  3.5 - 5.1 mmol/L Final   • Chloride 09/24/2018 108* 98 - 107 mmol/L Final   • Total CO2 09/24/2018 27.0  26.0 - 30.0 mmol/L Final   • Calcium 09/24/2018 9.1  8.4 - 10.2 mg/dL Final   • Total Protein 09/24/2018 6.9  6.3 - 8.2 g/dL Final   • Albumin 09/24/2018 4.00  3.50 - 5.00 g/dL Final   • Globulin 09/24/2018 2.9  gm/dL Final   • A/G Ratio 09/24/2018 1.4  1.0 - 2.0 g/dL Final   • Total Bilirubin 09/24/2018 0.6  0.2 - 1.3 mg/dL Final   • Alkaline Phosphatase 09/24/2018 90  38 - 126 U/L Final   • AST (SGOT) 09/24/2018 31  15 - 46 U/L Final   • ALT (SGPT) 09/24/2018 30  13 - 69 U/L Final   Hospital Outpatient Visit on 09/24/2018   Component Date Value Ref Range Status   • Creatinine 09/24/2018 1.20  0.60 - 1.30 mg/dL Final    Serial Number: [id number]:  [id number]        No results found.    ASSESSMENT: The patient is a very pleasant 63 y.o. male  with Stage IV  Non-small cell lung cancer.     PROBLEM LIST:  1. Stage IV  Non-small cell lung cancer [id number] with brain/bone metastasis:  A. Presented with SOB 01/2018, 01/04/2018 CT chest/abdomen/pelvis: Innumerable bilateral pulmonary nodules with  Expansile lucent lesion within Left iliac bone 3.5cm.   B. Status post bronchoscopy with biopsy done on January 10, 2018 showed adenocarcinoma     C. 01/17/2018 PET/CT: Dominant multiple diffuse pulmonary parenchymal soft tissue lung nodules.  Multiple additional nodules scattered through both lungs too numerous too count. Right Paratracheal LN SUV 3.49.  Osseous metastases noted with 2 lesions in Right pelvis, most prominent Right ilium SUV 7.26.  Rib lesion along L rib cage.  Mild adenopathy base of neck and  supraclavicular areas on right and left.   D. MRI brain 01/18/2018: Numerous but small diffusely scattered enhancing nodules.  Largest lesion is 5mm. No evidence of hemorrhage or significant vasogenic edema.   E. Molecular testing revealed EGFR exon 19 mutation, PDL 10%, and p53 exon 17 mutation.  E. Started Tagrisso 02/2018.   2. Seasonal allergies  3. Anxiety  4.  Low back pain    PLAN:  1.  I will continue Tagrisso until progression or an acceptable toxicity.  2. I reviewed with the patient CT scans, I reviewed the films myself, I went over the pictures with him, we have compare the current scan to previous PET scan.  The patient has essentially stable disease with small right lateral lung nodules felt significantly smaller compared to first PET scan.  No evidence of active metastatic disease.  I will repeat his scan in 3 months, we'll consider doing a PET scan to further evaluate his lower back pain.  3.  Regarding his low back pain, he does have small bilateral kidney stones.  No evidence of obstruction.  His current scan showed possibly gallbladder sludge however his liver enzymes are perfectly normal furthermore he does not have any right upper quadrant abdominal pain.   4.  I will continue to monitor patient blood work including blood counts kidney function and liver enzymes.  5.  I will have a very low threshold in the fututre to add Xgeva for bone metastases.  6. We will continue Zyrtec for seasonal allergies.  7. We will continue escitalopram for anxiety/depression.   8.  I will consider doing a follow-up MRI brain if he has any symptoms.  Karen Vicente MD  9/26/2018    Scribed for Karen Vicente MD by Peri YOON  9/26/2018  4:43 PM  I, Karen Vicente MD, personally performed the services described in this documentation as scribed by the above named individual in my presence, and it is both accurate and complete.  9/26/2018  4:43 PM

## 2018-10-04 ENCOUNTER — TELEPHONE (OUTPATIENT)
Dept: ONCOLOGY | Facility: CLINIC | Age: 63
End: 2018-10-04

## 2018-10-04 DIAGNOSIS — C79.9 METASTATIC CANCER (HCC): ICD-10-CM

## 2018-10-04 RX ORDER — CETIRIZINE HYDROCHLORIDE 10 MG/1
10 TABLET ORAL DAILY
Qty: 30 TABLET | Refills: 5 | Status: SHIPPED | OUTPATIENT
Start: 2018-10-04 | End: 2019-03-01 | Stop reason: SDUPTHER

## 2018-10-04 NOTE — TELEPHONE ENCOUNTER
----- Message from Jeanie Vallejo sent at 10/4/2018  1:32 PM EDT -----  Regarding: ROXIE - RX REFILL   Contact: [phone]  PATIENT NEEDS A RX REFILL ON cetirizine (zyrTEC) 10 MG tablet

## 2018-11-12 ENCOUNTER — TELEPHONE (OUTPATIENT)
Dept: ONCOLOGY | Facility: CLINIC | Age: 63
End: 2018-11-12

## 2018-11-12 NOTE — TELEPHONE ENCOUNTER
Received call from pt through triage line. He is on Tagrisso. Pt reports hematuria. He saw his urologist/ who did scans/ and was told he had two kidney stones still in the kidneys. Since he saw the urologist, the blood has increased. No pain. Discussed with Dr Vicente. He said the hematuria is not related to cancer or Tagrisso.     I told the patient this. Told him that since he is on xarelto, any bleeding will be worse. It may be that the stones are starting to move, and once they get to his ureters he may start experiencing some pain. Either way, recommended he contact his urologist to let he/she know about the increase in blood.

## 2018-11-12 NOTE — TELEPHONE ENCOUNTER
----- Message from Jeanie Vallejo sent at 11/12/2018  4:06 PM EST -----  Regarding: ROXIE - HEP A VACCINE   Contact: [phone]  PATIENT CALLED WANTING TO KNOW IF HE CAN GET A HEP A VACCINE

## 2018-12-14 DIAGNOSIS — C79.9 METASTATIC CANCER (HCC): Primary | ICD-10-CM

## 2018-12-17 ENCOUNTER — APPOINTMENT (OUTPATIENT)
Dept: PET IMAGING | Facility: HOSPITAL | Age: 63
End: 2018-12-17
Attending: INTERNAL MEDICINE

## 2018-12-17 ENCOUNTER — HOSPITAL ENCOUNTER (OUTPATIENT)
Dept: PET IMAGING | Facility: HOSPITAL | Age: 63
End: 2018-12-17
Attending: INTERNAL MEDICINE

## 2018-12-17 ENCOUNTER — HOSPITAL ENCOUNTER (OUTPATIENT)
Dept: CT IMAGING | Facility: HOSPITAL | Age: 63
Discharge: HOME | End: 2018-12-17
Attending: INTERNAL MEDICINE | Admitting: INTERNAL MEDICINE

## 2018-12-17 DIAGNOSIS — C79.9 METASTATIC CANCER (HCC): ICD-10-CM

## 2018-12-17 PROCEDURE — 82565 ASSAY OF CREATININE: CPT

## 2018-12-17 PROCEDURE — 74177 CT ABD & PELVIS W/CONTRAST: CPT

## 2018-12-17 PROCEDURE — 25010000002 IOPAMIDOL 61 % SOLUTION: Performed by: INTERNAL MEDICINE

## 2018-12-17 PROCEDURE — 71260 CT THORAX DX C+: CPT

## 2018-12-17 RX ADMIN — IOPAMIDOL 95 ML: 612 INJECTION, SOLUTION INTRAVENOUS at 14:20

## 2018-12-18 ENCOUNTER — OFFICE VISIT (OUTPATIENT)
Dept: ONCOLOGY | Facility: CLINIC | Age: 63
End: 2018-12-18

## 2018-12-18 VITALS
BODY MASS INDEX: 32.93 KG/M2 | WEIGHT: 230 LBS | DIASTOLIC BLOOD PRESSURE: 82 MMHG | TEMPERATURE: 97.4 F | HEART RATE: 71 BPM | HEIGHT: 70 IN | RESPIRATION RATE: 16 BRPM | OXYGEN SATURATION: 94 % | SYSTOLIC BLOOD PRESSURE: 159 MMHG

## 2018-12-18 DIAGNOSIS — C79.9 METASTATIC CANCER (HCC): Primary | ICD-10-CM

## 2018-12-18 LAB — CREAT BLDA-MCNC: 1.3 MG/DL (ref 0.6–1.3)

## 2018-12-18 PROCEDURE — 99214 OFFICE O/P EST MOD 30 MIN: CPT | Performed by: INTERNAL MEDICINE

## 2018-12-18 NOTE — PROGRESS NOTES
DATE OF VISIT: 12/18/2018    REASON FOR VISIT: Followup for Non-small cell lung cancer-metastatic      HISTORY OF PRESENT ILLNESS: The patient is a very pleasant 63 y.o. male with past medical history significant for EGFR related non-small cell lung cancer-metastatic with widespread bony metastasis and multiple pulmonary nodules, diagnosed February 2018.  He developed a dry cough and shortness of breath in Dec 2017.  A chest XR in January 2018 revealed a right lung mass. A CT scan was ordered and revealed several centimeter right upper  Lung field with associated multiple bilateral nodular densities consistent with metastatic disease.  He was referred and underwent a CT directed needle biopsy on 01/10/2018 which showed malignancy compatible with non-small cell lung cancer.  On January 17, 2018 he underwent a CT FNA.  Pathology was consistent with a non-small cell carcinoma but inadequate tissue specimen for molecular testing.  As part of his workup, he underwent a PET/CT scan today which revealed a moderately sized right pneumothorax. He was also found to have numerous small brain mets. The patient was admitted for a chest tube placement and management of his pneumothorax.  He saw Dr. Silva at [facility] in January 2018. Caris testing performed.Tumor is EGFR L858R mutated.  Tagrisso was recommended and patient started on 02/2018.  The patient is here today for follow up.    SUBJECTIVE: The patient has been doing fairly well. he is able to tolerate  his treatment without any serious side effects. he denied any fever or  chills, no night sweats, denied any headaches    PAST MEDICAL HISTORY/SOCIAL HISTORY/FAMILY HISTORY: Unchanged from prior documentation done on 01/11/2018.     Review of Systems   Constitutional: Negative for activity change, appetite change, chills, fatigue, fever and unexpected weight change.   HENT: Negative for hearing loss, mouth sores, nosebleeds, sore throat and trouble swallowing.    Eyes:  "Negative for visual disturbance.   Respiratory: Negative for cough, chest tightness, shortness of breath and wheezing.    Cardiovascular: Negative for chest pain, palpitations and leg swelling.   Gastrointestinal: Negative for abdominal distention, abdominal pain, blood in stool, constipation, diarrhea, nausea, rectal pain and vomiting.   Endocrine: Negative for cold intolerance and heat intolerance.   Genitourinary: Negative for difficulty urinating, dysuria, frequency and urgency.   Musculoskeletal: Positive for back pain. Negative for arthralgias, gait problem, joint swelling and myalgias.   Skin: Negative for rash.   Neurological: Negative for dizziness, tremors, syncope, weakness, light-headedness, numbness and headaches.   Hematological: Negative for adenopathy. Does not bruise/bleed easily.   Psychiatric/Behavioral: Negative for confusion, sleep disturbance and suicidal ideas. The patient is not nervous/anxious.          Current Outpatient Medications:   •  cetirizine (zyrTEC) 10 MG tablet, Take 1 tablet by mouth Daily., Disp: 30 tablet, Rfl: 5  •  escitalopram (LEXAPRO) 20 MG tablet, Take 1 tablet by mouth Daily., Disp: 30 tablet, Rfl: 5  •  Osimertinib Mesylate 80 MG tablet, Take 80 mg by mouth Daily., Disp: 30 tablet, Rfl: 11  •  rivaroxaban (XARELTO) 20 MG tablet, Take 1 tablet by mouth Daily With Dinner., Disp: 90 tablet, Rfl: 3    PHYSICAL EXAMINATION:   /82   Pulse 71   Temp 97.4 °F (36.3 °C) (Temporal)   Resp 16   Ht 177.8 cm (70\")   Wt 83.9 kg (185 lb)   SpO2 94%   BMI 26.54 kg/m²    ECOG Performance Status: 1 - Symptomatic but completely ambulatory  General Appearance:  alert, cooperative, no apparent distress and appears stated age   Neurologic/Psychiatric: A&O x 3, gait steady, appropriate affect, strength 5/5 in all muscle groups   HEENT:  Normocephalic, without obvious abnormality, mucous membranes moist   Neck: Supple, symmetrical, trachea midline, no adenopathy;  No thyromegaly, " masses, or tenderness   Lungs:   Clear to auscultation bilaterally; respirations regular, even, and unlabored bilaterally   Heart:  Regular rate and rhythm, no murmurs appreciated   Abdomen:   Soft, non-tender, non-distended and no organomegaly   Lymph nodes: No cervical, supraclavicular, inguinal or axillary adenopathy noted   Extremities: Normal, atraumatic; no clubbing, cyanosis, or edema    Skin: No rashes, ulcers, or suspicious lesions noted     Hospital Outpatient Visit on 12/17/2018   Component Date Value Ref Range Status   • Creatinine 12/17/2018 1.30  0.60 - 1.30 mg/dL Final    Serial Number: [id number]:  [id number]      Ct Chest With Contrast    Result Date: 12/18/2018  Narrative: EXAMINATION: CT CHEST W CONTRAST-  INDICATION: Followup scan; C79.9-Secondary malignant neoplasm of unspecified site.  TECHNIQUE: CT scan of chest following intravenous contrast.  The radiation dose reduction device was turned on for each scan per the ALARA (As Low as Reasonably Achievable) protocol.  COMPARISON: 09/24/2018.  FINDINGS: There is no axillary lymphadenopathy. There is no mediastinal or hilar adenopathy. There is no pericardial or pleural effusion. There are multiple bilateral small ill-defined nodules, all of which are stable and unchanged when compared to previous examination. There is a single focal area of sclerosis at T5.      Impression: CT images of the chest reveal no interval change when compared with 09/24/2018.  D:  12/18/2018 E:  12/18/2018   This report was finalized on 12/18/2018 2:35 PM by Dr. Gary Petty MD.      Ct Abdomen Pelvis With Contrast    Result Date: 12/18/2018  Narrative: EXAMINATION: CT ABDOMEN PELVIS W CONTRAST- 12/17/2018  INDICATION: C79.9-Secondary malignant neoplasm of unspecified site     TECHNIQUE: CT scan of the abdomen and pelvis was performed following intravenous contrast.  The radiation dose reduction device was turned on for each scan per the ALARA (As Low as Reasonably  Achievable) protocol.  COMPARISON: 09/24/2018  FINDINGS: The liver and spleen are normal. There is no adrenal or pancreatic mass. There is no renal mass. There is a left renal parenchymal stone. There is no ureteral stone or obstructive uropathy. There is no ascites, aneurysm or retroperitoneal lymphadenopathy. There is no pelvic mass or fluid and there is no inguinal lymphadenopathy there is minimal adenopathy at the mesenteric root. There are bony blastic changes involving L3, L5, S1, the right and left ischia and both iliac wings.      Impression: There is minimal adenopathy involving the mesenteric root. There are bony metastatic changes of the lumbar spine and pelvis. All findings are stable and unchanged when compared with 09/24/2018.  D:  12/18/2018 E:  12/18/2018  This report was finalized on 12/18/2018 2:35 PM by Dr. Gary Petty MD.    (  No results found.    ASSESSMENT: The patient is a very pleasant 63 y.o. male  with Stage IV  Non-small cell lung cancer.     PROBLEM LIST:  1. Stage IV  Non-small cell lung cancer [id number] with brain/bone metastasis:  A. Presented with SOB 01/2018, 01/04/2018 CT chest/abdomen/pelvis: Innumerable bilateral pulmonary nodules with  Expansile lucent lesion within Left iliac bone 3.5cm.   B. Status post bronchoscopy with biopsy done on January 10, 2018 showed adenocarcinoma     C. 01/17/2018 PET/CT: Dominant multiple diffuse pulmonary parenchymal soft tissue lung nodules.  Multiple additional nodules scattered through both lungs too numerous too count. Right Paratracheal LN SUV 3.49.  Osseous metastases noted with 2 lesions in Right pelvis, most prominent Right ilium SUV 7.26.  Rib lesion along L rib cage.  Mild adenopathy base of neck and supraclavicular areas on right and left.   D. MRI brain 01/18/2018: Numerous but small diffusely scattered enhancing nodules.  Largest lesion is 5mm. No evidence of hemorrhage or significant vasogenic edema.   E. Molecular testing revealed  EGFR exon 19 mutation, PDL 10%, and p53 exon 17 mutation.  E. Started Tagrisso 02/2018.   2. Seasonal allergies  3. Anxiety  4.  Low back pain    PLAN:  1.  I will continue Tagrisso until progression or an acceptable toxicity.  2. I reviewed with the patient CT scans, I reviewed the films myself, I went over the pictures with him, we have compare the current scan to previous PET scan.  The patient has essentially stable disease with small right lateral lung nodules felt significantly smaller compared to first PET scan.  No evidence of active metastatic disease.  I will repeat his scan in 3 months, we'll consider doing a PET scan to further evaluate his lower back pain.  3.  I will repeat MRI brain on return.  4.  I will continue to monitor patient blood work including blood counts kidney function and liver enzymes.  5.  I will have a very low threshold in the fututre to add Xgeva for bone metastases.  6. We will continue Zyrtec for seasonal allergies.  7. We will continue escitalopram for anxiety/depression.   Karen Vicente MD  12/18/2018  2:38 PM

## 2019-01-17 ENCOUNTER — TELEPHONE (OUTPATIENT)
Dept: ONCOLOGY | Facility: CLINIC | Age: 64
End: 2019-01-17

## 2019-01-17 NOTE — TELEPHONE ENCOUNTER
----- Message from Jeanie Vallejo sent at 1/16/2019  4:16 PM EST -----  Regarding: ROXIE - RX REFILL   Contact: [phone]  PATIENT CALLED SAYING THAT THE MAIL ORDER IS NEEDING A NEW RX FOR THE XARELTO

## 2019-03-01 ENCOUNTER — TELEPHONE (OUTPATIENT)
Dept: ONCOLOGY | Facility: CLINIC | Age: 64
End: 2019-03-01

## 2019-03-01 DIAGNOSIS — C79.9 METASTATIC CANCER (HCC): ICD-10-CM

## 2019-03-01 RX ORDER — CETIRIZINE HYDROCHLORIDE 10 MG/1
10 TABLET ORAL DAILY
Qty: 30 TABLET | Refills: 5 | Status: SHIPPED | OUTPATIENT
Start: 2019-03-01 | End: 2019-06-25 | Stop reason: ALTCHOICE

## 2019-03-01 RX ORDER — ESCITALOPRAM OXALATE 20 MG/1
20 TABLET ORAL DAILY
Qty: 30 TABLET | Refills: 5 | Status: SHIPPED | OUTPATIENT
Start: 2019-03-01 | End: 2019-06-25

## 2019-03-01 NOTE — TELEPHONE ENCOUNTER
----- Message from Pari Guan sent at 3/1/2019 11:37 AM EST -----  Regarding: BADIN-REFILLS  Contact: [phone]  Patient called needs a refill on Escitalopram 20 mg, and Cetirizine 10 mg called into [facility] in [place], [place].

## 2019-03-05 ENCOUNTER — TELEPHONE (OUTPATIENT)
Dept: ONCOLOGY | Facility: CLINIC | Age: 64
End: 2019-03-05

## 2019-03-05 NOTE — TELEPHONE ENCOUNTER
----- Message from Pari Guan sent at 3/5/2019  1:59 PM EST -----  Regarding: BADIN-PET SCAN  Contact: [phone]  Nelida patients wife called wants to know why a PET scan has not been ordered? Please call.

## 2019-03-05 NOTE — TELEPHONE ENCOUNTER
Nelida informed that we will keep CT scans, since insurance will not pay for PET scan since his last CTs were WNL.  Advised that if CT shows any changes, insurance may pay for a PET at that point

## 2019-03-18 ENCOUNTER — HOSPITAL ENCOUNTER (OUTPATIENT)
Dept: MRI IMAGING | Facility: HOSPITAL | Age: 64
Discharge: HOME | End: 2019-03-18

## 2019-03-18 ENCOUNTER — HOSPITAL ENCOUNTER (OUTPATIENT)
Dept: CT IMAGING | Facility: HOSPITAL | Age: 64
Discharge: HOME | End: 2019-03-18
Admitting: INTERNAL MEDICINE

## 2019-03-18 DIAGNOSIS — C79.9 METASTATIC CANCER (HCC): ICD-10-CM

## 2019-03-18 PROCEDURE — A9577 INJ MULTIHANCE: HCPCS | Performed by: INTERNAL MEDICINE

## 2019-03-18 PROCEDURE — 82565 ASSAY OF CREATININE: CPT

## 2019-03-18 PROCEDURE — 0 GADOBENATE DIMEGLUMINE 529 MG/ML SOLUTION: Performed by: INTERNAL MEDICINE

## 2019-03-18 PROCEDURE — 74177 CT ABD & PELVIS W/CONTRAST: CPT

## 2019-03-18 PROCEDURE — 70553 MRI BRAIN STEM W/O & W/DYE: CPT

## 2019-03-18 PROCEDURE — 25010000002 IOPAMIDOL 61 % SOLUTION: Performed by: INTERNAL MEDICINE

## 2019-03-18 PROCEDURE — 71260 CT THORAX DX C+: CPT

## 2019-03-18 RX ADMIN — GADOBENATE DIMEGLUMINE 20 ML: 529 INJECTION, SOLUTION INTRAVENOUS at 13:25

## 2019-03-18 RX ADMIN — IOPAMIDOL 99 ML: 612 INJECTION, SOLUTION INTRAVENOUS at 14:14

## 2019-03-19 ENCOUNTER — APPOINTMENT (OUTPATIENT)
Dept: LAB | Facility: HOSPITAL | Age: 64
End: 2019-03-19

## 2019-03-19 ENCOUNTER — OFFICE VISIT (OUTPATIENT)
Dept: ONCOLOGY | Facility: CLINIC | Age: 64
End: 2019-03-19

## 2019-03-19 VITALS
BODY MASS INDEX: 32.93 KG/M2 | RESPIRATION RATE: 16 BRPM | OXYGEN SATURATION: 95 % | DIASTOLIC BLOOD PRESSURE: 77 MMHG | HEIGHT: 70 IN | SYSTOLIC BLOOD PRESSURE: 123 MMHG | WEIGHT: 230 LBS | TEMPERATURE: 97.6 F | HEART RATE: 75 BPM

## 2019-03-19 DIAGNOSIS — C79.9 METASTATIC CANCER (HCC): Primary | ICD-10-CM

## 2019-03-19 LAB — CREAT BLDA-MCNC: 1.2 MG/DL (ref 0.6–1.3)

## 2019-03-19 PROCEDURE — 99214 OFFICE O/P EST MOD 30 MIN: CPT | Performed by: INTERNAL MEDICINE

## 2019-03-19 NOTE — PROGRESS NOTES
DATE OF VISIT: 3/19/2019    REASON FOR VISIT: Followup for Non-small cell lung cancer-metastatic      HISTORY OF PRESENT ILLNESS: The patient is a very pleasant 63 y.o. male with past medical history significant for EGFR related non-small cell lung cancer-metastatic with widespread bony metastasis and multiple pulmonary nodules, diagnosed February 2018.  He developed a dry cough and shortness of breath in Dec 2017.  A chest XR in January 2018 revealed a right lung mass. A CT scan was ordered and revealed several centimeter right upper  Lung field with associated multiple bilateral nodular densities consistent with metastatic disease.  He was referred and underwent a CT directed needle biopsy on 01/10/2018 which showed malignancy compatible with non-small cell lung cancer.  On January 17, 2018 he underwent a CT FNA.  Pathology was consistent with a non-small cell carcinoma but inadequate tissue specimen for molecular testing.  As part of his workup, he underwent a PET/CT scan today which revealed a moderately sized right pneumothorax. He was also found to have numerous small brain mets. The patient was admitted for a chest tube placement and management of his pneumothorax.  He saw Dr. Silva at [facility] in January 2018. Caris testing performed.Tumor is EGFR L858R mutated.  Tagrisso was recommended and patient started on 02/2018.  The patient is here today for follow up.    SUBJECTIVE: The patient has been doing fairly well. he is able to tolerate  his treatment without any serious side effects.  He is thinking about weaning himself off depression medicine.  He is complaining of mild itching in his right lower extremity. he denied any fever or  chills, no night sweats, denied any headaches    PAST MEDICAL HISTORY/SOCIAL HISTORY/FAMILY HISTORY: Unchanged from prior documentation done on 01/11/2018.     Review of Systems   Constitutional: Negative for activity change, appetite change, chills, fatigue, fever and  "unexpected weight change.   HENT: Negative for hearing loss, mouth sores, nosebleeds, sore throat and trouble swallowing.    Eyes: Negative for visual disturbance.   Respiratory: Negative for cough, chest tightness, shortness of breath and wheezing.    Cardiovascular: Negative for chest pain, palpitations and leg swelling.   Gastrointestinal: Negative for abdominal distention, abdominal pain, blood in stool, constipation, diarrhea, nausea, rectal pain and vomiting.   Endocrine: Negative for cold intolerance and heat intolerance.   Genitourinary: Negative for difficulty urinating, dysuria, frequency and urgency.   Musculoskeletal: Positive for back pain. Negative for arthralgias, gait problem, joint swelling and myalgias.   Skin: Negative for rash.   Neurological: Negative for dizziness, tremors, syncope, weakness, light-headedness, numbness and headaches.   Hematological: Negative for adenopathy. Does not bruise/bleed easily.   Psychiatric/Behavioral: Negative for confusion, sleep disturbance and suicidal ideas. The patient is not nervous/anxious.          Current Outpatient Medications:   •  cetirizine (zyrTEC) 10 MG tablet, Take 1 tablet by mouth Daily., Disp: 30 tablet, Rfl: 5  •  escitalopram (LEXAPRO) 20 MG tablet, Take 1 tablet by mouth Daily., Disp: 30 tablet, Rfl: 5  •  Osimertinib Mesylate 80 MG tablet, Take 80 mg by mouth Daily., Disp: 30 tablet, Rfl: 11  •  rivaroxaban (XARELTO) 20 MG tablet, Take 1 tablet by mouth Daily With Dinner., Disp: 90 tablet, Rfl: 3  No current facility-administered medications for this visit.     PHYSICAL EXAMINATION:   /77   Pulse 75   Temp 97.6 °F (36.4 °C) (Temporal)   Resp 16   Ht 177.8 cm (70\")   Wt 104 kg (230 lb)   SpO2 95%   BMI 33.00 kg/m²    ECOG Performance Status: 1 - Symptomatic but completely ambulatory  General Appearance:  alert, cooperative, no apparent distress and appears stated age   Neurologic/Psychiatric: A&O x 3, gait steady, appropriate " affect, strength 5/5 in all muscle groups   HEENT:  Normocephalic, without obvious abnormality, mucous membranes moist   Neck: Supple, symmetrical, trachea midline, no adenopathy;  No thyromegaly, masses, or tenderness   Lungs:   Clear to auscultation bilaterally; respirations regular, even, and unlabored bilaterally   Heart:  Regular rate and rhythm, no murmurs appreciated   Abdomen:   Soft, non-tender, non-distended and no organomegaly   Lymph nodes: No cervical, supraclavicular, inguinal or axillary adenopathy noted   Extremities: Normal, atraumatic; no clubbing, cyanosis, or edema    Skin: No rashes, ulcers, or suspicious lesions noted     Hospital Outpatient Visit on 03/18/2019   Component Date Value Ref Range Status   • Creatinine 03/18/2019 1.20  0.60 - 1.30 mg/dL Final    Serial Number: [id number]:  [id number]      Ct Chest With Contrast    Result Date: 3/19/2019  Narrative: EXAMINATION: CT ABDOMEN AND PELVIS W CONTRAST-, CT CHEST W CONTRAST-  INDICATION: C79.9-Secondary malignant neoplasm of unspecified site; followup lung cancer.  TECHNIQUE: Multiple axial CT imaging was obtained of the chest, abdomen and pelvis following the administration of intravenous contrast.  The radiation dose reduction device was turned on for each scan per the ALARA (As Low as Reasonably Achievable) protocol.  COMPARISON: 12/17/2018.  FINDINGS: CHEST: Thyroid is homogeneous in appearance. There is no mediastinal mass. No bulky hilar or axillary lymphadenopathy. Calcification is seen in several of the lymph nodes in the mediastinum likely old healed granulomatous disease. The cardiac chambers are within normal limits.  No pericardial effusion.  No axillary adenopathy. There are multiple groundglass nodular densities seen diffusely throughout the lung fields. The groundglass nodularity is stable when compared to the prior study. No evidence of progression of disease. No pleural effusion or pneumothorax. Degenerative change is seen  within the spine. The spiculated mass within the right upper lung field is stable when compared to the prior study.  ABDOMEN: The liver is homogeneous. No stones in the gallbladder. Spleen is unremarkable. Nonobstructing stone is seen within the left kidney. Right kidney is unremarkable. Adrenal glands are within normal limits. Some stranding identified within the mesenteric root. Pancreas is homogeneous. No abdominal or retroperitoneal lymphadenopathy. Diverticulosis of the colon without evidence of diverticulitis.  PELVIS: Pelvic organs are unremarkable. Mild enlargement of the prostate. Pelvic portion of the gastrointestinal tract is within normal limits. No pelvic adenopathy. Bony structures reveal sclerotic lesion identified within the right iliac bone as well as scattered throughout the spine.  The sclerotic metastatic disease within the bony structures has remained stable when compared to the prior study.      Impression: Stable examination as above with no progression of the nodular densities throughout the lung fields bilaterally or sclerotic bony metastatic disease. No progression of disease.  D:  03/19/2019 E:  03/19/2019   This report was finalized on 3/19/2019 1:42 PM by Dr. Adry Mg MD.      Mri Brain With & Without Contrast    Result Date: 3/18/2019  Narrative: EXAMINATION: MRI BRAIN W WO CONTRAST-03/18/2019:  INDICATION: F/U scan; C79.9-Secondary malignant neoplasm of unspecified site.  TECHNIQUE:  Multiplanar multisequence MRI of the brain was performed without and with contrast.  COMPARISONS:  07/10/2018.  FINDINGS: There is no abnormal parenchymal enhancement to suggest metastatic disease. There is no abnormal parenchymal diffusion restriction to suggest acute infarction. There is no mass effect or shift of midline structures. The basal cisterns are patent. There is unchanged supratentorial, subcortical and deep white matter T2 prolongation which most likely indicates changes of chronic  microangiopathy in this age group. [place] flow voids are well maintained. No significant abnormal sinonasal fluid signal.      Impression: No evidence of intracranial metastases.  D:  03/18/2019 E:  03/18/2019   This report was finalized on 3/18/2019 4:07 PM by Matteo Álvarez.      Ct Abdomen Pelvis With Contrast    Result Date: 3/19/2019  Narrative: EXAMINATION: CT ABDOMEN AND PELVIS W CONTRAST-, CT CHEST W CONTRAST-  INDICATION: C79.9-Secondary malignant neoplasm of unspecified site; followup lung cancer.  TECHNIQUE: Multiple axial CT imaging was obtained of the chest, abdomen and pelvis following the administration of intravenous contrast.  The radiation dose reduction device was turned on for each scan per the ALARA (As Low as Reasonably Achievable) protocol.  COMPARISON: 12/17/2018.  FINDINGS: CHEST: Thyroid is homogeneous in appearance. There is no mediastinal mass. No bulky hilar or axillary lymphadenopathy. Calcification is seen in several of the lymph nodes in the mediastinum likely old healed granulomatous disease. The cardiac chambers are within normal limits.  No pericardial effusion.  No axillary adenopathy. There are multiple groundglass nodular densities seen diffusely throughout the lung fields. The groundglass nodularity is stable when compared to the prior study. No evidence of progression of disease. No pleural effusion or pneumothorax. Degenerative change is seen within the spine. The spiculated mass within the right upper lung field is stable when compared to the prior study.  ABDOMEN: The liver is homogeneous. No stones in the gallbladder. Spleen is unremarkable. Nonobstructing stone is seen within the left kidney. Right kidney is unremarkable. Adrenal glands are within normal limits. Some stranding identified within the mesenteric root. Pancreas is homogeneous. No abdominal or retroperitoneal lymphadenopathy. Diverticulosis of the colon without evidence of diverticulitis.  PELVIS:  Pelvic organs are unremarkable. Mild enlargement of the prostate. Pelvic portion of the gastrointestinal tract is within normal limits. No pelvic adenopathy. Bony structures reveal sclerotic lesion identified within the right iliac bone as well as scattered throughout the spine.  The sclerotic metastatic disease within the bony structures has remained stable when compared to the prior study.      Impression: Stable examination as above with no progression of the nodular densities throughout the lung fields bilaterally or sclerotic bony metastatic disease. No progression of disease.  D:  03/19/2019 E:  03/19/2019   This report was finalized on 3/19/2019 1:42 PM by Dr. Adry Mg MD.    (  No results found.    ASSESSMENT: The patient is a very pleasant 63 y.o. male  with Stage IV  Non-small cell lung cancer.     PROBLEM LIST:  1. Stage IV  Non-small cell lung cancer [id number] with brain/bone metastasis:  A. Presented with SOB 01/2018, 01/04/2018 CT chest/abdomen/pelvis: Innumerable bilateral pulmonary nodules with  Expansile lucent lesion within Left iliac bone 3.5cm.   B. Status post bronchoscopy with biopsy done on January 10, 2018 showed adenocarcinoma     C. 01/17/2018 PET/CT: Dominant multiple diffuse pulmonary parenchymal soft tissue lung nodules.  Multiple additional nodules scattered through both lungs too numerous too count. Right Paratracheal LN SUV 3.49.  Osseous metastases noted with 2 lesions in Right pelvis, most prominent Right ilium SUV 7.26.  Rib lesion along L rib cage.  Mild adenopathy base of neck and supraclavicular areas on right and left.   D. MRI brain 01/18/2018: Numerous but small diffusely scattered enhancing nodules.  Largest lesion is 5mm. No evidence of hemorrhage or significant vasogenic edema.   E. Molecular testing revealed EGFR exon 19 mutation, PDL 10%, and p53 exon 17 mutation.  E. Started Tagrisso 02/2018.   2. Seasonal allergies  3. Anxiety  4.  Low back pain  5.  Right  lower extremity DVT    PLAN:  1.  I will continue Tagrisso until progression or an acceptable toxicity.  2. I reviewed with the patient CT scans, I reviewed the films myself. The patient has essentially stable disease with small right lateral lung nodules and stable bone metastases.  No evidence of active metastatic disease.  I will repeat his scan in 3 months  3.  I did go over the MRI brain results with the patient and reassured him there is no evidence of metastatic disease.  I will repeat MRI brain in 6-month.  4.  I will continue to monitor patient blood work including blood counts kidney function and liver enzymes.  5.  I will have a very low threshold in the fututre to add Xgeva for bone metastases.  6. We will continue Zyrtec for seasonal allergies.  7. We will continue escitalopram for anxiety/depression.  He is thinking of stopping his treatment.  I strongly encouraged him to do with gradual weaning.  He will discuss this further with his primary care provider.  8.  We will continue Xarelto for DVT.  Karen Vicente MD  3/19/2019  1:47 PM

## 2019-03-20 ENCOUNTER — TELEPHONE (OUTPATIENT)
Dept: SOCIAL WORK | Facility: HOSPITAL | Age: 64
End: 2019-03-20

## 2019-03-20 NOTE — TELEPHONE ENCOUNTER
BAUDILIO called pt and spoke with his wife to assist with questions related to Medicare and disability.  SW informed her of the process for applying for Medicare.  SW referred her to [employer], an agency that can help with all insurance related questions.  BAUDILIO encouraged her to call with any future needs or concerns.  SW available for ongoing support and resource needs.

## 2019-04-26 ENCOUNTER — TELEPHONE (OUTPATIENT)
Dept: GASTROENTEROLOGY | Facility: CLINIC | Age: 64
End: 2019-04-26

## 2019-04-26 NOTE — TELEPHONE ENCOUNTER
PATIENT IS SCHEDULED FOR COLONOSCOPY ON MAY 9TH AT 11:30. PATIENT WILL NEED TO BE OF HIS XARELTO PRIOR TO PROCEDURE. PLEASE ADVISE HOW MANY DAY PATIENT CAN STOP THE XARELTO PRIOR TO PROCEDURE.    THANKS  ROHITH

## 2019-05-01 ENCOUNTER — TELEPHONE (OUTPATIENT)
Dept: GASTROENTEROLOGY | Facility: CLINIC | Age: 64
End: 2019-05-01

## 2019-05-01 ENCOUNTER — TELEPHONE (OUTPATIENT)
Dept: ONCOLOGY | Facility: CLINIC | Age: 64
End: 2019-05-01

## 2019-05-01 NOTE — TELEPHONE ENCOUNTER
----- Message from Pari Guan sent at 5/1/2019  8:53 AM EDT -----  Regarding: BADIN-REFILL  Contact: [phone]  Patient called and needs a refill on Xarelto and needs this to be done for a 90 day supply with 3 refills so this will take care of the rest of the year. This needs to be called into [facility] in [place], [place].

## 2019-05-01 NOTE — TELEPHONE ENCOUNTER
Jeanie,  Called patient back. Lost connection both times. Patient left message on nurse line wondering how many days he need to be off his Xarelto. Thanks.

## 2019-05-06 ENCOUNTER — TELEPHONE (OUTPATIENT)
Dept: ONCOLOGY | Facility: CLINIC | Age: 64
End: 2019-05-06

## 2019-05-06 DIAGNOSIS — Z12.11 SCREENING FOR COLON CANCER: Primary | ICD-10-CM

## 2019-05-06 NOTE — TELEPHONE ENCOUNTER
Returned call to patient.  Dr. Vicente is ok for patient to be off xarelto as directed by gastrologist and to return taking xarelto the following day of colonoscopy.  Patient verbalized understanding.

## 2019-05-06 NOTE — TELEPHONE ENCOUNTER
----- Message from Pari Guan sent at 5/6/2019 10:06 AM EDT -----  Regarding: RE: BADIN-COLONOSCOPY  Contact: [phone]  The wife wants a phone call back. Thanks   ----- Message -----  From: Liya Nugent RN  Sent: 5/6/2019   9:40 AM  To: Pari Guan  Subject: RE: BADIN-COLONOSCOPY                            What office am I calling and is there someone specific that I need to speak to about medication.  Thanks   Liya     ----- Message -----  From: Pari Guan  Sent: 5/6/2019   9:04 AM  To: [facility] [place]  Subject: BADIN-COLONOSCOPY                                Patient is scheduled this Thursday for a colonoscopy and they want him to stop taking Eliquis 4 days before is this okay due to him being on the tagrisso? Please all.

## 2019-05-07 ENCOUNTER — TELEPHONE (OUTPATIENT)
Dept: GASTROENTEROLOGY | Facility: CLINIC | Age: 64
End: 2019-05-07

## 2019-05-07 NOTE — TELEPHONE ENCOUNTER
SPOKE WITH PATIENT ABOUT STOPPING XARLETO. PATIENT STATES HE STOPPED TAKING IT ON Sunday. DR. FAUSTIN GAVE CLEARANCE TO STOP XARLETO. PATIENT VERBALIZED UNDERSTANDING.

## 2019-05-09 ENCOUNTER — LAB REQUISITION (OUTPATIENT)
Dept: LAB | Facility: HOSPITAL | Age: 64
End: 2019-05-09

## 2019-05-09 ENCOUNTER — OUTSIDE FACILITY SERVICE (OUTPATIENT)
Dept: GASTROENTEROLOGY | Facility: CLINIC | Age: 64
End: 2019-05-09

## 2019-05-09 DIAGNOSIS — Z12.11 ENCOUNTER FOR SCREENING FOR MALIGNANT NEOPLASM OF COLON: ICD-10-CM

## 2019-05-09 PROCEDURE — 88305 TISSUE EXAM BY PATHOLOGIST: CPT | Performed by: INTERNAL MEDICINE

## 2019-05-09 PROCEDURE — 45385 COLONOSCOPY W/LESION REMOVAL: CPT | Performed by: INTERNAL MEDICINE

## 2019-06-24 ENCOUNTER — LAB (OUTPATIENT)
Dept: LAB | Facility: HOSPITAL | Age: 64
End: 2019-06-24

## 2019-06-24 ENCOUNTER — HOSPITAL ENCOUNTER (OUTPATIENT)
Dept: CT IMAGING | Facility: HOSPITAL | Age: 64
Discharge: HOME | End: 2019-06-24
Admitting: INTERNAL MEDICINE

## 2019-06-24 DIAGNOSIS — C79.9 METASTATIC CANCER (HCC): ICD-10-CM

## 2019-06-24 LAB
ALBUMIN SERPL-MCNC: 4.1 G/DL (ref 3.5–5.2)
ALBUMIN/GLOB SERPL: 1.5 G/DL
ALP SERPL-CCNC: 113 U/L (ref 39–117)
ALT SERPL W P-5'-P-CCNC: 20 U/L (ref 1–41)
ANION GAP SERPL CALCULATED.3IONS-SCNC: 12 MMOL/L
AST SERPL-CCNC: 23 U/L (ref 1–40)
BASOPHILS # BLD AUTO: 0.04 10*3/MM3 (ref 0–0.2)
BASOPHILS NFR BLD AUTO: 0.7 % (ref 0–1.5)
BILIRUB SERPL-MCNC: 0.4 MG/DL (ref 0.2–1.2)
BUN BLD-MCNC: 13 MG/DL (ref 8–23)
BUN/CREAT SERPL: 10.4 (ref 7–25)
CALCIUM SPEC-SCNC: 8.9 MG/DL (ref 8.6–10.5)
CHLORIDE SERPL-SCNC: 105 MMOL/L (ref 98–107)
CO2 SERPL-SCNC: 27 MMOL/L (ref 22–29)
CREAT BLD-MCNC: 1.25 MG/DL (ref 0.76–1.27)
CREAT BLDA-MCNC: 1.3 MG/DL (ref 0.6–1.3)
DEPRECATED RDW RBC AUTO: 50.1 FL (ref 37–54)
EOSINOPHIL # BLD AUTO: 0.14 10*3/MM3 (ref 0–0.4)
EOSINOPHIL NFR BLD AUTO: 2.5 % (ref 0.3–6.2)
ERYTHROCYTE [DISTWIDTH] IN BLOOD BY AUTOMATED COUNT: 13.8 % (ref 12.3–15.4)
GFR SERPL CREATININE-BSD FRML MDRD: 58 ML/MIN/1.73
GLOBULIN UR ELPH-MCNC: 2.7 GM/DL
GLUCOSE BLD-MCNC: 86 MG/DL (ref 65–99)
HCT VFR BLD AUTO: 49.8 % (ref 37.5–51)
HGB BLD-MCNC: 16.2 G/DL (ref 13–17.7)
IMM GRANULOCYTES # BLD AUTO: 0.01 10*3/MM3 (ref 0–0.05)
IMM GRANULOCYTES NFR BLD AUTO: 0.2 % (ref 0–0.5)
LYMPHOCYTES # BLD AUTO: 0.88 10*3/MM3 (ref 0.7–3.1)
LYMPHOCYTES NFR BLD AUTO: 15.7 % (ref 19.6–45.3)
MCH RBC QN AUTO: 31.5 PG (ref 26.6–33)
MCHC RBC AUTO-ENTMCNC: 32.5 G/DL (ref 31.5–35.7)
MCV RBC AUTO: 96.9 FL (ref 79–97)
MONOCYTES # BLD AUTO: 0.69 10*3/MM3 (ref 0.1–0.9)
MONOCYTES NFR BLD AUTO: 12.3 % (ref 5–12)
NEUTROPHILS # BLD AUTO: 3.86 10*3/MM3 (ref 1.7–7)
NEUTROPHILS NFR BLD AUTO: 68.6 % (ref 42.7–76)
NRBC BLD AUTO-RTO: 0 /100 WBC (ref 0–0.2)
PLATELET # BLD AUTO: 148 10*3/MM3 (ref 140–450)
PMV BLD AUTO: 11.2 FL (ref 6–12)
POTASSIUM BLD-SCNC: 4 MMOL/L (ref 3.5–5.2)
PROT SERPL-MCNC: 6.8 G/DL (ref 6–8.5)
RBC # BLD AUTO: 5.14 10*6/MM3 (ref 4.14–5.8)
SODIUM BLD-SCNC: 144 MMOL/L (ref 136–145)
WBC NRBC COR # BLD: 5.62 10*3/MM3 (ref 3.4–10.8)

## 2019-06-24 PROCEDURE — 36415 COLL VENOUS BLD VENIPUNCTURE: CPT

## 2019-06-24 PROCEDURE — 80053 COMPREHEN METABOLIC PANEL: CPT

## 2019-06-24 PROCEDURE — 85025 COMPLETE CBC W/AUTO DIFF WBC: CPT

## 2019-06-24 PROCEDURE — 71260 CT THORAX DX C+: CPT

## 2019-06-24 PROCEDURE — 74177 CT ABD & PELVIS W/CONTRAST: CPT

## 2019-06-24 PROCEDURE — 25010000002 IOPAMIDOL 61 % SOLUTION: Performed by: INTERNAL MEDICINE

## 2019-06-24 PROCEDURE — 82565 ASSAY OF CREATININE: CPT

## 2019-06-24 RX ADMIN — IOPAMIDOL 100 ML: 612 INJECTION, SOLUTION INTRAVENOUS at 09:08

## 2019-06-25 ENCOUNTER — OFFICE VISIT (OUTPATIENT)
Dept: ONCOLOGY | Facility: CLINIC | Age: 64
End: 2019-06-25

## 2019-06-25 VITALS
TEMPERATURE: 97 F | SYSTOLIC BLOOD PRESSURE: 124 MMHG | HEIGHT: 70 IN | BODY MASS INDEX: 32.93 KG/M2 | OXYGEN SATURATION: 94 % | DIASTOLIC BLOOD PRESSURE: 80 MMHG | RESPIRATION RATE: 16 BRPM | HEART RATE: 74 BPM | WEIGHT: 230 LBS

## 2019-06-25 DIAGNOSIS — T45.1X5S: ICD-10-CM

## 2019-06-25 DIAGNOSIS — C79.9 METASTATIC CANCER (HCC): Primary | ICD-10-CM

## 2019-06-25 PROCEDURE — 99214 OFFICE O/P EST MOD 30 MIN: CPT | Performed by: NURSE PRACTITIONER

## 2019-06-25 RX ORDER — CETIRIZINE HYDROCHLORIDE, PSEUDOEPHEDRINE HYDROCHLORIDE 5; 120 MG/1; MG/1
1 TABLET, FILM COATED, EXTENDED RELEASE ORAL DAILY
Qty: 90 TABLET | Refills: 3 | Status: SHIPPED | OUTPATIENT
Start: 2019-06-25 | End: 2019-07-22 | Stop reason: ALTCHOICE

## 2019-06-25 RX ORDER — FLUTICASONE PROPIONATE 50 MCG
2 SPRAY, SUSPENSION (ML) NASAL DAILY
Qty: 3 BOTTLE | Refills: 3 | Status: SHIPPED | OUTPATIENT
Start: 2019-06-25

## 2019-07-22 RX ORDER — CETIRIZINE HYDROCHLORIDE 5 MG/1
5 TABLET ORAL DAILY
Qty: 90 TABLET | Refills: 1 | Status: SHIPPED | OUTPATIENT
Start: 2019-07-22 | End: 2019-08-27

## 2019-08-27 ENCOUNTER — TELEPHONE (OUTPATIENT)
Dept: ONCOLOGY | Facility: CLINIC | Age: 64
End: 2019-08-27

## 2019-08-27 RX ORDER — DESLORATADINE 5 MG/1
5 TABLET ORAL DAILY
Qty: 90 TABLET | Refills: 3 | Status: SHIPPED | OUTPATIENT
Start: 2019-08-27

## 2019-08-27 NOTE — TELEPHONE ENCOUNTER
----- Message from Jeanie Vallejo sent at 8/27/2019 10:21 AM EDT -----  Regarding: ROXIE - RX REQUESTED  Contact: [phone]  PATIENT CALLED STATING [facility] WILL NOT COVER cetirizine (zyrTEC) 5 MG, SO THEY TOLD THE PATIENT THERE WAS AN ALTERNATIVE FOR MEDICATION THAT INSURANCE WOULD COVER.     CAN YOU SEND IN A NEW RX FOR THIS :                                                DESLORAPADINE 5 MG       [facility] WILL COVER AND COST WOULD 22.00    THIS DRUG WILL NOT INTERACT WITH HIS CHEMO HE SPOKE TO THE PHARMACIST.

## 2019-08-27 NOTE — TELEPHONE ENCOUNTER
Called patient to inform that Dr. Vicente ok with changing medication (clarinex) for allergies for insurance coverage.

## 2019-09-23 ENCOUNTER — HOSPITAL ENCOUNTER (OUTPATIENT)
Dept: CARDIOLOGY | Facility: HOSPITAL | Age: 64
Discharge: HOME | End: 2019-09-23
Admitting: NURSE PRACTITIONER

## 2019-09-23 DIAGNOSIS — T45.1X5S: ICD-10-CM

## 2019-09-23 DIAGNOSIS — C79.9 METASTATIC CANCER (HCC): ICD-10-CM

## 2019-09-23 LAB
BH CV ECHO MEAS - AO MAX PG (FULL): 4 MMHG
BH CV ECHO MEAS - AO MAX PG: 9 MMHG
BH CV ECHO MEAS - AO MEAN PG (FULL): 2 MMHG
BH CV ECHO MEAS - AO MEAN PG: 4 MMHG
BH CV ECHO MEAS - AO ROOT AREA (BSA CORRECTED): 1.4
BH CV ECHO MEAS - AO ROOT AREA: 8 CM^2
BH CV ECHO MEAS - AO ROOT DIAM: 3.2 CM
BH CV ECHO MEAS - AO V2 MAX: 150 CM/SEC
BH CV ECHO MEAS - AO V2 MEAN: 95.1 CM/SEC
BH CV ECHO MEAS - AO V2 VTI: 30.6 CM
BH CV ECHO MEAS - AVA(I,A): 2.8 CM^2
BH CV ECHO MEAS - AVA(I,D): 2.8 CM^2
BH CV ECHO MEAS - AVA(V,A): 2.6 CM^2
BH CV ECHO MEAS - AVA(V,D): 2.6 CM^2
BH CV ECHO MEAS - BSA(HAYCOCK): 2.3 M^2
BH CV ECHO MEAS - BSA: 2.2 M^2
BH CV ECHO MEAS - BZI_BMI: 33 KILOGRAMS/M^2
BH CV ECHO MEAS - BZI_METRIC_HEIGHT: 177.8 CM
BH CV ECHO MEAS - BZI_METRIC_WEIGHT: 104.3 KG
BH CV ECHO MEAS - EDV(CUBED): 177.5 ML
BH CV ECHO MEAS - EDV(MOD-SP2): 99 ML
BH CV ECHO MEAS - EDV(MOD-SP4): 104 ML
BH CV ECHO MEAS - EDV(TEICH): 154.9 ML
BH CV ECHO MEAS - EF(CUBED): 53.6 %
BH CV ECHO MEAS - EF(MOD-BP): 55 %
BH CV ECHO MEAS - EF(MOD-SP2): 51.5 %
BH CV ECHO MEAS - EF(MOD-SP4): 52.9 %
BH CV ECHO MEAS - EF(TEICH): 44.9 %
BH CV ECHO MEAS - ESV(CUBED): 82.3 ML
BH CV ECHO MEAS - ESV(MOD-SP2): 48 ML
BH CV ECHO MEAS - ESV(MOD-SP4): 49 ML
BH CV ECHO MEAS - ESV(TEICH): 85.4 ML
BH CV ECHO MEAS - FS: 22.6 %
BH CV ECHO MEAS - IVS/LVPW: 1
BH CV ECHO MEAS - IVSD: 0.81 CM
BH CV ECHO MEAS - LA DIMENSION: 4.4 CM
BH CV ECHO MEAS - LA/AO: 1.4
BH CV ECHO MEAS - LAD MAJOR: 5.4 CM
BH CV ECHO MEAS - LAT PEAK E' VEL: 9.5 CM/SEC
BH CV ECHO MEAS - LV DIASTOLIC VOL/BSA (35-75): 47 ML/M^2
BH CV ECHO MEAS - LV MASS(C)D: 163.5 GRAMS
BH CV ECHO MEAS - LV MASS(C)DI: 73.8 GRAMS/M^2
BH CV ECHO MEAS - LV MAX PG: 5 MMHG
BH CV ECHO MEAS - LV MEAN PG: 2 MMHG
BH CV ECHO MEAS - LV SYSTOLIC VOL/BSA (12-30): 22.1 ML/M^2
BH CV ECHO MEAS - LV V1 MAX: 112 CM/SEC
BH CV ECHO MEAS - LV V1 MEAN: 67.6 CM/SEC
BH CV ECHO MEAS - LV V1 VTI: 24.3 CM
BH CV ECHO MEAS - LVIDD: 5.6 CM
BH CV ECHO MEAS - LVIDS: 4.4 CM
BH CV ECHO MEAS - LVLD AP2: 7.9 CM
BH CV ECHO MEAS - LVLD AP4: 8.1 CM
BH CV ECHO MEAS - LVLS AP2: 6.7 CM
BH CV ECHO MEAS - LVLS AP4: 6.8 CM
BH CV ECHO MEAS - LVOT AREA (M): 3.5 CM^2
BH CV ECHO MEAS - LVOT AREA: 3.5 CM^2
BH CV ECHO MEAS - LVOT DIAM: 2.1 CM
BH CV ECHO MEAS - LVPWD: 0.77 CM
BH CV ECHO MEAS - MED PEAK E' VEL: 6.4 CM/SEC
BH CV ECHO MEAS - PA ACC SLOPE: 1137 CM/SEC^2
BH CV ECHO MEAS - PA ACC TIME: 0.08 SEC
BH CV ECHO MEAS - PA PR(ACCEL): 44.4 MMHG
BH CV ECHO MEAS - SI(AO): 111.1 ML/M^2
BH CV ECHO MEAS - SI(CUBED): 43 ML/M^2
BH CV ECHO MEAS - SI(LVOT): 38 ML/M^2
BH CV ECHO MEAS - SI(MOD-SP2): 23 ML/M^2
BH CV ECHO MEAS - SI(MOD-SP4): 24.8 ML/M^2
BH CV ECHO MEAS - SI(TEICH): 31.4 ML/M^2
BH CV ECHO MEAS - SV(AO): 246.1 ML
BH CV ECHO MEAS - SV(CUBED): 95.2 ML
BH CV ECHO MEAS - SV(LVOT): 84.2 ML
BH CV ECHO MEAS - SV(MOD-SP2): 51 ML
BH CV ECHO MEAS - SV(MOD-SP4): 55 ML
BH CV ECHO MEAS - SV(TEICH): 69.6 ML
BH CV ECHO MEAS - TAPSE (>1.6): 2.1 CM2
BH CV XLRA - RV BASE: 3.4 CM
BH CV XLRA - RV LENGTH: 5.2 CM
BH CV XLRA - RV MID: 3 CM
LEFT ATRIUM VOLUME INDEX: 28 ML/M^2
LEFT ATRIUM VOLUME: 62 ML
LV EF 2D ECHO EST: 55 %

## 2019-09-23 PROCEDURE — 93306 TTE W/DOPPLER COMPLETE: CPT

## 2019-09-23 PROCEDURE — 93306 TTE W/DOPPLER COMPLETE: CPT | Performed by: INTERNAL MEDICINE

## 2019-09-24 ENCOUNTER — HOSPITAL ENCOUNTER (OUTPATIENT)
Dept: MRI IMAGING | Facility: HOSPITAL | Age: 64
Discharge: HOME | End: 2019-09-24

## 2019-09-24 ENCOUNTER — LAB (OUTPATIENT)
Dept: LAB | Facility: HOSPITAL | Age: 64
End: 2019-09-24

## 2019-09-24 ENCOUNTER — OFFICE VISIT (OUTPATIENT)
Dept: ONCOLOGY | Facility: CLINIC | Age: 64
End: 2019-09-24

## 2019-09-24 ENCOUNTER — HOSPITAL ENCOUNTER (OUTPATIENT)
Dept: CT IMAGING | Facility: HOSPITAL | Age: 64
Discharge: HOME | End: 2019-09-24
Admitting: NURSE PRACTITIONER

## 2019-09-24 ENCOUNTER — TELEPHONE (OUTPATIENT)
Dept: ONCOLOGY | Facility: HOSPITAL | Age: 64
End: 2019-09-24

## 2019-09-24 VITALS
TEMPERATURE: 98.2 F | HEART RATE: 87 BPM | OXYGEN SATURATION: 98 % | RESPIRATION RATE: 16 BRPM | WEIGHT: 226 LBS | HEIGHT: 70 IN | DIASTOLIC BLOOD PRESSURE: 83 MMHG | SYSTOLIC BLOOD PRESSURE: 131 MMHG | BODY MASS INDEX: 32.35 KG/M2

## 2019-09-24 DIAGNOSIS — C79.9 METASTATIC CANCER (HCC): Primary | ICD-10-CM

## 2019-09-24 DIAGNOSIS — C79.9 METASTATIC CANCER (HCC): ICD-10-CM

## 2019-09-24 DIAGNOSIS — C34.91 NON-SMALL CELL LUNG CANCER, RIGHT (HCC): Primary | ICD-10-CM

## 2019-09-24 DIAGNOSIS — C34.91 NON-SMALL CELL LUNG CANCER, RIGHT (HCC): ICD-10-CM

## 2019-09-24 LAB
ALBUMIN SERPL-MCNC: 4.6 G/DL (ref 3.5–5.2)
ALBUMIN/GLOB SERPL: 1.6 G/DL
ALP SERPL-CCNC: 150 U/L (ref 39–117)
ALT SERPL W P-5'-P-CCNC: 15 U/L (ref 1–41)
ANION GAP SERPL CALCULATED.3IONS-SCNC: 9 MMOL/L (ref 5–15)
AST SERPL-CCNC: 18 U/L (ref 1–40)
BASOPHILS # BLD AUTO: 0.04 10*3/MM3 (ref 0–0.2)
BASOPHILS NFR BLD AUTO: 0.6 % (ref 0–1.5)
BILIRUB SERPL-MCNC: 0.4 MG/DL (ref 0.2–1.2)
BUN BLD-MCNC: 13 MG/DL (ref 8–23)
BUN/CREAT SERPL: 11 (ref 7–25)
CALCIUM SPEC-SCNC: 9.2 MG/DL (ref 8.6–10.5)
CHLORIDE SERPL-SCNC: 105 MMOL/L (ref 98–107)
CO2 SERPL-SCNC: 29 MMOL/L (ref 22–29)
CREAT BLD-MCNC: 1.18 MG/DL (ref 0.76–1.27)
CREAT BLDA-MCNC: 1.2 MG/DL (ref 0.6–1.3)
DEPRECATED RDW RBC AUTO: 50.4 FL (ref 37–54)
EOSINOPHIL # BLD AUTO: 0.12 10*3/MM3 (ref 0–0.4)
EOSINOPHIL NFR BLD AUTO: 1.9 % (ref 0.3–6.2)
ERYTHROCYTE [DISTWIDTH] IN BLOOD BY AUTOMATED COUNT: 14.2 % (ref 12.3–15.4)
GFR SERPL CREATININE-BSD FRML MDRD: 62 ML/MIN/1.73
GLOBULIN UR ELPH-MCNC: 2.9 GM/DL
GLUCOSE BLD-MCNC: 108 MG/DL (ref 65–99)
HCT VFR BLD AUTO: 51.6 % (ref 37.5–51)
HGB BLD-MCNC: 16.6 G/DL (ref 13–17.7)
IMM GRANULOCYTES # BLD AUTO: 0.01 10*3/MM3 (ref 0–0.05)
IMM GRANULOCYTES NFR BLD AUTO: 0.2 % (ref 0–0.5)
LYMPHOCYTES # BLD AUTO: 0.92 10*3/MM3 (ref 0.7–3.1)
LYMPHOCYTES NFR BLD AUTO: 14.6 % (ref 19.6–45.3)
MCH RBC QN AUTO: 31 PG (ref 26.6–33)
MCHC RBC AUTO-ENTMCNC: 32.2 G/DL (ref 31.5–35.7)
MCV RBC AUTO: 96.3 FL (ref 79–97)
MONOCYTES # BLD AUTO: 0.64 10*3/MM3 (ref 0.1–0.9)
MONOCYTES NFR BLD AUTO: 10.2 % (ref 5–12)
NEUTROPHILS # BLD AUTO: 4.57 10*3/MM3 (ref 1.7–7)
NEUTROPHILS NFR BLD AUTO: 72.5 % (ref 42.7–76)
NRBC BLD AUTO-RTO: 0 /100 WBC (ref 0–0.2)
PLATELET # BLD AUTO: 152 10*3/MM3 (ref 140–450)
PMV BLD AUTO: 11 FL (ref 6–12)
POTASSIUM BLD-SCNC: 4.2 MMOL/L (ref 3.5–5.2)
PROT SERPL-MCNC: 7.5 G/DL (ref 6–8.5)
RBC # BLD AUTO: 5.36 10*6/MM3 (ref 4.14–5.8)
SODIUM BLD-SCNC: 143 MMOL/L (ref 136–145)
WBC NRBC COR # BLD: 6.3 10*3/MM3 (ref 3.4–10.8)

## 2019-09-24 PROCEDURE — 0 GADOBENATE DIMEGLUMINE 529 MG/ML SOLUTION: Performed by: NURSE PRACTITIONER

## 2019-09-24 PROCEDURE — 74177 CT ABD & PELVIS W/CONTRAST: CPT

## 2019-09-24 PROCEDURE — 85025 COMPLETE CBC W/AUTO DIFF WBC: CPT

## 2019-09-24 PROCEDURE — 99215 OFFICE O/P EST HI 40 MIN: CPT | Performed by: INTERNAL MEDICINE

## 2019-09-24 PROCEDURE — 71260 CT THORAX DX C+: CPT

## 2019-09-24 PROCEDURE — 70553 MRI BRAIN STEM W/O & W/DYE: CPT

## 2019-09-24 PROCEDURE — A9577 INJ MULTIHANCE: HCPCS | Performed by: NURSE PRACTITIONER

## 2019-09-24 PROCEDURE — 82565 ASSAY OF CREATININE: CPT

## 2019-09-24 PROCEDURE — 36415 COLL VENOUS BLD VENIPUNCTURE: CPT

## 2019-09-24 PROCEDURE — 25010000002 IOPAMIDOL 61 % SOLUTION: Performed by: NURSE PRACTITIONER

## 2019-09-24 PROCEDURE — 80053 COMPREHEN METABOLIC PANEL: CPT

## 2019-09-24 RX ORDER — OSIMERTINIB 80 1/1
TABLET, FILM COATED ORAL
Qty: 30 TABLET | Refills: 10 | Status: SHIPPED | OUTPATIENT
Start: 2019-09-24 | End: 2020-05-18 | Stop reason: HOSPADM

## 2019-09-24 RX ADMIN — IOPAMIDOL 95 ML: 612 INJECTION, SOLUTION INTRAVENOUS at 10:12

## 2019-09-24 RX ADMIN — GADOBENATE DIMEGLUMINE 20 ML: 529 INJECTION, SOLUTION INTRAVENOUS at 11:31

## 2019-09-24 NOTE — TELEPHONE ENCOUNTER
Reached out to [facility] to confirm script for Tagrisso received this morning. Marked for expedited processing. Reached out to patient via phone number within Epic demographics to provide update for patient, no answer, LVM with my call back number should additional questions arise or assistance needed.     ----- Message from Arleen Ly MA sent at 9/24/2019  8:53 AM EDT -----  Regarding: Cinthia  PT has left 3 messages since yesterday and also has called back in August requesting a refill and he still hasn't rec'd.  PT is requesting a refill on Tagrisso ASAP as he is out of this medication.  [facility] [phone] states they have been waiting for a RX since August and still have not rec'd one.  PT is getting anxious as he has not heard from anyone.  Please call patient for an update.  PT did not leave a return number.    Thank you,     ~Arleen

## 2019-09-24 NOTE — PROGRESS NOTES
DATE OF VISIT: 9/24/2019    REASON FOR VISIT: Followup for Non-small cell lung cancer-metastatic      HISTORY OF PRESENT ILLNESS: The patient is a very pleasant 64 y.o. male with past medical history significant for EGFR related non-small cell lung cancer-metastatic with widespread bony metastasis and multiple pulmonary nodules, diagnosed February 2018.  He developed a dry cough and shortness of breath in Dec 2017.  A chest XR in January 2018 revealed a right lung mass. A CT scan was ordered and revealed several centimeter right upper  Lung field with associated multiple bilateral nodular densities consistent with metastatic disease.  He was referred and underwent a CT directed needle biopsy on 01/10/2018 which showed malignancy compatible with non-small cell lung cancer.  On January 17, 2018 he underwent a CT FNA.  Pathology was consistent with a non-small cell carcinoma but inadequate tissue specimen for molecular testing.  As part of his workup, he underwent a PET/CT scan today which revealed a moderately sized right pneumothorax. He was also found to have numerous small brain mets. The patient was admitted for a chest tube placement and management of his pneumothorax.  He saw Dr. Silva at [facility] in January 2018. Caris testing performed.Tumor is EGFR L858R mutated.  Tagrisso was recommended and patient started on 02/2018.  The patient is here today for follow up.    SUBJECTIVE: The patient is here today with his wife.  He is doing fairly well.  He has mild but stable low back pain.  Denies any headaches no dizziness.  He is anxious about the scan results.    PAST MEDICAL HISTORY/SOCIAL HISTORY/FAMILY HISTORY: Unchanged from prior documentation done on 01/11/2018.     Review of Systems   Constitutional: Positive for fatigue. Negative for activity change, appetite change, chills, fever and unexpected weight change.   HENT: Positive for congestion and postnasal drip. Negative for hearing loss, mouth sores,  nosebleeds, sore throat and trouble swallowing.    Eyes: Negative for visual disturbance.   Respiratory: Positive for cough. Negative for chest tightness, shortness of breath and wheezing.    Cardiovascular: Negative for chest pain, palpitations and leg swelling.   Gastrointestinal: Negative for abdominal distention, abdominal pain, blood in stool, constipation, diarrhea, nausea, rectal pain and vomiting.   Endocrine: Negative for cold intolerance and heat intolerance.   Genitourinary: Negative for difficulty urinating, dysuria, frequency and urgency.   Musculoskeletal: Positive for back pain. Negative for arthralgias, gait problem, joint swelling and myalgias.   Skin: Negative for rash.   Neurological: Negative for dizziness, tremors, syncope, weakness, light-headedness, numbness and headaches.   Hematological: Negative for adenopathy. Does not bruise/bleed easily.   Psychiatric/Behavioral: Negative for confusion, sleep disturbance and suicidal ideas. The patient is not nervous/anxious.          Current Outpatient Medications:   •  desloratadine (CLARINEX) 5 MG tablet, Take 1 tablet by mouth Daily., Disp: 90 tablet, Rfl: 3  •  fluticasone (FLONASE) 50 MCG/ACT nasal spray, 2 sprays into the nostril(s) as directed by provider Daily. Administer 2 sprays in each nostril for each dose., Disp: 3 bottle, Rfl: 3  •  rivaroxaban (XARELTO) 20 MG tablet, Take 1 tablet by mouth Daily With Dinner., Disp: 90 tablet, Rfl: 3  •  Sod Picosulfate-Mag Ox-Cit Acd 10-3.5-12 MG-GM -GM/160ML solution, Take 1 kit by mouth Take As Directed. Follow instructions that were mailed to your home. If you didn't receive these call [phone]., Disp: 2 bottle, Rfl: 0  •  TAGRISSO 80 MG tablet, TAKE ONE TABLET (80 MG) BY MOUTH ONCE DAILY AT THE SAME TIME. MAY TAKEWITH OR WITHOUT FOOD. CALL [phone] FOR REFILLS, Disp: 30 tablet, Rfl: 10  No current facility-administered medications for this visit.     PHYSICAL EXAMINATION:   /83    "Pulse 87   Temp 98.2 °F (36.8 °C) (Temporal)   Resp 16   Ht 177.8 cm (70\")   Wt 103 kg (226 lb)   SpO2 98%   BMI 32.43 kg/m²    ECOG Performance Status: 1 - Symptomatic but completely ambulatory  General Appearance:  alert, cooperative, no apparent distress and appears stated age   Neurologic/Psychiatric: A&O x 3, gait steady, appropriate affect, strength 5/5 in all muscle groups   HEENT:  Normocephalic, without obvious abnormality, mucous membranes moist   Neck: Supple, symmetrical, trachea midline, no adenopathy;  No thyromegaly, masses, or tenderness   Lungs:   Clear to auscultation bilaterally; respirations regular, even, and unlabored bilaterally   Heart:  Regular rate and rhythm, no murmurs appreciated   Abdomen:   Soft, non-tender, non-distended and no organomegaly   Lymph nodes: No cervical, supraclavicular, inguinal or axillary adenopathy noted   Extremities: Normal, atraumatic; no clubbing, cyanosis, or edema    Skin: No rashes, ulcers, or suspicious lesions noted     Hospital Outpatient Visit on 09/24/2019   Component Date Value Ref Range Status   • Creatinine 09/24/2019 1.20  0.60 - 1.30 mg/dL Final    Serial Number: [id number]:  [id number]   Hospital Outpatient Visit on 09/23/2019   Component Date Value Ref Range Status   • BSA 09/23/2019 2.2  m^2 Final   • IVSd 09/23/2019 0.81  cm Final   • LVIDd 09/23/2019 5.6  cm Final   • LVIDs 09/23/2019 4.4  cm Final   • LVPWd 09/23/2019 0.77  cm Final   • IVS/LVPW 09/23/2019 1.0   Final   • FS 09/23/2019 22.6  % Final   • EDV(Teich) 09/23/2019 154.9  ml Final   • ESV(Teich) 09/23/2019 85.4  ml Final   • EF(Teich) 09/23/2019 44.9  % Final   • EDV(cubed) 09/23/2019 177.5  ml Final   • ESV(cubed) 09/23/2019 82.3  ml Final   • EF(cubed) 09/23/2019 53.6  % Final   • LV mass(C)d 09/23/2019 163.5  grams Final   • LV mass(C)dI 09/23/2019 73.8  grams/m^2 Final   • SV(Teich) 09/23/2019 69.6  ml Final   • SI(Teich) 09/23/2019 31.4  ml/m^2 Final   • SV(cubed) " 09/23/2019 95.2  ml Final   • SI(cubed) 09/23/2019 43.0  ml/m^2 Final   • Ao root diam 09/23/2019 3.2  cm Final   • Ao root area 09/23/2019 8.0  cm^2 Final   • LA dimension 09/23/2019 4.4  cm Final   • LA/Ao 09/23/2019 1.4   Final   • LVOT diam 09/23/2019 2.1  cm Final   • LVOT area 09/23/2019 3.5  cm^2 Final   • LVOT area(traced) 09/23/2019 3.5  cm^2 Final   • LAd major 09/23/2019 5.4  cm Final   • LVLd ap4 09/23/2019 8.1  cm Final   • EDV(MOD-sp4) 09/23/2019 104.0  ml Final   • LVLs ap4 09/23/2019 6.8  cm Final   • ESV(MOD-sp4) 09/23/2019 49.0  ml Final   • EF(MOD-sp4) 09/23/2019 52.9  % Final   • LVLd ap2 09/23/2019 7.9  cm Final   • EDV(MOD-sp2) 09/23/2019 99.0  ml Final   • LVLs ap2 09/23/2019 6.7  cm Final   • ESV(MOD-sp2) 09/23/2019 48.0  ml Final   • EF(MOD-sp2) 09/23/2019 51.5  % Final   • LA volume 09/23/2019 62.0  ml Final   • EF(MOD-bp) 09/23/2019 55  % Final   • SV(MOD-sp4) 09/23/2019 55.0  ml Final   • SI(MOD-sp4) 09/23/2019 24.8  ml/m^2 Final   • SV(MOD-sp2) 09/23/2019 51.0  ml Final   • SI(MOD-sp2) 09/23/2019 23.0  ml/m^2 Final   • Ao root area (BSA corrected) 09/23/2019 1.4   Final   • LV Kate Vol (BSA corrected) 09/23/2019 47.0  ml/m^2 Final   • LV Sys Vol (BSA corrected) 09/23/2019 22.1  ml/m^2 Final   • LA Volume Index 09/23/2019 28.0  ml/m^2 Final   • Ao pk michelle 09/23/2019 150.0  cm/sec Final   • Ao max PG 09/23/2019 9.0  mmHg Final   • Ao max PG (full) 09/23/2019 4.0  mmHg Final   • Ao V2 mean 09/23/2019 95.1  cm/sec Final   • Ao mean PG 09/23/2019 4.0  mmHg Final   • Ao mean PG (full) 09/23/2019 2.0  mmHg Final   • Ao V2 VTI 09/23/2019 30.6  cm Final   • DANIELLE(I,A) 09/23/2019 2.8  cm^2 Final   • DANIELLE(I,D) 09/23/2019 2.8  cm^2 Final   • DANIELLE(V,A) 09/23/2019 2.6  cm^2 Final   • DANIELLE(V,D) 09/23/2019 2.6  cm^2 Final   • LV V1 max PG 09/23/2019 5.0  mmHg Final   • LV V1 mean PG 09/23/2019 2.0  mmHg Final   • LV V1 max 09/23/2019 112.0  cm/sec Final   • LV V1 mean 09/23/2019 67.6  cm/sec Final   • LV V1  VTI 09/23/2019 24.3  cm Final   • SV(Ao) 09/23/2019 246.1  ml Final   • SI(Ao) 09/23/2019 111.1  ml/m^2 Final   • SV(LVOT) 09/23/2019 84.2  ml Final   • SI(LVOT) 09/23/2019 38.0  ml/m^2 Final   • PA acc slope 09/23/2019 1,137  cm/sec^2 Final   • PA acc time 09/23/2019 0.08  sec Final   • PA pr(Accel) 09/23/2019 44.4  mmHg Final   • Lat Peak E' Corbin 09/23/2019 9.5  cm/sec Final   • Med Peak E' Corbin 09/23/2019 6.4  cm/sec Final   • BH CV ECHO HUANG - BZI_BMI 09/23/2019 33.0  kilograms/m^2 Final   •  CV ECHO HUANG - BSA(HAYCOCK) 09/23/2019 2.3  m^2 Final   •  CV ECHO HUANG - BZI_METRIC_WEIGHT 09/23/2019 104.3  kg Final   •  CV ECHO HUANG - BZI_METRIC_HEIGHT 09/23/2019 177.8  cm Final   • RV Base 09/23/2019 3.40  cm Final   • RV Length 09/23/2019 5.20  cm Final   • RV Mid 09/23/2019 3.00  cm Final   • TAPSE (>1.6) 09/23/2019 2.10  cm2 Final   • Echo EF Estimated 09/23/2019 55  % Final      Ct Chest With Contrast    Result Date: 9/24/2019  Narrative: EXAMINATION: CT CHEST W CONTRAST-, CT ABDOMEN PELVIS W CONTRAST- 09/24/2019  INDICATION: f/u scan; C79.9-Secondary malignant neoplasm of unspecified site  TECHNIQUE: CT chest, abdomen and pelvis with intravenous contrast  The radiation dose reduction device was turned on for each scan per the ALARA (As Low as Reasonably Achievable) protocol.  COMPARISON: CT dated 06/24/2019  FINDINGS:  CHEST: Thyroid is homogeneous in attenuation. No bulky mediastinal adenopathy. Central airways are patent. Esophagus in normal course and caliber. Patent nonaneurysmal thoracic aorta with patent great vessel origins. Central pulmonary arteries are grossly patent. Cardiac size within normal limits and without pericardial effusion. Extended lung windows demonstrate persistent scattered nodular densities throughout the lung fields without a dominant focus.  Spiculated noncalcified nodule right lung apex measuring similar to prior at 16 mm without effusion development demonstrating subsegmental  atelectasis. Degenerative changes of the spine with sclerotic foci as seen on prior. No soft tissue body wall findings of concern.  ABDOMEN AND PELVIS: Liver demonstrates mild to moderate diffuse low-attenuation hepatic steatosis throughout the hepatic parenchyma without focal lesion.  The gallbladder has a folded appearance without cholelithiasis present. No biliary dilatation. Pancreas and spleen unremarkable. Adrenals without distinct nodule. Kidneys without hydronephrosis or hydroureter demonstrating nonobstructing right nephrolithiasis. No bulky retroperitoneal adenopathy. Atherosclerotic nonaneurysmal abdominal aorta. Portal veins and IVC patent. GI tract evaluation without focal thickening or disproportionate dilatation of bowel to suggest mechanical obstructive process. No free fluid or intra-abdominal free air. Pelvic viscera grossly unremarkable without bulky pelvic adenopathy or free fluid. Degenerative changes of the spine along with sclerotic abnormal signal within the L5 vertebral body as seen on prior. No soft tissue body wall findings development.      Impression: 1. Multiple bilateral pulmonary nodules along with dominant nodule right upper lobe medially similar to prior. No distinct progression or effusion development. 2. Sclerotic foci throughout the osseous structures with a dominant L5 vertebral bone marrow signal change of blastic disease again noted without distinct evidence for progression.  D:  09/24/2019 E:  09/24/2019      Mri Brain With & Without Contrast    Result Date: 9/24/2019  Narrative: EXAMINATION: MRI BRAIN W WO CONTRAST-  INDICATION: f/u scan; C79.9-Secondary malignant neoplasm of unspecified site  TECHNIQUE: Sagittal and axial T1 and axial T2 FLAIR diffusion weighted images the brain, posteriorly and contrast axial sagittal and coronal T1-weighted images.  COMPARISON: Brain MRI 03/18/2019  FINDINGS: History indicates previous metastatic disease to the brain into bone. Prior  study report from 03/18/2019 indicates no evidence of intraductal metastatic disease.  Today's exam shows no evidence of restricted diffusion to suggest acute infarction. Remaining imaging sequences show no evidence of mass mass effect edema, hemorrhage hydrocephalus or abnormal extra-axial collection. There is expected to be a generalized central atrophy for patient's age, and mild-to-moderate nonconfluent central white matter disease. None of these white matter lesions enhance, however, the post contrast series does show a single new enhancing posterior left cerebellar nodule approximately 3 mm in diameter. No evidence of enhancing lesion or other pathologic brain or meningeal enhancement is seen elsewhere.       Impression: Single new 3 mm enhancing nodule in the left cerebellum. No evidence of metastatic disease elsewhere. Otherwise stable brain MRI.        Ct Abdomen Pelvis With Contrast    Result Date: 9/24/2019  Narrative: EXAMINATION: CT CHEST W CONTRAST-, CT ABDOMEN PELVIS W CONTRAST- 09/24/2019  INDICATION: f/u scan; C79.9-Secondary malignant neoplasm of unspecified site  TECHNIQUE: CT chest, abdomen and pelvis with intravenous contrast  The radiation dose reduction device was turned on for each scan per the ALARA (As Low as Reasonably Achievable) protocol.  COMPARISON: CT dated 06/24/2019  FINDINGS:  CHEST: Thyroid is homogeneous in attenuation. No bulky mediastinal adenopathy. Central airways are patent. Esophagus in normal course and caliber. Patent nonaneurysmal thoracic aorta with patent great vessel origins. Central pulmonary arteries are grossly patent. Cardiac size within normal limits and without pericardial effusion. Extended lung windows demonstrate persistent scattered nodular densities throughout the lung fields without a dominant focus.  Spiculated noncalcified nodule right lung apex measuring similar to prior at 16 mm without effusion development demonstrating subsegmental atelectasis.  Degenerative changes of the spine with sclerotic foci as seen on prior. No soft tissue body wall findings of concern.  ABDOMEN AND PELVIS: Liver demonstrates mild to moderate diffuse low-attenuation hepatic steatosis throughout the hepatic parenchyma without focal lesion.  The gallbladder has a folded appearance without cholelithiasis present. No biliary dilatation. Pancreas and spleen unremarkable. Adrenals without distinct nodule. Kidneys without hydronephrosis or hydroureter demonstrating nonobstructing right nephrolithiasis. No bulky retroperitoneal adenopathy. Atherosclerotic nonaneurysmal abdominal aorta. Portal veins and IVC patent. GI tract evaluation without focal thickening or disproportionate dilatation of bowel to suggest mechanical obstructive process. No free fluid or intra-abdominal free air. Pelvic viscera grossly unremarkable without bulky pelvic adenopathy or free fluid. Degenerative changes of the spine along with sclerotic abnormal signal within the L5 vertebral body as seen on prior. No soft tissue body wall findings development.      Impression: 1. Multiple bilateral pulmonary nodules along with dominant nodule right upper lobe medially similar to prior. No distinct progression or effusion development. 2. Sclerotic foci throughout the osseous structures with a dominant L5 vertebral bone marrow signal change of blastic disease again noted without distinct evidence for progression.  D:  09/24/2019 E:  09/24/2019    (  No results found.    ASSESSMENT: The patient is a very pleasant 64 y.o. male  with Stage IV  Non-small cell lung cancer.     PROBLEM LIST:  1. Stage IV  Non-small cell lung cancer [id number] with brain/bone metastasis:  A. Presented with SOB 01/2018, 01/04/2018 CT chest/abdomen/pelvis: Innumerable bilateral pulmonary nodules with  Expansile lucent lesion within Left iliac bone 3.5cm.   B. Status post bronchoscopy with biopsy done on January 10, 2018 showed adenocarcinoma     C.  01/17/2018 PET/CT: Dominant multiple diffuse pulmonary parenchymal soft tissue lung nodules.  Multiple additional nodules scattered through both lungs too numerous too count. Right Paratracheal LN SUV 3.49.  Osseous metastases noted with 2 lesions in Right pelvis, most prominent Right ilium SUV 7.26.  Rib lesion along L rib cage.  Mild adenopathy base of neck and supraclavicular areas on right and left.   D. MRI brain 01/18/2018: Numerous but small diffusely scattered enhancing nodules.  Largest lesion is 5mm. No evidence of hemorrhage or significant vasogenic edema.   E. Molecular testing revealed EGFR exon 19 mutation, PDL 10%, and p53 exon 17 mutation.  E. Started Tagrisso 02/2018.   2. Seasonal allergies  3. Anxiety  4.  Low back pain  5.  Right lower extremity DVT    PLAN:  1.  I will continue Tagrisso until progression or an acceptable toxicity.  2. I did go over the scan results with the patient his wife, I reviewed the films myself, went over the MRI films with the patient compared the current study to previous films done March 2019.  Patient has contrast-enhancing lesion 3 mm in size in the left cerebellum.  This will be watched carefully.  3. We will repeat CAT scans as well as MRI of the brain in 3 months.  This would be ordered prior to return  4.  I will continue to monitor the patient's blood work including blood counts, kidney function, and liver enzymes throughout treatment. I reassured the patient that his blood counts are normal, with stable kidney function and normal liver enzymes.   5.  We will consider adding Xgeva in the future if needed for progressive bony disease or symptomatic metastasis.   6. We will order echocardiogram and EKG for prior to return as recommended in the package insert for monitoring for cardiotoxicity from Tagrisso.   7. We will continue Zyrtec for seasonal allergies. He was given prescriptions for both of these today.   8. I reviewed with the patient that if he has  progressive disease there are other treatment options available per NCCN guidelines.   9. The patient has stopped his Lexapro for anxiety. He is doing well without it, except for some early wakening. He will notify us if he thinks he needs to restart this or if he needs something for sleep.   10.  We will continue Xarelto 20 mg daily for DVT.      Karen Vicente MD  9/24/2019  2:37 PM

## 2019-11-14 ENCOUNTER — TELEPHONE (OUTPATIENT)
Dept: SOCIAL WORK | Facility: HOSPITAL | Age: 64
End: 2019-11-14

## 2019-11-14 NOTE — TELEPHONE ENCOUNTER
SW called pt to discuss information about Medicare and the application process for coverage.  SW referred pt to [facility] ([facility] health insurance program).  They will be able to assist him in choosing a Medicare plan and answering more direct questions.  SW available for ongoing support and resource needs.

## 2019-12-03 ENCOUNTER — TELEPHONE (OUTPATIENT)
Dept: ONCOLOGY | Facility: CLINIC | Age: 64
End: 2019-12-03

## 2019-12-03 NOTE — TELEPHONE ENCOUNTER
Pt states that he has been having some back pain and would like to be referred to an orthopedist.     The patient can be contacted at the number on file for questions.

## 2019-12-03 NOTE — TELEPHONE ENCOUNTER
Discussed with CAR Arnold, and patient advised that we will perform scans as scheduled on 12/17 to r/o cancer in his back before referring to an orthopedist

## 2019-12-10 ENCOUNTER — TELEPHONE (OUTPATIENT)
Dept: ONCOLOGY | Facility: CLINIC | Age: 64
End: 2019-12-10

## 2019-12-10 NOTE — TELEPHONE ENCOUNTER
Patient's wife called triage line. Stating that patient has had increased back pain and patient had tried [facility] Back [facility] body and that he was on blood thinner and wanted to make sure it would be ok to take. So the question is can he take the [facility].    After discussing with sabiha YOON called and spoke to patient. Informing him that CAR Daniels had stated that the Aspirin was really high dose and that she thought he shouldn't take it at this time.

## 2019-12-17 ENCOUNTER — TELEPHONE (OUTPATIENT)
Dept: ONCOLOGY | Facility: CLINIC | Age: 64
End: 2019-12-17

## 2019-12-17 ENCOUNTER — HOSPITAL ENCOUNTER (OUTPATIENT)
Dept: CT IMAGING | Facility: HOSPITAL | Age: 64
Discharge: HOME | End: 2019-12-17

## 2019-12-17 ENCOUNTER — LAB (OUTPATIENT)
Dept: LAB | Facility: HOSPITAL | Age: 64
End: 2019-12-17

## 2019-12-17 ENCOUNTER — HOSPITAL ENCOUNTER (OUTPATIENT)
Dept: MRI IMAGING | Facility: HOSPITAL | Age: 64
Discharge: HOME | End: 2019-12-17
Admitting: INTERNAL MEDICINE

## 2019-12-17 ENCOUNTER — OFFICE VISIT (OUTPATIENT)
Dept: ONCOLOGY | Facility: CLINIC | Age: 64
End: 2019-12-17

## 2019-12-17 VITALS
OXYGEN SATURATION: 99 % | TEMPERATURE: 97.6 F | BODY MASS INDEX: 31.62 KG/M2 | WEIGHT: 220.9 LBS | HEART RATE: 91 BPM | DIASTOLIC BLOOD PRESSURE: 88 MMHG | SYSTOLIC BLOOD PRESSURE: 124 MMHG | RESPIRATION RATE: 20 BRPM | HEIGHT: 70 IN

## 2019-12-17 DIAGNOSIS — C34.91 NON-SMALL CELL LUNG CANCER, RIGHT (HCC): ICD-10-CM

## 2019-12-17 DIAGNOSIS — C79.9 METASTATIC CANCER (HCC): Primary | ICD-10-CM

## 2019-12-17 DIAGNOSIS — C79.9 METASTATIC CANCER (HCC): ICD-10-CM

## 2019-12-17 LAB
ALBUMIN SERPL-MCNC: 4.4 G/DL (ref 3.5–5.2)
ALBUMIN/GLOB SERPL: 1.4 G/DL
ALP SERPL-CCNC: 166 U/L (ref 39–117)
ALT SERPL W P-5'-P-CCNC: 15 U/L (ref 1–41)
ANION GAP SERPL CALCULATED.3IONS-SCNC: 11 MMOL/L (ref 5–15)
AST SERPL-CCNC: 17 U/L (ref 1–40)
BASOPHILS # BLD AUTO: 0.03 10*3/MM3 (ref 0–0.2)
BASOPHILS NFR BLD AUTO: 0.5 % (ref 0–1.5)
BILIRUB SERPL-MCNC: 0.4 MG/DL (ref 0.2–1.2)
BUN BLD-MCNC: 17 MG/DL (ref 8–23)
BUN/CREAT SERPL: 14.4 (ref 7–25)
CALCIUM SPEC-SCNC: 9.3 MG/DL (ref 8.6–10.5)
CHLORIDE SERPL-SCNC: 105 MMOL/L (ref 98–107)
CO2 SERPL-SCNC: 27 MMOL/L (ref 22–29)
CREAT BLD-MCNC: 1.18 MG/DL (ref 0.76–1.27)
CREAT BLDA-MCNC: 1.4 MG/DL (ref 0.6–1.3)
DEPRECATED RDW RBC AUTO: 50.5 FL (ref 37–54)
EOSINOPHIL # BLD AUTO: 0.1 10*3/MM3 (ref 0–0.4)
EOSINOPHIL NFR BLD AUTO: 1.7 % (ref 0.3–6.2)
ERYTHROCYTE [DISTWIDTH] IN BLOOD BY AUTOMATED COUNT: 13.9 % (ref 12.3–15.4)
GFR SERPL CREATININE-BSD FRML MDRD: 62 ML/MIN/1.73
GLOBULIN UR ELPH-MCNC: 3.2 GM/DL
GLUCOSE BLD-MCNC: 105 MG/DL (ref 65–99)
HCT VFR BLD AUTO: 49.9 % (ref 37.5–51)
HGB BLD-MCNC: 16.2 G/DL (ref 13–17.7)
IMM GRANULOCYTES # BLD AUTO: 0.01 10*3/MM3 (ref 0–0.05)
IMM GRANULOCYTES NFR BLD AUTO: 0.2 % (ref 0–0.5)
LYMPHOCYTES # BLD AUTO: 0.76 10*3/MM3 (ref 0.7–3.1)
LYMPHOCYTES NFR BLD AUTO: 13.1 % (ref 19.6–45.3)
MCH RBC QN AUTO: 31.7 PG (ref 26.6–33)
MCHC RBC AUTO-ENTMCNC: 32.5 G/DL (ref 31.5–35.7)
MCV RBC AUTO: 97.7 FL (ref 79–97)
MONOCYTES # BLD AUTO: 0.86 10*3/MM3 (ref 0.1–0.9)
MONOCYTES NFR BLD AUTO: 14.9 % (ref 5–12)
NEUTROPHILS # BLD AUTO: 4.02 10*3/MM3 (ref 1.7–7)
NEUTROPHILS NFR BLD AUTO: 69.6 % (ref 42.7–76)
NRBC BLD AUTO-RTO: 0 /100 WBC (ref 0–0.2)
PLATELET # BLD AUTO: 155 10*3/MM3 (ref 140–450)
PMV BLD AUTO: 11.1 FL (ref 6–12)
POTASSIUM BLD-SCNC: 4 MMOL/L (ref 3.5–5.2)
PROT SERPL-MCNC: 7.6 G/DL (ref 6–8.5)
RBC # BLD AUTO: 5.11 10*6/MM3 (ref 4.14–5.8)
SODIUM BLD-SCNC: 143 MMOL/L (ref 136–145)
WBC NRBC COR # BLD: 5.78 10*3/MM3 (ref 3.4–10.8)

## 2019-12-17 PROCEDURE — 85025 COMPLETE CBC W/AUTO DIFF WBC: CPT

## 2019-12-17 PROCEDURE — 80053 COMPREHEN METABOLIC PANEL: CPT

## 2019-12-17 PROCEDURE — 36415 COLL VENOUS BLD VENIPUNCTURE: CPT

## 2019-12-17 PROCEDURE — 0 GADOBENATE DIMEGLUMINE 529 MG/ML SOLUTION: Performed by: INTERNAL MEDICINE

## 2019-12-17 PROCEDURE — 74177 CT ABD & PELVIS W/CONTRAST: CPT

## 2019-12-17 PROCEDURE — 99214 OFFICE O/P EST MOD 30 MIN: CPT | Performed by: INTERNAL MEDICINE

## 2019-12-17 PROCEDURE — 70553 MRI BRAIN STEM W/O & W/DYE: CPT

## 2019-12-17 PROCEDURE — 71260 CT THORAX DX C+: CPT

## 2019-12-17 PROCEDURE — 25010000002 IOPAMIDOL 61 % SOLUTION: Performed by: INTERNAL MEDICINE

## 2019-12-17 PROCEDURE — A9577 INJ MULTIHANCE: HCPCS | Performed by: INTERNAL MEDICINE

## 2019-12-17 PROCEDURE — 82565 ASSAY OF CREATININE: CPT

## 2019-12-17 RX ORDER — HYDROCODONE BITARTRATE AND ACETAMINOPHEN 5; 325 MG/1; MG/1
1 TABLET ORAL EVERY 6 HOURS PRN
Qty: 120 TABLET | Refills: 0 | Status: SHIPPED | OUTPATIENT
Start: 2019-12-17 | End: 2020-02-04 | Stop reason: SDUPTHER

## 2019-12-17 RX ADMIN — IOPAMIDOL 95 ML: 612 INJECTION, SOLUTION INTRAVENOUS at 10:18

## 2019-12-17 RX ADMIN — GADOBENATE DIMEGLUMINE 20 ML: 529 INJECTION, SOLUTION INTRAVENOUS at 10:39

## 2019-12-17 NOTE — PROGRESS NOTES
DATE OF VISIT: 12/17/2019    REASON FOR VISIT: Followup for Non-small cell lung cancer-metastatic      HISTORY OF PRESENT ILLNESS: The patient is a very pleasant 64 y.o. male with past medical history significant for EGFR related non-small cell lung cancer-metastatic with widespread bony metastasis and multiple pulmonary nodules, diagnosed February 2018.  He developed a dry cough and shortness of breath in Dec 2017.  A chest XR in January 2018 revealed a right lung mass. A CT scan was ordered and revealed several centimeter right upper  Lung field with associated multiple bilateral nodular densities consistent with metastatic disease.  He was referred and underwent a CT directed needle biopsy on 01/10/2018 which showed malignancy compatible with non-small cell lung cancer.  On January 17, 2018 he underwent a CT FNA.  Pathology was consistent with a non-small cell carcinoma but inadequate tissue specimen for molecular testing.  As part of his workup, he underwent a PET/CT scan today which revealed a moderately sized right pneumothorax. He was also found to have numerous small brain mets. The patient was admitted for a chest tube placement and management of his pneumothorax.  He saw Dr. Silva at [facility] in January 2018. Caris testing performed.Tumor is EGFR [id number] mutated.  Tagrisso was recommended and patient started on 02/2018.  The patient is here today for follow up.    SUBJECTIVE: The patient is here today with his wife.  He is complaining of back pain.  Lower back, Tylenol as needed is not enough denies any numbness denies any trauma never had this problem before.  He denies any fever chills no night sweats.    PAST MEDICAL HISTORY/SOCIAL HISTORY/FAMILY HISTORY: Unchanged from prior documentation done on 01/11/2018.     Review of Systems   Constitutional: Positive for fatigue. Negative for activity change, appetite change, chills, fever and unexpected weight change.   HENT: Positive for congestion and  postnasal drip. Negative for hearing loss, mouth sores, nosebleeds, sore throat and trouble swallowing.    Eyes: Negative for visual disturbance.   Respiratory: Positive for cough. Negative for chest tightness, shortness of breath and wheezing.    Cardiovascular: Negative for chest pain, palpitations and leg swelling.   Gastrointestinal: Negative for abdominal distention, abdominal pain, blood in stool, constipation, diarrhea, nausea, rectal pain and vomiting.   Endocrine: Negative for cold intolerance and heat intolerance.   Genitourinary: Negative for difficulty urinating, dysuria, frequency and urgency.   Musculoskeletal: Positive for back pain. Negative for arthralgias, gait problem, joint swelling and myalgias.   Skin: Negative for rash.   Neurological: Negative for dizziness, tremors, syncope, weakness, light-headedness, numbness and headaches.   Hematological: Negative for adenopathy. Does not bruise/bleed easily.   Psychiatric/Behavioral: Negative for confusion, sleep disturbance and suicidal ideas. The patient is not nervous/anxious.          Current Outpatient Medications:   •  desloratadine (CLARINEX) 5 MG tablet, Take 1 tablet by mouth Daily., Disp: 90 tablet, Rfl: 3  •  fluticasone (FLONASE) 50 MCG/ACT nasal spray, 2 sprays into the nostril(s) as directed by provider Daily. Administer 2 sprays in each nostril for each dose., Disp: 3 bottle, Rfl: 3  •  rivaroxaban (XARELTO) 20 MG tablet, Take 1 tablet by mouth Daily With Dinner., Disp: 90 tablet, Rfl: 3  •  Sod Picosulfate-Mag Ox-Cit Acd 10-3.5-12 MG-GM -GM/160ML solution, Take 1 kit by mouth Take As Directed. Follow instructions that were mailed to your home. If you didn't receive these call [phone]., Disp: 2 bottle, Rfl: 0  •  TAGRISSO 80 MG tablet, TAKE ONE TABLET (80 MG) BY MOUTH ONCE DAILY AT THE SAME TIME. MAY TAKEWITH OR WITHOUT FOOD. CALL [phone] FOR REFILLS, Disp: 30 tablet, Rfl: 10  No current facility-administered medications for  "this visit.     PHYSICAL EXAMINATION:   /88   Pulse 91   Temp 97.6 °F (36.4 °C) (Temporal)   Resp 20   Ht 177.8 cm (70\")   Wt 100 kg (220 lb 14.4 oz)   SpO2 99%   BMI 31.70 kg/m²    ECOG Performance Status: 1 - Symptomatic but completely ambulatory  General Appearance:  alert, cooperative, no apparent distress and appears stated age   Neurologic/Psychiatric: A&O x 3, gait steady, appropriate affect, strength 5/5 in all muscle groups   HEENT:  Normocephalic, without obvious abnormality, mucous membranes moist   Neck: Supple, symmetrical, trachea midline, no adenopathy;  No thyromegaly, masses, or tenderness   Lungs:   Clear to auscultation bilaterally; respirations regular, even, and unlabored bilaterally   Heart:  Regular rate and rhythm, no murmurs appreciated   Abdomen:   Soft, non-tender, non-distended and no organomegaly   Lymph nodes: No cervical, supraclavicular, inguinal or axillary adenopathy noted   Extremities: Normal, atraumatic; no clubbing, cyanosis, or edema    Skin: No rashes, ulcers, or suspicious lesions noted     Lab on 12/17/2019   Component Date Value Ref Range Status   • Glucose 12/17/2019 105* 65 - 99 mg/dL Final   • BUN 12/17/2019 17  8 - 23 mg/dL Final   • Creatinine 12/17/2019 1.18  0.76 - 1.27 mg/dL Final   • Sodium 12/17/2019 143  136 - 145 mmol/L Final   • Potassium 12/17/2019 4.0  3.5 - 5.2 mmol/L Final   • Chloride 12/17/2019 105  98 - 107 mmol/L Final   • CO2 12/17/2019 27.0  22.0 - 29.0 mmol/L Final   • Calcium 12/17/2019 9.3  8.6 - 10.5 mg/dL Final   • Total Protein 12/17/2019 7.6  6.0 - 8.5 g/dL Final   • Albumin 12/17/2019 4.40  3.50 - 5.20 g/dL Final   • ALT (SGPT) 12/17/2019 15  1 - 41 U/L Final   • AST (SGOT) 12/17/2019 17  1 - 40 U/L Final   • Alkaline Phosphatase 12/17/2019 166* 39 - 117 U/L Final   • Total Bilirubin 12/17/2019 0.4  0.2 - 1.2 mg/dL Final   • eGFR Non African Amer 12/17/2019 62  >60 mL/min/1.73 Final   • Globulin 12/17/2019 3.2  gm/dL Final   • A/G " Ratio 12/17/2019 1.4  g/dL Final   • BUN/Creatinine Ratio 12/17/2019 14.4  7.0 - 25.0 Final   • Anion Gap 12/17/2019 11.0  5.0 - 15.0 mmol/L Final   • WBC 12/17/2019 5.78  3.40 - 10.80 10*3/mm3 Final   • RBC 12/17/2019 5.11  4.14 - 5.80 10*6/mm3 Final   • Hemoglobin 12/17/2019 16.2  13.0 - 17.7 g/dL Final   • Hematocrit 12/17/2019 49.9  37.5 - 51.0 % Final   • MCV 12/17/2019 97.7* 79.0 - 97.0 fL Final   • MCH 12/17/2019 31.7  26.6 - 33.0 pg Final   • MCHC 12/17/2019 32.5  31.5 - 35.7 g/dL Final   • RDW 12/17/2019 13.9  12.3 - 15.4 % Final   • RDW-SD 12/17/2019 50.5  37.0 - 54.0 fl Final   • MPV 12/17/2019 11.1  6.0 - 12.0 fL Final   • Platelets 12/17/2019 155  140 - 450 10*3/mm3 Final   • Neutrophil % 12/17/2019 69.6  42.7 - 76.0 % Final   • Lymphocyte % 12/17/2019 13.1* 19.6 - 45.3 % Final   • Monocyte % 12/17/2019 14.9* 5.0 - 12.0 % Final   • Eosinophil % 12/17/2019 1.7  0.3 - 6.2 % Final   • Basophil % 12/17/2019 0.5  0.0 - 1.5 % Final   • Immature Grans % 12/17/2019 0.2  0.0 - 0.5 % Final   • Neutrophils, Absolute 12/17/2019 4.02  1.70 - 7.00 10*3/mm3 Final   • Lymphocytes, Absolute 12/17/2019 0.76  0.70 - 3.10 10*3/mm3 Final   • Monocytes, Absolute 12/17/2019 0.86  0.10 - 0.90 10*3/mm3 Final   • Eosinophils, Absolute 12/17/2019 0.10  0.00 - 0.40 10*3/mm3 Final   • Basophils, Absolute 12/17/2019 0.03  0.00 - 0.20 10*3/mm3 Final   • Immature Grans, Absolute 12/17/2019 0.01  0.00 - 0.05 10*3/mm3 Final   • nRBC 12/17/2019 0.0  0.0 - 0.2 /100 WBC Final   Hospital Outpatient Visit on 12/17/2019   Component Date Value Ref Range Status   • Creatinine 12/17/2019 1.40* 0.60 - 1.30 mg/dL Final    Serial Number: [id number]:  [id number]      Ct Chest With Contrast    Result Date: 12/17/2019  Narrative: EXAMINATION: CT CHEST W CONTRAST- 12/17/2019  INDICATION: restaging lung cancer; C34.91-Malignant neoplasm of unspecified part of right bronchus or lung  TECHNIQUE: CT scan of the chest was performed following intravenous  contrast.  The radiation dose reduction device was turned on for each scan per the ALARA (As Low as Reasonably Achievable) protocol.  COMPARISON: 09/24/2019  FINDINGS: There is no axillary lymphadenopathy. There is no mediastinal or hilar adenopathy. There is no pericardial or pleural effusion. There is a nodule in the right upper lobe which is stable and unchanged when compared to previous examination. Additionally there are multiple small bilateral pulmonary nodules. These likewise are stable.      Impression: There is a dominant mass in the right upper lobe measuring approximately 1.3 x 1.8 cm in the axial plane. Additionally there are multiple bilateral subcentimeter pulmonary nodules. The entirety of the examination is, however, stable and unchanged when compared with 09/24/2019.  D:  12/17/2019 E:  12/17/2019   This report was finalized on 12/17/2019 2:14 PM by Dr. Gary Petty MD.      Ct Abdomen Pelvis With Contrast    Result Date: 12/17/2019  Narrative: EXAMINATION: CT ABDOMEN AND PELVIS WITH CONTRAST-12/17/2019:  INDICATION: Restaging lung cancer; C34.91-Malignant neoplasm of unspecified part of right bronchus or lung.  TECHNIQUE: CT scan of the abdomen and pelvis was performed following intravenous contrast.  The radiation dose reduction device was turned on for each scan per the ALARA (As Low as Reasonably Achievable) protocol.  COMPARISON: 09/24/2019.  FINDINGS: The liver and spleen are normal. There is no adrenal or pancreatic mass. There is a nonobstructing stone in each kidney. There is no renal mass. There is no ascites, aneurysm or retroperitoneal lymphadenopathy. There is no pelvic mass or fluid. There is no inguinal lymphadenopathy. There are bony sclerotic changes in the spine and pelvis which are stable.      Impression: There are bony sclerotic metastatic lesions in the thoracic spine, lumbar spine and pelvis. These are stable. There are no new findings in the abdomen or pelvis.  D:   12/17/2019 E:  12/17/2019    This report was finalized on 12/17/2019 2:14 PM by Dr. Gary Petty MD.    (  No results found.    ASSESSMENT: The patient is a very pleasant 64 y.o. male  with Stage IV  Non-small cell lung cancer.     PROBLEM LIST:  1. Stage IV  Non-small cell lung cancer [id number] with brain/bone metastasis:  A. Presented with SOB 01/2018, 01/04/2018 CT chest/abdomen/pelvis: Innumerable bilateral pulmonary nodules with  Expansile lucent lesion within Left iliac bone 3.5cm.   B. Status post bronchoscopy with biopsy done on January 10, 2018 showed adenocarcinoma     C. 01/17/2018 PET/CT: Dominant multiple diffuse pulmonary parenchymal soft tissue lung nodules.  Multiple additional nodules scattered through both lungs too numerous too count. Right Paratracheal LN SUV 3.49.  Osseous metastases noted with 2 lesions in Right pelvis, most prominent Right ilium SUV 7.26.  Rib lesion along L rib cage.  Mild adenopathy base of neck and supraclavicular areas on right and left.   D. MRI brain 01/18/2018: Numerous but small diffusely scattered enhancing nodules.  Largest lesion is 5mm. No evidence of hemorrhage or significant vasogenic edema.   E. Molecular testing revealed EGFR exon 19 mutation, PDL 10%, and p53 exon 17 mutation.  E. Started Tagrisso 02/2018.   2. Seasonal allergies  3. Anxiety  4.  Low back pain  5.  Right lower extremity DVT    PLAN:  1.  I will continue Tagrisso until progression or an acceptable toxicity.  2. I did go over the scan results with the patient his wife, I reviewed the films myself.  Patient continued to have stable long bones on and brain lesions.  Has contrast-enhancing lesion 3 mm in size in the left cerebellum.  This will be watched carefully.  The patient asked if it is reason to do CyberKnife on the brain which I think it is however since the lesion essentially stable I think other good options to continue watchful waiting.  Patient would like to think about it and let us  know.  3. We will repeat CAT scans as well as MRI of the brain in 3 months.  This would be ordered prior to return  4.  I will continue to monitor the patient's blood work including blood counts, kidney function, and liver enzymes throughout treatment. I reassured the patient that his blood counts are normal, with stable kidney function and normal liver enzymes.   5.  I will start the patient Xgeva once a month for symptomatic bone metastases.  This will be given concurrently with calcium vitamin D daily.  6. We will order echocardiogram and EKG for prior to return as recommended in the package insert for monitoring for cardiotoxicity from Tagrisso.   7. We will continue Zyrtec for seasonal allergies. He was given prescriptions for both of these today.   8. I reviewed with the patient that if he has progressive disease there are other treatment options available per NCCN guidelines.   9. The patient has stopped his Lexapro for anxiety. He is doing well without it, except for some early wakening. He will notify us if he thinks he needs to restart this or if he needs something for sleep.   10.  We will continue Xarelto 20 mg daily for DVT.    11.  I will order MRI lumbar spine to further evaluate his lower back and consider palliative radiation if needed.  12.  I will start the patient on Norco 5/3 2 5 mg as needed for cancer related pain.  Karen Vicente MD  12/17/2019  2:40 PM

## 2019-12-18 ENCOUNTER — TELEPHONE (OUTPATIENT)
Dept: ONCOLOGY | Facility: CLINIC | Age: 64
End: 2019-12-18

## 2019-12-18 NOTE — TELEPHONE ENCOUNTER
Patient's wife, Nelida, left a voicemail on the triage line stating Dr. Vicente advised him yesterday to start calcium and vitamin D prior to beginning xgeva.  She does not remember if he told them how much to take of each.  Called and spoke with Nelida.  Advised Dr. Vicente sent a prescription to his pharmacy for Calcium +D 600 mg-400 units with instructions to take 2 tabs daily.  Verbalized understanding.

## 2019-12-30 ENCOUNTER — OFFICE VISIT (OUTPATIENT)
Dept: ONCOLOGY | Facility: CLINIC | Age: 64
End: 2019-12-30

## 2019-12-30 ENCOUNTER — HOSPITAL ENCOUNTER (OUTPATIENT)
Dept: MRI IMAGING | Facility: HOSPITAL | Age: 64
Discharge: HOME | End: 2019-12-30
Admitting: INTERNAL MEDICINE

## 2019-12-30 VITALS
WEIGHT: 219.9 LBS | RESPIRATION RATE: 16 BRPM | BODY MASS INDEX: 31.48 KG/M2 | SYSTOLIC BLOOD PRESSURE: 128 MMHG | TEMPERATURE: 98.1 F | HEART RATE: 93 BPM | HEIGHT: 70 IN | OXYGEN SATURATION: 97 % | DIASTOLIC BLOOD PRESSURE: 81 MMHG

## 2019-12-30 DIAGNOSIS — C79.9 METASTATIC CANCER (HCC): ICD-10-CM

## 2019-12-30 DIAGNOSIS — C79.9 METASTATIC CANCER (HCC): Primary | ICD-10-CM

## 2019-12-30 PROCEDURE — 0 GADOBENATE DIMEGLUMINE 529 MG/ML SOLUTION: Performed by: INTERNAL MEDICINE

## 2019-12-30 PROCEDURE — A9577 INJ MULTIHANCE: HCPCS | Performed by: INTERNAL MEDICINE

## 2019-12-30 PROCEDURE — 99214 OFFICE O/P EST MOD 30 MIN: CPT | Performed by: INTERNAL MEDICINE

## 2019-12-30 PROCEDURE — 72158 MRI LUMBAR SPINE W/O & W/DYE: CPT

## 2019-12-30 RX ADMIN — GADOBENATE DIMEGLUMINE 20 ML: 529 INJECTION, SOLUTION INTRAVENOUS at 13:49

## 2019-12-30 NOTE — PROGRESS NOTES
DATE OF VISIT: 12/30/2019    REASON FOR VISIT: Followup for Non-small cell lung cancer-metastatic      HISTORY OF PRESENT ILLNESS: The patient is a very pleasant 64 y.o. male with past medical history significant for EGFR related non-small cell lung cancer-metastatic with widespread bony metastasis and multiple pulmonary nodules, diagnosed February 2018.  He developed a dry cough and shortness of breath in Dec 2017.  A chest XR in January 2018 revealed a right lung mass. A CT scan was ordered and revealed several centimeter right upper  Lung field with associated multiple bilateral nodular densities consistent with metastatic disease.  He was referred and underwent a CT directed needle biopsy on 01/10/2018 which showed malignancy compatible with non-small cell lung cancer.  On January 17, 2018 he underwent a CT FNA.  Pathology was consistent with a non-small cell carcinoma but inadequate tissue specimen for molecular testing.  As part of his workup, he underwent a PET/CT scan today which revealed a moderately sized right pneumothorax. He was also found to have numerous small brain mets. The patient was admitted for a chest tube placement and management of his pneumothorax.  He saw Dr. Silva at [facility] in January 2018. Caris testing performed.Tumor is EGFR [id number] mutated.  Tagrisso was recommended and patient started on 02/2018.  The patient is here today for follow up.    SUBJECTIVE: The patient is here today with his sister.  Continued complaint of pain in lower back.  He could not tolerate Norco.  Tolerating it down from 6 to 2 out of 10 severity.  He has to take Tylenol 4 times a day.    PAST MEDICAL HISTORY/SOCIAL HISTORY/FAMILY HISTORY: Unchanged from prior documentation done on 01/11/2018.     Review of Systems   Constitutional: Positive for fatigue. Negative for activity change, appetite change, chills, fever and unexpected weight change.   HENT: Positive for congestion and postnasal drip. Negative for  hearing loss, mouth sores, nosebleeds, sore throat and trouble swallowing.    Eyes: Negative for visual disturbance.   Respiratory: Positive for cough. Negative for chest tightness, shortness of breath and wheezing.    Cardiovascular: Negative for chest pain, palpitations and leg swelling.   Gastrointestinal: Negative for abdominal distention, abdominal pain, blood in stool, constipation, diarrhea, nausea, rectal pain and vomiting.   Endocrine: Negative for cold intolerance and heat intolerance.   Genitourinary: Negative for difficulty urinating, dysuria, frequency and urgency.   Musculoskeletal: Positive for back pain. Negative for arthralgias, gait problem, joint swelling and myalgias.   Skin: Negative for rash.   Neurological: Negative for dizziness, tremors, syncope, weakness, light-headedness, numbness and headaches.   Hematological: Negative for adenopathy. Does not bruise/bleed easily.   Psychiatric/Behavioral: Negative for confusion, sleep disturbance and suicidal ideas. The patient is not nervous/anxious.          Current Outpatient Medications:   •  Calcium Carb-Cholecalciferol (CALCIUM/VITAMIN D) 600-400 MG-UNIT tablet, Take 2 tablets by mouth Daily., Disp: 60 tablet, Rfl: 5  •  desloratadine (CLARINEX) 5 MG tablet, Take 1 tablet by mouth Daily., Disp: 90 tablet, Rfl: 3  •  fluticasone (FLONASE) 50 MCG/ACT nasal spray, 2 sprays into the nostril(s) as directed by provider Daily. Administer 2 sprays in each nostril for each dose., Disp: 3 bottle, Rfl: 3  •  HYDROcodone-acetaminophen (NORCO) 5-325 MG per tablet, Take 1 tablet by mouth Every 6 (Six) Hours As Needed for Moderate Pain ., Disp: 120 tablet, Rfl: 0  •  rivaroxaban (XARELTO) 20 MG tablet, Take 1 tablet by mouth Daily With Dinner., Disp: 90 tablet, Rfl: 3  •  Sod Picosulfate-Mag Ox-Cit Acd 10-3.5-12 MG-GM -GM/160ML solution, Take 1 kit by mouth Take As Directed. Follow instructions that were mailed to your home. If you didn't receive these call  [phone]., Disp: 2 bottle, Rfl: 0  •  TAGRISSO 80 MG tablet, TAKE ONE TABLET (80 MG) BY MOUTH ONCE DAILY AT THE SAME TIME. MAY TAKEWITH OR WITHOUT FOOD. CALL [phone] FOR REFILLS, Disp: 30 tablet, Rfl: 10  No current facility-administered medications for this visit.     PHYSICAL EXAMINATION:   /81   Pulse 93   Temp 98.1 °F (36.7 °C) (Temporal)   Resp 16   Ht 177.8 cm (70\")   Wt 99.7 kg (219 lb 14.4 oz)   SpO2 97%   BMI 31.55 kg/m²    ECOG Performance Status: 1 - Symptomatic but completely ambulatory  General Appearance:  alert, cooperative, no apparent distress and appears stated age   Neurologic/Psychiatric: A&O x 3, gait steady, appropriate affect, strength 5/5 in all muscle groups   HEENT:  Normocephalic, without obvious abnormality, mucous membranes moist   Neck: Supple, symmetrical, trachea midline, no adenopathy;  No thyromegaly, masses, or tenderness   Lungs:   Clear to auscultation bilaterally; respirations regular, even, and unlabored bilaterally   Heart:  Regular rate and rhythm, no murmurs appreciated   Abdomen:   Soft, non-tender, non-distended and no organomegaly   Lymph nodes: No cervical, supraclavicular, inguinal or axillary adenopathy noted   Extremities: Normal, atraumatic; no clubbing, cyanosis, or edema    Skin: No rashes, ulcers, or suspicious lesions noted     Hospital Outpatient Visit on 12/17/2019   Component Date Value Ref Range Status   • Creatinine 12/17/2019 1.40* 0.60 - 1.30 mg/dL Final    Serial Number: [id number]:  [id number]   Lab on 12/17/2019   Component Date Value Ref Range Status   • Glucose 12/17/2019 105* 65 - 99 mg/dL Final   • BUN 12/17/2019 17  8 - 23 mg/dL Final   • Creatinine 12/17/2019 1.18  0.76 - 1.27 mg/dL Final   • Sodium 12/17/2019 143  136 - 145 mmol/L Final   • Potassium 12/17/2019 4.0  3.5 - 5.2 mmol/L Final   • Chloride 12/17/2019 105  98 - 107 mmol/L Final   • CO2 12/17/2019 27.0  22.0 - 29.0 mmol/L Final   • Calcium 12/17/2019 9.3  8.6 - " 10.5 mg/dL Final   • Total Protein 12/17/2019 7.6  6.0 - 8.5 g/dL Final   • Albumin 12/17/2019 4.40  3.50 - 5.20 g/dL Final   • ALT (SGPT) 12/17/2019 15  1 - 41 U/L Final   • AST (SGOT) 12/17/2019 17  1 - 40 U/L Final   • Alkaline Phosphatase 12/17/2019 166* 39 - 117 U/L Final   • Total Bilirubin 12/17/2019 0.4  0.2 - 1.2 mg/dL Final   • eGFR Non African Amer 12/17/2019 62  >60 mL/min/1.73 Final   • Globulin 12/17/2019 3.2  gm/dL Final   • A/G Ratio 12/17/2019 1.4  g/dL Final   • BUN/Creatinine Ratio 12/17/2019 14.4  7.0 - 25.0 Final   • Anion Gap 12/17/2019 11.0  5.0 - 15.0 mmol/L Final   • WBC 12/17/2019 5.78  3.40 - 10.80 10*3/mm3 Final   • RBC 12/17/2019 5.11  4.14 - 5.80 10*6/mm3 Final   • Hemoglobin 12/17/2019 16.2  13.0 - 17.7 g/dL Final   • Hematocrit 12/17/2019 49.9  37.5 - 51.0 % Final   • MCV 12/17/2019 97.7* 79.0 - 97.0 fL Final   • MCH 12/17/2019 31.7  26.6 - 33.0 pg Final   • MCHC 12/17/2019 32.5  31.5 - 35.7 g/dL Final   • RDW 12/17/2019 13.9  12.3 - 15.4 % Final   • RDW-SD 12/17/2019 50.5  37.0 - 54.0 fl Final   • MPV 12/17/2019 11.1  6.0 - 12.0 fL Final   • Platelets 12/17/2019 155  140 - 450 10*3/mm3 Final   • Neutrophil % 12/17/2019 69.6  42.7 - 76.0 % Final   • Lymphocyte % 12/17/2019 13.1* 19.6 - 45.3 % Final   • Monocyte % 12/17/2019 14.9* 5.0 - 12.0 % Final   • Eosinophil % 12/17/2019 1.7  0.3 - 6.2 % Final   • Basophil % 12/17/2019 0.5  0.0 - 1.5 % Final   • Immature Grans % 12/17/2019 0.2  0.0 - 0.5 % Final   • Neutrophils, Absolute 12/17/2019 4.02  1.70 - 7.00 10*3/mm3 Final   • Lymphocytes, Absolute 12/17/2019 0.76  0.70 - 3.10 10*3/mm3 Final   • Monocytes, Absolute 12/17/2019 0.86  0.10 - 0.90 10*3/mm3 Final   • Eosinophils, Absolute 12/17/2019 0.10  0.00 - 0.40 10*3/mm3 Final   • Basophils, Absolute 12/17/2019 0.03  0.00 - 0.20 10*3/mm3 Final   • Immature Grans, Absolute 12/17/2019 0.01  0.00 - 0.05 10*3/mm3 Final   • nRBC 12/17/2019 0.0  0.0 - 0.2 /100 WBC Final      Ct Chest With  Contrast    Result Date: 12/17/2019  Narrative: EXAMINATION: CT CHEST W CONTRAST- 12/17/2019  INDICATION: restaging lung cancer; C34.91-Malignant neoplasm of unspecified part of right bronchus or lung  TECHNIQUE: CT scan of the chest was performed following intravenous contrast.  The radiation dose reduction device was turned on for each scan per the ALARA (As Low as Reasonably Achievable) protocol.  COMPARISON: 09/24/2019  FINDINGS: There is no axillary lymphadenopathy. There is no mediastinal or hilar adenopathy. There is no pericardial or pleural effusion. There is a nodule in the right upper lobe which is stable and unchanged when compared to previous examination. Additionally there are multiple small bilateral pulmonary nodules. These likewise are stable.      Impression: There is a dominant mass in the right upper lobe measuring approximately 1.3 x 1.8 cm in the axial plane. Additionally there are multiple bilateral subcentimeter pulmonary nodules. The entirety of the examination is, however, stable and unchanged when compared with 09/24/2019.  D:  12/17/2019 E:  12/17/2019   This report was finalized on 12/17/2019 2:14 PM by Dr. Gary Petty MD.      Mri Brain With & Without Contrast    Result Date: 12/18/2019  Narrative: EXAMINATION: MRI BRAIN W WO CONTRAST-12/17/2019:  INDICATION: Restaging lung cancer; C34.91-Malignant neoplasm of unspecified part of right bronchus or lung.  TECHNIQUE: Sagittal and axial T1 and axial T2 FLAIR diffusion-weighted images of the brain, post-Gadolinium contrast axial, sagittal and coronal T1-weighted images.  COMPARISON: 09/24/2019 brain MRI.  FINDINGS: Previous exam report indicates single new 3 mm enhancing nodule in the left cerebellum compared to the prior study. Followup.  No restricted diffusion is seen to suggest acute infarction. The remaining imaging sequences show no evidence of mass, mass effect, hemorrhage, contusion, edema, hydrocephalus, or abnormal extra-axial  collection. FLAIR images show stable pattern of mild-to-moderate nonconfluent central white matter disease compared to the prior study.  Postcontrast images show the patient's small left cerebellar lesion to appear unchanged, between 3 and 3.5 mm on both studies. There is a faint suggestion of a 1.5 mm right lateral cerebellar lesion, but in this place on prior study, there are adjacent parallel linear vessels, similar to today, and this appearance today may represent a vessel in cross section. Elsewhere in the brain, there are a few punctate 1-2 mm areas of enhancement, usually within cortical sulci that generally appear to represent normal vessels seen in cross section, and which are unchanged except for a right parafalcine area of enhancement in the right parietal lobe, increased from 2 mm to 3 mm. No other clearly new or increasing area of enhancement is appreciated.      Impression: 1.  Stable approximately 3.5 millimeter left cerebellar enhancing lesion compared to 09/24/2019 exam. 2.  Single right parietal lesion may have increased from 1 to 2 mm in diameter, although again difficult to distinguish as a vascular structure versus small metastasis. 3.  Scattered other 1-2 mm areas of punctate enhancement, unchanged and all thought to be vessels seen in cross section. No clearly new or enlarging lesion elsewhere. No other new intracranial disease is appreciated.  D:  12/17/2019 E:  12/17/2019     This report was finalized on 12/18/2019 9:54 AM by Dr. Rolando Hooper MD.      Ct Abdomen Pelvis With Contrast    Result Date: 12/17/2019  Narrative: EXAMINATION: CT ABDOMEN AND PELVIS WITH CONTRAST-12/17/2019:  INDICATION: Restaging lung cancer; C34.91-Malignant neoplasm of unspecified part of right bronchus or lung.  TECHNIQUE: CT scan of the abdomen and pelvis was performed following intravenous contrast.  The radiation dose reduction device was turned on for each scan per the ALARA (As Low as Reasonably Achievable)  protocol.  COMPARISON: 09/24/2019.  FINDINGS: The liver and spleen are normal. There is no adrenal or pancreatic mass. There is a nonobstructing stone in each kidney. There is no renal mass. There is no ascites, aneurysm or retroperitoneal lymphadenopathy. There is no pelvic mass or fluid. There is no inguinal lymphadenopathy. There are bony sclerotic changes in the spine and pelvis which are stable.      Impression: There are bony sclerotic metastatic lesions in the thoracic spine, lumbar spine and pelvis. These are stable. There are no new findings in the abdomen or pelvis.  D:  12/17/2019 E:  12/17/2019    This report was finalized on 12/17/2019 2:14 PM by Dr. Gary Petty MD.      Mri Lumbar Spine With & Without Contrast    Result Date: 12/30/2019  Narrative: EXAMINATION: MRI LUMBAR SPINE W WO CONTRAST-  INDICATION: f/u abnormal CT, increasing back pain; C79.9-Secondary malignant neoplasm of unspecified site  TECHNIQUE: Sagittal and axial images of the lumbar spine are displayed. Images were acquired prior to and following intravenous contrast.  COMPARISON: CT scan of the abdomen and pelvis performed 12/17/2019.  FINDINGS: There is abnormal marrow signal at T12, L3 and L5 consistent with metastatic  disease this does not result in compression or narrowing of the neural canal. There is no disc protrusion. There is no spinal stenosis. The conus medullaris is normal. The other 3 metastatic lesions demonstrate various degrees of contrast enhancement. More inferior images demonstrate abnormal signal in the rightward aspect of the sacrum and in each ilium.       Impression: There are lesions at T12, L3 and L5 consistent with metastatic disease. There is also involvement of the rightward aspect of the sacrum and each ilium, right more so than left. This is not resulting compromise of the neural canal in the appearance has not changed when compared to the CT scan of the abdomen and pelvis dated 12/17/2019.    This  report was finalized on 12/30/2019 2:30 PM by Dr. Gary Petty MD.    (  No results found.    ASSESSMENT: The patient is a very pleasant 64 y.o. male  with Stage IV  Non-small cell lung cancer.     PROBLEM LIST:  1. Stage IV  Non-small cell lung cancer [id number] with brain/bone metastasis:  A. Presented with SOB 01/2018, 01/04/2018 CT chest/abdomen/pelvis: Innumerable bilateral pulmonary nodules with  Expansile lucent lesion within Left iliac bone 3.5cm.   B. Status post bronchoscopy with biopsy done on January 10, 2018 showed adenocarcinoma     C. 01/17/2018 PET/CT: Dominant multiple diffuse pulmonary parenchymal soft tissue lung nodules.  Multiple additional nodules scattered through both lungs too numerous too count. Right Paratracheal LN SUV 3.49.  Osseous metastases noted with 2 lesions in Right pelvis, most prominent Right ilium SUV 7.26.  Rib lesion along L rib cage.  Mild adenopathy base of neck and supraclavicular areas on right and left.   D. MRI brain 01/18/2018: Numerous but small diffusely scattered enhancing nodules.  Largest lesion is 5mm. No evidence of hemorrhage or significant vasogenic edema.   E. Molecular testing revealed EGFR exon 19 mutation, PDL 10%, and p53 exon 17 mutation.  E. Started Tagrisso 02/2018.   2. Seasonal allergies  3. Anxiety  4.  Low back pain  5.  Right lower extremity DVT    PLAN:  1.  I did go over the MRI results with the patient explained he continues to have bony metastasis however does look essentially stable compared to before.  2.  Pain I did go over the scan results with the patient his wife, I reviewed the films myself.  Patient continued to have stable long bones on and brain lesions.  Has contrast-enhancing lesion 3 mm in size in the left cerebellum.  This will be watched carefully.  The patient asked if it is reason to do CyberKnife on the brain which I think it is however since the lesion essentially stable I think other good options to continue watchful waiting.   Patient would like to think about it and let us know.  3. We will repeat CAT scans as well as MRI of the brain in 3 months.  This would be ordered prior to return in March.  4.  I will continue to monitor the patient's blood work including blood counts, kidney function, and liver enzymes throughout treatment. I reassured the patient that his blood counts are normal, with stable kidney function and normal liver enzymes.   5.  I will consider to start the patient on Xgeva once a month for symptomatic bone metastases.  This will be given concurrently with calcium vitamin D daily.  6. We will order echocardiogram and EKG for prior to return as recommended in the package insert for monitoring for cardiotoxicity from Tagrisso.   7. We will continue Zyrtec for seasonal allergies. He was given prescriptions for both of these today.   8. I reviewed with the patient that if he has progressive disease there are other treatment options available per NCCN guidelines.   9. The patient has stopped his Lexapro for anxiety. He is doing well without it, except for some early wakening. He will notify us if he thinks he needs to restart this or if he needs something for sleep.   10.  We will continue Xarelto 20 mg daily for DVT.    11.  I will refer the patient's urination kindly for palpitation lower back.  12.  I will continue the patient on Norco 5/325 mg as needed for cancer related pain.  Karen Vicente MD  12/30/2019  2:34 PM

## 2020-01-10 ENCOUNTER — OFFICE VISIT (OUTPATIENT)
Dept: RADIATION ONCOLOGY | Facility: HOSPITAL | Age: 65
End: 2020-01-10

## 2020-01-10 ENCOUNTER — HOSPITAL ENCOUNTER (OUTPATIENT)
Dept: RADIATION ONCOLOGY | Facility: HOSPITAL | Age: 65
Setting detail: RADIATION/ONCOLOGY SERIES
Discharge: HOME | End: 2020-01-10

## 2020-01-10 VITALS
HEART RATE: 89 BPM | WEIGHT: 222.2 LBS | RESPIRATION RATE: 18 BRPM | SYSTOLIC BLOOD PRESSURE: 134 MMHG | TEMPERATURE: 98.1 F | DIASTOLIC BLOOD PRESSURE: 88 MMHG | OXYGEN SATURATION: 97 % | BODY MASS INDEX: 31.88 KG/M2

## 2020-01-10 DIAGNOSIS — C79.9 METASTATIC CANCER (HCC): ICD-10-CM

## 2020-01-10 PROCEDURE — G0463 HOSPITAL OUTPT CLINIC VISIT: HCPCS | Performed by: RADIOLOGY

## 2020-01-10 NOTE — PROGRESS NOTES
CONSULTATION NOTE      :                                                          1955  DATE OF CONSULTATION:                       1/10/2020   REQUESTING PHYSICIAN:                   Karen Vicente MD  REASON FOR CONSULTATION:           Metastatic Lung Cancer    Thank you for requesting my services in evaluation of this pleasant individual.  I am seeing them in outpatient consultation regarding a diagnosis of metastatic non-small cell lung cancer with symptomatic bone lesions in the pelvis and lumbar spine.     BRIEF HISTORY:  The patient is a very pleasant 64 y.o. male who presented with shortness of breath in 2018, and was unfortunately found on imaging to have innumerable bilateral pulmonary nodules with  evidence of both bone and CNS metastases.  He underwent a bronch with biopsy showing adenocarcinoma, and molecular testing revealed EGFR exon 19 mutation, PDL 10%, and p53 exon 17 mutation.  He has been on Tagrisso since 2018, and has overall had a very reasonable response to treatment with essentially stabilization of disease.  The originally identified numerous CNS lesions have all decreased and at this point he only has 2 small punctate contrast-enhancing abnormalities.  The past several months however, he has been having increasing low back and left-sided hip pain that he says is worst when standing, sitting on a hard chair, or when carrying heavy items or trying to pick something up from the floor.  His pain is primarily on the right and occasionally radiates down the right leg.  He denies any bowel or bladder problems.  He also denies any numbness or tingling.  He has had an MRI showing multilevel spine disease at T12, L3, and L5, with a compression deformity of L5, and he has also has an expansile lesion in the left iliac wing.  He describes intermittent pain that he rates at its worst level of 8-9 out of 10, and a constant baseline pain of approximately 3-4 out of 10.  I am  being asked to see him today regarding palliative radiation.      Allergies   Allergen Reactions   • Ibuprofen Itching and Dermatitis     Prickly rash on heaD   • Penicillin G Hives   • Penicillins Rash   • Sulfa Antibiotics Rash and Itching       Social History     Socioeconomic History   • Marital status:      Spouse name: Not on file   • Number of children: Not on file   • Years of education: Not on file   • Highest education level: Not on file   Tobacco Use   • Smoking status: Never Smoker   • Smokeless tobacco: Former User   Substance and Sexual Activity   • Alcohol use: No   • Drug use: No   • Sexual activity: Defer   Social History Narrative        Retired from the [email or web address]    A .    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    Chew tobacco up to 2001       Past Medical History:   Diagnosis Date   • Cancer (CMS/HCC)     NSCLC   • Lung nodule    • Shortness of breath     RECENT ONSET       family history includes Brain cancer in his paternal grandfather; Breast cancer in his maternal grandmother, mother, paternal grandmother, and sister; Cancer in his maternal grandmother, mother, paternal grandmother, and sister; Emphysema in his father.     Past Surgical History:   Procedure Laterality Date   • BRONCHOSCOPY N/A 1/18/2018    Procedure: RANDALL AND BRONCHOSCOPY WITH ENDOBRONCHIAL ULTRASOUND WITH FLUORO;  Surgeon: Gustavo Haque MD;  Location: [facility] ENDOSCOPY;  Service:    • COLLATERAL LIGAMENT REPAIR, KNEE     • COLONOSCOPY  05/2019   • KNEE ARTHROSCOPY     • LASIK     • LUNG BIOPSY Right 01/10/2018    Dr. Petty @ [facility]   • SKIN CANCER EXCISION Bilateral     BASAL CELL ON NECK YESTERDAY        Review of Systems   Constitutional: Positive for fatigue.   Eyes:        Reports intermittent slight blurred vision right eye   Musculoskeletal: Positive for back pain.   Psychiatric/Behavioral: The patient is nervous/anxious.    All other systems reviewed and  are negative.          Objective   VITAL SIGNS:   Vitals:    01/10/20 0954   BP: 134/88   Pulse: 89   Resp: 18   Temp: 98.1 °F (36.7 °C)   TempSrc: Temporal   SpO2: 97%   Weight: 101 kg (222 lb 3.2 oz)   PainSc:   4   PainLoc: Back        Karnofsky score: 80        Physical Exam   Constitutional: He is oriented to person, place, and time. He appears well-developed and well-nourished. No distress.   HENT:   Head: Normocephalic and atraumatic.   Mouth/Throat: Oropharynx is clear and moist.   Eyes: Pupils are equal, round, and reactive to light. Conjunctivae and EOM are normal.   Neck: Normal range of motion. Neck supple.   Cardiovascular: Normal rate and regular rhythm. Exam reveals no friction rub.   No murmur heard.  Pulmonary/Chest: Effort normal and breath sounds normal. He has no wheezes.   Abdominal: Soft. Bowel sounds are normal. He exhibits no distension and no mass. There is no tenderness.   Musculoskeletal: Normal range of motion. He exhibits no edema.   He describes pain with palpation on the left iliac wing adjacent to the sacrum and the lowest portions of the lumbar spine   Lymphadenopathy:     He has no cervical adenopathy.   Neurological: He is alert and oriented to person, place, and time. He displays normal reflexes. No cranial nerve deficit or sensory deficit. He exhibits normal muscle tone. Coordination normal.   Appropriate strength and sensation within the bilateral lower extremities without any focal deficits or hyper active reflexes   Skin: Skin is warm and dry.   Psychiatric: He has a normal mood and affect. His behavior is normal. Judgment and thought content normal.   Nursing note and vitals reviewed.    IMAGING  I have personally reviewed the relevant imaging studies, as follows:  Ct Chest With Contrast    Result Date: 12/17/2019  There is a dominant mass in the right upper lobe measuring approximately 1.3 x 1.8 cm in the axial plane. Additionally there are multiple bilateral subcentimeter  pulmonary nodules. The entirety of the examination is, however, stable and unchanged when compared with 09/24/2019.  D:  12/17/2019 E:  12/17/2019   This report was finalized on 12/17/2019 2:14 PM by Dr. Gary Petty MD.      Ct Chest With Contrast    Result Date: 9/25/2019  1. Multiple bilateral pulmonary nodules along with dominant nodule right upper lobe medially similar to prior. No distinct progression or effusion development. 2. Sclerotic foci throughout the osseous structures with a dominant L5 vertebral bone marrow signal change of blastic disease again noted without distinct evidence for progression.  D:  09/24/2019 E:  09/24/2019  This report was finalized on 9/25/2019 5:06 PM by Dr. Jordi Stroud.      Mri Brain With & Without Contrast    Result Date: 12/18/2019  1.  Stable approximately 3.5 millimeter left cerebellar enhancing lesion compared to 09/24/2019 exam. 2.  Single right parietal lesion may have increased from 1 to 2 mm in diameter, although again difficult to distinguish as a vascular structure versus small metastasis. 3.  Scattered other 1-2 mm areas of punctate enhancement, unchanged and all thought to be vessels seen in cross section. No clearly new or enlarging lesion elsewhere. No other new intracranial disease is appreciated.  D:  12/17/2019 E:  12/17/2019     This report was finalized on 12/18/2019 9:54 AM by Dr. Rolando Hooper MD.      Mri Brain With & Without Contrast    Result Date: 9/24/2019  Single new 3 mm enhancing nodule of the left cerebellum. No evidence of metastatic disease elsewhere. Otherwise stable brain MRI.  D:  09/24/2019 E:  09/24/2019     This report was finalized on 9/24/2019 10:49 PM by DR. Rolando Hooper MD.      Ct Abdomen Pelvis With Contrast    Result Date: 12/17/2019  There are bony sclerotic metastatic lesions in the thoracic spine, lumbar spine and pelvis. These are stable. There are no new findings in the abdomen or pelvis.  D:  12/17/2019 E:  12/17/2019    This  report was finalized on 12/17/2019 2:14 PM by Dr. Gary Petty MD.      Ct Abdomen Pelvis With Contrast    Result Date: 9/25/2019  1. Multiple bilateral pulmonary nodules along with dominant nodule right upper lobe medially similar to prior. No distinct progression or effusion development. 2. Sclerotic foci throughout the osseous structures with a dominant L5 vertebral bone marrow signal change of blastic disease again noted without distinct evidence for progression.  D:  09/24/2019 E:  09/24/2019  This report was finalized on 9/25/2019 5:06 PM by Dr. Jordi Stroud.      Mri Lumbar Spine With & Without Contrast    Result Date: 12/30/2019  There are lesions at T12, L3 and L5 consistent with metastatic disease. There is also involvement of the rightward aspect of the sacrum and each ilium, right more so than left. This is not resulting compromise of the neural canal in the appearance has not changed when compared to the CT scan of the abdomen and pelvis dated 12/17/2019.    This report was finalized on 12/30/2019 2:30 PM by Dr. Gary Petty MD.      PATHOLOGY  Bronchoscopy with Biopsy 1/18/2018:  1. RIGHT UPPER LOBE LUNG, TRANSBRONCHIAL BIOPSY:  Well differentiated adenocarcinoma, lipidic pattern.   2. RIGHT LOWER LOBE LUNG, TRANSBRONCHIAL BIOPSY:  Well differentiated adenocarcinoma, lipidic pattern.   3. LYMPH NODE, STATION 7, TRANSBRONCHIAL NEEDLE ASPIRATION:  Metastatic adenocarcinoma.   4. LYMPH NODE, STATION 10R, TRANSBRONCHIAL NEEDLE ASPIRATION:  Metastatic adenocarcinoma. (See comment)          The following portions of the patient's history were reviewed and updated as appropriate: allergies, current medications, past family history, past medical history, past social history, past surgical history and problem list.    Assessment  Mr. Chambers is a 64-year-old gentleman with a [id number] metastatic adenocarcinoma of the lung, with symptomatic metastases within the lumbar spine as well as the left iliac wing.  I  spent approximately 45 minutes today with the patient and his family discussing the risks, benefits, and logistics of palliative radiation therapy.  I recommend that we treat his lumbar spine to a dose of 20 Gray in 5 fractions as well as the expansile lesion in the left iliac wing which I will treat to a dose of 8 Gray in a single fraction.  I believe all of these treatments can be completed using 3-dimensional techniques.  He was agreeable and willing to undergo treatment.  He signed informed consent today.  Due to his symptoms of pain as well as a compression fracture at L5, I will proceed to try and administer these treatments quickly.  He is scheduled to return to my clinic on Monday, January 13, 2019.  We will complete a radiation set up and simulation session, and I will aim to have his treatments ready to begin shortly thereafter.    RECOMMENDATIONS:    Plan to treat the lumbar spine, levels T12-L5, and also the left iliac wing expansile metastases    Return in about 3 days (around 1/13/2020) for Radiation Simulation.  Rajan was seen today for metastatic non-small cell lung cancer.    Diagnoses and all orders for this visit:    Metastatic cancer (CMS/HCC)    Thank you for allowing me to participate in the care of this individual.    Sincerely,       Vidal Ching MD

## 2020-01-13 ENCOUNTER — HOSPITAL ENCOUNTER (OUTPATIENT)
Dept: RADIATION ONCOLOGY | Facility: HOSPITAL | Age: 65
Discharge: HOME | End: 2020-01-13

## 2020-01-13 PROCEDURE — 77285 THER RAD SIMULAJ FIELD INTRM: CPT | Performed by: RADIOLOGY

## 2020-01-20 ENCOUNTER — TELEPHONE (OUTPATIENT)
Dept: ONCOLOGY | Facility: CLINIC | Age: 65
End: 2020-01-20

## 2020-01-20 NOTE — TELEPHONE ENCOUNTER
----- Message from Paula Parikh RN sent at 1/20/2020  8:22 AM EST -----  Regarding: triage  Patient wife called this morning.  Pt having nausea, pain in lower abdomen, blood in urine.  She stated recent scans show kidney stones. He does not have a urologist, saw someone way in the past but did not care for the doc.  Would like his blood checked and his urine checked.  Wife says patient really needs to be seen today.

## 2020-01-21 PROCEDURE — 77334 RADIATION TREATMENT AID(S): CPT | Performed by: RADIOLOGY

## 2020-01-21 PROCEDURE — 77307 TELETHX ISODOSE PLAN CPLX: CPT | Performed by: RADIOLOGY

## 2020-01-22 ENCOUNTER — HOSPITAL ENCOUNTER (OUTPATIENT)
Dept: RADIATION ONCOLOGY | Facility: HOSPITAL | Age: 65
Discharge: HOME | End: 2020-01-22

## 2020-01-22 PROCEDURE — 77412 RADIATION TX DELIVERY LVL 3: CPT | Performed by: RADIOLOGY

## 2020-01-22 PROCEDURE — 77280 THER RAD SIMULAJ FIELD SMPL: CPT | Performed by: RADIOLOGY

## 2020-01-23 ENCOUNTER — HOSPITAL ENCOUNTER (OUTPATIENT)
Dept: RADIATION ONCOLOGY | Facility: HOSPITAL | Age: 65
Discharge: HOME | End: 2020-01-23

## 2020-01-23 PROCEDURE — 77336 RADIATION PHYSICS CONSULT: CPT | Performed by: RADIOLOGY

## 2020-01-23 PROCEDURE — 77412 RADIATION TX DELIVERY LVL 3: CPT | Performed by: RADIOLOGY

## 2020-01-24 ENCOUNTER — HOSPITAL ENCOUNTER (OUTPATIENT)
Dept: RADIATION ONCOLOGY | Facility: HOSPITAL | Age: 65
Discharge: HOME | End: 2020-01-24

## 2020-01-24 PROCEDURE — 77412 RADIATION TX DELIVERY LVL 3: CPT | Performed by: RADIOLOGY

## 2020-01-27 ENCOUNTER — HOSPITAL ENCOUNTER (OUTPATIENT)
Dept: RADIATION ONCOLOGY | Facility: HOSPITAL | Age: 65
Discharge: HOME | End: 2020-01-27

## 2020-01-27 PROCEDURE — 77412 RADIATION TX DELIVERY LVL 3: CPT | Performed by: RADIOLOGY

## 2020-01-28 ENCOUNTER — HOSPITAL ENCOUNTER (OUTPATIENT)
Dept: RADIATION ONCOLOGY | Facility: HOSPITAL | Age: 65
Discharge: HOME | End: 2020-01-28

## 2020-01-28 ENCOUNTER — HOSPITAL ENCOUNTER (OUTPATIENT)
Dept: ONCOLOGY | Facility: HOSPITAL | Age: 65
Setting detail: INFUSION SERIES
Discharge: HOME | End: 2020-01-28

## 2020-01-28 VITALS — BODY MASS INDEX: 30.17 KG/M2 | WEIGHT: 210.3 LBS

## 2020-01-28 DIAGNOSIS — C79.9 METASTATIC CANCER (HCC): Primary | ICD-10-CM

## 2020-01-28 PROCEDURE — 77412 RADIATION TX DELIVERY LVL 3: CPT | Performed by: RADIOLOGY

## 2020-01-28 PROCEDURE — 96360 HYDRATION IV INFUSION INIT: CPT

## 2020-01-28 RX ORDER — SODIUM CHLORIDE 9 MG/ML
999 INJECTION, SOLUTION INTRAVENOUS ONCE
Status: CANCELLED | OUTPATIENT
Start: 2020-01-28

## 2020-01-28 RX ORDER — SODIUM CHLORIDE 9 MG/ML
999 INJECTION, SOLUTION INTRAVENOUS ONCE
Status: COMPLETED | OUTPATIENT
Start: 2020-01-28 | End: 2020-01-28

## 2020-01-28 RX ADMIN — SODIUM CHLORIDE 999 ML: 9 INJECTION, SOLUTION INTRAVENOUS at 12:25

## 2020-01-29 ENCOUNTER — DOCUMENTATION (OUTPATIENT)
Dept: NUTRITION | Facility: HOSPITAL | Age: 65
End: 2020-01-29

## 2020-01-29 NOTE — PROGRESS NOTES
Oncology Nutrition Screening    Patient Name:  Rajan Chambers  YOB: 1955  MRN: [id number]  Date:  01/29/20  Physician: Dr. Vidal Ching; Dr. Karen Vicente    Type of Cancer Treatment  Targeted Therapy: Tagrisso since February of 2018 with a very reasonable response to treatment with essentially stabilization of disease  Palliative Radiation:  20 Gray in 5 fractions to the lumbar spine levels T12-L5 as well as the expansile lesion in the left iliac wing to be treated with a dose of 8 Gray in a single fraction    Patient Active Problem List   Diagnosis   • Non-smoker   • Metastatic cancer (Lung and bone mets)   • Iatrogenic pneumothorax (Right)   Metastatic non small cell lung cancer with symptomatic bone lesions in the pelvis and lumbar spine    Current Outpatient Medications   Medication Sig Dispense Refill   • Calcium Carb-Cholecalciferol (CALCIUM/VITAMIN D) 600-400 MG-UNIT tablet Take 2 tablets by mouth Daily. 60 tablet 5   • desloratadine (CLARINEX) 5 MG tablet Take 1 tablet by mouth Daily. 90 tablet 3   • fluticasone (FLONASE) 50 MCG/ACT nasal spray 2 sprays into the nostril(s) as directed by provider Daily. Administer 2 sprays in each nostril for each dose. 3 bottle 3   • HYDROcodone-acetaminophen (NORCO) 5-325 MG per tablet Take 1 tablet by mouth Every 6 (Six) Hours As Needed for Moderate Pain . 120 tablet 0   • rivaroxaban (XARELTO) 20 MG tablet Take 1 tablet by mouth Daily With Dinner. 90 tablet 3   • Sod Picosulfate-Mag Ox-Cit Acd 10-3.5-12 MG-GM -GM/160ML solution Take 1 kit by mouth Take As Directed. Follow instructions that were mailed to your home. If you didn't receive these call [phone]. 2 bottle 0   • TAGRISSO 80 MG tablet TAKE ONE TABLET (80 MG) BY MOUTH ONCE DAILY AT THE SAME TIME. MAY TAKEWITH OR WITHOUT FOOD. CALL [phone] FOR REFILLS 30 tablet 10     No current facility-administered medications for this visit.        Glycemic Risk:   NA    Weight:   Height: 70  inches  Weight: 210.3 lbs.  Usual Body Weight: 228 lbs.   BMI: 30.2  Overweight  Weight loss of 12 lbs over the past 2 weeks    Oral Food Intake:  Regular Diet - No Restrictions    Hydration Status:   How many 8 ounce glass of water of fluid do you drink per day?  16 ounces per day    Enteral Feeding:   NA    Nutrition Symptoms:   Altered Apetite  Fatigue    Activity:   Able to do little activity and spend most of the day in bed or chair     reports that he has never smoked. He quit smokeless tobacco use about 21 years ago. He reports that he does not drink alcohol or use drugs.    Evaluation of Nutritional Risk:   Patient is identified with chronic disease related malnutrition as characterized by unintentional 5% weight loss over the past two weeks (7.8% weight loss over the past 4 months), no oral intake other than bites, dehydration and declining functional status.    Patient is seen during RAD ONC status checks; he completes treatment today.  Patient and wife share that he is absent of an appetite and has not eaten more than a few bites each day for the past two weeks; estimation of daily fluid intake is 16 ounces.    Discussed tips for stimulation of the appetite and suggestions made for types of foods that may be appealing to the patient; discussed various types of nutritional supplements that are available, tips for making homemade smoothies and shakes - patient has not tried any of these in the past, but willing to try.  Samples of [employer] Smoothe provided.  If absence of appetite continues, discussed possible appetite stimulants that might be considered.    Stressed the importance of hydration status and the goal of 100 ounces per day, which patient questions if he would be able to attain.  Suggested IVF to provide baseline hydration; patient scheduled for IVF today after radiation and later in the week in he is able to make the trip from his home.       Patient completes radiation treatment; contact  information provided for additional assistance as needed.      Electronically signed by:  Whit Awad RD  11:24 AM

## 2020-01-30 ENCOUNTER — TELEPHONE (OUTPATIENT)
Dept: ONCOLOGY | Facility: CLINIC | Age: 65
End: 2020-01-30

## 2020-01-30 ENCOUNTER — HOSPITAL ENCOUNTER (OUTPATIENT)
Dept: ONCOLOGY | Facility: HOSPITAL | Age: 65
Setting detail: INFUSION SERIES
Discharge: HOME | End: 2020-01-30

## 2020-01-30 ENCOUNTER — OFFICE VISIT (OUTPATIENT)
Dept: ONCOLOGY | Facility: CLINIC | Age: 65
End: 2020-01-30

## 2020-01-30 ENCOUNTER — TELEPHONE (OUTPATIENT)
Dept: RADIATION ONCOLOGY | Facility: HOSPITAL | Age: 65
End: 2020-01-30

## 2020-01-30 VITALS
HEART RATE: 106 BPM | BODY MASS INDEX: 30.35 KG/M2 | HEIGHT: 70 IN | DIASTOLIC BLOOD PRESSURE: 65 MMHG | RESPIRATION RATE: 18 BRPM | WEIGHT: 212 LBS | SYSTOLIC BLOOD PRESSURE: 105 MMHG | TEMPERATURE: 97.7 F

## 2020-01-30 VITALS
DIASTOLIC BLOOD PRESSURE: 65 MMHG | RESPIRATION RATE: 18 BRPM | SYSTOLIC BLOOD PRESSURE: 105 MMHG | TEMPERATURE: 97.2 F | BODY MASS INDEX: 31.07 KG/M2 | WEIGHT: 217 LBS | OXYGEN SATURATION: 99 % | HEART RATE: 71 BPM | HEIGHT: 70 IN

## 2020-01-30 DIAGNOSIS — M54.50 CHRONIC MIDLINE LOW BACK PAIN WITHOUT SCIATICA: ICD-10-CM

## 2020-01-30 DIAGNOSIS — C79.9 METASTATIC CANCER (HCC): ICD-10-CM

## 2020-01-30 DIAGNOSIS — J01.90 ACUTE SINUSITIS, RECURRENCE NOT SPECIFIED, UNSPECIFIED LOCATION: ICD-10-CM

## 2020-01-30 DIAGNOSIS — C79.9 METASTATIC CANCER (HCC): Primary | ICD-10-CM

## 2020-01-30 DIAGNOSIS — C34.91 NON-SMALL CELL LUNG CANCER, RIGHT (HCC): Primary | ICD-10-CM

## 2020-01-30 DIAGNOSIS — G89.29 CHRONIC MIDLINE LOW BACK PAIN WITHOUT SCIATICA: ICD-10-CM

## 2020-01-30 PROBLEM — M54.9 BACK PAIN: Status: ACTIVE | Noted: 2020-01-30

## 2020-01-30 PROBLEM — J32.9 SINUS INFECTION: Status: ACTIVE | Noted: 2020-01-30

## 2020-01-30 PROCEDURE — 96360 HYDRATION IV INFUSION INIT: CPT

## 2020-01-30 PROCEDURE — 99214 OFFICE O/P EST MOD 30 MIN: CPT | Performed by: NURSE PRACTITIONER

## 2020-01-30 RX ORDER — SODIUM CHLORIDE 9 MG/ML
999 INJECTION, SOLUTION INTRAVENOUS ONCE
Status: COMPLETED | OUTPATIENT
Start: 2020-01-30 | End: 2020-01-30

## 2020-01-30 RX ORDER — DOXYCYCLINE HYCLATE 100 MG
100 TABLET ORAL 2 TIMES DAILY
Qty: 14 TABLET | Refills: 0 | Status: SHIPPED | OUTPATIENT
Start: 2020-01-30 | End: 2020-02-25

## 2020-01-30 RX ORDER — SODIUM CHLORIDE 9 MG/ML
999 INJECTION, SOLUTION INTRAVENOUS ONCE
OUTPATIENT
Start: 2020-01-30

## 2020-01-30 RX ORDER — ONDANSETRON 4 MG/1
TABLET, FILM COATED ORAL
COMMUNITY
Start: 2020-01-20

## 2020-01-30 RX ADMIN — SODIUM CHLORIDE 1000 ML: 9 INJECTION, SOLUTION INTRAVENOUS at 11:20

## 2020-01-30 NOTE — TELEPHONE ENCOUNTER
Patient's wife called stating that patient completed radiation on Tuesday, but patient woke up today with pretty severe back pain he was given norco 5 early this morning, then given another a couple hours later which had helped more. Patient is coming in at 1100 today for fluids, and she would like for him to be seen for further evaluation. Advised her that dr fraser is out today, and he could be seen tomorrow by him, but she would like for him to be seen in the [facility] today instead. Discussed with CAR Kim, who states that she will see him at 1300 today after his fluids. Nelida informed and agreeable to plan.

## 2020-01-30 NOTE — TELEPHONE ENCOUNTER
Pt called with c/o severe back pain- after discussion with Dr. Ching- called pt back- pt states pain is easing off since taking pain pill but still a lot of pain- instructed per Dr. Ching to go to the Emergency room-verbalized understanding

## 2020-01-30 NOTE — PROGRESS NOTES
"ONCOLOGY URGENT CARE CLINIC VISIT    Rajan Chambers  [id number]  1955    Chief Complaint:  Back pain     History of present illness:  Rajan Chambers is a 64 y.o. year old male who is currently undergoing treatment for metastatic lung cancer with brain and bone mets.  He is taking Tagrisso daily.  He completed palliative radiation x5 treatments with last dose on 1/28/2020 for spinal mets.  He also has L5 compression fracture.  His wife called triage line today stating he was having severe pain and requested to be seen.  He was given an appointment in the oncology urgent care clinic for further evaluation and management.      Patient with ongoing low back pain from spinal mets.  He completed palliative radiation 2 days ago to his low back area.  He said this morning the pain was more severe than usual but not described as a new pain.  He currently is rating pain 3/10 and described as \"a constant bad toothache\".  Pain is worse with movement.  He took a pain pill about 30 minutes ago so he states his pain is tolerable at this time.  He states the pain medication wears off before 6 hours.  He states this morning when the pain was the worse it was a 8/10 and he was getting out of bed at that time.  He reports taking Tylenol throughout the day and Norco at night but today he has taken the Norco twice.  He denies numbness in his legs but does report tingling in right leg to his toes but that has been going on for a couple weeks.  He denies urinary or bowel incontinence.  He reports having a normal BM yesterday.  Patient also complains of nasal congestion and sinus pressure for the past week.  He has been taking Flonase without any relief.  Of note, he has had decreased PO intake and has lost about 12 pounds in the past 2 weeks.  Radiation doctor scheduled him for IV fluids Tuesday and today which he states has helped.  He states he called radiation department this morning when he was having severe back pain and he " was instructed to go the ED but he decided not to go because the pain improved after taking his pain medication.      Oncology History:       Metastatic cancer (Lung and bone mets)    1/17/2018 Initial Diagnosis     Metastatic cancer (Lung and bone mets)      12/31/2019 - 12/31/2019 Chemotherapy     OP SUPPORTIVE Denosumab (Xgeva) Q28D      1/22/2020 - 1/28/2020 Radiation     Radiation OncologyTreatment Course:  Rajan Chambers received 2000 cGy in 5 fractions to T12-L5 spine and 800cGy in 1 fractions to left illiac wing via External Beam Radiation - EBRT.      1/28/2020 -  Chemotherapy     OP HYDRATION AND ANTIEMETICS         Past Medical History:   Diagnosis Date   • Cancer (CMS/HCC)     NSCLC   • Lung nodule    • Shortness of breath     RECENT ONSET       Past Surgical History:   Procedure Laterality Date   • BRONCHOSCOPY N/A 1/18/2018    Procedure: RANDALL AND BRONCHOSCOPY WITH ENDOBRONCHIAL ULTRASOUND WITH FLUORO;  Surgeon: Gustavo Haque MD;  Location: [facility] ENDOSCOPY;  Service:    • COLLATERAL LIGAMENT REPAIR, KNEE     • COLONOSCOPY  05/2019   • KNEE ARTHROSCOPY     • LASIK     • LUNG BIOPSY Right 01/10/2018    Dr. Petty @ [facility]   • SKIN CANCER EXCISION Bilateral     BASAL CELL ON NECK YESTERDAY       Family History   Problem Relation Age of Onset   • Cancer Mother    • Breast cancer Mother    • Emphysema Father    • Cancer Sister    • Breast cancer Sister    • Cancer Maternal Grandmother    • Breast cancer Maternal Grandmother    • Cancer Paternal Grandmother    • Breast cancer Paternal Grandmother    • Brain cancer Paternal Grandfather        Past medical history, social history and family history was reviewed.    Medications: The current medication list was reviewed and reconciled.     Allergies:  is allergic to ibuprofen; penicillin g; penicillins; and sulfa antibiotics.    Review of Systems:    Review of Systems   Constitutional: Positive for appetite change, fatigue and unexpected weight change.  "Negative for fever.   HENT: Positive for congestion, postnasal drip and sinus pressure. Negative for mouth sores, sore throat and trouble swallowing.    Respiratory: Negative for cough, shortness of breath and wheezing.    Cardiovascular: Negative for chest pain, palpitations and leg swelling.   Gastrointestinal: Negative for abdominal distention, abdominal pain, constipation, diarrhea, nausea and vomiting.   Genitourinary: Negative for difficulty urinating, dysuria and frequency.   Musculoskeletal: Positive for back pain. Negative for arthralgias.   Skin: Negative for pallor, rash and wound.   Neurological: Negative for dizziness and weakness.   Hematological: Does not bruise/bleed easily.   Psychiatric/Behavioral: Negative for confusion and sleep disturbance. The patient is not nervous/anxious.        PHYSICAL EXAM:      1/30/2020 1256   Temp 97.2 °F (36.2 °C)   Pulse 71   Resp 18   /65   SpO2 99 %   Height 177.8 cm (70\")   Weight 98.4 kg (217 lb)         ECOG: (2) Ambulatory & Capable of Self Care, Unable to Carry Out Work Activity, Up & About Greater Than 50% of Waking Hours  General: ill appearing male in no acute distress  HEENT: sclerae anicteric, oropharynx clear  Lymphatics: no cervical, supraclavicular, inguinal, or axillary adenopathy  Neck: Supple. No thyromegaly.  Cardiovascular: regular rate and rhythm, no murmurs  Lungs: clear to auscultation bilaterally. No respiratory distress  Abdomen: soft, nontender, nondistended.  No palpable organomegaly  Extremities: no lower extremity edema, cyanosis, or clubbing  Skin: no rashes, lesions, bruising, or petechiae  Neuro: Alert and oriented x3. Moves all extremities.    Assessment and Plan:      Patient Active Problem List   Diagnosis   • Metastatic cancer (Lung and bone mets)   • Non-small cell lung cancer, right (CMS/HCC)   • Back pain   • Sinus infection     Discussion: Patient presents with complaints of low back pain.  Patient with metastatic lung " cancer to the bone and brain.  He is currently taking Tagrisso.  He has completed palliative radiation to spine mets 2 days ago.  I had a long discussion with patient, wife and sister about his pain medication and how to take it.  He is going to take it every 6 hours as prescribed and I told him he could take a Tylenol 325 mg a couple times throughout the day as well, just not to exceed 2 gm of acetaminophen in a day.  Also instructed him to take stool softener while taking pain medication and could also take MiriLax if needed.  We talked about the fact that radiation is still working and his pain may improve over the next few days but it is likely not going to completely resolve.  I also reviewed signs and symptoms of a cord compression and the need to seek medical attention immediately if they occur.  I am going to refer him to palliative care for help with pain management.  I will treat him for a sinus infection with doxycycline since he is allergic to penicillin.  I also encouraged supportive care with increased fluids and rest.  Patient and family verbalize an understanding and are agreeable with plan.      I spent 30 minutes with the patient. I spent > 50% percent of this time counseling and discussing symptoms, evaluation, current status and management.    Frances Urrutia, APRN    1/30/2020

## 2020-01-30 NOTE — TELEPHONE ENCOUNTER
After seeing note from Dr Ching's office documented earlier this morning recommending that patient go to the ER for back pain, returned call to [place] after discussing with CAR Kim, stating that may be a better option for patient since it was recommended by his other physician. Wife would prefer that patient be seen in [facility] today instead and avoid ER visit if possible. Advised that we will keep appt as previously discussed for 1300 today.

## 2020-01-31 ENCOUNTER — HOSPITAL ENCOUNTER (EMERGENCY)
Facility: HOSPITAL | Age: 65
Discharge: HOME | End: 2020-02-01
Attending: EMERGENCY MEDICINE | Admitting: EMERGENCY MEDICINE

## 2020-01-31 DIAGNOSIS — C79.51 SPINE METASTASIS: ICD-10-CM

## 2020-01-31 DIAGNOSIS — M54.50 ACUTE MIDLINE LOW BACK PAIN WITHOUT SCIATICA: Primary | ICD-10-CM

## 2020-01-31 PROCEDURE — 96374 THER/PROPH/DIAG INJ IV PUSH: CPT

## 2020-01-31 PROCEDURE — 25010000002 HYDROMORPHONE PER 4 MG: Performed by: EMERGENCY MEDICINE

## 2020-01-31 PROCEDURE — 80053 COMPREHEN METABOLIC PANEL: CPT | Performed by: EMERGENCY MEDICINE

## 2020-01-31 PROCEDURE — 99283 EMERGENCY DEPT VISIT LOW MDM: CPT

## 2020-01-31 PROCEDURE — 85007 BL SMEAR W/DIFF WBC COUNT: CPT | Performed by: EMERGENCY MEDICINE

## 2020-01-31 PROCEDURE — 85025 COMPLETE CBC W/AUTO DIFF WBC: CPT | Performed by: EMERGENCY MEDICINE

## 2020-01-31 RX ORDER — HYDROMORPHONE HYDROCHLORIDE 1 MG/ML
0.5 INJECTION, SOLUTION INTRAMUSCULAR; INTRAVENOUS; SUBCUTANEOUS
Status: COMPLETED | OUTPATIENT
Start: 2020-01-31 | End: 2020-02-01

## 2020-01-31 RX ORDER — SODIUM CHLORIDE 0.9 % (FLUSH) 0.9 %
10 SYRINGE (ML) INJECTION AS NEEDED
Status: DISCONTINUED | OUTPATIENT
Start: 2020-01-31 | End: 2020-02-01 | Stop reason: HOSPADM

## 2020-01-31 RX ADMIN — HYDROMORPHONE HYDROCHLORIDE 0.5 MG: 1 INJECTION, SOLUTION INTRAMUSCULAR; INTRAVENOUS; SUBCUTANEOUS at 23:53

## 2020-01-31 RX ADMIN — SODIUM CHLORIDE 1000 ML: 9 INJECTION, SOLUTION INTRAVENOUS at 23:53

## 2020-02-01 ENCOUNTER — APPOINTMENT (OUTPATIENT)
Dept: CT IMAGING | Facility: HOSPITAL | Age: 65
End: 2020-02-01

## 2020-02-01 VITALS
OXYGEN SATURATION: 98 % | BODY MASS INDEX: 31.99 KG/M2 | HEART RATE: 81 BPM | WEIGHT: 216 LBS | RESPIRATION RATE: 18 BRPM | HEIGHT: 69 IN | SYSTOLIC BLOOD PRESSURE: 126 MMHG | TEMPERATURE: 98.8 F | DIASTOLIC BLOOD PRESSURE: 88 MMHG

## 2020-02-01 LAB
ALBUMIN SERPL-MCNC: 3.8 G/DL (ref 3.5–5.2)
ALBUMIN/GLOB SERPL: 1.2 G/DL
ALP SERPL-CCNC: 140 U/L (ref 39–117)
ALT SERPL W P-5'-P-CCNC: 11 U/L (ref 1–41)
ANION GAP SERPL CALCULATED.3IONS-SCNC: 11 MMOL/L (ref 5–15)
AST SERPL-CCNC: 17 U/L (ref 1–40)
BASOPHILS # BLD AUTO: 0.02 10*3/MM3 (ref 0–0.2)
BASOPHILS NFR BLD AUTO: 0.3 % (ref 0–1.5)
BILIRUB SERPL-MCNC: 0.3 MG/DL (ref 0.2–1.2)
BUN BLD-MCNC: 19 MG/DL (ref 8–23)
BUN/CREAT SERPL: 19.8 (ref 7–25)
CALCIUM SPEC-SCNC: 8.7 MG/DL (ref 8.6–10.5)
CHLORIDE SERPL-SCNC: 102 MMOL/L (ref 98–107)
CO2 SERPL-SCNC: 26 MMOL/L (ref 22–29)
CREAT BLD-MCNC: 0.96 MG/DL (ref 0.76–1.27)
DEPRECATED RDW RBC AUTO: 49 FL (ref 37–54)
EOSINOPHIL # BLD AUTO: 0.13 10*3/MM3 (ref 0–0.4)
EOSINOPHIL NFR BLD AUTO: 1.9 % (ref 0.3–6.2)
ERYTHROCYTE [DISTWIDTH] IN BLOOD BY AUTOMATED COUNT: 13.7 % (ref 12.3–15.4)
GFR SERPL CREATININE-BSD FRML MDRD: 79 ML/MIN/1.73
GLOBULIN UR ELPH-MCNC: 3.1 GM/DL
GLUCOSE BLD-MCNC: 110 MG/DL (ref 65–99)
HCT VFR BLD AUTO: 41.9 % (ref 37.5–51)
HGB BLD-MCNC: 13.9 G/DL (ref 13–17.7)
IMM GRANULOCYTES # BLD AUTO: 0.03 10*3/MM3 (ref 0–0.05)
IMM GRANULOCYTES NFR BLD AUTO: 0.4 % (ref 0–0.5)
LYMPHOCYTES # BLD AUTO: 0.38 10*3/MM3 (ref 0.7–3.1)
LYMPHOCYTES NFR BLD AUTO: 5.6 % (ref 19.6–45.3)
MCH RBC QN AUTO: 31.8 PG (ref 26.6–33)
MCHC RBC AUTO-ENTMCNC: 33.2 G/DL (ref 31.5–35.7)
MCV RBC AUTO: 95.9 FL (ref 79–97)
MONOCYTES # BLD AUTO: 0.87 10*3/MM3 (ref 0.1–0.9)
MONOCYTES NFR BLD AUTO: 12.8 % (ref 5–12)
NEUTROPHILS # BLD AUTO: 5.37 10*3/MM3 (ref 1.7–7)
NEUTROPHILS NFR BLD AUTO: 79 % (ref 42.7–76)
NRBC BLD AUTO-RTO: 0 /100 WBC (ref 0–0.2)
PLATELET # BLD AUTO: 138 10*3/MM3 (ref 140–450)
PMV BLD AUTO: 10.2 FL (ref 6–12)
POTASSIUM BLD-SCNC: 3.7 MMOL/L (ref 3.5–5.2)
PROT SERPL-MCNC: 6.9 G/DL (ref 6–8.5)
RBC # BLD AUTO: 4.37 10*6/MM3 (ref 4.14–5.8)
SODIUM BLD-SCNC: 139 MMOL/L (ref 136–145)
WBC NRBC COR # BLD: 6.8 10*3/MM3 (ref 3.4–10.8)

## 2020-02-01 PROCEDURE — 74176 CT ABD & PELVIS W/O CONTRAST: CPT

## 2020-02-01 PROCEDURE — 96376 TX/PRO/DX INJ SAME DRUG ADON: CPT

## 2020-02-01 PROCEDURE — 25010000002 HYDROMORPHONE PER 4 MG: Performed by: EMERGENCY MEDICINE

## 2020-02-01 RX ORDER — HYDROMORPHONE HYDROCHLORIDE 1 MG/ML
0.5 INJECTION, SOLUTION INTRAMUSCULAR; INTRAVENOUS; SUBCUTANEOUS
Status: DISCONTINUED | OUTPATIENT
Start: 2020-02-01 | End: 2020-02-01 | Stop reason: HOSPADM

## 2020-02-01 RX ORDER — DOCUSATE SODIUM 100 MG/1
100 CAPSULE, LIQUID FILLED ORAL 2 TIMES DAILY PRN
Qty: 30 CAPSULE | Refills: 0 | Status: SHIPPED | OUTPATIENT
Start: 2020-02-01

## 2020-02-01 RX ADMIN — HYDROMORPHONE HYDROCHLORIDE 0.5 MG: 1 INJECTION, SOLUTION INTRAMUSCULAR; INTRAVENOUS; SUBCUTANEOUS at 00:47

## 2020-02-01 RX ADMIN — HYDROMORPHONE HYDROCHLORIDE 0.5 MG: 1 INJECTION, SOLUTION INTRAMUSCULAR; INTRAVENOUS; SUBCUTANEOUS at 02:00

## 2020-02-01 NOTE — DISCHARGE INSTRUCTIONS
I recommend adjusting your Norco dosing.  As opposed to taking 1 tablet every 6 hours I recommend that you take 1 to 2 tablets every 6 hours as needed for pain control.  Prior to implementing this you need to verify this dosing change with your prescribing doctor.  Given the anticipated regular use of opiate pain medication recommend that you take scheduled Colace to prevent constipation.  Return to the emergency department if symptoms worsen or other concerns arise.

## 2020-02-01 NOTE — ED PROVIDER NOTES
"Subjective   Mr. Rajan Chambers is a 64 y.o male presenting to the emergency department with complaints of back pain. He was diagnosed with stage 4 lung cancer 2 years ago and his oncologist is Dr. Vicente. He began taking Tagrisso and the cancer is at a current \"stand-still\" but it weakened his T12, L3, and L5. His back pain began October 2019 and he was diagnosed with a compression fracture on L5 3 weeks ago. Over the past 2 weeks, his back pain became nearly intolerable. He finished the last radiation treatment 2 days ago. The most comfortable position is standing up and every other position elicits increased pain. He is prescribed Norco 5-325 mg 1 tablet every 6 hours as needed and as his pain worsened, his primary care physician changed this to 1 tablet every 4 hours as needed. Tonight, he took the medication and there was no relief after an hour so he decided to come here.    He complains of a decreased appetite causing him to lose 12 pounds and denies nausea. He received recent scans showing he has 2 kidney stones. He is currently taking antibiotics for sinusitis and he complains of sinus pain, ear pain, and occasional bloody mucous. There are no other acute symptoms at this time.      History provided by:  Patient  Back Pain   Location:  Thoracic spine and lumbar spine  Radiates to:  Does not radiate  Pain severity:  Severe  Onset quality:  Gradual  Duration:  3 months  Timing:  Constant  Progression:  Worsening  Chronicity:  New  Relieved by: standing (somewhat)  Worsened by:  Lying down and movement  Ineffective treatments: Norco.  Associated symptoms: abdominal pain    Risk factors: hx of cancer        Review of Systems   Constitutional: Positive for appetite change (decrease) and unexpected weight change (decrease).   HENT: Positive for ear pain, rhinorrhea, sinus pressure and sinus pain.    Gastrointestinal: Positive for abdominal pain and constipation. Negative for nausea.   Genitourinary: Positive for " hematuria.        Scrotal pain   Musculoskeletal: Positive for back pain.   Allergic/Immunologic: Positive for immunocompromised state.   All other systems reviewed and are negative.      Past Medical History:   Diagnosis Date   • Cancer (CMS/HCC)     NSCLC   • Lung nodule    • Shortness of breath     RECENT ONSET       Allergies   Allergen Reactions   • Ibuprofen Itching and Dermatitis     Prickly rash on heaD   • Penicillin G Hives   • Penicillins Rash   • Sulfa Antibiotics Rash and Itching       Past Surgical History:   Procedure Laterality Date   • BRONCHOSCOPY N/A 1/18/2018    Procedure: RANDALL AND BRONCHOSCOPY WITH ENDOBRONCHIAL ULTRASOUND WITH FLUORO;  Surgeon: Gustavo Haque MD;  Location: [facility] to [facility];  Service:    • COLLATERAL LIGAMENT REPAIR, KNEE     • COLONOSCOPY  05/2019   • KNEE ARTHROSCOPY     • LASIK     • LUNG BIOPSY Right 01/10/2018    Dr. Petty @ [facility]   • SKIN CANCER EXCISION Bilateral     BASAL CELL ON NECK YESTERDAY       Family History   Problem Relation Age of Onset   • Cancer Mother    • Breast cancer Mother    • Emphysema Father    • Cancer Sister    • Breast cancer Sister    • Cancer Maternal Grandmother    • Breast cancer Maternal Grandmother    • Cancer Paternal Grandmother    • Breast cancer Paternal Grandmother    • Brain cancer Paternal Grandfather        Social History     Socioeconomic History   • Marital status:      Spouse name: Not on file   • Number of children: Not on file   • Years of education: Not on file   • Highest education level: Not on file   Tobacco Use   • Smoking status: Never Smoker   • Smokeless tobacco: Former User   Substance and Sexual Activity   • Alcohol use: No   • Drug use: No   • Sexual activity: Defer   Social History Narrative        Retired from the [employer]    A .    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    Chew tobacco up to 2001         Objective   Physical Exam   Constitutional: He  is oriented to person, place, and time. He appears well-developed and well-nourished.   HENT:   Head: Normocephalic and atraumatic.   Eyes: Conjunctivae are normal. No scleral icterus.   Neck: Normal range of motion.   Cardiovascular: Normal rate, regular rhythm and normal heart sounds.   No murmur heard.  Pulmonary/Chest: Effort normal and breath sounds normal. No respiratory distress. He has no wheezes.   Abdominal: Soft. There is tenderness. There is no rebound and no guarding.   Mild generalized lower abdominal tenderness   Musculoskeletal: He exhibits tenderness.   Moderate midline lower lumbar tenderness to palpation. No swelling. Skin appears normal. Significant pain with any range of motion of the torso including flexion, extension, and rotation.   Neurological: He is alert and oriented to person, place, and time.   Skin: Skin is warm and dry. He is not diaphoretic.   Psychiatric: He has a normal mood and affect. His behavior is normal.   Nursing note and vitals reviewed.      Procedures         ED Course     Recent Results (from the past 24 hour(s))   Comprehensive Metabolic Panel    Collection Time: 01/31/20 11:51 PM   Result Value Ref Range    Glucose 110 (H) 65 - 99 mg/dL    BUN 19 8 - 23 mg/dL    Creatinine 0.96 0.76 - 1.27 mg/dL    Sodium 139 136 - 145 mmol/L    Potassium 3.7 3.5 - 5.2 mmol/L    Chloride 102 98 - 107 mmol/L    CO2 26.0 22.0 - 29.0 mmol/L    Calcium 8.7 8.6 - 10.5 mg/dL    Total Protein 6.9 6.0 - 8.5 g/dL    Albumin 3.80 3.50 - 5.20 g/dL    ALT (SGPT) 11 1 - 41 U/L    AST (SGOT) 17 1 - 40 U/L    Alkaline Phosphatase 140 (H) 39 - 117 U/L    Total Bilirubin 0.3 0.2 - 1.2 mg/dL    eGFR Non African Amer 79 >60 mL/min/1.73    Globulin 3.1 gm/dL    A/G Ratio 1.2 g/dL    BUN/Creatinine Ratio 19.8 7.0 - 25.0    Anion Gap 11.0 5.0 - 15.0 mmol/L   CBC Auto Differential    Collection Time: 01/31/20 11:51 PM   Result Value Ref Range    WBC 6.80 3.40 - 10.80 10*3/mm3    RBC 4.37 4.14 - 5.80 10*6/mm3  "   Hemoglobin 13.9 13.0 - 17.7 g/dL    Hematocrit 41.9 37.5 - 51.0 %    MCV 95.9 79.0 - 97.0 fL    MCH 31.8 26.6 - 33.0 pg    MCHC 33.2 31.5 - 35.7 g/dL    RDW 13.7 12.3 - 15.4 %    RDW-SD 49.0 37.0 - 54.0 fl    MPV 10.2 6.0 - 12.0 fL    Platelets 138 (L) 140 - 450 10*3/mm3    Neutrophil % 79.0 (H) 42.7 - 76.0 %    Lymphocyte % 5.6 (L) 19.6 - 45.3 %    Monocyte % 12.8 (H) 5.0 - 12.0 %    Eosinophil % 1.9 0.3 - 6.2 %    Basophil % 0.3 0.0 - 1.5 %    Immature Grans % 0.4 0.0 - 0.5 %    Neutrophils, Absolute 5.37 1.70 - 7.00 10*3/mm3    Lymphocytes, Absolute 0.38 (L) 0.70 - 3.10 10*3/mm3    Monocytes, Absolute 0.87 0.10 - 0.90 10*3/mm3    Eosinophils, Absolute 0.13 0.00 - 0.40 10*3/mm3    Basophils, Absolute 0.02 0.00 - 0.20 10*3/mm3    Immature Grans, Absolute 0.03 0.00 - 0.05 10*3/mm3    nRBC 0.0 0.0 - 0.2 /100 WBC     Note: In addition to lab results from this visit, the labs listed above may include labs taken at another facility or during a different encounter within the last 24 hours. Please correlate lab times with ED admission and discharge times for further clarification of the services performed during this visit.    CT Abdomen Pelvis Without Contrast   Final Result   1.  No acute abnormality within the abdomen or pelvis.   2.  Small nonobstructing bilateral renal calculi. No evidence of upper urinary obstruction.   3.  Extensive sclerotic skeletal metastatic disease without evidence of acute complication. This is stable.   4.   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 See above.      Signer Name: Mohan Weaver MD    Signed: 2/1/2020 1:00 AM    Workstation Name: [email or web address]     [facility]:    01/31/20 2250   BP: 133/84   BP Location: Left arm   Patient Position: Sitting   Pulse: 83   Resp: 18   Temp: 98.8 °F (37.1 °C)   TempSrc: Oral   SpO2: 97%   Weight: 98 kg (216 lb)   Height: 175.3 cm (69\") "     Medications   sodium chloride 0.9 % flush 10 mL (has no administration in time range)   sodium chloride 0.9 % bolus 1,000 mL (1,000 mL Intravenous New Bag 1/31/20 4993)   HYDROmorphone (DILAUDID) injection 0.5 mg (0.5 mg Intravenous Given 2/1/20 0047)     ECG/EMG Results (last 24 hours)     ** No results found for the last 24 hours. **        No orders to display         His pain is improved with treatment in the ED.  I have recommended that the patient increase his Norco to 1-2 tabs every 6 hours if his prescribing doctor is in agreement with this.  He will contact them at soonest availability to see if they agree with this change.  He is comfortable with this plan and will return to the ED if concerns arise.            MDM    Final diagnoses:   Acute midline low back pain without sciatica   Spine metastasis (CMS/HCC)       Documentation assistance provided by antonio Wild.  Information recorded by the scribe was done at my direction and has been verified and validated by me.     Ryanne Wild  02/01/20 0118       Todd Walker DO  02/02/20 4521

## 2020-02-03 ENCOUNTER — TELEPHONE (OUTPATIENT)
Dept: ONCOLOGY | Facility: CLINIC | Age: 65
End: 2020-02-03

## 2020-02-03 DIAGNOSIS — Z51.81 THERAPEUTIC DRUG MONITORING: Primary | ICD-10-CM

## 2020-02-03 NOTE — TELEPHONE ENCOUNTER
Received call from wife through triage line. Per wife, her  was seen in the ER Friday night due to back pain, and the ER MD instructed him to take two of his Norco 5/325 tablets q6hrs instead of the 1 tablet q6hrs he had been taking, and instructed him to call Dr Vicente's office Monday morning for further instructions. Wife states that increasing to two tablets is controlling his pain, but not taking it completely away. He is scheduled to see Dr Winter tomorrow morning for a palliative care referral. They do have enough pain pills at home to last until the next 24 hrs when he is able to see Dr Winter. Explained that once he is an established pt, she will take over symptom management including pain regimen. Wife also noted that her  is depressed. She said he never wants to get out of bed, always sits in silence in a dark room, and always wants her to stay with him- but she is not always able to do that. I encouraged her to let me schedule an apt with Susie brown, and we can coordinate this on a day they will be at the hospital for another apt. Wife explained that if one of the doctors brought this up, he may be more open to the idea.

## 2020-02-04 ENCOUNTER — LAB (OUTPATIENT)
Dept: LAB | Facility: HOSPITAL | Age: 65
End: 2020-02-04

## 2020-02-04 ENCOUNTER — OFFICE VISIT (OUTPATIENT)
Dept: PALLIATIVE CARE | Facility: CLINIC | Age: 65
End: 2020-02-04

## 2020-02-04 VITALS
WEIGHT: 215 LBS | OXYGEN SATURATION: 96 % | HEART RATE: 90 BPM | SYSTOLIC BLOOD PRESSURE: 110 MMHG | BODY MASS INDEX: 31.75 KG/M2 | DIASTOLIC BLOOD PRESSURE: 73 MMHG

## 2020-02-04 DIAGNOSIS — Z51.81 THERAPEUTIC DRUG MONITORING: ICD-10-CM

## 2020-02-04 DIAGNOSIS — G89.3 PAIN FROM BONE METASTASES (HCC): Primary | ICD-10-CM

## 2020-02-04 DIAGNOSIS — C79.51 PAIN FROM BONE METASTASES (HCC): Primary | ICD-10-CM

## 2020-02-04 DIAGNOSIS — Z02.89 MEDICATION MANAGEMENT CONTRACT AGREEMENT: ICD-10-CM

## 2020-02-04 PROBLEM — I82.409 DVT (DEEP VENOUS THROMBOSIS) (HCC): Status: ACTIVE | Noted: 2020-02-04

## 2020-02-04 LAB
AMPHET+METHAMPHET UR QL: NEGATIVE
AMPHETAMINES UR QL: NEGATIVE
BARBITURATES UR QL SCN: NEGATIVE
BENZODIAZ UR QL SCN: NEGATIVE
BUPRENORPHINE SERPL-MCNC: NEGATIVE NG/ML
CANNABINOIDS SERPL QL: NEGATIVE
COCAINE UR QL: NEGATIVE
METHADONE UR QL SCN: NEGATIVE
OPIATES UR QL: POSITIVE
OXYCODONE UR QL SCN: NEGATIVE
PCP UR QL SCN: NEGATIVE
PROPOXYPH UR QL: NEGATIVE
TRICYCLICS UR QL SCN: NEGATIVE

## 2020-02-04 PROCEDURE — 80306 DRUG TEST PRSMV INSTRMNT: CPT

## 2020-02-04 PROCEDURE — 99205 OFFICE O/P NEW HI 60 MIN: CPT | Performed by: INTERNAL MEDICINE

## 2020-02-04 RX ORDER — HYDROCODONE BITARTRATE AND ACETAMINOPHEN 5; 325 MG/1; MG/1
TABLET ORAL
Qty: 120 TABLET | Refills: 0 | Status: SHIPPED | OUTPATIENT
Start: 2020-02-04 | End: 2020-03-13 | Stop reason: SDUPTHER

## 2020-02-04 RX ORDER — GABAPENTIN 100 MG/1
CAPSULE ORAL
Qty: 30 CAPSULE | Refills: 0 | Status: SHIPPED | OUTPATIENT
Start: 2020-02-04 | End: 2020-03-05 | Stop reason: SDUPTHER

## 2020-02-04 RX ORDER — SENNA AND DOCUSATE SODIUM 50; 8.6 MG/1; MG/1
2 TABLET, FILM COATED ORAL DAILY
Qty: 60 TABLET | Refills: 0 | Status: SHIPPED | OUTPATIENT
Start: 2020-02-04 | End: 2020-03-05

## 2020-02-04 NOTE — PROGRESS NOTES
Subjective   Rajan Chambers is a 64 y.o. male.     History of Present Illness   Referring/Oncology:  Karen Batista MD  Rad/Onc:  Vidal Ching MD  Primary Care:  Jeimy Johnson    64yom with stage IV NSC lung cancer, R lung dx Jan 2018.  Metastatic to bones of T-spine/L-spine/pelvis, multiple pulmonary nodules, numerous small brain metastases.  Complications of reactive anxiety and DVT.    MRI Lumbar Spine 12/30/19:  Metastatic lesions T12, L3 and L5, R sacrum, bilateral ilium.    Treatment plan:  Osemertinib since Feb 2018, Xgeva.  Palliative XRT to L12-L5 and left iliac wing metastases 1/22-1/28/20.      Pain per Rad/Onc 2/10/20 evaluation: The past several months, he has been having increasing low back and left-sided hip pain that he says is worst when standing, sitting on a hard chair, or when carrying heavy items or trying to pick something up from the floor.  His pain is primarily on the right and occasionally radiates down the right leg.  He denies any bowel or bladder problems.  He also denies any numbness or tingling.  He has had an MRI showing multilevel spine disease at T12, L3, and L5, with a compression deformity of L5, and he has also has an expansile lesion in the left iliac wing.  He describes intermittent pain that he rates at its worst level of 8-9 out of 10, and a constant baseline pain of approximately 3-4 out of 10.      ED visit 1/31/20 for unmanageable pain, 2 days after completion of palliative XRT.  CT without cancer progression nor acute abnormality for back pain (no nephrolithiasis, as pt has h/o).  Instructed to increase Norco 5/325mg q6h PRN to 1-2 tabs po q6h PRN, taking 2 every 6 hours on schedule    ANALGESIA:  Managed somewhat.  Night awakening for leg pain 2x/week  ADVERSE EFFECTS:  Constipation.  Dose related transient drowsiness  ACTIVITY:  Independent IADLs  AFFECT:  Moderate anxiety/depression  ABERRANT BEHAVIORS:  None clearly.  Did not bring meds for  count today.    Symptoms: No insomnia.  Decreased appetite.      Support/Strengths:  Accompanied by wife (in healthcare) and sister (h/o health issues and very informed on Medicare and insurance issues).  Works on cattle farm, high functioning.    Distress/Difficulties: none expressed    Substance Use History: none    ACP/Goals: pain management    The following portions of the patient's history were reviewed and updated as appropriate: allergies, current medications, past family history, past medical history, past social history, past surgical history and problem list.    Review of Systems  Otherwise negative except as below and as already detailed in HPI.    TARSHA:  Reviewed.  See scanned form in Media. No concerns.  Consistent with history.  Prescribers identified as members of care team.     Medication Counts:  Reviewed.  See RN note. Did not bring medication to appointment    CONTROLLED SUBSTANCE TRACKING 2/4/2020   Last Tarsha 2/3/2020   Report Number [id number]   ORT Initial Risk Score 1   Diversion Concern No   Disposal Education and Agreement [id number]       UDS:  THC, Screen, Urine   Date Value Ref Range Status   02/04/2020 Negative Negative Final     Phencyclidine (PCP), Urine   Date Value Ref Range Status   02/04/2020 Negative Negative Final     Cocaine Screen, Urine   Date Value Ref Range Status   02/04/2020 Negative Negative Final     Methamphetamine, Ur   Date Value Ref Range Status   02/04/2020 Negative Negative Final     Opiate Screen   Date Value Ref Range Status   02/04/2020 Positive (A) Negative Final     Amphetamine Screen, Urine   Date Value Ref Range Status   02/04/2020 Negative Negative Final     Benzodiazepine Screen, Urine   Date Value Ref Range Status   02/04/2020 Negative Negative Final     Tricyclic Antidepressants Screen   Date Value Ref Range Status   02/04/2020 Negative Negative Final     Methadone Screen, Urine   Date Value Ref Range Status   02/04/2020 Negative Negative Final      Barbiturates Screen, Urine   Date Value Ref Range Status   02/04/2020 Negative Negative Final     Oxycodone Screen, Urine   Date Value Ref Range Status   02/04/2020 Negative Negative Final     Propoxyphene Screen   Date Value Ref Range Status   02/04/2020 Negative Negative Final     Buprenorphine, Screen, Urine   Date Value Ref Range Status   02/04/2020 Negative Negative Final     Palliative Performance Scale  Palliative Performance Scale Score: 70%    Winner Symptom Assessment System Revised  Pain Score: 7   ESAS Tiredness Score: 8  ESAS Nausea Score: No nausea  ESAS Depression Score: 7  ESAS Anxiety Score: 6  ESAS Drowsiness Score: 4  ESAS Lack of Appetite Score: 6  ESAS Wellbeing Score: 7  ESAS Dyspnea Score: No shortness of breath  ESAS Source of Information: patient    MYLENE-7:    Over the last two weeks, how often have you been bothered by the following problems?  Feeling nervous, anxious or on edge: Nearly every day  Not being able to stop or control worrying: Nearly every day  Worrying too much about different things: More than half the days  Trouble Relaxing: More than half the days  Being so restless that it is hard to sit still: More than half the days  Becoming easily annoyed or irritable: More than half the days  Feeling afraid as if something awful might happen: More than half the days  MYLENE 7 Total Score: 16    PHQ-9:  PHQ-2/PHQ-9 Depression Screening 2/4/2020   Little interest or pleasure in doing things 3   Feeling down, depressed, or hopeless 2   Trouble falling or staying asleep, or sleeping too much 2   Feeling tired or having little energy 3   Poor appetite or overeating 2   Feeling bad about yourself - or that you are a failure or have let yourself or your family down 0   Trouble concentrating on things, such as reading the newspaper or watching television 1   Moving or speaking so slowly that other people could have noticed. Or the opposite - being so fidgety or restless that you have been  moving around a lot more than usual 2   Thoughts that you would be better off dead, or of hurting yourself in some way 0   Total Score 15        ECOG: (1) Restricted in physically strenuous activity, ambulatory and able to do work of light nature    Objective   Physical Exam   Constitutional: He is oriented to person, place, and time. He appears well-developed and well-nourished.   Eyes: Pupils are equal, round, and reactive to light. No scleral icterus.   Cardiovascular: Normal rate, regular rhythm and normal heart sounds. Exam reveals no gallop and no friction rub.   No murmur heard.  Pulmonary/Chest: Breath sounds normal. No stridor. No respiratory distress. He has no wheezes. He has no rales.   Abdominal: Soft. He exhibits distension. Bowel sounds are decreased. There is no tenderness.   Musculoskeletal: He exhibits no edema or deformity.        Lumbar back: He exhibits decreased range of motion and bony tenderness. He exhibits no tenderness, no pain and no spasm.   Neurological: He is alert and oriented to person, place, and time. He exhibits normal muscle tone. Coordination normal.   Skin: Skin is warm and dry. No rash noted. He is not diaphoretic. No erythema. No pallor.   Psychiatric: He has a normal mood and affect. His behavior is normal. Judgment and thought content normal.   Nursing note and vitals reviewed.        Assessment/Plan   Rajan was seen today for appointment, follow-up and lung cancer.    Diagnoses and all orders for this visit:    Pain from bone metastases (CMS/HCC)  -     Ambulatory Referral to Physical Therapy Evaluate and treat, Ortho; Heat, Electrotherapy; Cross Fiber, Soft Tissue Mobilizaton; Stretching, ROM, Strengthening; Full weight bearing  -     gabapentin (NEURONTIN) 100 MG capsule; 1-3 capsules at bedtime as needed as tolerated for leg pain  -     HYDROcodone-acetaminophen (NORCO) 5-325 MG per tablet; 1-2 tablets every 6 hours as needed for back pain    Medication management  contract agreement    Other orders  -     senna-docusate sodium (SENOKOT-S) 8.6-50 MG tablet; Take 2 tablets by mouth Daily for 30 days.               Prior authorization documentation:  Inefficacious:  APAP alone  Intolerant to side effects: NSAIDs (hives)    Universal precautions:    ORT risk:  Low score  Aberrant behavior:  None.  UDT and PDMP as expected  Indication:  Cancer inflammatory bone metastases pain  OME:  40mg  RIOSORD score:  20 = 3% OIRD probability  Naloxone prescribed:  no     Advance care plan:    -  Healthcare decision making plan: Wife Nelida Chambers by law  -  MOST discussions thus far:  Not explored today at initial symptom management visit.      Patient Instructions of AVS:  1.  Call Karin with the antidepressant - dose and instructions - that you are taking so we have correct information.    Total face to face time spent:  60 min.  Greater than 50% time spent in counseling and discussion re:    Patient was counseled on narcotic safety and patient responsibility of medication against theft or stolen medications.  Controlled medication agreement reviewed, signed, and in chart.  Authorized that other [facility] providers may prescribe or adjust medications as on low risk regimen at this time.  1)  No early refills requests will be honored for lost or stolen medication.    2)  Patient is expected to comply with requests for random medication counts and urine drug monitoring while receiving opioid therapy.    Patient was counseled to take medications only as prescribed or instructed by prescribing physician.    1)  Patient was counseled regarding risk of overdose and polypharmacy.    2)  Patient was advised against using alcohol or driving while on narcotic medication.    3)  Patient was advised about higher risk of accidental overdose with benzodiazepine use.  4)  Patient was advised about emergent use of Naloxone.  5)  Patient was given instruction on safe disposal of medications.    Patient was advised of  "opioid side effects, including, but not limited to:   -  Physical dependence and opioid tolerance, related to duration of opioid therapy   -  Opioid induced hyperalgesia, related to duration of opioid therapy   -  Opioid use disorder (drug abuse and addiction)   -  hypogonadism and fatigue,    -  Sleep apnea,   -  osteoporosis,    -  depression,    -  Increased risk of pneumonia   - Constipation   - Altered sensorium - confusion and/or sedation   - Nausea   - Pruritis, itching   - Hyperhidrosis, excessive sweating     Advised to be supervised for first few days while on new medication or dosages and to call for any side effect concerns.    Care coordination:   -  Refer to PT  -  Hold off on Interventional Pain Management, as may get delayed pain relief after radiation.  -  We discussed referral to Behavioral Health if mood not improved with improved pain management.  Hold off for now given appt burden of PT.    Follow up in 3-4 weeks.    Addendum (2/18/20):  Ordering an  custom fitted back brace with anterior, lateral and superior support to reduce pain by restricting mobility of the spine.\"  -  Paper order to [facility] was siged and faxed on 2/18/20.      "

## 2020-02-04 NOTE — PATIENT INSTRUCTIONS
1.  Call Karin with the antidepressant - dose and instructions - that you are taking so we have correct information.    ALWAYS bring ALL of your medications prescribed by this clinic to EVERY appointment.  If you fail to bring in any remaining controlled medication (usually a pain or anxiety medication), you may not receive a refill or replacement prescription at that appointment.      Call [phone] for questions regarding medications, refills, or plan of care on Mondays - Fridays 9am to 4pm.      You must call AT LEAST 7-10 BUSINESS DAYS in advance for any refill requests. Clinic days are Tuesday and Thursdays at this time.  Prescriptions for controlled medications will be completed on clinic days only.  Please be aware of additional insurance prior authorization processing time required for many of those medications.      Call after hours and weekends only for new or acute (not chronic) symptom issues to speak to on-call physician or nurse practitioner.  Be advised that any requests for prescriptions for controlled substances can NOT be honored after hours, including refill requests.    Call [phone] only for scheduling issues.

## 2020-02-04 NOTE — PROGRESS NOTES
Pt presents to clinic today with wife, Nelida and sister Ana. Pt ambulatory, vss, a&ox4, and appropriate.  Pt oriented to palliative care, prescribing practices, med counts, contact information and medication disposal agreement.  Pt complains of lower back pain since October, noted compression fracture. Describes as a sharp knife.  Pt also c/o constipation.  Decreased appetite but weight stable.  No n/v, insomnia. Depression noted by family as pt doesn't want to do anything and has no energy to do it.     Med Counts  Medication Filled # Filled Count Used  # days LEANDRO                                                                                      Pt did not bring medication on first visit - counseled on bringing each time.

## 2020-02-10 ENCOUNTER — TELEPHONE (OUTPATIENT)
Dept: PALLIATIVE CARE | Facility: CLINIC | Age: 65
End: 2020-02-10

## 2020-02-10 NOTE — TELEPHONE ENCOUNTER
Pt had called and stated that PT said they could not provide pt with back braced and referred to [facility]. They had contacted the company and were informed that they needed some information from us.  Called and spoke with company and they informed they would be sending a form over for us to order the brace. Called pt/wife back and informed was waiting on form and would complete as soon as I received.  Wife v/u.

## 2020-02-12 ENCOUNTER — TELEPHONE (OUTPATIENT)
Dept: ONCOLOGY | Facility: CLINIC | Age: 65
End: 2020-02-12

## 2020-02-12 ENCOUNTER — TREATMENT (OUTPATIENT)
Dept: PHYSICAL THERAPY | Facility: CLINIC | Age: 65
End: 2020-02-12

## 2020-02-12 DIAGNOSIS — M54.50 ACUTE LEFT-SIDED LOW BACK PAIN WITHOUT SCIATICA: Primary | ICD-10-CM

## 2020-02-12 DIAGNOSIS — G89.3 PAIN FROM BONE METASTASES (HCC): ICD-10-CM

## 2020-02-12 DIAGNOSIS — C79.51 PAIN FROM BONE METASTASES (HCC): ICD-10-CM

## 2020-02-12 PROCEDURE — 97110 THERAPEUTIC EXERCISES: CPT | Performed by: PHYSICAL THERAPIST

## 2020-02-12 PROCEDURE — 97161 PT EVAL LOW COMPLEX 20 MIN: CPT | Performed by: PHYSICAL THERAPIST

## 2020-02-12 NOTE — PROGRESS NOTES
" Physical Therapy Initial Evaluation and Plan of Care      Patient: Rajan Chambers   : 1955  Diagnosis/ICD-10 Code:  No primary diagnosis found.  Referring practitioner: Jacquie Winter MD    Subjective Evaluation    History of Present Illness  Mechanism of injury: Cancer 2018 lung cancer stage 4.  He went to [facility] and the meds really stopped it.  Oct 2019 back pain started and he went to [facility] and the [facility] treated the spine with Radiation to hopefully stop the cancer treatment.     Radiation 3 weeks ago to the spine.  [id number]    L5 compression fracture.     2 kidney stones also present.      Pain  Current pain ratin (back and tail bone.)  Location: low back centrally and in the kidney area as well.   Alleviating factors: laying down.             Objective       Postural Observations  Seated posture: fair  Standing posture: fair  Correction of posture: makes symptoms better    Additional Postural Observation Details  Lumbar spine lordosis fixed.  Limited trunk mobility with ambulation and transfers.     Palpation   Left   Hypertonic in the erector spinae.   Tenderness of the erector spinae.     Right   Hypertonic in the erector spinae. Tenderness of the erector spinae.     Additional Palpation Details  L L/S and L PSIS area the size of a lemon is the primary area of pain.     Tenderness     Left Hip   Tenderness in the PSIS.     Active Range of Motion     Lumbar   Flexion: Active lumbar flexion: FT to 6\" above the knee.  no reversal of the lumbar lordosis curvature. with pain    Muscle Activation   Patient able to activate left transverse abdominals and right transverse abdominals.     Additional Muscle Activation Details  Weakness in the lower abdominals      Tests     Lumbar   Positive repeated flexion (relief).     Left   Negative crossed SLR and passive SLR.     Right   Negative crossed SLR and passive SLR.     Additional Tests Details  DKTC + for tightness but reduced pain to 2/10 pain post " activity.  L L/S area the size of a lemon.     HS MM guarding elevated pain in the LE.     Supine High hooklying 0/10.         Ambulation     Quality of Movement During Gait     Additional Quality of Movement During Gait Details  Limited stride and limited trunk rotation noted with ambulation.     Guarded trunk mobility.          Assessment & Plan     Assessment  Impairments: abnormal muscle tone, abnormal or restricted ROM, activity intolerance, lacks appropriate home exercise program and pain with function  Assessment details: Patient is a 64 year old male who comes to physical therapy with lumbar spine pain.  Pt has a history of Bone Metastases in the spine but most of his pain today did respond to mechanical activity.  He was responding to exercises and mobiltiy with reduced pain. Signs and symptoms are consistent with mechanical pain and pain more from the compression fx in the Lumbar spine.  The patient currently has pain, decreased ROM, decreased strength, and inability to perform all essential functional activities. Pt will benefit from skilled PT services to address the above issues.     Prognosis: fair  Functional Limitations: carrying objects, lifting, pulling, pushing, uncomfortable because of pain, sitting, standing, stooping and unable to perform repetitive tasks  Goals  Plan Goals: SHORT TERM GOALS:     2 weeks  1. Pt independent with HEP  2. Pt to demonstrate trunk AROM 25-50% of expected norms to allow for improved ability to perform ADL's  3. Pt to demonstrate bilateral hip strength 4/5 in all planes to improved stability of the core/trunk     LONG TERM GOALS:   6 weeks  1. Pt to demonstrate trunk AROM 50-75% of expected norms to allow for improved ability to perform functional activities  2. Pt to demonstrate ability to perform full functional squat with good form and without increased pain in the low back   3. Pt to report being able to work  in the home without increase in pain in the back  4.  Pt to report cessation of pain/numbness/tingling into the left leg to show decreased nerve compression      Plan  Therapy options: will be seen for skilled physical therapy services  Planned modality interventions: cryotherapy, thermotherapy (hydrocollator packs) and electrical stimulation/Russian stimulation  Planned therapy interventions: abdominal trunk stabilization, manual therapy, spinal/joint mobilization, soft tissue mobilization, strengthening, stretching, therapeutic activities, functional ROM exercises, flexibility, body mechanics training, neuromuscular re-education and postural training  Treatment plan discussed with: patient  Plan details: Pt will be seen 2 x / week.  Assess Pt response to PREP, posture and body mechanics.         Manual Therapy:    3     mins  96942;  Therapeutic Exercise:    12     mins  91005;     Neuromuscular Karissa:        mins  22990;    Therapeutic Activity:          mins  89194;     Gait Training:           mins  18022;     Ultrasound:          mins  33883;    Electrical Stimulation:         mins  55921 ( );  Dry Needling          mins self-pay    Timed Treatment:   15   mins   Total Treatment:     42   mins    PT SIGNATURE: Skinny Thomas, PT   DATE TREATMENT INITIATED: 2/12/2020    Initial certification  Certification Period: 5/12/2020  I certify that the therapy services are furnished while this patient is under my care.  The services outlined above are required by this patient, and will be reviewed every 90 days.     PHYSICIAN: Jacquie Winter MD      DATE:     Please sign and return via fax to  .. Thank you, [facility].

## 2020-02-12 NOTE — TELEPHONE ENCOUNTER
Patient needs the ct report about his kidney stones faxed to urologist  Dr. Phillips's office. Patient doesn't need the disc         Patient phone number [phone]

## 2020-02-12 NOTE — TELEPHONE ENCOUNTER
Called patient and explained that he has to come to radiology dept and get his disk himself.  He said he would.   
Patient is requesting we send his recent kidney stone disc sent to his  urologist Dr Washburn      Patients Phone: [phone]  
10

## 2020-02-17 ENCOUNTER — TREATMENT (OUTPATIENT)
Dept: PHYSICAL THERAPY | Facility: CLINIC | Age: 65
End: 2020-02-17

## 2020-02-17 DIAGNOSIS — C79.51 PAIN FROM BONE METASTASES (HCC): ICD-10-CM

## 2020-02-17 DIAGNOSIS — M54.50 ACUTE LEFT-SIDED LOW BACK PAIN WITHOUT SCIATICA: Primary | ICD-10-CM

## 2020-02-17 DIAGNOSIS — G89.3 PAIN FROM BONE METASTASES (HCC): ICD-10-CM

## 2020-02-17 PROCEDURE — 97110 THERAPEUTIC EXERCISES: CPT | Performed by: PHYSICAL THERAPIST

## 2020-02-20 ENCOUNTER — TREATMENT (OUTPATIENT)
Dept: PHYSICAL THERAPY | Facility: CLINIC | Age: 65
End: 2020-02-20

## 2020-02-20 DIAGNOSIS — C79.51 PAIN FROM BONE METASTASES (HCC): ICD-10-CM

## 2020-02-20 DIAGNOSIS — M54.50 ACUTE LEFT-SIDED LOW BACK PAIN WITHOUT SCIATICA: Primary | ICD-10-CM

## 2020-02-20 DIAGNOSIS — G89.3 PAIN FROM BONE METASTASES (HCC): ICD-10-CM

## 2020-02-20 PROCEDURE — 97140 MANUAL THERAPY 1/> REGIONS: CPT | Performed by: PHYSICAL THERAPIST

## 2020-02-20 PROCEDURE — 97110 THERAPEUTIC EXERCISES: CPT | Performed by: PHYSICAL THERAPIST

## 2020-02-20 NOTE — PROGRESS NOTES
Physical Therapy Daily Progress Note    Patient Information  Rajan Chambers  1955      Visit # : 3    Rajan Chambers reports 4/10 pain today at rest.  Pt reports after last treatment with the ice pack he was pain free for 1.5 days.      Pt reports the pain is in the L L/S area.     Objective Pt presents to PT today with no distress noted at rest.     Pt with improved endurance noted with activity.      Post PT 0/10 pain noted.     See Exercise, Manual, and Modality Logs for complete treatment.     Assessment/Plan  Pt with improved function and less pain noted. He is able to perform more activity each day.        Progress per Plan of Care and Progress strengthening /stabilization /functional activity      Visit Diagnoses:    ICD-10-CM ICD-9-CM   1. Acute left-sided low back pain without sciatica M54.5 724.2   2. Pain from bone metastases (CMS/HCC) G89.3 338.3    C79.51             Manual Therapy:    10     mins  93978;  Therapeutic Exercise:    43     mins  29156;     Neuromuscular Karissa:        mins  44120;    Therapeutic Activity:          mins  55025;     Gait Training:        ___  mins  66882;     Ultrasound:          mins  38953;    Electrical Stimulation:         mins  20093 ( );  Dry Needling          mins self-pay    Timed Treatment:   53   mins   Total Treatment:     63   mins    Skinny Thomas, PT  Physical Therapist

## 2020-02-24 DIAGNOSIS — Z51.81 THERAPEUTIC DRUG MONITORING: Primary | ICD-10-CM

## 2020-02-25 ENCOUNTER — HOSPITAL ENCOUNTER (OUTPATIENT)
Dept: RADIATION ONCOLOGY | Facility: HOSPITAL | Age: 65
Setting detail: RADIATION/ONCOLOGY SERIES
Discharge: HOME | End: 2020-02-25

## 2020-02-25 ENCOUNTER — TREATMENT (OUTPATIENT)
Dept: PHYSICAL THERAPY | Facility: CLINIC | Age: 65
End: 2020-02-25

## 2020-02-25 ENCOUNTER — OFFICE VISIT (OUTPATIENT)
Dept: PALLIATIVE CARE | Facility: CLINIC | Age: 65
End: 2020-02-25

## 2020-02-25 ENCOUNTER — LAB (OUTPATIENT)
Dept: LAB | Facility: HOSPITAL | Age: 65
End: 2020-02-25

## 2020-02-25 ENCOUNTER — OFFICE VISIT (OUTPATIENT)
Dept: RADIATION ONCOLOGY | Facility: HOSPITAL | Age: 65
End: 2020-02-25

## 2020-02-25 VITALS
DIASTOLIC BLOOD PRESSURE: 70 MMHG | SYSTOLIC BLOOD PRESSURE: 115 MMHG | OXYGEN SATURATION: 97 % | BODY MASS INDEX: 31.5 KG/M2 | HEART RATE: 88 BPM | WEIGHT: 213.3 LBS

## 2020-02-25 VITALS
HEIGHT: 69 IN | SYSTOLIC BLOOD PRESSURE: 112 MMHG | OXYGEN SATURATION: 95 % | BODY MASS INDEX: 31.32 KG/M2 | DIASTOLIC BLOOD PRESSURE: 84 MMHG | HEART RATE: 77 BPM | TEMPERATURE: 97.8 F | RESPIRATION RATE: 16 BRPM | WEIGHT: 211.5 LBS

## 2020-02-25 DIAGNOSIS — G89.3 PAIN FROM BONE METASTASES (HCC): ICD-10-CM

## 2020-02-25 DIAGNOSIS — C79.9 METASTATIC CANCER (HCC): ICD-10-CM

## 2020-02-25 DIAGNOSIS — C79.51 PAIN FROM BONE METASTASES (HCC): Primary | ICD-10-CM

## 2020-02-25 DIAGNOSIS — Z79.01 CHRONIC ANTICOAGULATION: ICD-10-CM

## 2020-02-25 DIAGNOSIS — M54.50 ACUTE LEFT-SIDED LOW BACK PAIN WITHOUT SCIATICA: Primary | ICD-10-CM

## 2020-02-25 DIAGNOSIS — G89.3 PAIN FROM BONE METASTASES (HCC): Primary | ICD-10-CM

## 2020-02-25 DIAGNOSIS — Z51.81 THERAPEUTIC DRUG MONITORING: ICD-10-CM

## 2020-02-25 DIAGNOSIS — M25.50 DIFFUSE ARTHRALGIA: ICD-10-CM

## 2020-02-25 DIAGNOSIS — C79.51 PAIN FROM BONE METASTASES (HCC): ICD-10-CM

## 2020-02-25 LAB
AMPHET+METHAMPHET UR QL: NEGATIVE
AMPHETAMINES UR QL: NEGATIVE
BARBITURATES UR QL SCN: NEGATIVE
BENZODIAZ UR QL SCN: NEGATIVE
BUPRENORPHINE SERPL-MCNC: NEGATIVE NG/ML
CANNABINOIDS SERPL QL: NEGATIVE
COCAINE UR QL: NEGATIVE
METHADONE UR QL SCN: NEGATIVE
OPIATES UR QL: NEGATIVE
OXYCODONE UR QL SCN: NEGATIVE
PCP UR QL SCN: NEGATIVE
PROPOXYPH UR QL: NEGATIVE
TRICYCLICS UR QL SCN: NEGATIVE

## 2020-02-25 PROCEDURE — 99213 OFFICE O/P EST LOW 20 MIN: CPT | Performed by: INTERNAL MEDICINE

## 2020-02-25 PROCEDURE — 80306 DRUG TEST PRSMV INSTRMNT: CPT

## 2020-02-25 PROCEDURE — 97110 THERAPEUTIC EXERCISES: CPT | Performed by: PHYSICAL THERAPIST

## 2020-02-25 PROCEDURE — G0463 HOSPITAL OUTPT CLINIC VISIT: HCPCS

## 2020-02-25 PROCEDURE — 97140 MANUAL THERAPY 1/> REGIONS: CPT | Performed by: PHYSICAL THERAPIST

## 2020-02-25 RX ORDER — PHENOL 1.4 %
600 AEROSOL, SPRAY (ML) MUCOUS MEMBRANE 2 TIMES DAILY WITH MEALS
COMMUNITY
End: 2020-05-18 | Stop reason: HOSPADM

## 2020-02-25 RX ORDER — ACETAMINOPHEN 500 MG
2 TABLET ORAL EVERY 6 HOURS PRN
COMMUNITY

## 2020-02-25 RX ORDER — ESCITALOPRAM OXALATE 20 MG/1
1 TABLET ORAL 2 TIMES DAILY
COMMUNITY
End: 2020-04-13 | Stop reason: ALTCHOICE

## 2020-02-25 NOTE — PROGRESS NOTES
FOLLOW UP NOTE    PATIENT:                                                      Rajan Chambers  MEDICAL RECORD #:                        [id number]  :                                                          1955  COMPLETION DATE:    2020  DIAGNOSIS:     Bone metastases  Stage IV non-small cell lung cancer ([id number])    BRIEF HISTORY:  Mr. Chambers is a 64 y.o. gentleman who returns today for initial follow-up.  He has a history of stage IV non-small cell lung cancer with multiple pulmonary nodules and metastases to bones of T-spine/L-spine/pelvis and numerous small brain lesions.  He has been on Tagrisso since 2018.  He underwent a course of palliative radiation to painful bony mets approximately 1 month ago.  He received a dose of 20 Gy in 5 fractions to T12-L5, and 8 Gy in a single fraction to the left iliac wing using external beam radiotherapy.  He tolerated treatment well.  Days after completing treatment, he presented to the ED for unmanageable low back pain.  CT showed stable skeletal metastatic disease without progression.  He states that in the last 2-3 weeks he has had noticeable pain improvement in his lower back and left hip.  He rates his pain at 2/10 at rest, and 4/10 with activity or prolonged sitting.  Overall, he has been able to cut back medications to gabapentin 100 mg nightly and scheduled Tylenol throughout the day which has provided satisfactory pain relief.  He was seen by palliative medicine this morning and given a prescription for diclofenac.  He continues with physical therapy 2 times per week, and has begun wearing a spinal brace.  He is able to bend at the waist without eliciting pain, noting that he is now able to put on his socks and shoes where he was not able to before.  He continues to have some right hip paresthesia that he describes as sciatic pain, though this is unchanged and stable.  Acute posttreatment fatigue persists but slowly is improving.   "      MEDICATIONS: Medication reconciliation for the patient was reviewed and confirmed in the electronic medical record.    Review of Systems   Constitutional: Positive for fatigue.   Musculoskeletal: Positive for arthralgias and back pain.   Psychiatric/Behavioral: The patient is nervous/anxious.    All other systems reviewed and are negative.      KPS 80%    Physical Exam   Constitutional: He is oriented to person, place, and time. He appears well-developed and well-nourished. No distress.   HENT:   Head: Normocephalic and atraumatic.   Eyes: Pupils are equal, round, and reactive to light. Conjunctivae are normal.   Neck: Normal range of motion. Neck supple.   Cardiovascular: Normal rate and regular rhythm.   No murmur heard.  Pulmonary/Chest: Effort normal and breath sounds normal. He has no wheezes.   Abdominal: Soft. Bowel sounds are normal. He exhibits no distension and no mass. There is no tenderness.   Musculoskeletal: Normal range of motion. He exhibits no edema.   Mild pain elicited with palpation of left iliac wing and lumbosacral spine.  No pain to palpation over right iliac wing.     Lymphadenopathy:     He has no cervical adenopathy.        Right: No supraclavicular adenopathy present.        Left: No supraclavicular adenopathy present.   Neurological: He is alert and oriented to person, place, and time. He displays normal reflexes. No cranial nerve deficit or sensory deficit. He exhibits normal muscle tone. Coordination normal.   BUE and BLE strength 5/5.  No focal or sensory deficits noted.   Skin: Skin is warm and dry.   Psychiatric: He has a normal mood and affect. His behavior is normal. Judgment and thought content normal.   Nursing note and vitals reviewed.      VITAL SIGNS:   Vitals:    02/25/20 1804   BP: 112/84   Pulse: 77   Resp: 16   Temp: 97.8 °F (36.6 °C)   TempSrc: Temporal   SpO2: 95%  Comment: RA   Weight: 95.9 kg (211 lb 8 oz)   Height: 175.3 cm (69\")   PainSc:   2       The " following portions of the patient's history were reviewed and updated as appropriate: allergies, current medications, past family history, past medical history, past social history, past surgical history and problem list.       Rajan was seen today for bone metastases.    Diagnoses and all orders for this visit:    Pain from bone metastases (CMS/HCC)      IMPRESSION:  Mr. Chambers is a 64 y.o. male with history of metastatic non-small cell lung cancer, with symptomatic metastases within the lumbar spine and left iliac wing.  He completed a course of palliative EBRT to the lumbar spine, levels T12-L5, and the left iliac wing expansile metastases 1 month ago.  He tolerated treatment well and did not develop any acute radiation-related toxicities.  He has had good response to treatment in regards to palliation of pain and improved mobility.  He is scheduled for repeat imaging per Dr. Vicente in approximately 3 weeks.  We discussed follow-up intervals, surveillance imaging, and expectations for response to treatment.  Additionally, the patient had questions about radiosurgery for the stable 3 mm lesion in the left cerebellum, which was recommended watchful waiting for now.      RECOMMENDATIONS:  Mr. Chambers remains under the oncologic care of Dr. Vicente.  The patient and his wife would like to continue follow-up, so I will schedule appointment for 6 months or sooner if needed.    Return in about 6 months (around 8/25/2020) for Office Visit.    CAR Mendoza

## 2020-02-25 NOTE — PROGRESS NOTES
Subjective   Rajan Chambers is a 64 y.o. male.     History of Present Illness   Referring/Oncology:  Karen Batista MD  Rad/Onc:  Vidal Ching MD  Primary Care:  Jeimy Johnson     64yom with stage IV NSC lung cancer, R lung dx Jan 2018.  Metastatic to bones of T-spine/L-spine/pelvis, multiple pulmonary nodules, numerous small brain metastases.  Complications of reactive anxiety and DVT.     MRI Lumbar Spine 12/30/19:  Metastatic lesions T12, L3 and L5, R sacrum, bilateral ilium.     Treatment plan:  Osemertinib since Feb 2018, Xgeva.  Palliative XRT to L12-L5 and left iliac wing metastases 1/22-1/28/20.      Interim history:  3 week follow.  Just started PT.  Stopped [place], as did not like constipation despite scheduled laxative.  Taking APAP 1gm q6h scheduled with satisfactory relief.  Back brace ordered and helpful.  Gabapentin 100-200mg helpful and tolerated, usually just takes 100mg at bedtime.    Pain: R low back to R leg sciatica pain    Medication management:  He stopped Norco due to constipation.  Tylenol 1gm every 6 hours, which is effective.    Symptoms: marked improvement in mood.    Function: ambulates without assist device    Support/Strengths:  Wife accompanies him today.    Distress/Difficulties: appt burden noted    The following portions of the patient's history were reviewed and updated as appropriate: allergies, current medications, past family history, past medical history, past social history, past surgical history and problem list.    Review of Systems  Otherwise negative except as below and as already detailed in HPI.    TARSHA:  Reviewed.  See scanned form in Media. No concerns.  Consistent with history.  Prescribers identified as members of care team.     Medication Counts:  Reviewed.  See RN note. Brought medication.  No overuse or misuse evident., underuse    CONTROLLED SUBSTANCE TRACKING 2/4/2020 2/25/2020   Last Tarsha 2/3/2020 2/24/2020   Report Number [id number]  [id number]   Last UDS - 2/4/2020   ORT Initial Risk Score 1 1   Prior UDT result - Expected   Pill count - Expected   Diversion Concern No No   Disposal Education and Agreement [id number]       UDS:  THC, Screen, Urine   Date Value Ref Range Status   02/25/2020 Negative Negative Final     Phencyclidine (PCP), Urine   Date Value Ref Range Status   02/25/2020 Negative Negative Final     Cocaine Screen, Urine   Date Value Ref Range Status   02/25/2020 Negative Negative Final     Methamphetamine, Ur   Date Value Ref Range Status   02/25/2020 Negative Negative Final     Opiate Screen   Date Value Ref Range Status   02/25/2020 Negative Negative Final     Amphetamine Screen, Urine   Date Value Ref Range Status   02/25/2020 Negative Negative Final     Benzodiazepine Screen, Urine   Date Value Ref Range Status   02/25/2020 Negative Negative Final     Tricyclic Antidepressants Screen   Date Value Ref Range Status   02/25/2020 Negative Negative Final     Methadone Screen, Urine   Date Value Ref Range Status   02/25/2020 Negative Negative Final     Barbiturates Screen, Urine   Date Value Ref Range Status   02/25/2020 Negative Negative Final     Oxycodone Screen, Urine   Date Value Ref Range Status   02/25/2020 Negative Negative Final     Propoxyphene Screen   Date Value Ref Range Status   02/25/2020 Negative Negative Final     Buprenorphine, Screen, Urine   Date Value Ref Range Status   02/25/2020 Negative Negative Final     Palliative Performance Scale  Palliative Performance Scale Score: 80%    [place] Symptom Assessment System Revised  Pain Score: 2   ESAS Tiredness Score: 1  ESAS Nausea Score: No nausea  ESAS Depression Score: 1  ESAS Anxiety Score: 1  ESAS Drowsiness Score: No drowsiness  ESAS Lack of Appetite Score: 4  ESAS Wellbeing Score: 4  ESAS Dyspnea Score: No shortness of breath  ESAS Other Problem Score: 2  ESAS Source of Information: patient    MYLENE-7:    Over the last two weeks, how often have you been bothered  by the following problems?  Feeling nervous, anxious or on edge: Several days  Not being able to stop or control worrying: Several days  Worrying too much about different things: Several days  Trouble Relaxing: Several days  Being so restless that it is hard to sit still: Not at all  Becoming easily annoyed or irritable: Several days  Feeling afraid as if something awful might happen: Not at all  MYLENE 7 Total Score: 5    PHQ-9:  PHQ-2/PHQ-9 Depression Screening 2/25/2020   Little interest or pleasure in doing things 1   Feeling down, depressed, or hopeless 1   Trouble falling or staying asleep, or sleeping too much 1   Feeling tired or having little energy 1   Poor appetite or overeating 1   Feeling bad about yourself - or that you are a failure or have let yourself or your family down 0   Trouble concentrating on things, such as reading the newspaper or watching television 1   Moving or speaking so slowly that other people could have noticed. Or the opposite - being so fidgety or restless that you have been moving around a lot more than usual 0   Thoughts that you would be better off dead, or of hurting yourself in some way 0   Total Score 6        ECOG: (1) Restricted in physically strenuous activity, ambulatory and able to do work of light nature    Objective   Physical Exam   Constitutional: He is oriented to person, place, and time. He appears well-developed and well-nourished. No distress.   Eyes: Pupils are equal, round, and reactive to light. EOM are normal. No scleral icterus.   Pulmonary/Chest: Effort normal.   Abdominal: Soft. He exhibits distension. Bowel sounds are decreased. There is no tenderness.   Musculoskeletal: He exhibits no edema, tenderness or deformity.   Neurological: He is alert and oriented to person, place, and time. He exhibits normal muscle tone. Coordination normal.   Skin: Skin is warm and dry. He is not diaphoretic.   Psychiatric: He has a normal mood and affect. His behavior is  normal. Judgment and thought content normal.   Nursing note and vitals reviewed.        Assessment/Plan   Rajan was seen today for appointment, follow-up and lung cancer.    Diagnoses and all orders for this visit:    Pain from bone metastases (CMS/HCC)    Diffuse arthralgia    Metastatic cancer (Lung and bone mets)  -     Ambulatory Referral to Advance Care Planning    Chronic anticoagulation    Other orders  -     diclofenac (VOLTAREN) 1 % gel gel; Apply 4 g topically to the appropriate area as directed 4 (Four) Times a Day.               Pt and wife instructed to dispose of unused [place] at [facility] takeback bin.  They voiced agreement to plan.    Advance care plan:    -  Healthcare decision making plan: , wife  -  MOST discussions thus far:  Short visit as he has Rad Onc appt immediately after this.  We did discuss consideration of conversations in future, depending on symptom burden and scan results.  Wife voices she will reach out, feels comfortable with conversations she has had with palliative RN in interim since last appt.      Patient Instructions of AVS:  1.  Tylenol - no more than 3,000mg per day.  May take 1gm three times daily scheduled.    2.  Topical NSAID gel, apply to painful joints up to four times daily as needed for pain.      Care coordination:   -  Pt with radiation oncology appt immediately following this.  -  Discussed supportive care services and care coordination.  Will continue to assess symptom burden with oncology team.  -  ACP referral order placed.  -  F/u with me on PRN basis.

## 2020-02-25 NOTE — PROGRESS NOTES
Pt presented to clinic today with spouse for followup. Pt ambulatory, vss, a&ox4, and appropriate.  Pt states pain is much better and has been off of pain meds for a week. Pain now is main in right hip and down leg. Physical therapy is working on it and pain improving. Able to obtain brace for his back.  Pt overtaking tylenol and educated pt that he is not to take more than 3000mg of tylenol per day as he has been taking 4000mg. 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     Med Counts  Medication Filled # Filled Count Used  # days LEANDRO   Gabapentin 100 2/4/20 30 19 11 21 0.5   Norco 5mg 2/4/20 120 76 44 21 2.09

## 2020-02-25 NOTE — PROGRESS NOTES
Physical Therapy Daily Progress Note    Patient Information  Rajan Chambers  1955      Visit # : 4    Rajan Chambers reports 2/10 pain today at rest.  Pain on the L PSIS area and pain in the R Hip and Leg with activity.         Objective Pt presents to PT today with no distress noted.     Palpation:  L PSIS and R glute / Greater trochanter area as the primary areas of pain today.  He is leaving PT today with 0/10 pain       See Exercise, Manual, and Modality Logs for complete treatment.     Assessment/Plan  Pt with much improved function and pain post PT.  The R greater trochanter and hip MM tender and guarded.        Progress per Plan of Care and Progress strengthening /stabilization /functional activity      Visit Diagnoses:    ICD-10-CM ICD-9-CM   1. Acute left-sided low back pain without sciatica M54.5 724.2   2. Pain from bone metastases (CMS/HCC) G89.3 338.3    C79.51             Manual Therapy:    9     mins  31525;  Therapeutic Exercise:    43     mins  75336;     Neuromuscular Karissa:        mins  88034;    Therapeutic Activity:          mins  65818;     Gait Training:        ___  mins  65801;     Ultrasound:          mins  12641;    Electrical Stimulation:         mins  87664 ( );  Dry Needling          mins self-pay    Timed Treatment:   52   mins   Total Treatment:     52   mins    Skinny Thomas, PT  Physical Therapist

## 2020-02-25 NOTE — PATIENT INSTRUCTIONS
1.  Tylenol - no more than 3,000mg per day.  May take 1gm three times daily scheduled.    2.  Topical NSAID gel, apply to painful joints up to four times daily as needed for pain.      ALWAYS bring ALL of your medications prescribed by this clinic to EVERY appointment.  If you fail to bring in any remaining controlled medication (usually a pain or anxiety medication), you may not receive a refill or replacement prescription at that appointment.      Call [phone] for questions regarding medications, refills, or plan of care on Mondays - Fridays 9am to 4pm.      You must call AT LEAST 7-10 BUSINESS DAYS in advance for any refill requests. Clinic days are Tuesday and Thursdays at this time.  Prescriptions for controlled medications will be completed on clinic days only.  Please be aware of additional insurance prior authorization processing time required for many of those medications.      Call after hours and weekends only for new or acute (not chronic) symptom issues to speak to on-call physician or nurse practitioner.  Be advised that any requests for prescriptions for controlled substances can NOT be honored after hours, including refill requests.    Call [phone] only for scheduling issues.

## 2020-02-28 ENCOUNTER — TREATMENT (OUTPATIENT)
Dept: PHYSICAL THERAPY | Facility: CLINIC | Age: 65
End: 2020-02-28

## 2020-02-28 DIAGNOSIS — C79.51 PAIN FROM BONE METASTASES (HCC): ICD-10-CM

## 2020-02-28 DIAGNOSIS — G89.3 PAIN FROM BONE METASTASES (HCC): ICD-10-CM

## 2020-02-28 DIAGNOSIS — M54.50 ACUTE LEFT-SIDED LOW BACK PAIN WITHOUT SCIATICA: Primary | ICD-10-CM

## 2020-02-28 PROCEDURE — 97110 THERAPEUTIC EXERCISES: CPT | Performed by: PHYSICAL THERAPIST

## 2020-02-28 PROCEDURE — 97140 MANUAL THERAPY 1/> REGIONS: CPT | Performed by: PHYSICAL THERAPIST

## 2020-02-28 NOTE — PROGRESS NOTES
Physical Therapy Daily Progress Note    Patient Information  Rajan Chambers  1955      Visit # : 5    Rajan Chambers reports 2/10 pain today at rest.  Pain is more in the upper lumbar and mid back.         Objective Pt presents to PT today with no distress noted.      Pt with no pain noted with supine hooklying.      Palpation:  Pain around the T6-T 9 area but it was only when fatigued.  Stretching of the T/S and mobility completely relieved the pain.     See Exercise, Manual, and Modality Logs for complete treatment.     Assessment/Plan  Pt with improved mobility and tolerance to HEP.       Progress strengthening /stabilization /functional activity      Visit Diagnoses:    ICD-10-CM ICD-9-CM   1. Acute left-sided low back pain without sciatica M54.5 724.2   2. Pain from bone metastases (CMS/HCC) G89.3 338.3    C79.51             Manual Therapy:    9     mins  04336;  Therapeutic Exercise:    42     mins  52740;     Neuromuscular Karissa:        mins  27992;    Therapeutic Activity:          mins  80051;     Gait Training:        ___  mins  66342;     Ultrasound:          mins  14437;    Electrical Stimulation:         mins  48873 ( );  Dry Needling          mins self-pay    Timed Treatment:   51   mins   Total Treatment:     51   mins    Skinny Thomas, PT  Physical Therapist

## 2020-03-02 ENCOUNTER — TELEPHONE (OUTPATIENT)
Dept: ONCOLOGY | Facility: CLINIC | Age: 65
End: 2020-03-02

## 2020-03-02 NOTE — TELEPHONE ENCOUNTER
Received call from pt and wife through triage line. He c/o R hip pain, that shoots down his right leg. He has been going to physical therapy for this which was helping, but is now causing it to be more painful. They wanted to confirm that the upcoming scans would show the right hip within the images. Discussed with CAR Pickett. Let pt know it would show his right hip.

## 2020-03-03 ENCOUNTER — TREATMENT (OUTPATIENT)
Dept: PHYSICAL THERAPY | Facility: CLINIC | Age: 65
End: 2020-03-03

## 2020-03-03 DIAGNOSIS — M54.50 ACUTE LEFT-SIDED LOW BACK PAIN WITHOUT SCIATICA: Primary | ICD-10-CM

## 2020-03-03 DIAGNOSIS — G89.3 PAIN FROM BONE METASTASES (HCC): ICD-10-CM

## 2020-03-03 DIAGNOSIS — C79.51 PAIN FROM BONE METASTASES (HCC): ICD-10-CM

## 2020-03-03 PROCEDURE — 97530 THERAPEUTIC ACTIVITIES: CPT | Performed by: PHYSICAL THERAPIST

## 2020-03-03 PROCEDURE — 97110 THERAPEUTIC EXERCISES: CPT | Performed by: PHYSICAL THERAPIST

## 2020-03-03 NOTE — PROGRESS NOTES
Physical Therapy Daily Progress Note    Patient Information  Rajan Chambers  1955      Visit # : 6    Rajan Chambers reports 2/10 pain today at rest.  Pt reports he had a lot of more pain after last PT session.     Pt with pain in the posterior L/S and PSIS area on the R side only.  Pain in the leg and foot on the R LE.         Objective Pt presents to PT today with minimal distress noted.     Palpation:  R PSIS, R L/S area.  The R PSIS is very hypersensate.   Pt with SI joint compression less painful.     Trial of SI belt seemed to reduce pain.  See Exercise, Manual, and Modality Logs for complete treatment.     Assessment/Plan  Pt with pain elevated in the R SI joint.  He seems to have inflammation that is limiting him.  The SI belt seems to help.       Progress per Plan of Care and Progress strengthening /stabilization /functional activity      Visit Diagnoses:    ICD-10-CM ICD-9-CM   1. Acute left-sided low back pain without sciatica M54.5 724.2   2. Pain from bone metastases (CMS/HCC) G89.3 338.3    C79.51             Manual Therapy:         mins  96488;  Therapeutic Exercise:    31     mins  19486;     Neuromuscular Karissa:        mins  55953;    Therapeutic Activity:     12     mins  92099;     Gait Training:        ___  mins  74889;     Ultrasound:          mins  51028;    Electrical Stimulation:         mins  00479 ( );  Dry Needling          mins self-pay    Timed Treatment:   43   mins   Total Treatment:     43   mins    Skinny Thomas, PT  Physical Therapist

## 2020-03-05 DIAGNOSIS — C79.51 PAIN FROM BONE METASTASES (HCC): ICD-10-CM

## 2020-03-05 DIAGNOSIS — G89.3 PAIN FROM BONE METASTASES (HCC): ICD-10-CM

## 2020-03-05 RX ORDER — GABAPENTIN 100 MG/1
CAPSULE ORAL
Qty: 90 CAPSULE | Refills: 0 | Status: SHIPPED | OUTPATIENT
Start: 2020-03-05 | End: 2020-03-23 | Stop reason: SDUPTHER

## 2020-03-06 ENCOUNTER — TREATMENT (OUTPATIENT)
Dept: PHYSICAL THERAPY | Facility: CLINIC | Age: 65
End: 2020-03-06

## 2020-03-06 DIAGNOSIS — M54.50 ACUTE LEFT-SIDED LOW BACK PAIN WITHOUT SCIATICA: Primary | ICD-10-CM

## 2020-03-06 DIAGNOSIS — C79.51 PAIN FROM BONE METASTASES (HCC): ICD-10-CM

## 2020-03-06 DIAGNOSIS — G89.3 PAIN FROM BONE METASTASES (HCC): ICD-10-CM

## 2020-03-06 PROCEDURE — 97110 THERAPEUTIC EXERCISES: CPT | Performed by: PHYSICAL THERAPIST

## 2020-03-06 PROCEDURE — 97530 THERAPEUTIC ACTIVITIES: CPT | Performed by: PHYSICAL THERAPIST

## 2020-03-06 NOTE — PROGRESS NOTES
Physical Therapy Daily Progress Note    Patient Information  Rajan Chambers  1955      Visit # : 7    Rajan Chambers reports 2-3/10 pain today at rest.  Pt reports the belt seemed to help a lot.  Pt reports he is not wearing the belt consistently.     Pt reports he has been putting the heat on it.      Objective Pt presents to PT today with no distress at rest.     Pt with less consistent relief with HEP activity and positional activity.     Pt with no reduced pain with multiple trials of positional activity and exercises.       See Exercise, Manual, and Modality Logs for complete treatment.     Assessment/Plan  Pt with less improvements.  His pain is not responding as much to positional activity and exercises.  He may need to do more frequency and use of ice instead of heat.        Awaiting MD orders  More frequent stretches but less total.  Use more ice for sxs control and SI belt for sx control.      Visit Diagnoses:    ICD-10-CM ICD-9-CM   1. Acute left-sided low back pain without sciatica M54.5 724.2   2. Pain from bone metastases (CMS/HCC) G89.3 338.3    C79.51             Manual Therapy:         mins  76146;  Therapeutic Exercise:    16     mins  17581;     Neuromuscular Karissa:        mins  73660;    Therapeutic Activity:     10     mins  96987;     Gait Training:        ___  mins  52073;     Ultrasound:          mins  28491;    Electrical Stimulation:         mins  97930 ( );  Dry Needling          mins self-pay    Timed Treatment:   26   mins   Total Treatment:     26   mins    Skinny Thomas, PT  Physical Therapist

## 2020-03-13 ENCOUNTER — TELEPHONE (OUTPATIENT)
Dept: ONCOLOGY | Facility: CLINIC | Age: 65
End: 2020-03-13

## 2020-03-13 DIAGNOSIS — C79.51 PAIN FROM BONE METASTASES (HCC): Primary | ICD-10-CM

## 2020-03-13 DIAGNOSIS — G89.3 PAIN FROM BONE METASTASES (HCC): ICD-10-CM

## 2020-03-13 DIAGNOSIS — C79.51 PAIN FROM BONE METASTASES (HCC): ICD-10-CM

## 2020-03-13 DIAGNOSIS — G89.3 PAIN FROM BONE METASTASES (HCC): Primary | ICD-10-CM

## 2020-03-13 RX ORDER — OXYCODONE HYDROCHLORIDE AND ACETAMINOPHEN 5; 325 MG/1; MG/1
1 TABLET ORAL EVERY 4 HOURS PRN
Qty: 18 TABLET | Refills: 0 | Status: SHIPPED | OUTPATIENT
Start: 2020-03-13 | End: 2020-03-16

## 2020-03-13 RX ORDER — SENNA AND DOCUSATE SODIUM 50; 8.6 MG/1; MG/1
2 TABLET, FILM COATED ORAL DAILY
Qty: 60 TABLET | Refills: 0 | Status: SHIPPED | OUTPATIENT
Start: 2020-03-13 | End: 2020-04-12

## 2020-03-13 RX ORDER — HYDROCODONE BITARTRATE AND ACETAMINOPHEN 5; 325 MG/1; MG/1
TABLET ORAL
Qty: 60 TABLET | Refills: 0 | Status: SHIPPED | OUTPATIENT
Start: 2020-03-13 | End: 2020-03-13 | Stop reason: SINTOL

## 2020-03-13 NOTE — TELEPHONE ENCOUNTER
Called wife back to let know that prescription had been sent into get pt to apt on Tuesday. Instructed pt could still take tylenol though included in the medication but not to exceed 3000mg per day. Also informed that a laxative had been called in as well for him to take daily to prevent constipation.  Wife v/u.  Ended with giving [facility] office number and reminding there was provider on call over the weekend if medication needed adjusted though no new medicine would be prescribed.

## 2020-03-13 NOTE — TELEPHONE ENCOUNTER
Pt's wife called reporting same concerns of pt having increased pain that is keeping him in bed all the time.  States that she does believe that some of it is his anxiety about scans on Tuesday and if cancer has come back. States pt has Norco 5 left over from a while ago and has been taking one and supplementing with Tylenol throughout the day but remains in bed. Asking if anything can be done for his pain to get to apt on Tuesday. Instructed would notify MD and call back with recommendations.

## 2020-03-13 NOTE — TELEPHONE ENCOUNTER
Patient wife called triage line to report that patient is having a lot more pain.  She said he has difficulty walking and sitting for long periods and stays in bed a lot.  No other symptoms but worsening pain and she is worried because it is the weekend.  They have scans and appt on Tuesday with MD.  I talked with CAR Kim and patient has pain meds from Dr. Winter office and she stated that patient could call Dr. Winter to see about changing pain meds or increasing.  I called Ms. Chambers back to state that they should call Dr. Winter and gave the office number to her.  Pt wife stated patient is scared that the scans are going to show worse disease and I tried to reassure her that we don't know anything until the scans are done and resulted.  Pt wife verbalized understanding.

## 2020-03-17 ENCOUNTER — LAB (OUTPATIENT)
Dept: LAB | Facility: HOSPITAL | Age: 65
End: 2020-03-17

## 2020-03-17 ENCOUNTER — HOSPITAL ENCOUNTER (OUTPATIENT)
Dept: RADIATION ONCOLOGY | Facility: HOSPITAL | Age: 65
Setting detail: RADIATION/ONCOLOGY SERIES
Discharge: HOME | End: 2020-03-17

## 2020-03-17 ENCOUNTER — OFFICE VISIT (OUTPATIENT)
Dept: ONCOLOGY | Facility: CLINIC | Age: 65
End: 2020-03-17

## 2020-03-17 ENCOUNTER — APPOINTMENT (OUTPATIENT)
Dept: MRI IMAGING | Facility: HOSPITAL | Age: 65
End: 2020-03-17

## 2020-03-17 ENCOUNTER — HOSPITAL ENCOUNTER (OUTPATIENT)
Dept: MRI IMAGING | Facility: HOSPITAL | Age: 65
Discharge: HOME | End: 2020-03-17

## 2020-03-17 ENCOUNTER — HOSPITAL ENCOUNTER (OUTPATIENT)
Dept: CT IMAGING | Facility: HOSPITAL | Age: 65
Discharge: HOME | End: 2020-03-17
Admitting: INTERNAL MEDICINE

## 2020-03-17 ENCOUNTER — OFFICE VISIT (OUTPATIENT)
Dept: RADIATION ONCOLOGY | Facility: HOSPITAL | Age: 65
End: 2020-03-17

## 2020-03-17 VITALS
SYSTOLIC BLOOD PRESSURE: 138 MMHG | BODY MASS INDEX: 30.73 KG/M2 | RESPIRATION RATE: 18 BRPM | WEIGHT: 208.2 LBS | OXYGEN SATURATION: 97 % | TEMPERATURE: 97.9 F | HEART RATE: 91 BPM | DIASTOLIC BLOOD PRESSURE: 73 MMHG

## 2020-03-17 VITALS
HEIGHT: 69 IN | DIASTOLIC BLOOD PRESSURE: 78 MMHG | SYSTOLIC BLOOD PRESSURE: 121 MMHG | HEART RATE: 92 BPM | BODY MASS INDEX: 31.25 KG/M2 | OXYGEN SATURATION: 98 % | RESPIRATION RATE: 18 BRPM | WEIGHT: 211 LBS | TEMPERATURE: 97.7 F

## 2020-03-17 DIAGNOSIS — C79.9 METASTATIC CANCER (HCC): ICD-10-CM

## 2020-03-17 DIAGNOSIS — C34.91 NON-SMALL CELL LUNG CANCER, RIGHT (HCC): Primary | ICD-10-CM

## 2020-03-17 LAB
ALBUMIN SERPL-MCNC: 4.6 G/DL (ref 3.5–5.2)
ALBUMIN/GLOB SERPL: 1.6 G/DL
ALP SERPL-CCNC: 150 U/L (ref 39–117)
ALT SERPL W P-5'-P-CCNC: 16 U/L (ref 1–41)
ANION GAP SERPL CALCULATED.3IONS-SCNC: 13 MMOL/L (ref 5–15)
AST SERPL-CCNC: 17 U/L (ref 1–40)
BILIRUB SERPL-MCNC: 0.4 MG/DL (ref 0.2–1.2)
BUN BLD-MCNC: 16 MG/DL (ref 8–23)
BUN/CREAT SERPL: 14.3 (ref 7–25)
CALCIUM SPEC-SCNC: 9.4 MG/DL (ref 8.6–10.5)
CHLORIDE SERPL-SCNC: 103 MMOL/L (ref 98–107)
CO2 SERPL-SCNC: 27 MMOL/L (ref 22–29)
CREAT BLD-MCNC: 1.12 MG/DL (ref 0.76–1.27)
ERYTHROCYTE [DISTWIDTH] IN BLOOD BY AUTOMATED COUNT: 16.1 % (ref 12.3–15.4)
GFR SERPL CREATININE-BSD FRML MDRD: 66 ML/MIN/1.73
GLOBULIN UR ELPH-MCNC: 2.9 GM/DL
GLUCOSE BLD-MCNC: 118 MG/DL (ref 65–99)
HCT VFR BLD AUTO: 44.6 % (ref 37.5–51)
HGB BLD-MCNC: 15.3 G/DL (ref 13–17.7)
LYMPHOCYTES # BLD AUTO: 0.7 10*3/MM3 (ref 0.7–3.1)
LYMPHOCYTES NFR BLD AUTO: 10.2 % (ref 19.6–45.3)
MCH RBC QN AUTO: 33 PG (ref 26.6–33)
MCHC RBC AUTO-ENTMCNC: 34.2 G/DL (ref 31.5–35.7)
MCV RBC AUTO: 96.4 FL (ref 79–97)
MONOCYTES # BLD AUTO: 0.6 10*3/MM3 (ref 0.1–0.9)
MONOCYTES NFR BLD AUTO: 8.5 % (ref 5–12)
NEUTROPHILS # BLD AUTO: 5.3 10*3/MM3 (ref 1.7–7)
NEUTROPHILS NFR BLD AUTO: 81.3 % (ref 42.7–76)
PLATELET # BLD AUTO: 183 10*3/MM3 (ref 140–450)
PMV BLD AUTO: 7 FL (ref 6–12)
POTASSIUM BLD-SCNC: 3.9 MMOL/L (ref 3.5–5.2)
PROT SERPL-MCNC: 7.5 G/DL (ref 6–8.5)
RBC # BLD AUTO: 4.63 10*6/MM3 (ref 4.14–5.8)
SODIUM BLD-SCNC: 143 MMOL/L (ref 136–145)
WBC NRBC COR # BLD: 6.5 10*3/MM3 (ref 3.4–10.8)

## 2020-03-17 PROCEDURE — 85025 COMPLETE CBC W/AUTO DIFF WBC: CPT

## 2020-03-17 PROCEDURE — 36415 COLL VENOUS BLD VENIPUNCTURE: CPT

## 2020-03-17 PROCEDURE — 74177 CT ABD & PELVIS W/CONTRAST: CPT

## 2020-03-17 PROCEDURE — 71260 CT THORAX DX C+: CPT

## 2020-03-17 PROCEDURE — 0 GADOBENATE DIMEGLUMINE 529 MG/ML SOLUTION: Performed by: INTERNAL MEDICINE

## 2020-03-17 PROCEDURE — 25010000002 IOPAMIDOL 61 % SOLUTION: Performed by: INTERNAL MEDICINE

## 2020-03-17 PROCEDURE — 70553 MRI BRAIN STEM W/O & W/DYE: CPT

## 2020-03-17 PROCEDURE — 99215 OFFICE O/P EST HI 40 MIN: CPT | Performed by: INTERNAL MEDICINE

## 2020-03-17 PROCEDURE — 80053 COMPREHEN METABOLIC PANEL: CPT

## 2020-03-17 PROCEDURE — A9577 INJ MULTIHANCE: HCPCS | Performed by: INTERNAL MEDICINE

## 2020-03-17 PROCEDURE — G0463 HOSPITAL OUTPT CLINIC VISIT: HCPCS | Performed by: RADIOLOGY

## 2020-03-17 RX ORDER — SODIUM CHLORIDE 9 MG/ML
250 INJECTION, SOLUTION INTRAVENOUS ONCE
Status: CANCELLED | OUTPATIENT
Start: 2020-04-14

## 2020-03-17 RX ORDER — DEXAMETHASONE 4 MG/1
4 TABLET ORAL 2 TIMES DAILY WITH MEALS
Qty: 60 TABLET | Refills: 3 | Status: SHIPPED | OUTPATIENT
Start: 2020-03-17 | End: 2020-03-19 | Stop reason: SDUPTHER

## 2020-03-17 RX ORDER — FAMOTIDINE 10 MG/ML
20 INJECTION, SOLUTION INTRAVENOUS ONCE
Status: CANCELLED | OUTPATIENT
Start: 2020-03-24

## 2020-03-17 RX ORDER — FAMOTIDINE 10 MG/ML
20 INJECTION, SOLUTION INTRAVENOUS AS NEEDED
Status: CANCELLED | OUTPATIENT
Start: 2020-04-14

## 2020-03-17 RX ORDER — PALONOSETRON 0.05 MG/ML
0.25 INJECTION, SOLUTION INTRAVENOUS ONCE
Status: CANCELLED | OUTPATIENT
Start: 2020-04-14

## 2020-03-17 RX ORDER — DIPHENHYDRAMINE HYDROCHLORIDE 50 MG/ML
50 INJECTION INTRAMUSCULAR; INTRAVENOUS AS NEEDED
Status: CANCELLED | OUTPATIENT
Start: 2020-04-14

## 2020-03-17 RX ORDER — FAMOTIDINE 10 MG/ML
20 INJECTION, SOLUTION INTRAVENOUS AS NEEDED
Status: CANCELLED | OUTPATIENT
Start: 2020-03-24

## 2020-03-17 RX ORDER — OSELTAMIVIR PHOSPHATE 75 MG/1
75 CAPSULE ORAL DAILY
COMMUNITY
Start: 2020-03-01 | End: 2020-03-17

## 2020-03-17 RX ORDER — PALONOSETRON 0.05 MG/ML
0.25 INJECTION, SOLUTION INTRAVENOUS ONCE
Status: CANCELLED | OUTPATIENT
Start: 2020-03-24

## 2020-03-17 RX ORDER — SODIUM CHLORIDE 9 MG/ML
250 INJECTION, SOLUTION INTRAVENOUS ONCE
Status: CANCELLED | OUTPATIENT
Start: 2020-03-24

## 2020-03-17 RX ORDER — FAMOTIDINE 10 MG/ML
20 INJECTION, SOLUTION INTRAVENOUS ONCE
Status: CANCELLED | OUTPATIENT
Start: 2020-04-14

## 2020-03-17 RX ORDER — MEMANTINE HYDROCHLORIDE 5 MG/1
5 TABLET ORAL 2 TIMES DAILY
Qty: 60 TABLET | Refills: 2 | Status: SHIPPED | OUTPATIENT
Start: 2020-03-17 | End: 2020-03-19 | Stop reason: SDUPTHER

## 2020-03-17 RX ORDER — DIPHENHYDRAMINE HYDROCHLORIDE 50 MG/ML
50 INJECTION INTRAMUSCULAR; INTRAVENOUS AS NEEDED
Status: CANCELLED | OUTPATIENT
Start: 2020-03-24

## 2020-03-17 RX ADMIN — IOPAMIDOL 100 ML: 612 INJECTION, SOLUTION INTRAVENOUS at 11:20

## 2020-03-17 RX ADMIN — GADOBENATE DIMEGLUMINE 19 ML: 529 INJECTION, SOLUTION INTRAVENOUS at 10:55

## 2020-03-17 NOTE — PROGRESS NOTES
CONSULTATION NOTE      :                                                          1955  DATE OF CONSULTATION:                       3/17/2020   REQUESTING PHYSICIAN:                   Karen Vicente MD  REASON FOR CONSULTATION:           Metastatic lung cancer  Stage: IVB     BRIEF HISTORY:  The patient is a very pleasant 64 y.o. male  with known metastatic non-small cell lung cancer.  He has been most recently on Tagrisso, but his most recent CT scans show progression of disease in the brain, lung, and right iliac wing.  He denies any new symptoms of headaches, nausea, or other neurologic complaints.  His only really complaint is related to right sided hip and lower back pain that is negatively affecting his quality of life.  He takes Percocet and Gabapentin but this is poorly controlling his pain.  He is being referred to my clinic for re-consultation regarding his new CNS metastases and also for consideration of treatment to his right lower back and iliac wing.    Allergies   Allergen Reactions   • Penicillin G Hives   • Ibuprofen Itching and Dermatitis     Prickly rash on heaD   • Penicillins Rash   • Sulfa Antibiotics Rash and Itching       Social History     Socioeconomic History   • Marital status:      Spouse name: Not on file   • Number of children: Not on file   • Years of education: Not on file   • Highest education level: Not on file   Tobacco Use   • Smoking status: Never Smoker   • Smokeless tobacco: Former User   Substance and Sexual Activity   • Alcohol use: No   • Drug use: No   • Sexual activity: Defer   Social History Narrative        Retired from the [employer]    A .    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    Chew tobacco up to        Past Medical History:   Diagnosis Date   • Brain cancer (CMS/HCC)    • Cancer (CMS/HCC)     NSCLC   • History of radiation therapy 2020    T12-L5, left iliac wing   • Lung nodule    •  "Metastatic cancer (CMS/HCC)    • Shortness of breath     RECENT ONSET       family history includes Brain cancer in his paternal grandfather; Breast cancer in his maternal grandmother, mother, paternal grandmother, and sister; Cancer in his maternal grandmother, mother, paternal grandmother, and sister; Emphysema in his father.     Past Surgical History:   Procedure Laterality Date   • BRONCHOSCOPY N/A 1/18/2018    Procedure: RANDALL AND BRONCHOSCOPY WITH ENDOBRONCHIAL ULTRASOUND WITH FLUORO;  Surgeon: Gustavo Haque MD;  Location: [facility] ENDOSCOPY;  Service:    • COLLATERAL LIGAMENT REPAIR, KNEE     • COLONOSCOPY  05/2019   • KNEE ARTHROSCOPY     • LASIK     • LUNG BIOPSY Right 01/10/2018    Dr. Petty @ [facility]   • SKIN CANCER EXCISION Bilateral     BASAL CELL ON NECK YESTERDAY        Review of Systems   Eyes: Positive for eye problems (reports \"fuzzy\" vision left eye).   Musculoskeletal: Positive for back pain (low back).   Neurological: Positive for extremity weakness (reports numbness down right leg-reports since 2018).   All other systems reviewed and are negative.          Objective   VITAL SIGNS:   Vitals:    03/17/20 1454   BP: 138/73   Pulse: 91   Resp: 18   Temp: 97.9 °F (36.6 °C)   TempSrc: Temporal   SpO2: 97%   Weight: 94.4 kg (208 lb 3.2 oz)   PainSc: 0-No pain        Karnofsky score: 80        Physical Exam   Constitutional: He is oriented to person, place, and time. He appears well-developed and well-nourished. No distress.   HENT:   Head: Normocephalic and atraumatic.   Mouth/Throat: Oropharynx is clear and moist.   Eyes: Pupils are equal, round, and reactive to light. Conjunctivae and EOM are normal.   Neck: Normal range of motion. Neck supple.   Cardiovascular: Normal rate and regular rhythm. Exam reveals no friction rub.   No murmur heard.  Pulmonary/Chest: Effort normal and breath sounds normal. He has no wheezes.   Abdominal: Soft. Bowel sounds are normal. He exhibits no distension and no mass. " There is no tenderness.   Musculoskeletal: Normal range of motion. He exhibits no edema.   Pain described in the right lower back and hip over the iliac wing.   Lymphadenopathy:     He has no cervical adenopathy.   Neurological: He is alert and oriented to person, place, and time. He displays normal reflexes. No cranial nerve deficit or sensory deficit. He exhibits normal muscle tone. Coordination normal.   Skin: Skin is warm and dry.   Psychiatric: He has a normal mood and affect. His behavior is normal. Judgment and thought content normal.   Nursing note and vitals reviewed.    IMAGING  I have personally reviewed the relevant imaging studies, as follows:  Ct Abdomen Pelvis Without Contrast    Result Date: 2/1/2020  1.  No acute abnormality within the abdomen or pelvis. 2.  Small nonobstructing bilateral renal calculi. No evidence of upper urinary obstruction. 3.  Extensive sclerotic skeletal metastatic disease without evidence of acute complication. This is stable. 4.   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 See above. Signer Name: Mohan Weaver MD  Signed: 2/1/2020 1:00 AM  Workstation Name: [id number]    Ct Chest With Contrast    Result Date: 3/17/2020  1. Index nodule right upper lobe posteriorly mild progression from prior in overall size with otherwise no significant change in bilateral pulmonary nodularity. 2. Extensive sclerotic metastasis without distinct evidence of progression or definitive pathologic fracture. 3. No acute intraabdominal or intrapelvic findings.  D:  03/17/2020 E:  03/17/2020  This report was finalized on 3/17/2020 1:13 PM by Dr. Jordi Stroud.      Ct Chest With Contrast    Result Date: 12/17/2019  There is a dominant mass in the right upper lobe measuring approximately 1.3 x 1.8 cm in the axial plane. Additionally there are multiple bilateral subcentimeter pulmonary nodules. The  entirety of the examination is, however, stable and unchanged when compared with 09/24/2019.  D:  12/17/2019 E:  12/17/2019   This report was finalized on 12/17/2019 2:14 PM by Dr. Gary Petty MD.      Mri Brain With & Without Contrast    Result Date: 3/17/2020  Interval development of subtle but extensive micrometastatic disease, with numerous very small and subtle enhancing lesions scattered throughout the brain. The largest of these is only approximately 5.5 mm in the left cerebellum.  D:  03/17/2020 E:  03/17/2020       Mri Brain With & Without Contrast    Result Date: 12/18/2019  1.  Stable approximately 3.5 millimeter left cerebellar enhancing lesion compared to 09/24/2019 exam. 2.  Single right parietal lesion may have increased from 1 to 2 mm in diameter, although again difficult to distinguish as a vascular structure versus small metastasis. 3.  Scattered other 1-2 mm areas of punctate enhancement, unchanged and all thought to be vessels seen in cross section. No clearly new or enlarging lesion elsewhere. No other new intracranial disease is appreciated.  D:  12/17/2019 E:  12/17/2019     This report was finalized on 12/18/2019 9:54 AM by Dr. Rolando Hooper MD.      Ct Abdomen Pelvis With Contrast    Result Date: 3/17/2020  1. Index nodule right upper lobe posteriorly mild progression from prior in overall size with otherwise no significant change in bilateral pulmonary nodularity. 2. Extensive sclerotic metastasis without distinct evidence of progression or definitive pathologic fracture. 3. No acute intraabdominal or intrapelvic findings.  D:  03/17/2020 E:  03/17/2020  This report was finalized on 3/17/2020 1:13 PM by Dr. Jordi Stroud.      Ct Abdomen Pelvis With Contrast    Result Date: 12/17/2019  There are bony sclerotic metastatic lesions in the thoracic spine, lumbar spine and pelvis. These are stable. There are no new findings in the abdomen or pelvis.  D:  12/17/2019 E:  12/17/2019    This report was  finalized on 12/17/2019 2:14 PM by Dr. Gary Petty MD.      Mri Lumbar Spine With & Without Contrast    Result Date: 12/30/2019  There are lesions at T12, L3 and L5 consistent with metastatic disease. There is also involvement of the rightward aspect of the sacrum and each ilium, right more so than left. This is not resulting compromise of the neural canal in the appearance has not changed when compared to the CT scan of the abdomen and pelvis dated 12/17/2019.    This report was finalized on 12/30/2019 2:30 PM by Dr. Gary Petty MD.         The following portions of the patient's history were reviewed and updated as appropriate: allergies, current medications, past family history, past medical history, past social history, past surgical history and problem list.    Assessment    Mr. Chambers is a 64 year old male with metastatic non-small cell lung cancer.  He has evidence of both CNS and bony progression of disease.  He will require whole brain radiation considering that he has scattered small volume disease.  This will consist of 30 Gy in 10 fractions using 3-dimensional techniques.  I discussed the risks and benefits and he was agreeable and signed informed consent.  I will also sent prescriptions for him today for low-dose steroid to release prophylactically limit the risk for swelling over the first 3 to 4 days of treatment, and I have also sent a prescription for memantine, which has been shown in a randomized control trial to limit the potential cognitive decline that is associated with whole brain radiation therapy.  With regards to his right hip, he has sclerotic changes throughout the right iliac crest, which coincides with the site of pain on physical exam.  I will plan to treat this area in a single fraction of 8 Gray using 3-dimensional techniques and will overlap this treatment with his remaining whole brain treatments.  He was given an appointment to return tomorrow in order to undergo a radiation  treatment set up.    The radiation treatments needed to cover his right iliac wing will entail re-irradiation due to overlap of the previous lumbar spine fields, which will entail delivering doses that exceed the normal tissue tolerance and pose a significantly higher risk for toxicity..  Due to the increased complexity, the expertise of a medical physicist will be needed to assist in the treatment planning process and generate a thorough written analysis confirming the appropriate radiation dosimetry.     RECOMMENDATIONS:     Return in about 2 days (around 3/19/2020) for Radiation Simulation.  Rajan was seen today for metastatic cancer.    Diagnoses and all orders for this visit:    Metastatic cancer (CMS/HCC)    Thank you for allowing me to participate in the care of this individual.    Sincerely,     Vidal Ching MD

## 2020-03-17 NOTE — PROGRESS NOTES
DATE OF VISIT: 3/17/2020    REASON FOR VISIT: Followup for Non-small cell lung cancer-metastatic      HISTORY OF PRESENT ILLNESS: The patient is a very pleasant 64 y.o. male with past medical history significant for EGFR related non-small cell lung cancer-metastatic with widespread bony metastasis and multiple pulmonary nodules, diagnosed February 2018.  He developed a dry cough and shortness of breath in Dec 2017.  A chest XR in January 2018 revealed a right lung mass. A CT scan was ordered and revealed several centimeter right upper  Lung field with associated multiple bilateral nodular densities consistent with metastatic disease.  He was referred and underwent a CT directed needle biopsy on 01/10/2018 which showed malignancy compatible with non-small cell lung cancer.  On January 17, 2018 he underwent a CT FNA.  Pathology was consistent with a non-small cell carcinoma but inadequate tissue specimen for molecular testing.  As part of his workup, he underwent a PET/CT scan today which revealed a moderately sized right pneumothorax. He was also found to have numerous small brain mets. The patient was admitted for a chest tube placement and management of his pneumothorax.  He saw Dr. Silva at [facility] in January 2018. Caris testing performed.Tumor is EGFR [id number] mutated.  Tagrisso was recommended and patient started on 02/2018.  The patient is here today for follow up.    SUBJECTIVE: The patient is here today with his wife.  He is complaining of low back pain different from the better he had before the patient notes more on the right lower side.  He is anxious with the scan results getting any headaches.    PAST MEDICAL HISTORY/SOCIAL HISTORY/FAMILY HISTORY: Unchanged from prior documentation done on 01/11/2018.     Review of Systems   Constitutional: Positive for fatigue. Negative for activity change, appetite change, chills, fever and unexpected weight change.   HENT: Positive for congestion and postnasal drip.  Negative for hearing loss, mouth sores, nosebleeds, sore throat and trouble swallowing.    Eyes: Negative for visual disturbance.   Respiratory: Positive for cough. Negative for chest tightness, shortness of breath and wheezing.    Cardiovascular: Negative for chest pain, palpitations and leg swelling.   Gastrointestinal: Negative for abdominal distention, abdominal pain, blood in stool, constipation, diarrhea, nausea, rectal pain and vomiting.   Endocrine: Negative for cold intolerance and heat intolerance.   Genitourinary: Negative for difficulty urinating, dysuria, frequency and urgency.   Musculoskeletal: Positive for back pain. Negative for arthralgias, gait problem, joint swelling and myalgias.   Skin: Negative for rash.   Neurological: Negative for dizziness, tremors, syncope, weakness, light-headedness, numbness and headaches.   Hematological: Negative for adenopathy. Does not bruise/bleed easily.   Psychiatric/Behavioral: Negative for confusion, sleep disturbance and suicidal ideas. The patient is not nervous/anxious.          Current Outpatient Medications:   •  acetaminophen (TYLENOL) 500 MG tablet, Take 2 tablets by mouth Every 6 (Six) Hours As Needed., Disp: , Rfl:   •  Calcium Carb-Cholecalciferol (CALCIUM/VITAMIN D) 600-400 MG-UNIT tablet, Take 2 tablets by mouth Daily., Disp: 60 tablet, Rfl: 5  •  calcium carbonate (OS-ROSIO) 600 MG tablet, Take 600 mg by mouth 2 (Two) Times a Day With Meals., Disp: , Rfl:   •  desloratadine (CLARINEX) 5 MG tablet, Take 1 tablet by mouth Daily., Disp: 90 tablet, Rfl: 3  •  diclofenac (VOLTAREN) 1 % gel gel, Apply 4 g topically to the appropriate area as directed 4 (Four) Times a Day., Disp: 100 g, Rfl: 3  •  docusate sodium (COLACE) 100 MG capsule, Take 1 capsule by mouth 2 (Two) Times a Day As Needed for Constipation., Disp: 30 capsule, Rfl: 0  •  escitalopram (LEXAPRO) 20 MG tablet, Take 1 tablet by mouth 2 (Two) Times a Day., Disp: , Rfl:   •  fluticasone (FLONASE)  "50 MCG/ACT nasal spray, 2 sprays into the nostril(s) as directed by provider Daily. Administer 2 sprays in each nostril for each dose., Disp: 3 bottle, Rfl: 3  •  gabapentin (NEURONTIN) 100 MG capsule, 1-3 capsules at bedtime as needed as tolerated for leg pain, Disp: 90 capsule, Rfl: 0  •  ondansetron (ZOFRAN) 4 MG tablet, TAKE 2 TABLETS BY MOUTH TWICE DAILY AS NEEDED FOR NAUSEA, Disp: , Rfl:   •  rivaroxaban (XARELTO) 20 MG tablet, Take 1 tablet by mouth Daily With Dinner., Disp: 90 tablet, Rfl: 3  •  senna-docusate sodium (SENOKOT-S) 8.6-50 MG tablet, Take 2 tablets by mouth Daily for 30 days. Take daily on days you take pain medication, Disp: 60 tablet, Rfl: 0  •  TAGRISSO 80 MG tablet, TAKE ONE TABLET (80 MG) BY MOUTH ONCE DAILY AT THE SAME TIME. MAY TAKEWITH OR WITHOUT FOOD. CALL [phone] FOR REFILLS, Disp: 30 tablet, Rfl: 10  No current facility-administered medications for this visit.     PHYSICAL EXAMINATION:   /78   Pulse 92   Temp 97.7 °F (36.5 °C) (Temporal)   Resp 18   Ht 175.3 cm (69.02\")   Wt 95.7 kg (211 lb)   SpO2 98%   BMI 31.14 kg/m²    ECOG Performance Status: 1 - Symptomatic but completely ambulatory  General Appearance:  alert, cooperative, no apparent distress and appears stated age   Neurologic/Psychiatric: A&O x 3, gait steady, appropriate affect, strength 5/5 in all muscle groups   HEENT:  Normocephalic, without obvious abnormality, mucous membranes moist   Neck: Supple, symmetrical, trachea midline, no adenopathy;  No thyromegaly, masses, or tenderness   Lungs:   Clear to auscultation bilaterally; respirations regular, even, and unlabored bilaterally   Heart:  Regular rate and rhythm, no murmurs appreciated   Abdomen:   Soft, non-tender, non-distended and no organomegaly   Lymph nodes: No cervical, supraclavicular, inguinal or axillary adenopathy noted   Extremities: Normal, atraumatic; no clubbing, cyanosis, or edema    Skin: No rashes, ulcers, or suspicious lesions noted "     Lab on 03/17/2020   Component Date Value Ref Range Status   • Glucose 03/17/2020 118* 65 - 99 mg/dL Final   • BUN 03/17/2020 16  8 - 23 mg/dL Final   • Creatinine 03/17/2020 1.12  0.76 - 1.27 mg/dL Final   • Sodium 03/17/2020 143  136 - 145 mmol/L Final   • Potassium 03/17/2020 3.9  3.5 - 5.2 mmol/L Final   • Chloride 03/17/2020 103  98 - 107 mmol/L Final   • CO2 03/17/2020 27.0  22.0 - 29.0 mmol/L Final   • Calcium 03/17/2020 9.4  8.6 - 10.5 mg/dL Final   • Total Protein 03/17/2020 7.5  6.0 - 8.5 g/dL Final   • Albumin 03/17/2020 4.60  3.50 - 5.20 g/dL Final   • ALT (SGPT) 03/17/2020 16  1 - 41 U/L Final   • AST (SGOT) 03/17/2020 17  1 - 40 U/L Final   • Alkaline Phosphatase 03/17/2020 150* 39 - 117 U/L Final   • Total Bilirubin 03/17/2020 0.4  0.2 - 1.2 mg/dL Final   • eGFR Non African Amer 03/17/2020 66  >60 mL/min/1.73 Final   • Globulin 03/17/2020 2.9  gm/dL Final   • A/G Ratio 03/17/2020 1.6  g/dL Final   • BUN/Creatinine Ratio 03/17/2020 14.3  7.0 - 25.0 Final   • Anion Gap 03/17/2020 13.0  5.0 - 15.0 mmol/L Final   • WBC 03/17/2020 6.50  3.40 - 10.80 10*3/mm3 Final   • RBC 03/17/2020 4.63  4.14 - 5.80 10*6/mm3 Final   • Hemoglobin 03/17/2020 15.3  13.0 - 17.7 g/dL Final   • Hematocrit 03/17/2020 44.6  37.5 - 51.0 % Final   • RDW 03/17/2020 16.1* 12.3 - 15.4 % Final   • MCV 03/17/2020 96.4  79.0 - 97.0 fL Final   • MCH 03/17/2020 33.0  26.6 - 33.0 pg Final   • MCHC 03/17/2020 34.2  31.5 - 35.7 g/dL Final   • MPV 03/17/2020 7.0  6.0 - 12.0 fL Final   • Platelets 03/17/2020 183  140 - 450 10*3/mm3 Final   • Neutrophil % 03/17/2020 81.3* 42.7 - 76.0 % Final   • Lymphocyte % 03/17/2020 10.2* 19.6 - 45.3 % Final   • Monocyte % 03/17/2020 8.5  5.0 - 12.0 % Final   • Neutrophils, Absolute 03/17/2020 5.30  1.70 - 7.00 10*3/mm3 Final   • Lymphocytes, Absolute 03/17/2020 0.70  0.70 - 3.10 10*3/mm3 Final   • Monocytes, Absolute 03/17/2020 0.60  0.10 - 0.90 10*3/mm3 Final      Ct Chest With Contrast    Result Date:  3/17/2020  Narrative: EXAMINATION: CT CHEST W CONTRAST-, CT ABDOMEN AND PELVIS W CONTRAST-  INDICATION: Followup scan; C79.9-Secondary malignant neoplasm of unspecified site.  TECHNIQUE: CT chest, abdomen and pelvis with intravenous contrast.  The radiation dose reduction device was turned on for each scan per the ALARA (As Low as Reasonably Achievable) protocol.  COMPARISON: CT dated 12/17/2019.  FINDINGS: CHEST: Thyroid is homogeneous in attenuation. No bulky mediastinal adenopathy. Central airways are patent. Esophagus in normal course and caliber. Patent nonaneurysmal thoracic aorta with patent great vessel origins. Central pulmonary arteries are grossly patent. Cardiac size within normal limits without pericardial effusion. Extended lung windows demonstrate numerous bilateral pulmonary nodular densities with index lesions including right upper lobe posteriorly 11 mm previously 9 mm and left lower lobe 8 mm focus unchanged. No new consolidation or effusion. Degenerative changes of the spine with sclerotic areas T12, L3 and L5 with total replacement L5 sclerotic involvement as well as multiple sclerotic areas throughout the pelvis including bilateral iliac crests.  ABDOMEN AND PELVIS: Liver demonstrates diffuse low-attenuation of the hepatic steatosis without focal liver lesion. Gallbladder unremarkable. No biliary dilatation. Pancreas and spleen unremarkable. Adrenals without distinct nodule. Kidneys without hydronephrosis or hydroureter. No bulky retroperitoneal adenopathy. GI tract evaluation without focal thickening or disproportionate dilatation of bowel. No free fluid or intraabdominal free air with moderately enlarged prostate demonstrating calcifications are present on the adjacent bladder without acute inflammatory findings. Degenerative changes of the spine with sclerotic metastasis as detailed above. No soft tissue body wall findings.      Impression: 1. Index nodule right upper lobe posteriorly mild  progression from prior in overall size with otherwise no significant change in bilateral pulmonary nodularity. 2. Extensive sclerotic metastasis without distinct evidence of progression or definitive pathologic fracture. 3. No acute intraabdominal or intrapelvic findings.  D:  03/17/2020 E:  03/17/2020  This report was finalized on 3/17/2020 1:13 PM by Dr. Jordi Stroud.      Mri Brain With & Without Contrast    Result Date: 3/17/2020  Narrative: EXAMINATION: MRI BRAIN W WO CONTRAST-03/17/2020:  INDICATION: F/U scan; C79.9-Secondary malignant neoplasm of unspecified site.  TECHNIQUE: Sagittal and axial T1 and axial T2 FLAIR diffusion-weighted images of the brain, post- Gadolinium contrast axial, sagittal and coronal T1-weighted images, thin section post-Gadolinium contrast T1 CyberKnife planning images.  COMPARISON: Previous brain MRI of 12/17/2019.  FINDINGS: Previous exam report indicated stable approximately 3.5 mm left cerebellar enhancing lesion, and scattered other punctate areas of nonspecific enhancement.  No restricted diffusion is seen to suggest acute infarction. The remaining imaging sequences show stable pattern of mild central and subcortical white matter disease presumably changes of microvascular leukoencephalopathy. There is no evidence to suggest interval infarction, no evidence of discrete mass or mass effect, no signal changes suggestive of hemorrhage, and no evidence of hydrocephalus or abnormal extra-axial collection. Unenhanced T1-weighted images give the impression of  subtle but new scattered punctate T1 hyperintense lesions throughout the brain, perhaps most noticeable on the unenhanced images of the thalamus and basal ganglia, images 12 and 13. Postcontrast images show very subtle, but numerous punctate areas of enhancement, essentially throughout the brain, where previously these were either not visible or difficult to distinguish from normal vessels. As an example, the larger of these  lesions, in the left cerebellum, previously 3.5 mm now measures 5.5 mm. There are at least half a dozen other right and left cerebellar lesions, and perhaps 20 or 30 or more very subtle punctate lesions scattered throughout the remainder of the brain. No large dominant enhancing mass is seen. No clearly pathologic meningeal enhancement is seen.      Impression: Interval development of subtle but extensive micrometastatic disease, with numerous very small and subtle enhancing lesions scattered throughout the brain. The largest of these is only approximately 5.5 mm in the left cerebellum.  D:  03/17/2020 E:  03/17/2020       Ct Abdomen Pelvis With Contrast    Result Date: 3/17/2020  Narrative: EXAMINATION: CT CHEST W CONTRAST-, CT ABDOMEN AND PELVIS W CONTRAST-  INDICATION: Followup scan; C79.9-Secondary malignant neoplasm of unspecified site.  TECHNIQUE: CT chest, abdomen and pelvis with intravenous contrast.  The radiation dose reduction device was turned on for each scan per the ALARA (As Low as Reasonably Achievable) protocol.  COMPARISON: CT dated 12/17/2019.  FINDINGS: CHEST: Thyroid is homogeneous in attenuation. No bulky mediastinal adenopathy. Central airways are patent. Esophagus in normal course and caliber. Patent nonaneurysmal thoracic aorta with patent great vessel origins. Central pulmonary arteries are grossly patent. Cardiac size within normal limits without pericardial effusion. Extended lung windows demonstrate numerous bilateral pulmonary nodular densities with index lesions including right upper lobe posteriorly 11 mm previously 9 mm and left lower lobe 8 mm focus unchanged. No new consolidation or effusion. Degenerative changes of the spine with sclerotic areas T12, L3 and L5 with total replacement L5 sclerotic involvement as well as multiple sclerotic areas throughout the pelvis including bilateral iliac crests.  ABDOMEN AND PELVIS: Liver demonstrates diffuse low-attenuation of the hepatic  steatosis without focal liver lesion. Gallbladder unremarkable. No biliary dilatation. Pancreas and spleen unremarkable. Adrenals without distinct nodule. Kidneys without hydronephrosis or hydroureter. No bulky retroperitoneal adenopathy. GI tract evaluation without focal thickening or disproportionate dilatation of bowel. No free fluid or intraabdominal free air with moderately enlarged prostate demonstrating calcifications are present on the adjacent bladder without acute inflammatory findings. Degenerative changes of the spine with sclerotic metastasis as detailed above. No soft tissue body wall findings.      Impression: 1. Index nodule right upper lobe posteriorly mild progression from prior in overall size with otherwise no significant change in bilateral pulmonary nodularity. 2. Extensive sclerotic metastasis without distinct evidence of progression or definitive pathologic fracture. 3. No acute intraabdominal or intrapelvic findings.  D:  03/17/2020 E:  03/17/2020  This report was finalized on 3/17/2020 1:13 PM by Dr. Jordi Stroud.    (  No results found.    ASSESSMENT: The patient is a very pleasant 64 y.o. male  with Stage IV  Non-small cell lung cancer.     PROBLEM LIST:  1. Stage IV  Non-small cell lung cancer [id number] with brain/bone metastasis:  A. Presented with SOB 01/2018, 01/04/2018 CT chest/abdomen/pelvis: Innumerable bilateral pulmonary nodules with  Expansile lucent lesion within Left iliac bone 3.5cm.   B. Status post bronchoscopy with biopsy done on January 10, 2018 showed adenocarcinoma     C. 01/17/2018 PET/CT: Dominant multiple diffuse pulmonary parenchymal soft tissue lung nodules.  Multiple additional nodules scattered through both lungs too numerous too count. Right Paratracheal LN SUV 3.49.  Osseous metastases noted with 2 lesions in Right pelvis, most prominent Right ilium SUV 7.26.  Rib lesion along L rib cage.  Mild adenopathy base of neck and supraclavicular areas on right and left.    D. MRI brain 01/18/2018: Numerous but small diffusely scattered enhancing nodules.  Largest lesion is 5mm. No evidence of hemorrhage or significant vasogenic edema.   E. Molecular testing revealed EGFR exon 19 mutation, PDL 10%, and p53 exon 17 mutation.  F. Started Tagrisso 02/2018.  G.  Progressive disease with increase in size of lung nodules bony mets as well as multiple brain lesions documented scans done March 17, 2020  2. Seasonal allergies  3. Anxiety  4.  Low back pain  5.  Right lower extremity DVT    PLAN:  1.  I did go over the MRI results with the patient and his wife and reviewed the films myself I went over the pictures with them and fortunately patient has evidence of new multiple brain metastases none is bigger than 5 mm.  The patient will benefit from part of her radiation.  We will refer him to Dr. Ching.  2.  I did go over the scan results with the patient and his wife, I reviewed the films myself, and for she has evidence of progressive disease with increase in size of lung nodules.  3.  The patient is interested in palliative treatment.  Will be started on carboplatin Taxol Tecentriq and Avastin this regimen has the best data for patients who are EGFR positive.  4.  We will start treatment in 3 weeks to assure completion of whole brain radiation.  5.  I will resume Xgeva once every 6 weeks for symptomatic bone metastases.  This will be given concurrently with calcium vitamin D daily.  6. We reviewed the potential side effects of this regimen including fatigue, vomiting and nausea, hair loss, nephropathy, neuropathy, hearing loss, myelosuppression, and risk of infusion reaction.  7. We discussed potential side effects of immunotherapy including but not limited to immune mediated reactions with thyroiditis, pneumonitis, hepatitis, colitis, rash, and electrolytes abnormalities, fatigue, multiorgan failure, and possibly death.  8.  We will continue Xarelto 20 mg daily for DVT.    9.  I will  continue the patient on Norco 5/325 mg as needed for cancer related pain.  We will continue gabapentin 100 mg 3 times daily.  Patient has been get his prescription filled by palliative clinic.  10.  I will monitor the patient blood work including blood counts kidney function function and electrolytes.  11.  I will set the patient up to meet with my nurse practitioner for treatment indication.  12.  The patient will follow-up with me prior to cycle #2.  13.  I will repeat the patient scans prior to cycle #4.  Karen Vicente MD  3/17/2020  15:50

## 2020-03-19 ENCOUNTER — HOSPITAL ENCOUNTER (OUTPATIENT)
Dept: RADIATION ONCOLOGY | Facility: HOSPITAL | Age: 65
Discharge: HOME | End: 2020-03-19

## 2020-03-19 DIAGNOSIS — C79.9 METASTATIC CANCER (HCC): ICD-10-CM

## 2020-03-19 PROCEDURE — 77334 RADIATION TREATMENT AID(S): CPT | Performed by: RADIOLOGY

## 2020-03-19 PROCEDURE — 77290 THER RAD SIMULAJ FIELD CPLX: CPT | Performed by: RADIOLOGY

## 2020-03-19 RX ORDER — MEMANTINE HYDROCHLORIDE 5 MG/1
5 TABLET ORAL 2 TIMES DAILY
Qty: 60 TABLET | Refills: 2 | Status: SHIPPED | OUTPATIENT
Start: 2020-03-19 | End: 2020-04-18 | Stop reason: SDUPTHER

## 2020-03-19 RX ORDER — DEXAMETHASONE 4 MG/1
4 TABLET ORAL 2 TIMES DAILY WITH MEALS
Qty: 60 TABLET | Refills: 3 | Status: SHIPPED | OUTPATIENT
Start: 2020-03-19 | End: 2020-05-04 | Stop reason: SDUPTHER

## 2020-03-20 ENCOUNTER — TELEPHONE (OUTPATIENT)
Dept: RADIATION ONCOLOGY | Facility: HOSPITAL | Age: 65
End: 2020-03-20

## 2020-03-20 PROCEDURE — 77334 RADIATION TREATMENT AID(S): CPT | Performed by: RADIOLOGY

## 2020-03-20 PROCEDURE — 77370 RADIATION PHYSICS CONSULT: CPT | Performed by: RADIOLOGY

## 2020-03-20 PROCEDURE — 77307 TELETHX ISODOSE PLAN CPLX: CPT | Performed by: RADIOLOGY

## 2020-03-20 NOTE — TELEPHONE ENCOUNTER
Pt wife called stating unable to fill namenda at pharmacy-only have brand name until Monday and copay is a lot-after discussion with Dr. Ching I called pt wife back to notify her Per Dr. Ching that it is OK to fill Monday and start at that time.-verbalized understanding

## 2020-03-23 ENCOUNTER — TELEPHONE (OUTPATIENT)
Dept: PALLIATIVE CARE | Facility: CLINIC | Age: 65
End: 2020-03-23

## 2020-03-23 ENCOUNTER — HOSPITAL ENCOUNTER (OUTPATIENT)
Dept: RADIATION ONCOLOGY | Facility: HOSPITAL | Age: 65
Discharge: HOME | End: 2020-03-23

## 2020-03-23 DIAGNOSIS — G89.3 PAIN FROM BONE METASTASES (HCC): ICD-10-CM

## 2020-03-23 DIAGNOSIS — C79.51 PAIN FROM BONE METASTASES (HCC): ICD-10-CM

## 2020-03-23 PROCEDURE — 77280 THER RAD SIMULAJ FIELD SMPL: CPT | Performed by: RADIOLOGY

## 2020-03-23 PROCEDURE — 77412 RADIATION TX DELIVERY LVL 3: CPT | Performed by: RADIOLOGY

## 2020-03-23 RX ORDER — GABAPENTIN 100 MG/1
100-300 CAPSULE ORAL 3 TIMES DAILY
Qty: 135 CAPSULE | Refills: 0 | Status: SHIPPED | OUTPATIENT
Start: 2020-03-23 | End: 2020-04-18 | Stop reason: SDUPTHER

## 2020-03-23 RX ORDER — OXYCODONE HYDROCHLORIDE AND ACETAMINOPHEN 5; 325 MG/1; MG/1
1 TABLET ORAL EVERY 6 HOURS PRN
Qty: 30 TABLET | Refills: 0 | Status: SHIPPED | OUTPATIENT
Start: 2020-03-23 | End: 2020-04-28

## 2020-03-23 NOTE — TELEPHONE ENCOUNTER
[facility] #[id number] reviewed.  Gabapentin 100mg #90 filled 3/5/20.  Percocet 5/325mg #18 filled 3/14/20.    Spoke with wife and patient, who were in car on way to radiation appointment.  Pt was audibly very alert and conversant.    Pt reports taking Percocet - taking one tablet per day.  SE:  no nausea, dizziness, headache, nor constipation.    Reports taking Gabapentin 100mg - one at a time three times a day.  Dr. Vicente had mentioned increase to 300mg 2/2 persistent sciatica complaints    UDTs 2/4 and 2/25 - only prescribed med (if anything), pt with sensitivity to medication SEs overall.      LOW risk of abuse/diversion.  Low dose regimen.    Plan:  Instructed to increase to 2 capsules three times a day for 3 days, then increase to 3 capsules three times a day as tolerated    Refill #30 Percocet today    Refill Gabapentin 100mg #135 capsules.    Plan phone  f/u in 2 weeks for refill needs.

## 2020-03-24 ENCOUNTER — HOSPITAL ENCOUNTER (OUTPATIENT)
Dept: RADIATION ONCOLOGY | Facility: HOSPITAL | Age: 65
Discharge: HOME | End: 2020-03-24

## 2020-03-24 VITALS — WEIGHT: 211.3 LBS | BODY MASS INDEX: 31.19 KG/M2

## 2020-03-24 PROCEDURE — 77412 RADIATION TX DELIVERY LVL 3: CPT | Performed by: RADIOLOGY

## 2020-03-25 ENCOUNTER — DOCUMENTATION (OUTPATIENT)
Dept: NUTRITION | Facility: HOSPITAL | Age: 65
End: 2020-03-25

## 2020-03-25 ENCOUNTER — HOSPITAL ENCOUNTER (OUTPATIENT)
Dept: RADIATION ONCOLOGY | Facility: HOSPITAL | Age: 65
Discharge: HOME | End: 2020-03-25

## 2020-03-25 PROCEDURE — 77412 RADIATION TX DELIVERY LVL 3: CPT | Performed by: RADIOLOGY

## 2020-03-25 NOTE — PROGRESS NOTES
[facility] Nutrition    Diagnosis:  Metastatic non-small cell lung cancer; progression of the disease in the brain, lung and right iliac wing  Radiation:  Whole brain radiation for scattered small volume disease - 30 GY in 10 fractions / Right iliac crest will receive a single fraction of 8 Gy    Weight 211.3 lbs / approximate 11 lbs weight loss since January 2020 (5% change)     Patient seen in FU as he begins new radiation treatment plan for brain and right iliac wing disease.  He is feeling much better since last seen in January 2020 when he was experiencing an aversion to all food and drink.  He states that he is now eating and enjoying the taste of his food again; appetite and oral food intake is dramatically improved.  Weight has remained stable.  Reinforced the importance of nutrition and focus on maintaining healthier diet choices; adequate hydration; high protein choices.  Discussed possible nutritionally related side effects of whole brain radiation including dry mouth and taste changes. Patient verbalized good understanding.  Will continue to follow.

## 2020-03-26 ENCOUNTER — HOSPITAL ENCOUNTER (OUTPATIENT)
Dept: RADIATION ONCOLOGY | Facility: HOSPITAL | Age: 65
Discharge: HOME | End: 2020-03-26

## 2020-03-26 PROCEDURE — 77412 RADIATION TX DELIVERY LVL 3: CPT | Performed by: RADIOLOGY

## 2020-03-27 ENCOUNTER — HOSPITAL ENCOUNTER (OUTPATIENT)
Dept: RADIATION ONCOLOGY | Facility: HOSPITAL | Age: 65
Discharge: HOME | End: 2020-03-27

## 2020-03-27 PROCEDURE — 77412 RADIATION TX DELIVERY LVL 3: CPT | Performed by: RADIOLOGY

## 2020-03-27 PROCEDURE — 77336 RADIATION PHYSICS CONSULT: CPT | Performed by: RADIOLOGY

## 2020-03-30 ENCOUNTER — HOSPITAL ENCOUNTER (OUTPATIENT)
Dept: RADIATION ONCOLOGY | Facility: HOSPITAL | Age: 65
Discharge: HOME | End: 2020-03-30

## 2020-03-30 PROCEDURE — 77412 RADIATION TX DELIVERY LVL 3: CPT | Performed by: RADIOLOGY

## 2020-03-30 PROCEDURE — 77417 THER RADIOLOGY PORT IMAGE(S): CPT | Performed by: RADIOLOGY

## 2020-03-31 ENCOUNTER — HOSPITAL ENCOUNTER (OUTPATIENT)
Dept: RADIATION ONCOLOGY | Facility: HOSPITAL | Age: 65
Discharge: HOME | End: 2020-03-31

## 2020-03-31 PROCEDURE — 77412 RADIATION TX DELIVERY LVL 3: CPT | Performed by: RADIOLOGY

## 2020-04-01 ENCOUNTER — HOSPITAL ENCOUNTER (OUTPATIENT)
Dept: RADIATION ONCOLOGY | Facility: HOSPITAL | Age: 65
Setting detail: RADIATION/ONCOLOGY SERIES
Discharge: HOME | End: 2020-04-01

## 2020-04-02 ENCOUNTER — HOSPITAL ENCOUNTER (OUTPATIENT)
Dept: RADIATION ONCOLOGY | Facility: HOSPITAL | Age: 65
Discharge: HOME | End: 2020-04-02

## 2020-04-02 PROCEDURE — 77412 RADIATION TX DELIVERY LVL 3: CPT | Performed by: RADIOLOGY

## 2020-04-02 PROCEDURE — 77336 RADIATION PHYSICS CONSULT: CPT | Performed by: RADIOLOGY

## 2020-04-03 ENCOUNTER — HOSPITAL ENCOUNTER (OUTPATIENT)
Dept: RADIATION ONCOLOGY | Facility: HOSPITAL | Age: 65
Discharge: HOME | End: 2020-04-03

## 2020-04-03 PROCEDURE — 77412 RADIATION TX DELIVERY LVL 3: CPT | Performed by: RADIOLOGY

## 2020-04-06 ENCOUNTER — EDUCATION (OUTPATIENT)
Dept: ONCOLOGY | Facility: HOSPITAL | Age: 65
End: 2020-04-06

## 2020-04-06 ENCOUNTER — HOSPITAL ENCOUNTER (OUTPATIENT)
Dept: RADIATION ONCOLOGY | Facility: HOSPITAL | Age: 65
Discharge: HOME | End: 2020-04-06

## 2020-04-06 ENCOUNTER — OFFICE VISIT (OUTPATIENT)
Dept: ONCOLOGY | Facility: CLINIC | Age: 65
End: 2020-04-06

## 2020-04-06 DIAGNOSIS — C34.91 NON-SMALL CELL LUNG CANCER, RIGHT (HCC): Primary | ICD-10-CM

## 2020-04-06 PROCEDURE — 77412 RADIATION TX DELIVERY LVL 3: CPT | Performed by: RADIOLOGY

## 2020-04-06 PROCEDURE — 99214 OFFICE O/P EST MOD 30 MIN: CPT | Performed by: NURSE PRACTITIONER

## 2020-04-06 NOTE — PLAN OF CARE
Outpatient Infusion • [address] [facility] • [address] • [place] [id number] • [phone]      CHEMOTHERAPY EDUCATION SHEET    NAME:  Rajan Chambers      : 1955           DATE: 20    Booklets Given: Chemotherapy and You []  Eating Hints []    Sexuality/Fertility Books []     Chemotherapy/Biotherapy Education Sheets: (list all that apply)  Paclitaxel, Carboplatin, Bevacizumab-awwb, and Atezolizumab                                                                                                                                                                Chemotherapy Regimen:  Paclitaxel, Carboplatin, Bevacizumab-awwb, and Atezolizumab on Day 1 of a 21 day cycle    TOPICS EDUCATION PROVIDED EDUCATION REINFORCED COMMENTS   ANEMIA:  role of RBC, cause, s/s, ways to manage, role of transfusion [x] [] Discussed role of RBC and risk of anemia. Reviewed s/sx of anemia, including fatigue. Encouraged exercise to combat fatigue   THROMBOCYTOPENIA:  role of platelet, cause, s/s, ways to prevent bleeding, things to avoid, when to seek help [x] [] Decreased platelets can increase risk of bleeding. Counseled on signs/symptoms of thrombocytopenia and when to call MD   NEUTROPENIA:  role of WBC, cause, infection precautions, s/s of infection, when to call MD [x] [] Low WBC can increase risk of infection. Counseled on preventing infection and to call MD if temperature reaches 100.4 or higher. Discussed appropriate hand hygiene, avoiding sick contacts, and use of neutropenic precautions when needed.   NUTRITION & APPETITE CHANGES:  importance of maintaining healthy diet & weight, ways to manage to improve intake, dietary consult, exercise regimen [x] [] Discussed risk of decreased appetite, reviewed plan for small more frequent meals.   DIARRHEA:  causes, s/s of dehydration, ways to manage, dietary changes, when to call MD [x] [] Discussed risk of diarrhea and immune related colitis. Can use OTC loperamide at  first presentation of diarrhea, but call MD if 4-6 episodes in 24 hours not relieved by OTC loperamide. Discussed role of high dose steroids for the treatment of immune related colitis.     CONSTIPATION:  causes, ways to manage, dietary changes, when to call MD [x] [] Discussed risk of constipation associated with antiemetic regimen. Reviewed over the counter use of stool softners and stimulants as needed should constipation present as a problem.    NAUSEA & VOMITING:  cause, use of antiemetics, dietary changes, when to call MD [x] [] Reviewed risk of N/V, counseled on prn ondansetron and to call MD if taking the max dose and unrelieved.  Discussed use of premedications on day of infusion prior to chemo to reduce N/V risk.     MOUTH SORES:  causes, oral care, ways to manage [x] [] Reviewed risk of mucositis, discussed use of soft tooth brush, avoidance of acidic foods or alcohol based mouth rinses. Discussed use of salt/soda rinse.   ALOPECIA:  cause, ways to manage, resources [x] [] Reviewed risk of alopecia.    INFERTILITY & SEXUALITY:  causes, fertility preservation options, sexuality changes, ways to manage, importance of birth control [x] [] Discussed safe sex practices   NERVOUS SYSTEM CHANGES:  causes, s/s, neuropathies, cognitive changes, ways to manage [x] [] Reviewed peripheral neuropathy risk, discussed infusion related reaction risk    PAIN:  causes, ways to manage [x] [] Reviewed risk of fatigue   SKIN & NAIL CHANGES:  cause, s/s, ways to manage [x] [] Discussed rash associated with immunotherapies   ORGAN TOXICITIES:  cause, s/s, need for diagnostic tests, labs, when to notify MD [x] [] Discussed various immune related organ toxicities including but not limited to pneumonitis, colitis, hepatitis, and pancreatitis. Reviewed role of high dose steroids as the necessary treatment of immune related organ toxicities.  Discussed monitoring of thyroid function (hyper/hypothyroidism)   SURVIVORSHIP:  distress,  distress assessment, secondary malignancies, early/late effects, follow-up, social issues, social support [] []    HOME CARE:  use of spill kits, storing of PO chemo, how to manage bodily fluids [x] [] Counseled on management of soiled linens and proper flush technique.  Discussed how to manage all the side effects at home and advised when to contact the MD office   MISCELLANEOUS:  drug interactions, administration, vesicant, et [x] [] Discussed length of infusion and frequency.     Notes:   Education provided via telephone in an effort to practice spatial distancing and reduce transmission risk of COVID-19 for both patients and employees during the COVID-19 pandemic.     Discussed aforementioned material with patient over the phone. All questions and concerns addressed. Provided patient with my contact information and instructions to call should additional questions arise. Will provide patient with personalized treatment calendar on infusion date.

## 2020-04-06 NOTE — PROGRESS NOTES
CHEMOTHERAPY PREPARATION- TELEPHONE VISIT    Rajan Chambers  [id number]  1955    Chief Complaint: chemo education     History of present illness:  Rajan Chambers is a 64 y.o. year old male who is here today for chemotherapy preparation and needs assessment. The patient has been diagnosed with metastatic non-small cell lung cancer and is scheduled to begin treatment with carbo/taxol/Tecentriq/Avastin.     Oncology History:       Metastatic cancer (Lung and bone mets)    1/17/2018 Initial Diagnosis     Metastatic cancer (Lung and bone mets)      12/31/2019 - 12/31/2019 Chemotherapy     OP SUPPORTIVE Denosumab (Xgeva) Q28D      1/22/2020 - 1/28/2020 Radiation     Radiation OncologyTreatment Course:  Rajan Chambers received 2000 cGy in 5 fractions to T12-L5 spine and 800cGy in 1 fractions to left illiac wing via External Beam Radiation - EBRT.      1/28/2020 -  Chemotherapy     OP HYDRATION AND ANTIEMETICS      4/7/2020 -  Chemotherapy     OP LUNG PACLitaxel / CARBOplatin AUC=6 / Atezolizumab / Bevacizumab-awwb        Non-small cell lung cancer, right (CMS/HCC)    1/30/2020 Initial Diagnosis     Non-small cell lung cancer, right (CMS/HCC)      4/7/2020 -  Chemotherapy     OP LUNG PACLitaxel / CARBOplatin AUC=6 / Atezolizumab / Bevacizumab-awwb         Past Medical History:   Diagnosis Date   • Brain cancer (CMS/HCC)    • Cancer (CMS/HCC)     NSCLC   • History of radiation therapy 01/28/2020    T12-L5, left iliac wing   • Lung nodule    • Metastatic cancer (CMS/HCC)    • Shortness of breath     RECENT ONSET       Past Surgical History:   Procedure Laterality Date   • BRONCHOSCOPY N/A 1/18/2018    Procedure: RANDALL AND BRONCHOSCOPY WITH ENDOBRONCHIAL ULTRASOUND WITH FLUORO;  Surgeon: Gustavo Haque MD;  Location: [facility] ENDOSCOPY;  Service:    • COLLATERAL LIGAMENT REPAIR, KNEE     • COLONOSCOPY  05/2019   • KNEE ARTHROSCOPY     • LASIK     • LUNG BIOPSY Right 01/10/2018    Dr. Petty @ [facility]   • SKIN CANCER  EXCISION Bilateral     BASAL CELL ON NECK YESTERDAY       MEDICATIONS: The current medication list was reviewed and reconciled.     Allergies:  is allergic to penicillin g; ibuprofen; penicillins; and sulfa antibiotics.    Family History   Problem Relation Age of Onset   • Cancer Mother    • Breast cancer Mother    • Emphysema Father    • Cancer Sister    • Breast cancer Sister    • Cancer Maternal Grandmother    • Breast cancer Maternal Grandmother    • Cancer Paternal Grandmother    • Breast cancer Paternal Grandmother    • Brain cancer Paternal Grandfather          Review of Systems   Constitutional: Positive for fatigue. Negative for fever and unexpected weight change.   HENT: Negative for congestion, hearing loss, sore throat and trouble swallowing.    Eyes: Negative for visual disturbance.   Respiratory: Negative for cough, shortness of breath and wheezing.    Cardiovascular: Negative for chest pain and leg swelling.   Gastrointestinal: Negative for abdominal distention, abdominal pain, constipation, diarrhea, nausea and vomiting.   Endocrine: Negative.    Genitourinary: Positive for hematuria.   Musculoskeletal: Positive for arthralgias and back pain. Negative for gait problem.   Skin: Negative.    Allergic/Immunologic: Negative.    Neurological: Negative for dizziness, weakness, numbness and headaches.   Hematological: Negative for adenopathy. Does not bruise/bleed easily.   Psychiatric/Behavioral: The patient is nervous/anxious.    All other systems reviewed and are negative.      Physical Exam  Vital Signs: There were no vitals taken for this visit.  Vitals:    04/06/20 1353   PainSc: 0-No pain        DEFERRED SECONDARY TO TELEPHONE ENCOUNTER                               ECOG Performance Status: (1) Restricted in Physically Strenuous Activity, Ambulatory & Able to Do Work of Light Nature          NEEDS ASSESSMENTS    Genetics  The patient's new diagnosis and family history have been reviewed for genetic  counseling needs. A genetic referral is not recommended.       Psychosocial  The patient has completed a PHQ-9 Depression Screening and the Distress Thermometer (DT) today.   PHQ-9 Total Score:  . PHQ-9 results show 1-4 (Minimal Depression). The patient scored their distress today as 2 on a scale of 0-10 with 0 being no distress and 10 being extreme distress.   Problems marked by the patient as being an issue for them within the last week include emotional problems and physical problems.   Results were reviewed along with psychosocial resources offered by our cancer center. Our oncology social worker will be flagged for a DT score of 4 or above, and a same day call will be made for a score of 9 or 10. A mental health referral is not recommended at this time. The patient is not accepting of a referral to [facility].   Copies of patient's questionnaires will be scanned into EMR for details and further reference.    Barriers to care  A barriers form was also completed by the patient today. We discussed services offered by our facility to help him have adequate access to care. The patient was given the name and card for our Oncology Social Worker, Leidy Disla. Based upon barriers assessment today, the patient will not require a follow-up call from the  to further discuss needs.   A copy of the barriers form will also be scanned into EMR for details and further reference.     VAD Assessment  The patient and I discussed planned intervenous chemotherapy as well as other IV treatments that are often needed throughout the course of treatment. These may include, but are not limited to blood transfusions, antibiotics, and IV hydration. The vasculature does appear to be adequate for multiple peripheral IVs throughout their treatment course. Discussed risks and benefits of VADs. The patient would not like to pursue Port-A-Cath insertion prior to initiation of treatment. We will consider port placement in  "the future if he begins having difficulty with IV access.     Advance Care Planning   ACP discussion was held with the patient during this visit. Patient has an advance directive in EMR which is still valid.   The patient and I discussed advanced care planning, \"Conversations that Matter\".   This service was offered, free of charge, for development of advance directives with a certified ACP facilitator.  The patient does have an up-to-date advanced directive. This document is on file with our office. The patient is not interested in an appointment with one of our facilitators to create or update their advanced directives.         Palliative Care  The patient and I discussed palliative care services. Palliative care is not the same as Hospice care. This is specialized medical care for people living with serious illness with the goal of improving quality of life for the patient and their family. [facility] has partnered with [facility] to offer our patients outpatient palliative care early along with their treatment to assist in coordination of care, symptom management, pain management, and medical decision making.  Oncology criteria for palliative care referral is met at this time. The patient is interested in a palliative care consultation.   The patient is already established with palliative care, Dr. Winter, for symptom management. He will continue follow up with her during treatment.     Additional Referral needs  none      CHEMOTHERAPY EDUCATION    Booklets Given: Chemotherapy and You [x]  Eating Hints [x]    Sexuality/Fertility Books []    Chemotherapy packet will be delivered to the patient during his infusion visit on 4/7/2020 @ 8:00. Reviewed contents of pack with the patient via telephone encounter.     Chemotherapy/Biotherapy Education Sheets: (list all that apply)  nausea management, diarrhea management and Cancer resourse contacts information                                                   "                                                                                                     DRUG EDUCATION COMPLETED BY PARISH IBRAHIM, PHARM D. SEE HER SUPPORTING DOCUMENTATION.          Chemotherapy Regimen:   Treatment Plans     Name Type Plan dates Plan Provider         Active    OP LUNG PACLitaxel / CARBOplatin AUC=6 / Atezolizumab / Bevacizumab-awwb ONCOLOGY TREATMENT  4/6/2020 - Present Karen Vicente MD                  BRIEFLY REVIEWED SELECTED TOPICS AGAIN WITH THE PATIENT IN REGARDS TO EXPECTATIONS WITH TREATMENT REGIMEN.   TOPICS EDUCATION PROVIDED COMMENTS   ANEMIA:  role of RBC, cause, s/s, ways to manage, role of transfusion [x]    THROMBOCYTOPENIA:  role of platelet, cause, s/s, ways to prevent bleeding, things to avoid, when to seek help [x]    NEUTROPENIA:  role of WBC, cause, infection precautions, s/s of infection, when to call MD [x]    NUTRITION & APPETITE CHANGES:  importance of maintaining healthy diet & weight, ways to manage to improve intake, dietary consult, exercise regimen [x]    DIARRHEA:  causes, s/s of dehydration, ways to manage, dietary changes, when to call MD []    CONSTIPATION:  causes, ways to manage, dietary changes, when to call MD []    NAUSEA & VOMITING:  cause, use of antiemetics, dietary changes, when to call MD [x]    MOUTH SORES:  causes, oral care, ways to manage [x]    ALOPECIA:  cause, ways to manage, resources []    INFERTILITY & SEXUALITY:  causes, fertility preservation options, sexuality changes, ways to manage, importance of birth control []    NERVOUS SYSTEM CHANGES:  causes, s/s, neuropathies, cognitive changes, ways to manage []    PAIN:  causes, ways to manage []    SKIN & NAIL CHANGES:  cause, s/s, ways to manage []    ORGAN TOXICITIES:  cause, s/s, need for diagnostic tests, labs, when to notify MD [x]    SURVIVORSHIP:  distress, distress assessment, secondary malignancies, early/late effects, follow-up, social issues, social support []    HOME  CARE:  use of spill kits, storing of PO chemo, how to manage bodily fluids [x]    MISCELLANEOUS:  drug interactions, administration, vesicant, et []        Assessment and Plan:    Diagnoses and all orders for this visit:    Non-small cell lung cancer, right (CMS/HCC)        This was a 25 minute telephone visit with 25 minutes spent in  counseling and coordination of care as documented above.   The patient and I have reviewed their new cancer diagnosis and scheduled treatment plan. Needs assessment was completed including genetics, psychosocial needs, barriers to care, VAD evaluation, advanced care planning, and palliative care services. Referrals have been ordered as appropriate based upon our evaluation and patient desires.     Chemotherapy teaching was also completed today as documented above and in Joyce Eubanks-D's note. Adequate time was given to answer all questions to his satisfaction. Patient and family are aware of their care team members and contact information if they have questions or problems throughout the treatment course. Needs assessments and education has been completed. The patient is adequately prepared to begin treatment as scheduled.     Pain assessment was performed today as a part of patient’s care. For patients with pain related to surgery, gynecologic malignancy or cancer treatment, the plan is as noted in the assessment/plan.  For patients with pain not related to these issues, they are to seek any further needed care from a more appropriate provider, such as PCP.    The patient will stop his Decadron tomorrow as recommended by Dr. Ching following completion of whole brain radiation.     Consent will be obtained at his infusion visit tomorrow AM.     Plan per Dr. Vicente's note to repeat CT scans prior to cycle #4.     We will consider port placement in the future if needed for difficulty with IV access.     Due to current recommendations for social distancing secondary to  COVID-19 pandemic, this visit was performed via telephone encounter. All materials were reviewed, and he will be provided a written copy at his infusion visit tomorrow. All questions from both the patient and his wife were answered during our conversation.     [facility]uso

## 2020-04-07 ENCOUNTER — HOSPITAL ENCOUNTER (OUTPATIENT)
Dept: ONCOLOGY | Facility: HOSPITAL | Age: 65
Setting detail: INFUSION SERIES
Discharge: HOME | End: 2020-04-07

## 2020-04-07 VITALS
RESPIRATION RATE: 20 BRPM | DIASTOLIC BLOOD PRESSURE: 76 MMHG | WEIGHT: 215 LBS | SYSTOLIC BLOOD PRESSURE: 113 MMHG | HEART RATE: 72 BPM | HEIGHT: 69 IN | BODY MASS INDEX: 31.84 KG/M2 | TEMPERATURE: 98.5 F

## 2020-04-07 DIAGNOSIS — C34.91 NON-SMALL CELL LUNG CANCER, RIGHT (HCC): ICD-10-CM

## 2020-04-07 DIAGNOSIS — C79.9 METASTATIC CANCER (HCC): ICD-10-CM

## 2020-04-07 DIAGNOSIS — C34.91 NON-SMALL CELL LUNG CANCER, RIGHT (HCC): Primary | ICD-10-CM

## 2020-04-07 LAB
ALBUMIN SERPL-MCNC: 4 G/DL (ref 3.5–5.2)
ALBUMIN/GLOB SERPL: 1.3 G/DL
ALP SERPL-CCNC: 113 U/L (ref 39–117)
ALT SERPL W P-5'-P-CCNC: 20 U/L (ref 1–41)
ANION GAP SERPL CALCULATED.3IONS-SCNC: 13 MMOL/L (ref 5–15)
AST SERPL-CCNC: 16 U/L (ref 1–40)
BILIRUB SERPL-MCNC: 0.3 MG/DL (ref 0.2–1.2)
BILIRUB UR QL STRIP: NEGATIVE
BUN BLD-MCNC: 22 MG/DL (ref 8–23)
BUN/CREAT SERPL: 18.6 (ref 7–25)
CALCIUM SPEC-SCNC: 9.1 MG/DL (ref 8.6–10.5)
CHLORIDE SERPL-SCNC: 104 MMOL/L (ref 98–107)
CLARITY UR: ABNORMAL
CO2 SERPL-SCNC: 24 MMOL/L (ref 22–29)
COLOR UR: YELLOW
CREAT BLD-MCNC: 1.18 MG/DL (ref 0.76–1.27)
CREAT BLDA-MCNC: 1.2 MG/DL (ref 0.6–1.3)
ERYTHROCYTE [DISTWIDTH] IN BLOOD BY AUTOMATED COUNT: 18.9 % (ref 12.3–15.4)
GFR SERPL CREATININE-BSD FRML MDRD: 62 ML/MIN/1.73
GLOBULIN UR ELPH-MCNC: 3 GM/DL
GLUCOSE BLD-MCNC: 96 MG/DL (ref 65–99)
GLUCOSE UR STRIP-MCNC: NEGATIVE MG/DL
HCT VFR BLD AUTO: 44.3 % (ref 37.5–51)
HGB BLD-MCNC: 14.5 G/DL (ref 13–17.7)
HGB UR QL STRIP.AUTO: ABNORMAL
KETONES UR QL STRIP: NEGATIVE
LEUKOCYTE ESTERASE UR QL STRIP.AUTO: NEGATIVE
LYMPHOCYTES # BLD AUTO: 0.9 10*3/MM3 (ref 0.7–3.1)
LYMPHOCYTES NFR BLD AUTO: 11 % (ref 19.6–45.3)
MCH RBC QN AUTO: 32.5 PG (ref 26.6–33)
MCHC RBC AUTO-ENTMCNC: 32.8 G/DL (ref 31.5–35.7)
MCV RBC AUTO: 99.1 FL (ref 79–97)
MONOCYTES # BLD AUTO: 0.5 10*3/MM3 (ref 0.1–0.9)
MONOCYTES NFR BLD AUTO: 5.9 % (ref 5–12)
NEUTROPHILS # BLD AUTO: 6.9 10*3/MM3 (ref 1.7–7)
NEUTROPHILS NFR BLD AUTO: 83.1 % (ref 42.7–76)
NITRITE UR QL STRIP: NEGATIVE
PH UR STRIP.AUTO: 5 [PH] (ref 5–8)
PLATELET # BLD AUTO: 145 10*3/MM3 (ref 140–450)
PMV BLD AUTO: 6.9 FL (ref 6–12)
POTASSIUM BLD-SCNC: 4 MMOL/L (ref 3.5–5.2)
PROT SERPL-MCNC: 7 G/DL (ref 6–8.5)
PROT UR QL STRIP: ABNORMAL
RBC # BLD AUTO: 4.47 10*6/MM3 (ref 4.14–5.8)
SODIUM BLD-SCNC: 141 MMOL/L (ref 136–145)
SP GR UR STRIP: 1.03 (ref 1–1.03)
T4 FREE SERPL-MCNC: 0.91 NG/DL (ref 0.93–1.7)
TSH SERPL DL<=0.05 MIU/L-ACNC: 1.27 UIU/ML (ref 0.27–4.2)
UROBILINOGEN UR QL STRIP: ABNORMAL
WBC NRBC COR # BLD: 8.3 10*3/MM3 (ref 3.4–10.8)

## 2020-04-07 PROCEDURE — 25010000002 PALONOSETRON 0.25 MG/5ML SOLUTION PREFILLED SYRINGE: Performed by: INTERNAL MEDICINE

## 2020-04-07 PROCEDURE — 25010000002 CARBOPLATIN PER 50 MG: Performed by: INTERNAL MEDICINE

## 2020-04-07 PROCEDURE — 82565 ASSAY OF CREATININE: CPT

## 2020-04-07 PROCEDURE — 25010000002 BEVACIZUMAB-AWWB 400 MG/16ML SOLUTION 16 ML VIAL: Performed by: INTERNAL MEDICINE

## 2020-04-07 PROCEDURE — 96365 THER/PROPH/DIAG IV INF INIT: CPT

## 2020-04-07 PROCEDURE — 96375 TX/PRO/DX INJ NEW DRUG ADDON: CPT

## 2020-04-07 PROCEDURE — 25010000002 BEVACIZUMAB-AWWB 100 MG/4ML SOLUTION 4 ML VIAL: Performed by: INTERNAL MEDICINE

## 2020-04-07 PROCEDURE — 96367 TX/PROPH/DG ADDL SEQ IV INF: CPT

## 2020-04-07 PROCEDURE — 84439 ASSAY OF FREE THYROXINE: CPT | Performed by: INTERNAL MEDICINE

## 2020-04-07 PROCEDURE — 25010000002 ATEZOLIZUMAB 1200 MG/20ML SOLUTION 20 ML VIAL: Performed by: INTERNAL MEDICINE

## 2020-04-07 PROCEDURE — 85025 COMPLETE CBC W/AUTO DIFF WBC: CPT | Performed by: INTERNAL MEDICINE

## 2020-04-07 PROCEDURE — 96417 CHEMO IV INFUS EACH ADDL SEQ: CPT

## 2020-04-07 PROCEDURE — 25010000002 FOSAPREPITANT PER 1 MG: Performed by: INTERNAL MEDICINE

## 2020-04-07 PROCEDURE — 81003 URINALYSIS AUTO W/O SCOPE: CPT | Performed by: INTERNAL MEDICINE

## 2020-04-07 PROCEDURE — 84443 ASSAY THYROID STIM HORMONE: CPT | Performed by: INTERNAL MEDICINE

## 2020-04-07 PROCEDURE — 25010000002 DEXAMETHASONE PER 1 MG: Performed by: INTERNAL MEDICINE

## 2020-04-07 PROCEDURE — 96413 CHEMO IV INFUSION 1 HR: CPT

## 2020-04-07 PROCEDURE — 96415 CHEMO IV INFUSION ADDL HR: CPT

## 2020-04-07 PROCEDURE — 25010000002 DIPHENHYDRAMINE PER 50 MG: Performed by: INTERNAL MEDICINE

## 2020-04-07 PROCEDURE — 25010000002 PACLITAXEL PER 30 MG: Performed by: INTERNAL MEDICINE

## 2020-04-07 PROCEDURE — 80053 COMPREHEN METABOLIC PANEL: CPT | Performed by: INTERNAL MEDICINE

## 2020-04-07 RX ORDER — SODIUM CHLORIDE 9 MG/ML
250 INJECTION, SOLUTION INTRAVENOUS ONCE
Status: CANCELLED | OUTPATIENT
Start: 2020-04-28

## 2020-04-07 RX ORDER — FAMOTIDINE 10 MG/ML
20 INJECTION, SOLUTION INTRAVENOUS AS NEEDED
Status: CANCELLED | OUTPATIENT
Start: 2020-04-28

## 2020-04-07 RX ORDER — FAMOTIDINE 10 MG/ML
20 INJECTION, SOLUTION INTRAVENOUS ONCE
Status: CANCELLED | OUTPATIENT
Start: 2020-04-07

## 2020-04-07 RX ORDER — FAMOTIDINE 10 MG/ML
20 INJECTION, SOLUTION INTRAVENOUS AS NEEDED
Status: CANCELLED | OUTPATIENT
Start: 2020-04-07

## 2020-04-07 RX ORDER — PALONOSETRON 0.05 MG/ML
0.25 INJECTION, SOLUTION INTRAVENOUS ONCE
Status: CANCELLED | OUTPATIENT
Start: 2020-04-28

## 2020-04-07 RX ORDER — DIPHENHYDRAMINE HYDROCHLORIDE 50 MG/ML
50 INJECTION INTRAMUSCULAR; INTRAVENOUS AS NEEDED
Status: CANCELLED | OUTPATIENT
Start: 2020-04-28

## 2020-04-07 RX ORDER — FAMOTIDINE 10 MG/ML
20 INJECTION, SOLUTION INTRAVENOUS ONCE
Status: CANCELLED | OUTPATIENT
Start: 2020-04-28

## 2020-04-07 RX ORDER — DIPHENHYDRAMINE HYDROCHLORIDE 50 MG/ML
50 INJECTION INTRAMUSCULAR; INTRAVENOUS AS NEEDED
Status: CANCELLED | OUTPATIENT
Start: 2020-04-07

## 2020-04-07 RX ORDER — SODIUM CHLORIDE 9 MG/ML
250 INJECTION, SOLUTION INTRAVENOUS ONCE
Status: CANCELLED | OUTPATIENT
Start: 2020-04-07

## 2020-04-07 RX ORDER — SODIUM CHLORIDE 9 MG/ML
250 INJECTION, SOLUTION INTRAVENOUS ONCE
Status: COMPLETED | OUTPATIENT
Start: 2020-04-07 | End: 2020-04-07

## 2020-04-07 RX ORDER — PALONOSETRON 0.05 MG/ML
0.25 INJECTION, SOLUTION INTRAVENOUS ONCE
Status: CANCELLED | OUTPATIENT
Start: 2020-04-07

## 2020-04-07 RX ORDER — PALONOSETRON 0.05 MG/ML
0.25 INJECTION, SOLUTION INTRAVENOUS ONCE
Status: COMPLETED | OUTPATIENT
Start: 2020-04-07 | End: 2020-04-07

## 2020-04-07 RX ORDER — FAMOTIDINE 10 MG/ML
20 INJECTION, SOLUTION INTRAVENOUS ONCE
Status: COMPLETED | OUTPATIENT
Start: 2020-04-07 | End: 2020-04-07

## 2020-04-07 RX ADMIN — BEVACIZUMAB-AWWB 1400 MG: 400 INJECTION, SOLUTION INTRAVENOUS at 11:03

## 2020-04-07 RX ADMIN — ATEZOLIZUMAB 1200 MG: 1200 INJECTION, SOLUTION INTRAVENOUS at 09:50

## 2020-04-07 RX ADMIN — SODIUM CHLORIDE 150 MG: 9 INJECTION, SOLUTION INTRAVENOUS at 09:04

## 2020-04-07 RX ADMIN — DIPHENHYDRAMINE HYDROCHLORIDE 50 MG: 50 INJECTION INTRAMUSCULAR; INTRAVENOUS at 09:23

## 2020-04-07 RX ADMIN — PALONOSETRON 0.25 MG: 0.25 INJECTION, SOLUTION INTRAVENOUS at 09:22

## 2020-04-07 RX ADMIN — DEXAMETHASONE SODIUM PHOSPHATE 20 MG: 10 INJECTION INTRAMUSCULAR; INTRAVENOUS at 09:23

## 2020-04-07 RX ADMIN — FAMOTIDINE 20 MG: 10 INJECTION INTRAVENOUS at 09:23

## 2020-04-07 RX ADMIN — SODIUM CHLORIDE 250 ML: 9 INJECTION, SOLUTION INTRAVENOUS at 09:00

## 2020-04-07 RX ADMIN — PACLITAXEL 420 MG: 6 INJECTION, SOLUTION INTRAVENOUS at 12:50

## 2020-04-07 RX ADMIN — CARBOPLATIN 660 MG: 10 INJECTION, SOLUTION INTRAVENOUS at 15:56

## 2020-04-13 ENCOUNTER — PATIENT MESSAGE (OUTPATIENT)
Dept: PALLIATIVE CARE | Facility: CLINIC | Age: 65
End: 2020-04-13

## 2020-04-13 ENCOUNTER — TELEPHONE (OUTPATIENT)
Dept: ONCOLOGY | Facility: CLINIC | Age: 65
End: 2020-04-13

## 2020-04-13 DIAGNOSIS — F32.9 REACTIVE DEPRESSION: Primary | ICD-10-CM

## 2020-04-13 RX ORDER — DULOXETIN HYDROCHLORIDE 30 MG/1
30 CAPSULE, DELAYED RELEASE ORAL DAILY
Qty: 30 CAPSULE | Refills: 0 | Status: SHIPPED | OUTPATIENT
Start: 2020-04-13 | End: 2020-04-23 | Stop reason: SDUPTHER

## 2020-04-13 NOTE — TELEPHONE ENCOUNTER
Patient wife calling triage line to report that patient just finished whole brain radiation about a week ago and has very dry, itchy skin on tops of his ears and top of his head.  She wanted to know what to use for this . A friend of hers had suggested silvadene.  I spoke to [facility] and she said to offer aquaphor.  I called  back and told her to use aquaphor and she thought she might have some of that on hand. I explained that silavdene was a prescription that they used for burn victims.  She also asked while we were talking about him  Being fatigued and if that was normal.  I told her it was especially now that he is off steroids and has just completed radiation.  She verbalized understanding.

## 2020-04-17 ENCOUNTER — DOCUMENTATION (OUTPATIENT)
Dept: NUTRITION | Facility: HOSPITAL | Age: 65
End: 2020-04-17

## 2020-04-17 NOTE — PROGRESS NOTES
ONC Nutrition     Diagnosis:  Metastatic non-small cell lung cancer; progression of the disease in the brain, lung and right iliac wing  Radiation:  Whole brain radiation for scattered small volume disease - 30 GY in 10 fractions / Right iliac crest will receive a single fraction of 8 Gy  Chemotherapy:  6 - 21 day cycles carbo/taxol/tecentriq/avastin; first cycle 3/7/20; Xegeva every 6 weeks for bone mets     Weight 215 lbs / weight gain of 7 lbs past 3 weeks    Phone consultation attempted / COVID-19 precautions.  Recent issues of post radiation fatigue and/or depression noted.  Follow up phone call to ascertain tolerance of the first cycle of chemotherapy and indication for nutritional intervention with management of nutritionally related side effects.  No answer; message left requesting return phone call.

## 2020-04-18 DIAGNOSIS — C79.51 PAIN FROM BONE METASTASES (HCC): ICD-10-CM

## 2020-04-18 DIAGNOSIS — G89.3 PAIN FROM BONE METASTASES (HCC): ICD-10-CM

## 2020-04-18 DIAGNOSIS — C79.9 METASTATIC CANCER (HCC): ICD-10-CM

## 2020-04-18 RX ORDER — MEMANTINE HYDROCHLORIDE 5 MG/1
5 TABLET ORAL 2 TIMES DAILY
Qty: 180 TABLET | Refills: 0 | Status: SHIPPED | OUTPATIENT
Start: 2020-04-18 | End: 2020-07-17

## 2020-04-18 RX ORDER — GABAPENTIN 100 MG/1
200 CAPSULE ORAL 3 TIMES DAILY
Qty: 180 CAPSULE | Refills: 0 | Status: SHIPPED | OUTPATIENT
Start: 2020-04-18 | End: 2020-04-23

## 2020-04-20 ENCOUNTER — TELEPHONE (OUTPATIENT)
Dept: ONCOLOGY | Facility: CLINIC | Age: 65
End: 2020-04-20

## 2020-04-20 DIAGNOSIS — C79.9 METASTATIC CANCER (HCC): Primary | ICD-10-CM

## 2020-04-20 NOTE — RADIATION COMPLETION NOTES
DATE OF COMPLETION: 4/6/2020  DIAGNOSIS: Metastatic NSCLC  Cancer Staging IVB      REFERRING: Karen Vicente MD        Rajan Bond completed radiation therapy today.      BACKGROUND: Rajan Chambers is a 64-year-old gentleman with a known diagnosis of metastatic non-small cell lung cancer.  He was unfortunately found to have evidence of metastatic disease to the brain as well as progressive pain secondary to a right sacral and iliac wing metastasis.  He completed palliative treatments to 2 separate areas as detailed below:    Treatment Summary     Dates of Therapy: 3/23/2020 - 4/6/2020  Treatment Site: Whole Brain  Dose: 30 Gy in 10 fractions of 3 Gy each  Technique: Opposed lateral photon fields with 6 MV photons.    Date of Therapy: 3/23/2020   Treatment Site: Right Iliac Wing  Dose: 8 Gy in a single fraction  Technique: Opposed oblique photon fields with 15 MV photons.    Treatment Course and Tolerance: He tolerated treatment very well.  His pain improved over the first several days, and he was also able to be tapered off of his steroids.  He was monitored closely throughout the course of treatment and did not develop any other significant acute radiation related toxicities.    The initial follow up visit will be in 1 month.    Rajan Chambers knows to call if any problems or concerns develop in the meantime.     Electronically signed by: Vidal Ching MD                    Cc: Jeimy Johnson MD

## 2020-04-20 NOTE — TELEPHONE ENCOUNTER
Patient wife, Nelida, called requesting to see if hospice can be brought in. Patient lives in [facility]. Wife said that patient has no will to participate in showering or oral hygiene. She cannot physically move him herself. She states that he has very bad brain fog and she has had to take over administering medications. He is currently a patient of Dr. Winter, but knows that it will be hard on patient to make the trip to [place] when the time comes. He is deteriorating in strength and has severe muscle weakness. She also stated that his mental health is declining. Patient has appointment with [facility], tomorrow for behavioral health. She will see if she can makes suggestions to improve that. Patient wife is wanting to talk and discuss options of hospice if that is something that can assist them at this time.

## 2020-04-21 ENCOUNTER — TELEPHONE (OUTPATIENT)
Dept: ONCOLOGY | Facility: CLINIC | Age: 65
End: 2020-04-21

## 2020-04-21 ENCOUNTER — TELEMEDICINE (OUTPATIENT)
Dept: PSYCHIATRY | Facility: CLINIC | Age: 65
End: 2020-04-21

## 2020-04-21 DIAGNOSIS — R53.83 FATIGUE DUE TO TREATMENT: Primary | ICD-10-CM

## 2020-04-21 PROCEDURE — 90792 PSYCH DIAG EVAL W/MED SRVCS: CPT | Performed by: NURSE PRACTITIONER

## 2020-04-21 NOTE — PROGRESS NOTES
"  Ismael Chambers is a 64 y.o. male who presents today for initial appointment This was an audio and video enabled telemedicine encounter to which patient consented. Patient has metastatic lung cancer.  Patient was referred by: Dr. Winter palliative care physician for depressive symptoms. Patient is with his wife, in his own home for session.      Chief Complaint:  Fatigue, decreased motivation, interest    History of Present Illness Patient is in bed during this 9 am initial visit. He reports he is doing fine. He states he has low energy and is bored not having much to do. He denies depression, denies being anxious except for worries about Covid 19 and exposure to it. He endorses having difficulty with being very tired, poor energy and falls asleep easily throughout the day and sleeps at night without difficulty. Patient endorses persistent sense of physical , emotional and cognitive tiredness related to cancer and treatment and interferes with usual functioning.  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 He was always a very active man and involved in Sikhism and is a Lee. He listens to an audio bible daily and has strong gabriel in God.  He used to go to churches and talk about the bible and [employer] work and loved it he states. Patient reports he was diagnosed two years ago with lung cancer and was doing well and this surprised them all when it came back \"with a vengeance\". He denies irritability anger or confusion. He will have difficulty with word finding he states and some memory recall. He denies falls but has difficulty getting out of bed. His wife is very supportive but admits it is getting to be a lot helping out of bed and worries about his condition. Discussed home health with patient and he isn't interested in someone coming into his home, discussed how this can help with his conditioning and independence, he states he isn't so sure about that. He denies having hobbies. He " "used to watch TV but \"there isn't anything on of interest\". 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 His children and grandchildren can't come over because of Covid, although one day a week his wife watches one of their grandchildren to help their daughter out as she works. Patient doesn't want to go for drives in the car because of fears of Covid, discussed drives are safe in their own car with wife driving. Patient denies history of depression, anxiety, panic, PTSD, OCD or betsy. He denies alcohol use or illicit substances. He quit chewing tobacco 2001.  He and his wife are supportive of each other. He reports good appetite.     The following portions of the patient's history were reviewed and updated as appropriate: allergies, current medications, past family history, past medical history, past social history, past surgical history and problem list.      Past Psych History: denies , has been placed on duloxetine 30mg po qd by Dr. Winter palliative care for mood and body discomfort. Pt denies SI/HI or AVH or confusion.     Substance Abuse:   Chewed tobacco up until 2001    ABUSE HX: denies   LEGAL HX: denies     Family Psychiatric History:  family history includes Brain cancer in his paternal grandfather; Breast cancer in his maternal grandmother, mother, paternal grandmother, and sister; Cancer in his maternal grandmother, mother, paternal grandmother, and sister; Emphysema in his father.      Social History:  with grown children and has grandchildren. Was  and Gustavo. Doesn't have hobbies, his farm was both vocation and love of Confucianist and the bible, speaking at churches and giving bibles away was his passion \"I just loved it\" and isn't able to do now.       Medical/Surgical History:  Past Medical History:   Diagnosis Date   • Brain cancer (CMS/HCC)    • Cancer (CMS/HCC)     NSCLC   • History of radiation therapy 01/28/2020    T12-L5, " left iliac wing   • Lung nodule    • Metastatic cancer (CMS/HCC)    • Shortness of breath     RECENT ONSET     Past Surgical History:   Procedure Laterality Date   • BRONCHOSCOPY N/A 1/18/2018    Procedure: RANDALL AND BRONCHOSCOPY WITH ENDOBRONCHIAL ULTRASOUND WITH FLUORO;  Surgeon: Gustavo Haque MD;  Location: [facility] ENDOSCOPY;  Service:    • COLLATERAL LIGAMENT REPAIR, KNEE     • COLONOSCOPY  05/2019   • KNEE ARTHROSCOPY     • LASIK     • LUNG BIOPSY Right 01/10/2018    Dr. Petty @ [facility]   • SKIN CANCER EXCISION Bilateral     BASAL CELL ON NECK YESTERDAY       Allergies   Allergen Reactions   • Penicillin G Hives   • Ibuprofen Itching and Dermatitis     Prickly rash on heaD   • Penicillins Rash   • Sulfa Antibiotics Rash and Itching       Current Medications:   Current Outpatient Medications   Medication Sig Dispense Refill   • acetaminophen (TYLENOL) 500 MG tablet Take 2 tablets by mouth Every 6 (Six) Hours As Needed.     • Calcium Carb-Cholecalciferol (CALCIUM/VITAMIN D) 600-400 MG-UNIT tablet Take 2 tablets by mouth Daily. 60 tablet 5   • calcium carbonate (OS-ROSIO) 600 MG tablet Take 600 mg by mouth 2 (Two) Times a Day With Meals.     • desloratadine (CLARINEX) 5 MG tablet Take 1 tablet by mouth Daily. 90 tablet 3   • dexamethasone (DECADRON) 4 MG tablet Take 1 tablet by mouth 2 (Two) Times a Day With Meals. 60 tablet 3   • diclofenac (VOLTAREN) 1 % gel gel Apply 4 g topically to the appropriate area as directed 4 (Four) Times a Day. 100 g 3   • docusate sodium (COLACE) 100 MG capsule Take 1 capsule by mouth 2 (Two) Times a Day As Needed for Constipation. 30 capsule 0   • DULoxetine (CYMBALTA) 30 MG capsule Take 1 capsule by mouth Daily for 30 days. 30 capsule 0   • fluticasone (FLONASE) 50 MCG/ACT nasal spray 2 sprays into the nostril(s) as directed by provider Daily. Administer 2 sprays in each nostril for each dose. 3 bottle 3   • gabapentin (NEURONTIN) 100 MG capsule Take 2 capsules by mouth 3 (Three)  Times a Day for 30 days. 180 capsule 0   • memantine (NAMENDA) 5 MG tablet Take 1 tablet by mouth 2 (Two) Times a Day for 90 days. 180 tablet 0   • ondansetron (ZOFRAN) 4 MG tablet TAKE 2 TABLETS BY MOUTH TWICE DAILY AS NEEDED FOR NAUSEA     • oxyCODONE-acetaminophen (PERCOCET) 5-325 MG per tablet Take 1 tablet by mouth Every 6 (Six) Hours As Needed for Severe Pain  for up to 30 doses. 30 tablet 0   • rivaroxaban (XARELTO) 20 MG tablet Take 1 tablet by mouth Daily With Dinner. 90 tablet 3   • TAGRISSO 80 MG tablet TAKE ONE TABLET (80 MG) BY MOUTH ONCE DAILY AT THE SAME TIME. MAY TAKEWITH OR WITHOUT FOOD. CALL [phone] FOR REFILLS 30 tablet 10     No current facility-administered medications for this visit.        Lab Results: reviewed in chart     Review of Systems Constitutional: Negative for appetite change, chills, diaphoresis,  fever and unexpected weight change. POSITIVE FOR FATIGUE, low energy, physical strength, cognitive tiredness.   HENT: Negative for hearing loss, sore throat, trouble swallowing and voice change.    Eyes: Negative for photophobia and visual disturbance.   Respiratory: Negative for cough, chest tightness and shortness of breath.    Cardiovascular: Negative for chest pain and palpitations.   Gastrointestinal: Negative for abdominal pain, constipation, nausea and vomiting.   Endocrine: Negative for cold intolerance and heat intolerance.   Genitourinary: Negative for dysuria and frequency.   Musculoskeletal: BONE PAIN HIP AND LEG  Skin: Negative for color change and wound.   Allergic/Immunologic: Negative for environmental allergies and immunocompromised state.   Neurological: Negative for dizziness, tremors, seizures, syncope, weakness, light-headedness and headaches.   Hematological: Negative for adenopathy. Does not bruise/bleed easily.    Objective   Physical Exam  There were no vitals taken for this visit.      Mental Status Exam:   Appearance: in bed covered up with quilt,   Hygiene:    good  Cooperation:  Cooperative  Eye Contact:  Good  Psychomotor Behavior:  resting in bed  Mood:  within normal limits during conversion   Affect:  Appropriate    Hopelessness: Denies  Speech:  Normal  Thought Process:  Linear  Thought Content:  Mood congruent  Suicidal:  None  Homicidal:  None  Hallucinations:  None  Delusion:  None  Memory:  word finding difficulties at times  Orientation:  Person, Place, Time and Situation  Reliability:  fair  Insight:  Fair  Judgement:  Good  Impulse Control:  Good  Physical/Medical Issues:  Yes met lung cancer      Short-term goals: Patient will be compliant with clinic appointments.  Patient will be engaged in therapy, medication compliant with minimal side effects. Patient  will report decrease of symptoms and frequency.    Long-term goals: Patient will have minimal symptoms of mental health disorder with continued treatment. Patient will be compliant with treatment and appointments.       Problem list: fatigue which then has decreased motivation and interest   Strengths: strong gabriel in God,  supportive family and wife          Assessment/Plan   Diagnoses and all orders for this visit:    Fatigue due to treatment        A psychological evaluation was conducted in order to assess past and current level of functioning. Areas assessed included, but were not limited to: perception of social support, perception of ability to face and deal with challenges in life (positive functioning), anxiety symptoms, depressive symptoms, perspective on beliefs/belief system, coping skills for stress, intelligence level,  Therapeutic rapport was established. Interventions conducted today were geared towards incorporating medication management along with support for continued therapy. Education was also provided as to the med management with this provider and what to expect in subsequent sessions.    Assisted patient in processing above session content; acknowledged and normalized patient’s  thoughts, feelings, and concerns.  Applied  positive coping skills and behavior management in session.  Allowed patient to freely discuss issues without interruption or judgment. Provided safe, confidential environment to facilitate the development of positive therapeutic relationship and encourage open, honest communication. Assisted patient in identifying risk factors which would indicate the need for higher level of care including thoughts to harm self or others and/or self-harming behavior and encouraged patient to contact this office, call 911, or present to the nearest emergency room should any of these events occur. Discussed crisis intervention services and means to access.  Patient adamantly and convincingly denies current suicidal or homicidal ideation or perceptual disturbance.    Discussed diagnosis and recommendations for treatment: Patient endorses persistent sense of physical , emotional and cognitive tiredness related to cancer and treatment and interferes with usual functioning.     Offered to  PROVIDE: Cognitive Behavioral Therapy and Solution Focused Therapy to improve functioning, maintain stability, and avoid decompensation and the need for higher level of care.    MEDICATION MANAGEMENT and other RECOMMENDATIONS:  Cont duloxetine 30mg daily as Dr. Winter began for physical discomfort and support of mood  Encouraged home health and possible PT in home to work on physical conditioning  Go for short drives in country  Cont to connect with Sabianism in video connections  TAlk with family members,   Audio books on subjects he likes  TV ie [employer] has documentaries he may find interesting      We discussed risks, benefits,goals and side effects of the above medication and the patient was agreeable with the plan.Patient was educated on the importance of compliance with treatment and follow-up appointments.To call for questions or concerns and return early if necessary. Crisis plan reviewed including  going to the Emergency department.       Treatment Plan: stabilize mood,  patient will stay out of the hospital and be at optimal level of functioning, take all medication as prescribed. Patient verbalized  understanding and agreement to plan.      Return if symptoms worsen or fail to improve.  Pt aware of service and didn't feel he needed it at this time.

## 2020-04-21 NOTE — TELEPHONE ENCOUNTER
Bubba from  called and stated that she went to the patients home this morning for an evaluation. Both the patient and his wife declined the services and stated that they did not need them at this time.

## 2020-04-22 ENCOUNTER — TELEPHONE (OUTPATIENT)
Dept: RADIATION ONCOLOGY | Facility: HOSPITAL | Age: 65
End: 2020-04-22

## 2020-04-22 ENCOUNTER — DOCUMENTATION (OUTPATIENT)
Dept: PALLIATIVE CARE | Facility: CLINIC | Age: 65
End: 2020-04-22

## 2020-04-22 ENCOUNTER — TELEPHONE (OUTPATIENT)
Dept: ONCOLOGY | Facility: CLINIC | Age: 65
End: 2020-04-22

## 2020-04-22 NOTE — TELEPHONE ENCOUNTER
----- Message from Celeste Carr RN sent at 4/22/2020  3:46 PM EDT -----  Regarding: patient declining  Contact: [phone]  Patient's wife, Nelida, called regarding patient's status.  She feels like he has been declining over the last 5 days.  No change from when you talked to her 2 days ago, but she is wondering if Dr. Vicente wants to see him sooner than Tuesday. She says he is slow to respond to questions, his gait is shuffling, and his balance is off. He fell yesterday.  She states she thought this was due to his radiation.  She spoke with Dr. Ching's nurse today and was advised it was not related to radiation and to contact Dr. Vicente.  I discussed with Frances and she advised that I forward to you to discuss with Dr. Vicente.

## 2020-04-22 NOTE — TELEPHONE ENCOUNTER
Pt wife called stating pt fell X 2 over last few days and is having poor coordination, slow to process information-instructed pt wife to notify Dr. Vicente's office ( pt has scans ordered and is currently receiving chemo treatments)-verbalized understanding

## 2020-04-22 NOTE — TELEPHONE ENCOUNTER
Spoke with Kamini, who states that patient continues to have the same issues that were previously discussed. States that rad onc had told her that the memory and speech problems could be related to the radiation that he previously had, but unlikely that gait and balance were affected. However, since he met with Susie Robles, he has been pushing himself to get up more and be more active, which Kamini states has helped him as well.  Discussed with dr fraser, and kamini advised that we can keep his appts as scheduled for Tuesday, but if he declines, he can always be worked in sooner. She states that he is stable, and therefore okay to wait until Tuesday for lab check and evaluation prior to possible treatment.

## 2020-04-22 NOTE — PROGRESS NOTES
Called pt to discuss apt options for tomorrow as all apts are going to telehealth (telephone or video) due to current pandemic.  Spoke with wife and she stated that they could do video visit as they have duncant and alonso. Stated they completed one yesterday with a provider.  Reminded to log in 15 mins before apt and check in and then they would be notified when provider joined.     During call allergies, medications, and pharmacy were reviewed with wife.  Declined ESAS but controlled tracking flowsheet was completed. Wife states he has been tolerating chemo well with his next treatment scheduled for 4/28.  States radiation did take a toll on him causing some balance and memory issues.  She states those were discussed with the oncologist.  NO pain issues at this time. Pt has stopped taking percocet and only takes Tylenol 500mg qid atc.

## 2020-04-23 ENCOUNTER — OFFICE VISIT (OUTPATIENT)
Dept: PALLIATIVE CARE | Facility: CLINIC | Age: 65
End: 2020-04-23

## 2020-04-23 DIAGNOSIS — G89.3 PAIN FROM BONE METASTASES (HCC): ICD-10-CM

## 2020-04-23 DIAGNOSIS — R53.83 FATIGUE DUE TO TREATMENT: ICD-10-CM

## 2020-04-23 DIAGNOSIS — C79.9 METASTATIC CANCER (HCC): Primary | ICD-10-CM

## 2020-04-23 DIAGNOSIS — C79.51 PAIN FROM BONE METASTASES (HCC): ICD-10-CM

## 2020-04-23 DIAGNOSIS — R29.898 MUSCULAR DECONDITIONING: ICD-10-CM

## 2020-04-23 DIAGNOSIS — R26.89 LOSS OF BALANCE: ICD-10-CM

## 2020-04-23 PROCEDURE — 99215 OFFICE O/P EST HI 40 MIN: CPT | Performed by: INTERNAL MEDICINE

## 2020-04-23 RX ORDER — DULOXETIN HYDROCHLORIDE 30 MG/1
30 CAPSULE, DELAYED RELEASE ORAL DAILY
Qty: 90 CAPSULE | Refills: 0 | Status: SHIPPED | OUTPATIENT
Start: 2020-04-23 | End: 2020-05-08

## 2020-04-23 RX ORDER — GABAPENTIN 100 MG/1
CAPSULE ORAL
Qty: 180 CAPSULE | Refills: 0
Start: 2020-04-23

## 2020-04-23 NOTE — PROGRESS NOTES
This provider note, based on phone call or other Telemedicine alternative, was created to either supplement or replace provision of in-person palliative care services by a provider (physician or nurse practitioner).  These services were not provided face-to-face due to the current recommendations of the CDC, restrictions implemented by the [facility] clinic or hospital which houses the clinic, or by request of the patient/family during the 2020 COVID-19 pandemic.    Patient verbally consented to this telehealth encounter.    Background:  Referring/Oncology:  Karen Batista MD  Rad/Onc:  Vidal Ching MD  Primary Care:  Jeimy Johnson     64yom with stage IV NSC lung cancer, R lung dx Jan 2018.  Metastatic to bones of T-spine/L-spine/pelvis, multiple pulmonary nodules, numerous small brain metastases.  Complications of reactive anxiety and DVT.     MRI Lumbar Spine 12/30/19:  Metastatic lesions T12, L3 and L5, R sacrum, bilateral ilium.     Treatment plan:  Osemertinib since Feb 2018, Xgeva.  Palliative XRT to L12-L5 and left iliac wing metastases 1/22-1/28/20.  s/p single fraction XRT to R iliac crest sclerotic lesion 2/2 pain, as well as whole brain radiation.  Now on Carbo/Taxol/Tecentriq/Avastin started 4/7/20    Advance care plan:    -  Healthcare decision making plan: wife Nelida Chambers    Interim history (chart review):  Message from wife of neuro issues.  Cognitive impairment in his speech - will forget what he was going to say.  Also with balance and coordination decline, fell onto grass during walk yesterday.    Interim history (per patient):  For past 3-4 weeks, has been lying in bed everyday until 2 days ago.  Has  visit yesterday.  Now getting up more and getting outside.    No longer taking any opioids.  APAP 500mg morning, noon, late evening, and bedtime.      Drinking more, urination clearing up in color.    Mild lightheadedness when he gets up.  Resolves after he is up for a while.   Wife notes his legs look weak.  Struggles with balance.  Focus and conversation was better 2 weeks ago compared, per wife, but has been stable for past week.    MYLENE-7:    Over the last two weeks, how often have you been bothered by the following problems?  Feeling nervous, anxious or on edge: Not at all  Not being able to stop or control worrying: Not at all  Worrying too much about different things: Not at all  Trouble Relaxing: Not at all  Being so restless that it is hard to sit still: Not at all  Becoming easily annoyed or irritable: Several days  Feeling afraid as if something awful might happen: Nearly every day  MYLENE 7 Total Score: 4    PHQ-9:  PHQ-2/PHQ-9 Depression Screening 4/23/2020   Little interest or pleasure in doing things 2   Feeling down, depressed, or hopeless 3   Trouble falling or staying asleep, or sleeping too much 3   Feeling tired or having little energy 3   Poor appetite or overeating 1   Feeling bad about yourself - or that you are a failure or have let yourself or your family down 0   Trouble concentrating on things, such as reading the newspaper or watching television 3   Moving or speaking so slowly that other people could have noticed. Or the opposite - being so fidgety or restless that you have been moving around a lot more than usual 3   Thoughts that you would be better off dead, or of hurting yourself in some way 0   Total Score 18   If you checked off any problems, how difficult have these problems made it for you to do your work, take care of things at home, or get along with other people? -        ECOG: (2) Ambulatory and capable of self care, unable to carry out work activity, up and about > 50% or waking hours    Physical Exam:  General:  Awake, seated in recliner, NAD  HEENT:  Face symmetric, EOMI, anicteric  PULM:  On room air, no respiratory distress, no use of accessory muscles noted, even pattern  Neuro:  Alert, oriented.  Delayed answers at times noted.  Psych:  Smiling,  normal affect, no agitation nor slowing    UDS:  THC, Screen, Urine   Date Value Ref Range Status   02/25/2020 Negative Negative Final     Phencyclidine (PCP), Urine   Date Value Ref Range Status   02/25/2020 Negative Negative Final     Cocaine Screen, Urine   Date Value Ref Range Status   02/25/2020 Negative Negative Final     Methamphetamine, Ur   Date Value Ref Range Status   02/25/2020 Negative Negative Final     Opiate Screen   Date Value Ref Range Status   02/25/2020 Negative Negative Final     Amphetamine Screen, Urine   Date Value Ref Range Status   02/25/2020 Negative Negative Final     Benzodiazepine Screen, Urine   Date Value Ref Range Status   02/25/2020 Negative Negative Final     Tricyclic Antidepressants Screen   Date Value Ref Range Status   02/25/2020 Negative Negative Final     Methadone Screen, Urine   Date Value Ref Range Status   02/25/2020 Negative Negative Final     Barbiturates Screen, Urine   Date Value Ref Range Status   02/25/2020 Negative Negative Final     Oxycodone Screen, Urine   Date Value Ref Range Status   02/25/2020 Negative Negative Final     Propoxyphene Screen   Date Value Ref Range Status   02/25/2020 Negative Negative Final     Buprenorphine, Screen, Urine   Date Value Ref Range Status   02/25/2020 Negative Negative Final          [place] Symptom Assessment System Revised  Pain Score: no pain(worst 5-6 (back))   ESAS Tiredness Score: 5  ESAS Nausea Score: 3  ESAS Depression Score: 3  ESAS Anxiety Score: 3  ESAS Drowsiness Score: 3  ESAS Lack of Appetite Score: 2  ESAS Wellbeing Score: 2  ESAS Dyspnea Score: No shortness of breath  ESAS Other Problem Score: 3(Physical Function)  ESAS Source of Information: patient    CONTROLLED SUBSTANCE TRACKING 2/4/2020 2/25/2020 4/22/2020   Last Kalyan 2/3/2020 2/24/2020 4/22/2020   Report Number [id number]   Last UDS - 2/4/2020 2/25/2020   Last Controlled Substance Agreement - - 2/4/2020   ORT Initial Risk Score 1 1 1  "  Prior UDT result - Expected Expected   Pill count - Expected -   Diversion Concern No No No   Disposal Education and Agreement [id number]   Naloxone - - Yes          Assessment/Plan   Rajan was seen today for follow-up.    Diagnoses and all orders for this visit:    Metastatic cancer (CMS/HCC)  -     QUESTIONNAIRE SERIES    Pain from bone metastases (CMS/HCC)  -     gabapentin (NEURONTIN) 100 MG capsule; 1-2 tabs TID as tolerated    Fatigue due to treatment    Muscular deconditioning    Loss of balance    Other orders  -     DULoxetine (CYMBALTA) 30 MG capsule; Take 1 capsule by mouth Daily for 90 days.                   Patient Instructions of AVS:  1.  Bed exercises:  Warm up.  Lying flat on bed.  Knees bent.  Pull knee to chest one at a time, grab under knee.  Stretch for 5 minutes    2.  Legs.  Resistance band/loops.  10 reps each.  2 sets.    Stand up from seated position.  (hips, knees, ankles at 90 degrees.  Knees hip width apart.  Feet planted flat on ground)  Resistance loop around knees and open legs.    Resistance loop and knee up to chest, alternate 10 reps each leg.    Resistance loop under ball of feet with leg extended and \"push on gas pedal\" 10 times each leg.      3.  Arms.  Weighted (light, up to 5lbs) seated exercises.  10 reps each.  2 sets.    Overhead with light weights.    Upright rows.    Lateral biceps curls.    Triceps kickbacks.    4.  Daily walk outside on driveway for 15-30 minutes total, as tolerated.      Plan:  1. Gabapentin  Decrease to 100mg BID and 200mg at bedtime, if contributing to cognitive decline and balance issues.  Will watch for increase in sciatica pain, warranting resuming to 200mg TID  2. Discussed muscle strengthening exercises to combat cancer related fatigue and deconditioning  3. Discussed hydration  4. Discussed role of walker, if needed  5. Discussed anticipatory post-chemo nausea, vomiting, and fatigue, but to resume exercises after this subsides, " usually in 5-7 days.  6. Continue antidepressant and counseling with Susie Robles.  7. Pt advised to call our clinical line [phone] for any symptom or side effect concerns, new clinical needs, or for refill needs per usual clinic policies.    FOLLOW UP:  6 weeks    Call/encounter time: 44 min    Encounter technology:  Zoom

## 2020-04-23 NOTE — PATIENT INSTRUCTIONS
"1.  Bed exercises:  Warm up.  Lying flat on bed.  Knees bent.  Pull knee to chest one at a time, grab under knee.  Stretch for 5 minutes    2.  Legs.  Resistance band/loops.  10 reps each.  2 sets.    Stand up from seated position.  (hips, knees, ankles at 90 degrees.  Knees hip width apart.  Feet planted flat on ground)  Resistance loop around knees and open legs.    Resistance loop and knee up to chest, alternate 10 reps each leg.    Resistance loop under ball of feet with leg extended and \"push on gas pedal\" 10 times each leg.      3.  Arms.  Weighted (light, up to 5lbs) seated exercises.  10 reps each.  2 sets.    Overhead with light weights.    Upright rows.    Lateral biceps curls.    Triceps kickbacks.    4.  Daily walk outside on driveway for 15-30 minutes total, as tolerated.      Call [phone] or send ALOSKO message to Palliative for questions regarding medications, refills, or plan of care on Mondays - Fridays 9am to 4pm.  (Note that individual messages to Dr. Winter will NOT be reviewed outside of clinics on Tuesdays 9-4pm and on Thursdays 830-noon)    You must call AT LEAST 7-10 BUSINESS DAYS in advance for any refill requests. Clinic days are Tuesday and Thursdays at this time.  Prescriptions for controlled medications will be completed on clinic days only.  Please be aware of additional insurance prior authorization processing time required for many of those medications.      Call after hours and weekends only for new or acute (not chronic) symptom issues to speak to on-call physician or nurse practitioner.  Be advised that any requests for prescriptions for controlled substances can NOT be honored after hours, including refill requests.    Call [phone] only for scheduling issues.    Novel Coronavirus Infection  Novel coronavirus, also known as 2019-nCoV, is a type of virus that causes respiratory illness. This may lead to inflammation and the buildup of mucus and fluids in the airway of " the lungs (pneumonia). There are many different coronaviruses.   What are the causes?  This illness is caused by a virus. You may catch the virus by:  · Breathing in droplets from an infected person's cough or sneeze.  · Touching something, like a table or a doorknob, that was exposed to the virus (contaminated) and then touching your mouth, nose, or eyes.  · Being around animals that carry the virus, or eating uncooked or undercooked meat or animal products that contain the virus.  What increases the risk?  You are more likely to develop this condition if you:  · Come in contact with a person infected with novel coronavirus, even prior to them having symptoms or if they have mild symptoms.  · Provide care for or live with a person who is infected with the novel coronavirus.  What are the signs or symptoms?  The novel coronavirus causes respiratory illness that can lead to pneumonia. Symptoms of pneumonia may include:  · A fever.  · A cough.  · Difficulty breathing.  How is this diagnosed?  This condition may be diagnosed based on:  · Your signs and symptoms, especially if:  · A physical exam.  · Lab tests, which may include:  ? A nasal swab to take a sample of fluid from your nose.  ? A throat swab to take a sample of fluid from your throat.  ? A sample of mucus from your lungs (sputum).  ? Blood tests that can support suspicion of the disease.  How is this treated?  There is no medicine to treat the novel coronavirus. Your health care provider will talk with you about ways to treat your symptoms. This may include rest, fluids, and over-the-counter medicines.  Follow these instructions at home:  Lifestyle  · Use a cool-mist room humidifier to add moisture to the air. This can help you breathe more easily.    · Do not put a fan to the face nor use nebulizers (use inhalers instead), as this can increase virus in the air and increase exposure to persons around you.  · Do not use any products that contain nicotine or  tobacco, such as cigarettes, e-cigarettes, and chewing tobacco. If you need help quitting, ask your health care provider.  · Rest at home as told by your health care provider.  · Return to your normal activities as told by your health care provider. Ask your health care provider what activities are safe for you.  General instructions  · Take over-the-counter and prescription medicines only as told by your health care provider.  · Drink enough fluid to keep your urine pale yellow.  · Keep all follow-up visits as told by your health care provider. This is important.  How is this prevented?    There is no vaccine to help prevent the novel coronavirus infection. However, there are steps you can take to protect yourself and others from this virus.  To protect yourself:   · Do not travel to areas where novel coronavirus is a risk. The areas where the coronavirus is reported change often. To identify high-risk areas, check the [facility] travel website: wwwnc.cdc.gov/travel/notices  · If you live in, or must travel to, an area where the coronavirus is a risk, take precautions to avoid infection.  ? Stay away from people who are sick.  Practice social distancing as much as possible, even from persons who do not exhibit symptoms.  ? Wash your hands often with soap and water. If soap and water are not available, use an alcohol-based hand .  ? Avoid touching your mouth, face, eyes, or nose.  ? Wear a mask around other persons, and request that they wear a mask around you or stay more than 6 feet away from you.  To protect others:  If you have symptoms, take steps to prevent the virus from spreading to others.  · If you think you have a coronavirus infection, contact your health care provider right away. Tell your health care team that you think you may have a novel coronavirus infection.  · Stay home. Leave your house only to seek medical care.  · Do not travel while you are sick.  · Wash your hands often with soap and  water. If soap and water are not available, use alcohol-based hand .  · Stay away from other members of your household. If possible, stay in your own room, separate from others. Use a different bathroom.  · Make sure that all people in your household wash their hands well and often.  · Cough or sneeze into a tissue or your sleeve or elbow. Do not cough or sneeze into your hand or into the air.  · Wear a face mask.  Where to find more information  · Centers for Disease Control and Prevention: www.cdc.gov/coronavirus/2019-ncov/index.html  · World Health Organization: www.who.int/health-topics/coronavirus  Contact a health care provider if:  · You have traveled to an area where novel coronavirus is a risk and you have symptoms of the infection.  · You have contact with someone who has traveled to an area where novel coronavirus is a risk and you have symptoms of the infection.  Get help right away if:  · You have trouble breathing.  · You have chest pain.  Summary  · Novel coronavirus is a type of virus that causes respiratory illness. This may lead to inflammation and the buildup of mucus and fluids in the airway of the lungs (pneumonia).  · You are more likely to develop this condition if you live in or travel to an area where there is an outbreak of the novel coronavirus.  · There is no medicine to treat novel coronavirus. Your health care provider will talk with you about ways to treat your symptoms. This may include rest, fluids, and over-the-counter medicines.  · Take steps to protect yourself and others from infection. Wash your hands often. Stay away from other people who are sick and from places where there are animals that may carry the virus. Wear a mask if you are sick or if you are exposed to people who may be sick.    This information is not intended to replace advice given to you by your health care provider. Make sure you discuss any questions you have with your health care provider.

## 2020-04-27 ENCOUNTER — TELEPHONE (OUTPATIENT)
Dept: ONCOLOGY | Facility: CLINIC | Age: 65
End: 2020-04-27

## 2020-04-27 NOTE — TELEPHONE ENCOUNTER
Patient wife called to report that patient has appt tomorrow but she is worried that he won't let Dr. Vicente know what is going on.  She said that patient is not doing well neurologically and has trouble finding words or walking and with simple coordination.  I talked to Dr. Vicente and team and they said that the patient wife could accompany him to the appt.  She was relieved but she was worried that if she said too much in front of patient, he would get frustrated.   She feels like she is in a bad situation because he is not doing well but tries to mask it when he is at the doctor appt.  I told her I would let Cinthia and team know and she was grateful.

## 2020-04-28 ENCOUNTER — APPOINTMENT (OUTPATIENT)
Dept: LAB | Facility: HOSPITAL | Age: 65
End: 2020-04-28

## 2020-04-28 ENCOUNTER — OFFICE VISIT (OUTPATIENT)
Dept: ONCOLOGY | Facility: CLINIC | Age: 65
End: 2020-04-28

## 2020-04-28 ENCOUNTER — HOSPITAL ENCOUNTER (OUTPATIENT)
Dept: ONCOLOGY | Facility: HOSPITAL | Age: 65
Setting detail: INFUSION SERIES
Discharge: HOME | End: 2020-04-28

## 2020-04-28 VITALS — DIASTOLIC BLOOD PRESSURE: 74 MMHG | SYSTOLIC BLOOD PRESSURE: 116 MMHG | HEART RATE: 98 BPM

## 2020-04-28 DIAGNOSIS — C79.9 METASTATIC CANCER (HCC): Primary | ICD-10-CM

## 2020-04-28 DIAGNOSIS — C34.91 NON-SMALL CELL LUNG CANCER, RIGHT (HCC): ICD-10-CM

## 2020-04-28 DIAGNOSIS — C34.91 NON-SMALL CELL LUNG CANCER, RIGHT (HCC): Primary | ICD-10-CM

## 2020-04-28 LAB
ALBUMIN SERPL-MCNC: 4.7 G/DL (ref 3.5–5.2)
ALBUMIN/GLOB SERPL: 1.5 G/DL
ALP SERPL-CCNC: 117 U/L (ref 39–117)
ALT SERPL W P-5'-P-CCNC: 19 U/L (ref 1–41)
ANION GAP SERPL CALCULATED.3IONS-SCNC: 15 MMOL/L (ref 5–15)
AST SERPL-CCNC: 22 U/L (ref 1–40)
BILIRUB SERPL-MCNC: 0.4 MG/DL (ref 0.2–1.2)
BILIRUB UR QL STRIP: ABNORMAL
BUN BLD-MCNC: 15 MG/DL (ref 8–23)
BUN/CREAT SERPL: 11.6 (ref 7–25)
CALCIUM SPEC-SCNC: 10.1 MG/DL (ref 8.6–10.5)
CHLORIDE SERPL-SCNC: 101 MMOL/L (ref 98–107)
CLARITY UR: ABNORMAL
CO2 SERPL-SCNC: 26 MMOL/L (ref 22–29)
COLOR UR: ABNORMAL
CREAT BLD-MCNC: 1.29 MG/DL (ref 0.76–1.27)
CREAT BLDA-MCNC: 1.2 MG/DL (ref 0.6–1.3)
ERYTHROCYTE [DISTWIDTH] IN BLOOD BY AUTOMATED COUNT: 15.7 % (ref 12.3–15.4)
GFR SERPL CREATININE-BSD FRML MDRD: 56 ML/MIN/1.73
GLOBULIN UR ELPH-MCNC: 3.2 GM/DL
GLUCOSE BLD-MCNC: 114 MG/DL (ref 65–99)
GLUCOSE UR STRIP-MCNC: NEGATIVE MG/DL
HCT VFR BLD AUTO: 49.7 % (ref 37.5–51)
HGB BLD-MCNC: 15.8 G/DL (ref 13–17.7)
HGB UR QL STRIP.AUTO: ABNORMAL
KETONES UR QL STRIP: NEGATIVE
LEUKOCYTE ESTERASE UR QL STRIP.AUTO: NEGATIVE
LYMPHOCYTES # BLD AUTO: 1 10*3/MM3 (ref 0.7–3.1)
LYMPHOCYTES NFR BLD AUTO: 13.3 % (ref 19.6–45.3)
MCH RBC QN AUTO: 31.3 PG (ref 26.6–33)
MCHC RBC AUTO-ENTMCNC: 31.8 G/DL (ref 31.5–35.7)
MCV RBC AUTO: 98.6 FL (ref 79–97)
MONOCYTES # BLD AUTO: 0.5 10*3/MM3 (ref 0.1–0.9)
MONOCYTES NFR BLD AUTO: 6.1 % (ref 5–12)
NEUTROPHILS # BLD AUTO: 6 10*3/MM3 (ref 1.7–7)
NEUTROPHILS NFR BLD AUTO: 80.6 % (ref 42.7–76)
NITRITE UR QL STRIP: NEGATIVE
PH UR STRIP.AUTO: 6 [PH] (ref 5–8)
PLATELET # BLD AUTO: 281 10*3/MM3 (ref 140–450)
PMV BLD AUTO: 6.6 FL (ref 6–12)
POTASSIUM BLD-SCNC: 4.4 MMOL/L (ref 3.5–5.2)
PROT SERPL-MCNC: 7.9 G/DL (ref 6–8.5)
PROT UR QL STRIP: ABNORMAL
RBC # BLD AUTO: 5.04 10*6/MM3 (ref 4.14–5.8)
SODIUM BLD-SCNC: 142 MMOL/L (ref 136–145)
SP GR UR STRIP: 1.03 (ref 1–1.03)
UROBILINOGEN UR QL STRIP: ABNORMAL
WBC NRBC COR # BLD: 7.4 10*3/MM3 (ref 3.4–10.8)

## 2020-04-28 PROCEDURE — 82565 ASSAY OF CREATININE: CPT

## 2020-04-28 PROCEDURE — 96375 TX/PRO/DX INJ NEW DRUG ADDON: CPT

## 2020-04-28 PROCEDURE — 96417 CHEMO IV INFUS EACH ADDL SEQ: CPT

## 2020-04-28 PROCEDURE — 81003 URINALYSIS AUTO W/O SCOPE: CPT | Performed by: INTERNAL MEDICINE

## 2020-04-28 PROCEDURE — 25010000002 DEXAMETHASONE PER 1 MG: Performed by: INTERNAL MEDICINE

## 2020-04-28 PROCEDURE — 25010000002 CARBOPLATIN PER 50 MG: Performed by: INTERNAL MEDICINE

## 2020-04-28 PROCEDURE — 96366 THER/PROPH/DIAG IV INF ADDON: CPT

## 2020-04-28 PROCEDURE — 25010000002 PACLITAXEL PER 30 MG: Performed by: INTERNAL MEDICINE

## 2020-04-28 PROCEDURE — 25010000002 FOSAPREPITANT PER 1 MG: Performed by: INTERNAL MEDICINE

## 2020-04-28 PROCEDURE — 99215 OFFICE O/P EST HI 40 MIN: CPT | Performed by: INTERNAL MEDICINE

## 2020-04-28 PROCEDURE — 25010000002 DIPHENHYDRAMINE PER 50 MG: Performed by: INTERNAL MEDICINE

## 2020-04-28 PROCEDURE — 85025 COMPLETE CBC W/AUTO DIFF WBC: CPT | Performed by: INTERNAL MEDICINE

## 2020-04-28 PROCEDURE — 25010000002 BEVACIZUMAB-AWWB 400 MG/16ML SOLUTION 16 ML VIAL: Performed by: INTERNAL MEDICINE

## 2020-04-28 PROCEDURE — 25010000002 ATEZOLIZUMAB 1200 MG/20ML SOLUTION 20 ML VIAL: Performed by: INTERNAL MEDICINE

## 2020-04-28 PROCEDURE — 96415 CHEMO IV INFUSION ADDL HR: CPT

## 2020-04-28 PROCEDURE — 25010000002 BEVACIZUMAB-AWWB 100 MG/4ML SOLUTION 4 ML VIAL: Performed by: INTERNAL MEDICINE

## 2020-04-28 PROCEDURE — 80053 COMPREHEN METABOLIC PANEL: CPT | Performed by: INTERNAL MEDICINE

## 2020-04-28 PROCEDURE — 96413 CHEMO IV INFUSION 1 HR: CPT

## 2020-04-28 PROCEDURE — 96367 TX/PROPH/DG ADDL SEQ IV INF: CPT

## 2020-04-28 PROCEDURE — 25010000002 PALONOSETRON 0.25 MG/5ML SOLUTION PREFILLED SYRINGE: Performed by: INTERNAL MEDICINE

## 2020-04-28 RX ORDER — SODIUM CHLORIDE 9 MG/ML
250 INJECTION, SOLUTION INTRAVENOUS ONCE
Status: COMPLETED | OUTPATIENT
Start: 2020-04-28 | End: 2020-04-28

## 2020-04-28 RX ORDER — FAMOTIDINE 10 MG/ML
20 INJECTION, SOLUTION INTRAVENOUS ONCE
Status: COMPLETED | OUTPATIENT
Start: 2020-04-28 | End: 2020-04-28

## 2020-04-28 RX ORDER — PALONOSETRON 0.05 MG/ML
0.25 INJECTION, SOLUTION INTRAVENOUS ONCE
Status: COMPLETED | OUTPATIENT
Start: 2020-04-28 | End: 2020-04-28

## 2020-04-28 RX ADMIN — PALONOSETRON 0.25 MG: 0.25 INJECTION, SOLUTION INTRAVENOUS at 10:05

## 2020-04-28 RX ADMIN — CARBOPLATIN 650 MG: 10 INJECTION, SOLUTION INTRAVENOUS at 16:01

## 2020-04-28 RX ADMIN — SODIUM CHLORIDE 150 MG: 9 INJECTION, SOLUTION INTRAVENOUS at 10:21

## 2020-04-28 RX ADMIN — SODIUM CHLORIDE 420 MG: 9 INJECTION, SOLUTION INTRAVENOUS at 12:57

## 2020-04-28 RX ADMIN — BEVACIZUMAB-AWWB 1400 MG: 400 INJECTION, SOLUTION INTRAVENOUS at 11:49

## 2020-04-28 RX ADMIN — DEXAMETHASONE SODIUM PHOSPHATE 20 MG: 4 INJECTION, SOLUTION INTRAMUSCULAR; INTRAVENOUS at 10:05

## 2020-04-28 RX ADMIN — FAMOTIDINE 20 MG: 10 INJECTION, SOLUTION INTRAVENOUS at 10:05

## 2020-04-28 RX ADMIN — SODIUM CHLORIDE 250 ML: 9 INJECTION, SOLUTION INTRAVENOUS at 10:05

## 2020-04-28 RX ADMIN — DIPHENHYDRAMINE HYDROCHLORIDE 50 MG: 50 INJECTION INTRAMUSCULAR; INTRAVENOUS at 10:05

## 2020-04-28 RX ADMIN — ATEZOLIZUMAB 1200 MG: 1200 INJECTION, SOLUTION INTRAVENOUS at 10:43

## 2020-04-28 NOTE — PROGRESS NOTES
DATE OF VISIT: 4/28/2020    REASON FOR VISIT: Followup for Non-small cell lung cancer-metastatic      HISTORY OF PRESENT ILLNESS: The patient is a very pleasant 64 y.o. male with past medical history significant for EGFR related non-small cell lung cancer-metastatic with widespread bony metastasis and multiple pulmonary nodules, diagnosed February 2018.  He developed a dry cough and shortness of breath in Dec 2017.  A chest XR in January 2018 revealed a right lung mass. A CT scan was ordered and revealed several centimeter right upper  Lung field with associated multiple bilateral nodular densities consistent with metastatic disease.  He was referred and underwent a CT directed needle biopsy on 01/10/2018 which showed malignancy compatible with non-small cell lung cancer.  On January 17, 2018 he underwent a CT FNA.  Pathology was consistent with a non-small cell carcinoma but inadequate tissue specimen for molecular testing.  As part of his workup, he underwent a PET/CT scan today which revealed a moderately sized right pneumothorax. He was also found to have numerous small brain mets. The patient was admitted for a chest tube placement and management of his pneumothorax.  He saw Dr. Silva at [facility] in January 2018. Caris testing performed.Tumor is EGFR L858R mutated.  Tagrisso was recommended and patient started on 02/2018.  Repeated scans revealed progressive disease on March 17, 2020.  Patient was started on carbo Taxol Avastin with Tecentriq April 07, 2020.  The patient is here today for follow up treatment cycle #2.    SUBJECTIVE: The patient is here today with his wife.  He started to feel better over the last 2 days.  Last week he has been extremely fatigue and on and off confused.  His appetite is coming back his pain is significantly better.    PAST MEDICAL HISTORY/SOCIAL HISTORY/FAMILY HISTORY: Unchanged from prior documentation done on 01/11/2018.     Review of Systems   Constitutional: Positive for  fatigue. Negative for activity change, appetite change, chills, fever and unexpected weight change.   HENT: Positive for congestion and postnasal drip. Negative for hearing loss, mouth sores, nosebleeds, sore throat and trouble swallowing.    Eyes: Negative for visual disturbance.   Respiratory: Positive for cough. Negative for chest tightness, shortness of breath and wheezing.    Cardiovascular: Negative for chest pain, palpitations and leg swelling.   Gastrointestinal: Negative for abdominal distention, abdominal pain, blood in stool, constipation, diarrhea, nausea, rectal pain and vomiting.   Endocrine: Negative for cold intolerance and heat intolerance.   Genitourinary: Negative for difficulty urinating, dysuria, frequency and urgency.   Musculoskeletal: Positive for back pain. Negative for arthralgias, gait problem, joint swelling and myalgias.   Skin: Negative for rash.   Neurological: Negative for dizziness, tremors, syncope, weakness, light-headedness, numbness and headaches.   Hematological: Negative for adenopathy. Does not bruise/bleed easily.   Psychiatric/Behavioral: Negative for confusion, sleep disturbance and suicidal ideas. The patient is not nervous/anxious.          Current Outpatient Medications:   •  acetaminophen (TYLENOL) 500 MG tablet, Take 2 tablets by mouth Every 6 (Six) Hours As Needed., Disp: , Rfl:   •  Calcium Carb-Cholecalciferol (CALCIUM/VITAMIN D) 600-400 MG-UNIT tablet, Take 2 tablets by mouth Daily., Disp: 60 tablet, Rfl: 5  •  calcium carbonate (OS-ROSIO) 600 MG tablet, Take 600 mg by mouth 2 (Two) Times a Day With Meals., Disp: , Rfl:   •  desloratadine (CLARINEX) 5 MG tablet, Take 1 tablet by mouth Daily., Disp: 90 tablet, Rfl: 3  •  dexamethasone (DECADRON) 4 MG tablet, Take 1 tablet by mouth 2 (Two) Times a Day With Meals., Disp: 60 tablet, Rfl: 3  •  diclofenac (VOLTAREN) 1 % gel gel, Apply 4 g topically to the appropriate area as directed 4 (Four) Times a Day., Disp: 100 g,  Rfl: 3  •  docusate sodium (COLACE) 100 MG capsule, Take 1 capsule by mouth 2 (Two) Times a Day As Needed for Constipation., Disp: 30 capsule, Rfl: 0  •  DULoxetine (CYMBALTA) 30 MG capsule, Take 1 capsule by mouth Daily for 90 days., Disp: 90 capsule, Rfl: 0  •  fluticasone (FLONASE) 50 MCG/ACT nasal spray, 2 sprays into the nostril(s) as directed by provider Daily. Administer 2 sprays in each nostril for each dose., Disp: 3 bottle, Rfl: 3  •  gabapentin (NEURONTIN) 100 MG capsule, 1-2 tabs TID as tolerated (Patient taking differently: Take 100 mg by mouth 3 (Three) Times a Day. 1-2 tabs TID as tolerated), Disp: 180 capsule, Rfl: 0  •  memantine (NAMENDA) 5 MG tablet, Take 1 tablet by mouth 2 (Two) Times a Day for 90 days., Disp: 180 tablet, Rfl: 0  •  ondansetron (ZOFRAN) 4 MG tablet, TAKE 2 TABLETS BY MOUTH TWICE DAILY AS NEEDED FOR NAUSEA, Disp: , Rfl:   •  rivaroxaban (XARELTO) 20 MG tablet, Take 1 tablet by mouth Daily With Dinner., Disp: 90 tablet, Rfl: 3  •  TAGRISSO 80 MG tablet, TAKE ONE TABLET (80 MG) BY MOUTH ONCE DAILY AT THE SAME TIME. MAY TAKEWITH OR WITHOUT FOOD. CALL [phone] FOR REFILLS, Disp: 30 tablet, Rfl: 10    PHYSICAL EXAMINATION:   There were no vitals taken for this visit.   ECOG Performance Status: 1 - Symptomatic but completely ambulatory  General Appearance:  alert, cooperative, no apparent distress and appears stated age   Neurologic/Psychiatric: A&O x 3, gait steady, appropriate affect, strength 5/5 in all muscle groups   HEENT:  Normocephalic, without obvious abnormality, mucous membranes moist   Neck: Supple, symmetrical, trachea midline, no adenopathy;  No thyromegaly, masses, or tenderness   Lungs:   Clear to auscultation bilaterally; respirations regular, even, and unlabored bilaterally   Heart:  Regular rate and rhythm, no murmurs appreciated   Abdomen:   Soft, non-tender, non-distended and no organomegaly   Lymph nodes: No cervical, supraclavicular, inguinal or axillary  adenopathy noted   Extremities: Normal, atraumatic; no clubbing, cyanosis, or edema    Skin: No rashes, ulcers, or suspicious lesions noted     No visits with results within 2 Week(s) from this visit.   Latest known visit with results is:   Hospital Outpatient Visit on 04/07/2020   Component Date Value Ref Range Status   • Glucose 04/07/2020 96  65 - 99 mg/dL Final   • BUN 04/07/2020 22  8 - 23 mg/dL Final   • Creatinine 04/07/2020 1.18  0.76 - 1.27 mg/dL Final   • Sodium 04/07/2020 141  136 - 145 mmol/L Final   • Potassium 04/07/2020 4.0  3.5 - 5.2 mmol/L Final   • Chloride 04/07/2020 104  98 - 107 mmol/L Final   • CO2 04/07/2020 24.0  22.0 - 29.0 mmol/L Final   • Calcium 04/07/2020 9.1  8.6 - 10.5 mg/dL Final   • Total Protein 04/07/2020 7.0  6.0 - 8.5 g/dL Final   • Albumin 04/07/2020 4.00  3.50 - 5.20 g/dL Final   • ALT (SGPT) 04/07/2020 20  1 - 41 U/L Final   • AST (SGOT) 04/07/2020 16  1 - 40 U/L Final   • Alkaline Phosphatase 04/07/2020 113  39 - 117 U/L Final   • Total Bilirubin 04/07/2020 0.3  0.2 - 1.2 mg/dL Final   • eGFR Non African Amer 04/07/2020 62  >60 mL/min/1.73 Final   • Globulin 04/07/2020 3.0  gm/dL Final   • A/G Ratio 04/07/2020 1.3  g/dL Final   • BUN/Creatinine Ratio 04/07/2020 18.6  7.0 - 25.0 Final   • Anion Gap 04/07/2020 13.0  5.0 - 15.0 mmol/L Final   • TSH 04/07/2020 1.270  0.270 - 4.200 uIU/mL Final   • Free T4 04/07/2020 0.91* 0.93 - 1.70 ng/dL Final   • Color, UA 04/07/2020 Yellow  Yellow, Straw Final   • Appearance, UA 04/07/2020 Hazy* Clear Final   • pH, UA 04/07/2020 5.0  5.0 - 8.0 Final   • Specific Gravity, UA 04/07/2020 1.030  1.005 - 1.030 Final   • Glucose, UA 04/07/2020 Negative  Negative Final   • Ketones, UA 04/07/2020 Negative  Negative Final   • Bilirubin, UA 04/07/2020 Negative  Negative Final   • Blood, UA 04/07/2020 Large (3+)* Negative Final   • Protein, UA 04/07/2020 Trace* Negative Final   • Leuk Esterase, UA 04/07/2020 Negative  Negative Final   • Nitrite, UA  04/07/2020 Negative  Negative Final   • Urobilinogen, UA 04/07/2020 0.2 E.U./dL  0.2 - 1.0 E.U./dL Final   • WBC 04/07/2020 8.30  3.40 - 10.80 10*3/mm3 Final   • RBC 04/07/2020 4.47  4.14 - 5.80 10*6/mm3 Final   • Hemoglobin 04/07/2020 14.5  13.0 - 17.7 g/dL Final   • Hematocrit 04/07/2020 44.3  37.5 - 51.0 % Final   • RDW 04/07/2020 18.9* 12.3 - 15.4 % Final   • MCV 04/07/2020 99.1* 79.0 - 97.0 fL Final   • MCH 04/07/2020 32.5  26.6 - 33.0 pg Final   • MCHC 04/07/2020 32.8  31.5 - 35.7 g/dL Final   • MPV 04/07/2020 6.9  6.0 - 12.0 fL Final   • Platelets 04/07/2020 145  140 - 450 10*3/mm3 Final   • Neutrophil % 04/07/2020 83.1* 42.7 - 76.0 % Final   • Lymphocyte % 04/07/2020 11.0* 19.6 - 45.3 % Final   • Monocyte % 04/07/2020 5.9  5.0 - 12.0 % Final   • Neutrophils, Absolute 04/07/2020 6.90  1.70 - 7.00 10*3/mm3 Final   • Lymphocytes, Absolute 04/07/2020 0.90  0.70 - 3.10 10*3/mm3 Final   • Monocytes, Absolute 04/07/2020 0.50  0.10 - 0.90 10*3/mm3 Final   • Creatinine 04/07/2020 1.20  0.60 - 1.30 mg/dL Final    Serial Number: [id number]:  [id number]      No results found.(  No results found.    ASSESSMENT: The patient is a very pleasant 64 y.o. male  with Stage IV  Non-small cell lung cancer.     PROBLEM LIST:  1. Stage IV  Non-small cell lung cancer [id number] with brain/bone metastasis:  A. Presented with SOB 01/2018, 01/04/2018 CT chest/abdomen/pelvis: Innumerable bilateral pulmonary nodules with  Expansile lucent lesion within Left iliac bone 3.5cm.   B. Status post bronchoscopy with biopsy done on January 10, 2018 showed adenocarcinoma     C. 01/17/2018 PET/CT: Dominant multiple diffuse pulmonary parenchymal soft tissue lung nodules.  Multiple additional nodules scattered through both lungs too numerous too count. Right Paratracheal LN SUV 3.49.  Osseous metastases noted with 2 lesions in Right pelvis, most prominent Right ilium SUV 7.26.  Rib lesion along L rib cage.  Mild adenopathy base of neck and  supraclavicular areas on right and left.   D. MRI brain 01/18/2018: Numerous but small diffusely scattered enhancing nodules.  Largest lesion is 5mm. No evidence of hemorrhage or significant vasogenic edema.   E. Molecular testing revealed EGFR exon 19 mutation, PDL 10%, and p53 exon 17 mutation.  F. Started Tagrisso 02/2018.  G.  Progressive disease with increase in size of lung nodules bony mets as well as multiple brain lesions documented scans done March 17, 2020  H.  Started carboplatin with Taxol Tecentriq and Avastin April 7, 2020, status post 1 cycle  2. Seasonal allergies  3. Anxiety  4.  Low back pain  5.  Right lower extremity DVT    PLAN:  1.  I will proceed with treatment as scheduled today using carboplatin Taxol Tecentriq and Avastin cycle #2.  This is in compliance with NCCN guidelines and it is FDA approved regimen.  2.  The patient follow-up with me in 3 weeks cycle #3.  3.  The patient has completed whole brain radiation at this point.  4.  The patient has been self off steroids for continue to hold Decadron for now since he is on immunotherapy.  5.  I will resume Xgeva once every 6 weeks for symptomatic bone metastases.  This will be given concurrently with calcium vitamin D daily.  6. We reviewed the potential side effects of this regimen including fatigue, vomiting and nausea, hair loss, nephropathy, neuropathy, hearing loss, myelosuppression, and risk of infusion reaction.  7. We discussed potential side effects of immunotherapy including but not limited to immune mediated reactions with thyroiditis, pneumonitis, hepatitis, colitis, rash, and electrolytes abnormalities, fatigue, multiorgan failure, and possibly death.  8.  We will continue Xarelto 20 mg daily for DVT.    9.  I will continue the patient on Norco 5/325 mg as needed for cancer related pain.  We will continue gabapentin 100 mg 3 times daily.  Patient has been get his prescription filled by palliative clinic.  10.  I will monitor  the patient blood work including blood counts kidney function function and electrolytes.  11.  I will set the patient up to meet with my nurse practitioner for treatment indication.  12. I will repeat the patient scans prior to cycle #4.    Karen Vicente MD  4/28/2020  09:28

## 2020-05-04 ENCOUNTER — TELEPHONE (OUTPATIENT)
Dept: ONCOLOGY | Facility: CLINIC | Age: 65
End: 2020-05-04

## 2020-05-04 ENCOUNTER — TELEPHONE (OUTPATIENT)
Dept: RADIATION ONCOLOGY | Facility: HOSPITAL | Age: 65
End: 2020-05-04

## 2020-05-04 DIAGNOSIS — C79.9 METASTATIC CANCER (HCC): ICD-10-CM

## 2020-05-04 RX ORDER — DEXAMETHASONE 4 MG/1
4 TABLET ORAL
Qty: 60 TABLET | Refills: 0 | Status: ON HOLD | OUTPATIENT
Start: 2020-05-04 | End: 2020-05-18 | Stop reason: SDUPTHER

## 2020-05-04 NOTE — TELEPHONE ENCOUNTER
Discussed symptoms with dr fraser, and since kamini is concerned with possible brain swelling either from disease or treatment, will send in dexamethasone 4mg QAM. If not helping, advised to increase to BID use. [place] end to pharmacy, and after kamini discusses this with patient and family, may  at pharmacy to start

## 2020-05-04 NOTE — TELEPHONE ENCOUNTER
PT'S WIFE WANTS TO SPEAK WITH DR FAUSTIN ABOUT PT'S CONDITION , PT HAS BEEN VERY CONFUSED HIS BALANCE IS OFF PT'S WIFE IS CONCERNED     [facility] CONTACT # [phone]

## 2020-05-05 DIAGNOSIS — R41.89 COGNITIVE CHANGE: ICD-10-CM

## 2020-05-05 DIAGNOSIS — R26.89 LOSS OF BALANCE: Primary | ICD-10-CM

## 2020-05-06 DIAGNOSIS — R53.83 FATIGUE DUE TO TREATMENT: Primary | ICD-10-CM

## 2020-05-06 RX ORDER — METHYLPHENIDATE HYDROCHLORIDE 5 MG/1
2.5 TABLET ORAL 2 TIMES DAILY
Qty: 30 TABLET | Refills: 0 | Status: SHIPPED | OUTPATIENT
Start: 2020-05-06 | End: 2020-06-05

## 2020-05-06 RX ORDER — METHYLPHENIDATE HYDROCHLORIDE 2.5 MG/1
1 TABLET, CHEWABLE ORAL 2 TIMES DAILY
Qty: 60 EACH | Refills: 0 | Status: SHIPPED | OUTPATIENT
Start: 2020-05-06 | End: 2020-05-06 | Stop reason: CLARIF

## 2020-05-06 RX ORDER — MEGESTROL ACETATE 40 MG/ML
400 SUSPENSION ORAL DAILY
Qty: 300 ML | Refills: 0 | Status: SHIPPED | OUTPATIENT
Start: 2020-05-06 | End: 2020-06-05

## 2020-05-08 ENCOUNTER — HOSPITAL ENCOUNTER (OUTPATIENT)
Dept: RADIATION ONCOLOGY | Facility: HOSPITAL | Age: 65
Setting detail: RADIATION/ONCOLOGY SERIES
Discharge: HOME | End: 2020-05-08

## 2020-05-08 ENCOUNTER — OFFICE VISIT (OUTPATIENT)
Dept: RADIATION ONCOLOGY | Facility: HOSPITAL | Age: 65
End: 2020-05-08

## 2020-05-08 DIAGNOSIS — C79.51 PAIN FROM BONE METASTASES (HCC): ICD-10-CM

## 2020-05-08 DIAGNOSIS — G89.3 PAIN FROM BONE METASTASES (HCC): ICD-10-CM

## 2020-05-08 DIAGNOSIS — C34.91 NON-SMALL CELL LUNG CANCER, RIGHT (HCC): ICD-10-CM

## 2020-05-08 DIAGNOSIS — C79.31 BRAIN METASTASES: Primary | ICD-10-CM

## 2020-05-08 RX ORDER — ESCITALOPRAM OXALATE 20 MG/1
20 TABLET ORAL DAILY
Qty: 90 TABLET | Refills: 0 | Status: SHIPPED | OUTPATIENT
Start: 2020-05-08 | End: 2020-08-06

## 2020-05-08 NOTE — PROGRESS NOTES
TELEMEDICINE FOLLOW-UP NOTE    05/08/2020    Problem List Items Addressed This Visit        Respiratory    Non-small cell lung cancer, right (CMS/HCC)       Nervous and Auditory    Pain from bone metastases (CMS/HCC)    Brain metastases (CMS/HCC) - Primary          Time Devoted to Visit: 10 min including time to review his previous imaging and records, with 50% devoted to direct discussion.    Reason for Visit:  I personally contacted Rajan Chambers  today in an effort to screen for coronavirus symptoms and also to perform their scheduled follow-up visit remotely in light of the coronavirus pandemic.  With respect to their specific risks for coronavirus, their screen is as follows:      COVID-19 RISK SCREEN     1. Has the patient had close contact without PPE with a lab confirmed COVID-19 (+) person or a person under investigation (PUI) for COVID-19 infection?  -- No     2. Has the patient had respiratory symptoms, worsened/new cough and/or SOA, unexplained fever, or sudden loss of smell and/or taste in the past 7 days? --  No    3. Does the patient have baseline higher exposure risk such as working in healthcare field or currently residing in healthcare facility?  --  No       They also denied any new respiratory symptoms or fever within the past 14 days.    I counseled them regarding safe practices for minimizing risk for infection including hand-washing, self-isolation, limiting contacts, and we also discussed what to do should they develop symptoms including fever, chills, new cough, shortness of breath, or new body aches.    SUBJECTIVE:  With respect to their cancer diagnosis, Mr. Chambers has known metastatic lung cancer.  He was found to have progressive disease in mid March, and he completed a course of palliative whole brain radiation therapy to a dose of 30 Gy on April 6, 2020, and he also received a single fraction of 8 Gy to the right iliac wing.  He then restarted systemic chemotherapy with Carboplatin,  Paclitaxel, Avastin, and Tecentriq the next day on April 7, 2020.  Mr. Chambers was assisted by his wife today, who states that approximately 1 week following radiation, he began acting intermittently confused, profoundly weak and tired, and that he has had poor balance and what she describes as a shuffling gate.  He as had a few days since then where he may have improved slightly, but Mrs. Chambers is very concerned as she says he is really not doing very well.  She contacted the [facility] earlier this week and he was restarted on steroids with Dexamethasone 4mg daily.  She thinks he may have improved very little since this medication change.  Reportedly, his pain is much better, and he is not taking opioid pain medications regularly or any other new medicines that could cause these symptoms.    KPS: 70%    EXAM FINDINGS AND/OR PROBLEMS:  Mr. Chambers appears confused, and has noticeable word finding difficulty, and does not sound or act like himself.    ASSESSMENT/PLAN: Rajan Chambers is a 64 y.o. year old male with metastatic non-small cell lung cancer.  He was found to have numerous small CNS metastases and is now 1 month post whole brain radiation.  The symptoms he is having could be related to progressive disease, but they are also concerning for acute radiation encephalopathy, and I suspect this is related to completing radiation and then starting multi-agent chemotherapy so quickly, which would not have allowed sufficient time to recover from the acute effects of radiation.  I had already started him on Memantine prior to starting whole brain radiation in an effort to limit the cognitive impact of treatment, and Dr. Winter with palliative added Ritalin earlier this week, and as mentioned, he also restarted Dexamethasone.  There is not much else to add right now from a medication standpoint.  I will try to move up the MRI that is ordered in the system, which is needed to not only assess response in the brain  but would also be helpful to determine if he has any signs of demyelination or necrosis.    RECOMMENDATIONS:  Return for Imaging - See orders.    Vidal Ching MD

## 2020-05-12 ENCOUNTER — TELEPHONE (OUTPATIENT)
Dept: ONCOLOGY | Facility: CLINIC | Age: 65
End: 2020-05-12

## 2020-05-12 DIAGNOSIS — C34.91 NON-SMALL CELL LUNG CANCER, RIGHT (HCC): Primary | ICD-10-CM

## 2020-05-12 NOTE — TELEPHONE ENCOUNTER
Referral placed to [facility] for a second opinion with Dr Steph Peters. Can we get this set up for him ASAP?  Thanks!

## 2020-05-12 NOTE — TELEPHONE ENCOUNTER
Patient wants to know if he can get a referral to [facility] and see dr. Steph Peters. Patient would like to go to [facility] for a 2nd opinion and see if there are any clinical trials that can help patient.     fax # [phone]    Patient still wants to see dr. Vicente as his dr.     Patient phone # [phone]

## 2020-05-14 ENCOUNTER — TELEPHONE (OUTPATIENT)
Dept: ONCOLOGY | Facility: CLINIC | Age: 65
End: 2020-05-14

## 2020-05-14 ENCOUNTER — HOSPITAL ENCOUNTER (INPATIENT)
Facility: HOSPITAL | Age: 65
LOS: 3 days | Discharge: HOSPICE | End: 2020-05-18
Attending: EMERGENCY MEDICINE | Admitting: INTERNAL MEDICINE

## 2020-05-14 ENCOUNTER — TELEPHONE (OUTPATIENT)
Dept: PALLIATIVE CARE | Facility: CLINIC | Age: 65
End: 2020-05-14

## 2020-05-14 ENCOUNTER — APPOINTMENT (OUTPATIENT)
Dept: CT IMAGING | Facility: HOSPITAL | Age: 65
End: 2020-05-14

## 2020-05-14 DIAGNOSIS — C79.9 METASTATIC CANCER (HCC): Primary | ICD-10-CM

## 2020-05-14 DIAGNOSIS — M54.50 ACUTE MIDLINE LOW BACK PAIN WITHOUT SCIATICA: ICD-10-CM

## 2020-05-14 DIAGNOSIS — R29.898 MUSCULAR DECONDITIONING: ICD-10-CM

## 2020-05-14 DIAGNOSIS — R52 UNCONTROLLED PAIN: ICD-10-CM

## 2020-05-14 LAB
ALBUMIN SERPL-MCNC: 4.1 G/DL (ref 3.5–5.2)
ALBUMIN/GLOB SERPL: 1.5 G/DL
ALP SERPL-CCNC: 101 U/L (ref 39–117)
ALT SERPL W P-5'-P-CCNC: 68 U/L (ref 1–41)
ANION GAP SERPL CALCULATED.3IONS-SCNC: 14 MMOL/L (ref 5–15)
AST SERPL-CCNC: 31 U/L (ref 1–40)
BASOPHILS # BLD AUTO: 0.04 10*3/MM3 (ref 0–0.2)
BASOPHILS NFR BLD AUTO: 0.5 % (ref 0–1.5)
BILIRUB SERPL-MCNC: 0.3 MG/DL (ref 0.2–1.2)
BUN BLD-MCNC: 22 MG/DL (ref 8–23)
BUN/CREAT SERPL: 20.6 (ref 7–25)
CALCIUM SPEC-SCNC: 9.1 MG/DL (ref 8.6–10.5)
CHLORIDE SERPL-SCNC: 103 MMOL/L (ref 98–107)
CO2 SERPL-SCNC: 23 MMOL/L (ref 22–29)
CREAT BLD-MCNC: 1.07 MG/DL (ref 0.76–1.27)
DEPRECATED RDW RBC AUTO: 49.2 FL (ref 37–54)
EOSINOPHIL # BLD AUTO: 0 10*3/MM3 (ref 0–0.4)
EOSINOPHIL NFR BLD AUTO: 0 % (ref 0.3–6.2)
ERYTHROCYTE [DISTWIDTH] IN BLOOD BY AUTOMATED COUNT: 14.6 % (ref 12.3–15.4)
GFR SERPL CREATININE-BSD FRML MDRD: 70 ML/MIN/1.73
GLOBULIN UR ELPH-MCNC: 2.7 GM/DL
GLUCOSE BLD-MCNC: 139 MG/DL (ref 65–99)
HCT VFR BLD AUTO: 42 % (ref 37.5–51)
HGB BLD-MCNC: 14.7 G/DL (ref 13–17.7)
IMM GRANULOCYTES # BLD AUTO: 0.57 10*3/MM3 (ref 0–0.05)
IMM GRANULOCYTES NFR BLD AUTO: 6.6 % (ref 0–0.5)
LYMPHOCYTES # BLD AUTO: 0.39 10*3/MM3 (ref 0.7–3.1)
LYMPHOCYTES NFR BLD AUTO: 4.5 % (ref 19.6–45.3)
MCH RBC QN AUTO: 33.7 PG (ref 26.6–33)
MCHC RBC AUTO-ENTMCNC: 35 G/DL (ref 31.5–35.7)
MCV RBC AUTO: 96.3 FL (ref 79–97)
MONOCYTES # BLD AUTO: 1.22 10*3/MM3 (ref 0.1–0.9)
MONOCYTES NFR BLD AUTO: 14.1 % (ref 5–12)
NEUTROPHILS # BLD AUTO: 6.41 10*3/MM3 (ref 1.7–7)
NEUTROPHILS NFR BLD AUTO: 74.3 % (ref 42.7–76)
NRBC BLD AUTO-RTO: 0.7 /100 WBC (ref 0–0.2)
NT-PROBNP SERPL-MCNC: 94.4 PG/ML (ref 5–900)
PLATELET # BLD AUTO: 203 10*3/MM3 (ref 140–450)
PMV BLD AUTO: 8.5 FL (ref 6–12)
POTASSIUM BLD-SCNC: 4.4 MMOL/L (ref 3.5–5.2)
PROT SERPL-MCNC: 6.8 G/DL (ref 6–8.5)
RBC # BLD AUTO: 4.36 10*6/MM3 (ref 4.14–5.8)
SODIUM BLD-SCNC: 140 MMOL/L (ref 136–145)
TROPONIN T SERPL-MCNC: <0.01 NG/ML (ref 0–0.03)
WBC NRBC COR # BLD: 8.63 10*3/MM3 (ref 3.4–10.8)

## 2020-05-14 PROCEDURE — 99284 EMERGENCY DEPT VISIT MOD MDM: CPT

## 2020-05-14 PROCEDURE — 80053 COMPREHEN METABOLIC PANEL: CPT | Performed by: EMERGENCY MEDICINE

## 2020-05-14 PROCEDURE — 70450 CT HEAD/BRAIN W/O DYE: CPT

## 2020-05-14 PROCEDURE — 72131 CT LUMBAR SPINE W/O DYE: CPT

## 2020-05-14 PROCEDURE — 93005 ELECTROCARDIOGRAM TRACING: CPT | Performed by: EMERGENCY MEDICINE

## 2020-05-14 PROCEDURE — 74176 CT ABD & PELVIS W/O CONTRAST: CPT

## 2020-05-14 PROCEDURE — 84484 ASSAY OF TROPONIN QUANT: CPT | Performed by: EMERGENCY MEDICINE

## 2020-05-14 PROCEDURE — 85025 COMPLETE CBC W/AUTO DIFF WBC: CPT | Performed by: EMERGENCY MEDICINE

## 2020-05-14 PROCEDURE — 83880 ASSAY OF NATRIURETIC PEPTIDE: CPT | Performed by: EMERGENCY MEDICINE

## 2020-05-14 RX ORDER — OXYCODONE HYDROCHLORIDE AND ACETAMINOPHEN 5; 325 MG/1; MG/1
1 TABLET ORAL ONCE
Status: COMPLETED | OUTPATIENT
Start: 2020-05-14 | End: 2020-05-15

## 2020-05-14 RX ORDER — ONDANSETRON 2 MG/ML
4 INJECTION INTRAMUSCULAR; INTRAVENOUS
Status: DISCONTINUED | OUTPATIENT
Start: 2020-05-14 | End: 2020-05-15

## 2020-05-14 RX ORDER — MORPHINE SULFATE 4 MG/ML
4 INJECTION, SOLUTION INTRAMUSCULAR; INTRAVENOUS
Status: DISCONTINUED | OUTPATIENT
Start: 2020-05-14 | End: 2020-05-15

## 2020-05-14 RX ORDER — SODIUM CHLORIDE 0.9 % (FLUSH) 0.9 %
10 SYRINGE (ML) INJECTION AS NEEDED
Status: DISCONTINUED | OUTPATIENT
Start: 2020-05-14 | End: 2020-05-18 | Stop reason: HOSPADM

## 2020-05-14 RX ORDER — SODIUM CHLORIDE 9 MG/ML
125 INJECTION, SOLUTION INTRAVENOUS CONTINUOUS
Status: DISCONTINUED | OUTPATIENT
Start: 2020-05-14 | End: 2020-05-18 | Stop reason: HOSPADM

## 2020-05-14 RX ADMIN — SODIUM CHLORIDE 125 ML/HR: 9 INJECTION, SOLUTION INTRAVENOUS at 23:31

## 2020-05-14 NOTE — TELEPHONE ENCOUNTER
Spoke with Nelida.  They have been in contact with Dr. Winter.  She talked to him regarding his pain medications and constipation.  Per Nelida, Dr. Winter is referring him to home health and setting him up for IVF's.  He does not currently need anything else.  They plan to keep his appointments next week as long as they are able to get him here.  She did talk to them about hospice, but they want to wait until he has had more scans to make that decision.

## 2020-05-14 NOTE — TELEPHONE ENCOUNTER
Received a voicemail on the triage line from patient's wife stating patient seems to be having a lot of pain.  She requests home health referral to be sent.  Called Nelida to discuss.  No answer x2.

## 2020-05-14 NOTE — TELEPHONE ENCOUNTER
"Returned call from patient's wife to my cell phone.  His appetite did get better after Decadron started and moving around a little bit more.  Last week, he would get up and sit outside and continue to walk to end of driveway.  Continued difficulty with verbal expression since brain radiation.    Past 2 days, moaning and groaning a lot.  Pt complaining of \"tail\" \"butt\" hurting.  He had stopped opioid medications and took only APAP 500mg TID due to fear of constipation.  Wife gave him the Percocet as well as the laxative.  For past 2 days, he is limping on R foot.  He now requires maximum assistance to transfer.  He is not staying ahead of his oral hydration.    Family would like to see if he can have scans moved up, to help them make decision to hospice.  However, they were in understanding that pt would need to receive at least 3 treatments (the 3rd is scheduled next week).  Functionally, he is not able to come for treatments.  They requested I call Dr. Vicente.    I called and spoke with Dr. Vicente and called them back.  Informed them that Dr. Vicente and I are in agreement that CT scan will not help in decision making, as patient functionally cannot come for treatments anymore, and hospice is appropriate for the most supportive care in the home as possible.    Sister requested home health for IVFs and states they will try over the weekend to get patient to move more, so he can come for infusion next week.    Plan:  Home Health RN/PT/OT/SLP.      "

## 2020-05-14 NOTE — TELEPHONE ENCOUNTER
JOSE WITH [facility] [[email or web address]] CALLED TO LET THE OFFICE KNOW THE MEDICAL RECORDS THAT WERE PREVIOUSLY REQUESTED FOR THE PT HAS BEEN RECEIVED.     NOTHING ELSE IS NEEDED.

## 2020-05-15 ENCOUNTER — APPOINTMENT (OUTPATIENT)
Dept: MRI IMAGING | Facility: HOSPITAL | Age: 65
End: 2020-05-15

## 2020-05-15 ENCOUNTER — TELEPHONE (OUTPATIENT)
Dept: ONCOLOGY | Facility: CLINIC | Age: 65
End: 2020-05-15

## 2020-05-15 PROBLEM — R41.82 AMS (ALTERED MENTAL STATUS): Status: ACTIVE | Noted: 2020-05-15

## 2020-05-15 PROBLEM — G89.3 CANCER RELATED PAIN: Status: ACTIVE | Noted: 2020-05-15

## 2020-05-15 LAB
ANION GAP SERPL CALCULATED.3IONS-SCNC: 13 MMOL/L (ref 5–15)
BASOPHILS # BLD AUTO: 0.04 10*3/MM3 (ref 0–0.2)
BASOPHILS NFR BLD AUTO: 0.5 % (ref 0–1.5)
BUN BLD-MCNC: 21 MG/DL (ref 8–23)
BUN/CREAT SERPL: 22.6 (ref 7–25)
CALCIUM SPEC-SCNC: 9 MG/DL (ref 8.6–10.5)
CHLORIDE SERPL-SCNC: 103 MMOL/L (ref 98–107)
CO2 SERPL-SCNC: 22 MMOL/L (ref 22–29)
CREAT BLD-MCNC: 0.93 MG/DL (ref 0.76–1.27)
DEPRECATED RDW RBC AUTO: 49.7 FL (ref 37–54)
EOSINOPHIL # BLD AUTO: 0 10*3/MM3 (ref 0–0.4)
EOSINOPHIL NFR BLD AUTO: 0 % (ref 0.3–6.2)
ERYTHROCYTE [DISTWIDTH] IN BLOOD BY AUTOMATED COUNT: 14.6 % (ref 12.3–15.4)
GFR SERPL CREATININE-BSD FRML MDRD: 82 ML/MIN/1.73
GLUCOSE BLD-MCNC: 115 MG/DL (ref 65–99)
HCT VFR BLD AUTO: 39.5 % (ref 37.5–51)
HGB BLD-MCNC: 13.7 G/DL (ref 13–17.7)
IMM GRANULOCYTES # BLD AUTO: 0.49 10*3/MM3 (ref 0–0.05)
IMM GRANULOCYTES NFR BLD AUTO: 5.5 % (ref 0–0.5)
LYMPHOCYTES # BLD AUTO: 0.39 10*3/MM3 (ref 0.7–3.1)
LYMPHOCYTES NFR BLD AUTO: 4.4 % (ref 19.6–45.3)
MCH RBC QN AUTO: 33.6 PG (ref 26.6–33)
MCHC RBC AUTO-ENTMCNC: 34.7 G/DL (ref 31.5–35.7)
MCV RBC AUTO: 96.8 FL (ref 79–97)
MONOCYTES # BLD AUTO: 1.71 10*3/MM3 (ref 0.1–0.9)
MONOCYTES NFR BLD AUTO: 19.3 % (ref 5–12)
NEUTROPHILS # BLD AUTO: 6.25 10*3/MM3 (ref 1.7–7)
NEUTROPHILS NFR BLD AUTO: 70.3 % (ref 42.7–76)
NRBC BLD AUTO-RTO: 0.8 /100 WBC (ref 0–0.2)
PLATELET # BLD AUTO: 193 10*3/MM3 (ref 140–450)
PMV BLD AUTO: 8.8 FL (ref 6–12)
POTASSIUM BLD-SCNC: 3.9 MMOL/L (ref 3.5–5.2)
RBC # BLD AUTO: 4.08 10*6/MM3 (ref 4.14–5.8)
SODIUM BLD-SCNC: 138 MMOL/L (ref 136–145)
WBC NRBC COR # BLD: 8.88 10*3/MM3 (ref 3.4–10.8)

## 2020-05-15 PROCEDURE — A9577 INJ MULTIHANCE: HCPCS | Performed by: INTERNAL MEDICINE

## 2020-05-15 PROCEDURE — 25010000002 HYDROMORPHONE PER 4 MG: Performed by: NURSE PRACTITIONER

## 2020-05-15 PROCEDURE — 70553 MRI BRAIN STEM W/O & W/DYE: CPT

## 2020-05-15 PROCEDURE — 99223 1ST HOSP IP/OBS HIGH 75: CPT | Performed by: INTERNAL MEDICINE

## 2020-05-15 PROCEDURE — 0 GADOBENATE DIMEGLUMINE 529 MG/ML SOLUTION: Performed by: INTERNAL MEDICINE

## 2020-05-15 PROCEDURE — 63710000001 DEXAMETHASONE PER 0.25 MG: Performed by: NURSE PRACTITIONER

## 2020-05-15 PROCEDURE — 85025 COMPLETE CBC W/AUTO DIFF WBC: CPT | Performed by: NURSE PRACTITIONER

## 2020-05-15 PROCEDURE — 25010000002 METHYLPREDNISOLONE PER 125 MG: Performed by: INTERNAL MEDICINE

## 2020-05-15 PROCEDURE — 80048 BASIC METABOLIC PNL TOTAL CA: CPT | Performed by: NURSE PRACTITIONER

## 2020-05-15 RX ORDER — CHOLECALCIFEROL (VITAMIN D3) 125 MCG
5 CAPSULE ORAL NIGHTLY
Status: DISCONTINUED | OUTPATIENT
Start: 2020-05-15 | End: 2020-05-18 | Stop reason: HOSPADM

## 2020-05-15 RX ORDER — METHYLPREDNISOLONE SODIUM SUCCINATE 125 MG/2ML
125 INJECTION, POWDER, LYOPHILIZED, FOR SOLUTION INTRAMUSCULAR; INTRAVENOUS DAILY
Status: DISCONTINUED | OUTPATIENT
Start: 2020-05-15 | End: 2020-05-18 | Stop reason: HOSPADM

## 2020-05-15 RX ORDER — SODIUM CHLORIDE 0.9 % (FLUSH) 0.9 %
10 SYRINGE (ML) INJECTION EVERY 12 HOURS SCHEDULED
Status: DISCONTINUED | OUTPATIENT
Start: 2020-05-15 | End: 2020-05-18 | Stop reason: HOSPADM

## 2020-05-15 RX ORDER — MEMANTINE HYDROCHLORIDE 10 MG/1
5 TABLET ORAL 2 TIMES DAILY
Status: DISCONTINUED | OUTPATIENT
Start: 2020-05-15 | End: 2020-05-18 | Stop reason: HOSPADM

## 2020-05-15 RX ORDER — DOCUSATE SODIUM 100 MG/1
100 CAPSULE, LIQUID FILLED ORAL 2 TIMES DAILY
Status: DISCONTINUED | OUTPATIENT
Start: 2020-05-15 | End: 2020-05-16

## 2020-05-15 RX ORDER — GABAPENTIN 100 MG/1
100 CAPSULE ORAL 3 TIMES DAILY
Status: DISCONTINUED | OUTPATIENT
Start: 2020-05-15 | End: 2020-05-18 | Stop reason: HOSPADM

## 2020-05-15 RX ORDER — QUETIAPINE FUMARATE 25 MG/1
50 TABLET, FILM COATED ORAL NIGHTLY
Status: DISCONTINUED | OUTPATIENT
Start: 2020-05-15 | End: 2020-05-15

## 2020-05-15 RX ORDER — ACETAMINOPHEN 500 MG
500 TABLET ORAL EVERY 6 HOURS PRN
Status: DISCONTINUED | OUTPATIENT
Start: 2020-05-15 | End: 2020-05-18 | Stop reason: HOSPADM

## 2020-05-15 RX ORDER — DEXAMETHASONE 4 MG/1
4 TABLET ORAL
Status: DISCONTINUED | OUTPATIENT
Start: 2020-05-15 | End: 2020-05-15

## 2020-05-15 RX ORDER — ESCITALOPRAM OXALATE 20 MG/1
20 TABLET ORAL DAILY
Status: DISCONTINUED | OUTPATIENT
Start: 2020-05-15 | End: 2020-05-18 | Stop reason: HOSPADM

## 2020-05-15 RX ORDER — FENTANYL 12 UG/H
1 PATCH TRANSDERMAL
Status: DISCONTINUED | OUTPATIENT
Start: 2020-05-15 | End: 2020-05-15

## 2020-05-15 RX ORDER — MEGESTROL ACETATE 40 MG/ML
400 SUSPENSION ORAL DAILY
Status: DISCONTINUED | OUTPATIENT
Start: 2020-05-15 | End: 2020-05-18 | Stop reason: HOSPADM

## 2020-05-15 RX ORDER — SODIUM CHLORIDE 0.9 % (FLUSH) 0.9 %
10 SYRINGE (ML) INJECTION AS NEEDED
Status: DISCONTINUED | OUTPATIENT
Start: 2020-05-15 | End: 2020-05-18 | Stop reason: HOSPADM

## 2020-05-15 RX ORDER — CETIRIZINE HYDROCHLORIDE 10 MG/1
10 TABLET ORAL DAILY
Status: DISCONTINUED | OUTPATIENT
Start: 2020-05-15 | End: 2020-05-18 | Stop reason: HOSPADM

## 2020-05-15 RX ORDER — OXYCODONE HYDROCHLORIDE AND ACETAMINOPHEN 5; 325 MG/1; MG/1
1 TABLET ORAL EVERY 4 HOURS PRN
Status: DISCONTINUED | OUTPATIENT
Start: 2020-05-15 | End: 2020-05-16

## 2020-05-15 RX ORDER — HYDROMORPHONE HYDROCHLORIDE 1 MG/ML
0.5 INJECTION, SOLUTION INTRAMUSCULAR; INTRAVENOUS; SUBCUTANEOUS
Status: DISCONTINUED | OUTPATIENT
Start: 2020-05-15 | End: 2020-05-16

## 2020-05-15 RX ORDER — FENTANYL 50 UG/H
1 PATCH TRANSDERMAL
Status: DISCONTINUED | OUTPATIENT
Start: 2020-05-15 | End: 2020-05-15

## 2020-05-15 RX ORDER — QUETIAPINE FUMARATE 25 MG/1
25 TABLET, FILM COATED ORAL NIGHTLY
Status: DISCONTINUED | OUTPATIENT
Start: 2020-05-15 | End: 2020-05-18 | Stop reason: HOSPADM

## 2020-05-15 RX ADMIN — ESCITALOPRAM OXALATE 20 MG: 20 TABLET ORAL at 08:37

## 2020-05-15 RX ADMIN — RIVAROXABAN 20 MG: 20 TABLET, FILM COATED ORAL at 17:19

## 2020-05-15 RX ADMIN — GABAPENTIN 100 MG: 100 CAPSULE ORAL at 08:36

## 2020-05-15 RX ADMIN — OXYCODONE HYDROCHLORIDE AND ACETAMINOPHEN 1 TABLET: 5; 325 TABLET ORAL at 00:05

## 2020-05-15 RX ADMIN — MEGESTROL ACETATE 400 MG: 40 SUSPENSION ORAL at 08:38

## 2020-05-15 RX ADMIN — OXYCODONE HYDROCHLORIDE AND ACETAMINOPHEN 1 TABLET: 5; 325 TABLET ORAL at 04:41

## 2020-05-15 RX ADMIN — MELATONIN TAB 5 MG 5 MG: 5 TAB at 21:23

## 2020-05-15 RX ADMIN — MEMANTINE 5 MG: 10 TABLET ORAL at 21:23

## 2020-05-15 RX ADMIN — GABAPENTIN 100 MG: 100 CAPSULE ORAL at 21:23

## 2020-05-15 RX ADMIN — SODIUM CHLORIDE 125 ML/HR: 9 INJECTION, SOLUTION INTRAVENOUS at 13:14

## 2020-05-15 RX ADMIN — DOCUSATE SODIUM 100 MG: 100 CAPSULE, LIQUID FILLED ORAL at 21:23

## 2020-05-15 RX ADMIN — Medication 1 TABLET: at 17:19

## 2020-05-15 RX ADMIN — METHYLPREDNISOLONE SODIUM SUCCINATE 125 MG: 125 INJECTION, POWDER, FOR SOLUTION INTRAMUSCULAR; INTRAVENOUS at 18:19

## 2020-05-15 RX ADMIN — OXYCODONE HYDROCHLORIDE AND ACETAMINOPHEN 1 TABLET: 5; 325 TABLET ORAL at 10:21

## 2020-05-15 RX ADMIN — DEXAMETHASONE 4 MG: 4 TABLET ORAL at 08:36

## 2020-05-15 RX ADMIN — Medication 1 TABLET: at 08:37

## 2020-05-15 RX ADMIN — SODIUM CHLORIDE, PRESERVATIVE FREE 10 ML: 5 INJECTION INTRAVENOUS at 21:23

## 2020-05-15 RX ADMIN — SODIUM CHLORIDE 125 ML/HR: 9 INJECTION, SOLUTION INTRAVENOUS at 22:45

## 2020-05-15 RX ADMIN — SODIUM CHLORIDE, PRESERVATIVE FREE 10 ML: 5 INJECTION INTRAVENOUS at 04:41

## 2020-05-15 RX ADMIN — CETIRIZINE HYDROCHLORIDE 10 MG: 10 TABLET, FILM COATED ORAL at 08:36

## 2020-05-15 RX ADMIN — MELATONIN TAB 5 MG 5 MG: 5 TAB at 04:41

## 2020-05-15 RX ADMIN — OXYCODONE HYDROCHLORIDE AND ACETAMINOPHEN 1 TABLET: 5; 325 TABLET ORAL at 16:13

## 2020-05-15 RX ADMIN — GABAPENTIN 100 MG: 100 CAPSULE ORAL at 16:13

## 2020-05-15 RX ADMIN — DOCUSATE SODIUM 100 MG: 100 CAPSULE, LIQUID FILLED ORAL at 08:36

## 2020-05-15 RX ADMIN — GADOBENATE DIMEGLUMINE 14 ML: 529 INJECTION, SOLUTION INTRAVENOUS at 05:00

## 2020-05-15 RX ADMIN — HYDROMORPHONE HYDROCHLORIDE 0.5 MG: 1 INJECTION, SOLUTION INTRAMUSCULAR; INTRAVENOUS; SUBCUTANEOUS at 22:44

## 2020-05-15 RX ADMIN — MEMANTINE 5 MG: 10 TABLET ORAL at 08:36

## 2020-05-15 RX ADMIN — QUETIAPINE FUMARATE 25 MG: 25 TABLET ORAL at 21:23

## 2020-05-15 RX ADMIN — HYDROMORPHONE HYDROCHLORIDE 0.5 MG: 1 INJECTION, SOLUTION INTRAMUSCULAR; INTRAVENOUS; SUBCUTANEOUS at 13:23

## 2020-05-15 NOTE — CONSULTS
"Jeimy Johnson MD - PCP  Consulting physician: Dr. Garrett Fuentes      HPI:   65yo M w/NSC lung ca, metastatic to the brain and spine. Disease progression found mid March and underwent whole brain radiation, completed about 1 month ago, and then quickly began multi agent chemo regimen.  Presents w/ increased confusion, progressive weakness (now requires assist of 2), and uncontrolled back pain (doesn't reliably take an  opiate bc of constipation). Sees Dr. Jacquie Winter in Palliative Care clinic and Dr. Vicente is his primary oncologist. Both have been in touch w/ patient's wife this past week, and as recently as yesterday, as patient has declined to the point that he can’t get back and forth for further tx.  Decadron was resumed earlier in the week.  Seen by Dr. Ching (radiation oncologist) as well with concern for radiation encephalopathy vs further disease progression.  Erwin Vicente and Maggy have rec'ed Hospice care but family hasn't been ready, asking for Home Health and hoping to get imaging to assess disease status earlier than had been scheduled.    Brain MRI this admission shows mild progression of metastatic disease per an updated Neuroradiologist read. Prelim report was erroneous.  Dr. Vicente has seen Mr. Chambers and did speak to wife this morning, relaying there was slight improvement in brain appearance due to the erroneous report.      Nursing reports patient has been \"all over the place\" today.  Sometimes can speak decently, other times not.  (Known history of expressive aphasia but now word finding difficulty increased.)  Has episodes of restlessness.  Did eat lunch when fed.  Has required I/O cath x2 today (obtained 100cc and 850cc respectively).  Has also been incontinent.  Percocet and Gabapentin have helped when he's appeared uncomfortable.    Wife says patient hasn't been eating at home, just sits and looks at his food.  She admits she'd never thought to try feeding him.  His balance is off " and has had to hold onto things when trying to ambulate.  Per Dr. Winter, patient historically hasn't liked how opiates make him feel.  Was rx'ed Ritalin to combat sedation and recent mental status changes but insurance wouldn't cover payment.    Meds reviewed.  Has received PRN Percocet (5mg) x2, IV dilaudid (0.5mg) x1  On gabapentin TID and seroquel QHS      Past Medical History:   Diagnosis Date   • Blood clot in vein    • Brain cancer (CMS/HCC)    • Cancer (CMS/HCC)     NSCLC   • History of radiation therapy 01/28/2020    T12-L5, left iliac wing   • Lung nodule    • Metastatic cancer (CMS/HCC)    • Pneumonia    • Shortness of breath     RECENT ONSET     Past Surgical History:   Procedure Laterality Date   • ABDOMINAL SURGERY     • BRONCHOSCOPY N/A 1/18/2018    Procedure: RANDALL AND BRONCHOSCOPY WITH ENDOBRONCHIAL ULTRASOUND WITH FLUORO;  Surgeon: Gustavo Haque MD;  Location: [facility] ENDOSCOPY;  Service:    • COLLATERAL LIGAMENT REPAIR, KNEE     • COLONOSCOPY  05/2019   • ENDOSCOPY     • EYE SURGERY     • HERNIA REPAIR     • KNEE ARTHROSCOPY     • LASIK     • LUNG BIOPSY Right 01/10/2018    Dr. Petty @ [facility]   • SKIN BIOPSY     • SKIN CANCER EXCISION Bilateral     BASAL CELL ON NECK YESTERDAY     Current Code Status     Date Active Code Status Order ID Comments User Context       5/15/2020 0023 No CPR [id number]  Jocelin Hsu APRN ED       Questions for Current Code Status     Question Answer Comment    Code Status No CPR     Medical Interventions (Level of Support Prior to Arrest) Limited     Limited Support to NOT Include Intubation         Current Facility-Administered Medications   Medication Dose Route Frequency Provider Last Rate Last Dose   • acetaminophen (TYLENOL) tablet 500 mg  500 mg Oral Q6H PRN Jocelin Hsu APRN       • calcium carb-cholecalciferol 600-800 MG-UNIT tablet 1 tablet  1 tablet Oral BID With Meals Jocelin Hsu APRN   1 tablet at 05/15/20 0837   • cetirizine (zyrTEC)  tablet 10 mg  10 mg Oral Daily Jocelin Hsu, APRN   10 mg at 05/15/20 0836   • dexamethasone (DECADRON) tablet 4 mg  4 mg Oral Daily With Breakfast Jocelin Hsu APRN   4 mg at 05/15/20 0836   • docusate sodium (COLACE) capsule 100 mg  100 mg Oral BID Jocelin Hsu, APRN   100 mg at 05/15/20 0836   • escitalopram (LEXAPRO) tablet 20 mg  20 mg Oral Daily Jocelin Hsu, APRN   20 mg at 05/15/20 0837   • gabapentin (NEURONTIN) capsule 100 mg  100 mg Oral TID Jocelin Hsu, APRN   100 mg at 05/15/20 0836   • HYDROmorphone (DILAUDID) injection 0.5 mg  0.5 mg Intravenous Q3H PRN Jocelin Hsu APRN   0.5 mg at 05/15/20 1323   • megestrol (MEGACE) 40 MG/ML suspension 400 mg  400 mg Oral Daily Jocelin Hsu APRN   400 mg at 05/15/20 0838   • melatonin tablet 5 mg  5 mg Oral Nightly Garrett Fuentes, DO   5 mg at 05/15/20 0441   • memantine (NAMENDA) tablet 5 mg  5 mg Oral BID Jocelin Hsu APRN   5 mg at 05/15/20 0836   • oxyCODONE-acetaminophen (PERCOCET) 5-325 MG per tablet 1 tablet  1 tablet Oral Q4H PRN Jocelin Hsu, APRN   1 tablet at 05/15/20 1021   • QUEtiapine (SEROquel) tablet 25 mg  25 mg Oral Nightly Garrett Fuentes, DO       • rivaroxaban (XARELTO) tablet 20 mg  20 mg Oral Daily With Dinner Jocelin Hsu, APRN       • sodium chloride 0.9 % flush 10 mL  10 mL Intravenous PRN Brian Aguilera DO       • sodium chloride 0.9 % flush 10 mL  10 mL Intravenous Q12H Jocelin Hsu, APRN   10 mL at 05/15/20 0441   • sodium chloride 0.9 % flush 10 mL  10 mL Intravenous PRN Jocelin Hsu, APRN       • sodium chloride 0.9 % infusion  125 mL/hr Intravenous Continuous Brian Aguilera  mL/hr at 05/15/20 1314 125 mL/hr at 05/15/20 1314       sodium chloride 125 mL/hr Last Rate: 125 mL/hr (05/15/20 1314)     Allergies   Allergen Reactions   • Penicillin G Hives   • Ibuprofen Itching and Dermatitis     Prickly rash on heaD   • Penicillins Rash   • Sulfa Antibiotics Rash and  "Itching     Family History   Problem Relation Age of Onset   • Cancer Mother    • Breast cancer Mother    • Emphysema Father    • Cancer Sister    • Breast cancer Sister    • Cancer Maternal Grandmother    • Breast cancer Maternal Grandmother    • Cancer Paternal Grandmother    • Breast cancer Paternal Grandmother    • Brain cancer Paternal Grandfather      Social History     Socioeconomic History   • Marital status:      Spouse name: Not on file   • Number of children: Not on file   • Years of education: Not on file   • Highest education level: Not on file   Tobacco Use   • Smoking status: Never Smoker   • Smokeless tobacco: Former User     Types: Snuff   Substance and Sexual Activity   • Alcohol use: No   • Drug use: No   • Sexual activity: Defer   Social History Narrative        Retired from the [employer]    A .    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    Chew tobacco up to 2001   Wife is Nelida Chambers  Has son Escobar Chambers and a daughter as well  Sister Ana Elise    Review of Systems - as per HPI and PMH, unable to obtain from pt due to AMS and expressive aphasia      /80 (BP Location: Right arm, Patient Position: Lying)   Pulse 64   Temp 98.1 °F (36.7 °C) (Oral)   Resp 18   Ht 175.3 cm (69\")   Wt 68 kg (150 lb)   SpO2 94%   BMI 22.15 kg/m²     Intake/Output Summary (Last 24 hours) at 5/15/2020 1536  Last data filed at 5/15/2020 1000  Gross per 24 hour   Intake 356 ml   Output 1575 ml   Net -1219 ml     Physical Exam:  Gen- well groomed, older male, in bed, NAD  Head/Neck- NC/AT, alopecia, trachea midline  Eyes- EOMI, no scleral icterus  ENT- patent nares, oral mucosa moist  Heart- RRR, no murmur  Lungs- CTAB, respirations not labored, on room air  Abd- soft, nontender, nondistended  Ext- No cyanosis or edema  Neuro- awake, alert, responsive but gives me only short answers (\"yes\" - \"no\" - \"ok\"), has word finding difficulty and repeats a " garbled word that can't be interpreted, face symmetric, moving extremities equally, no myoclonus  Psych- affect flat but keeps turning face away from me when having speech difficulty  Skin- Warm, dry     Results from last 7 days   Lab Units 05/15/20  0619   WBC 10*3/mm3 8.88   HEMOGLOBIN g/dL 13.7   HEMATOCRIT % 39.5   PLATELETS 10*3/mm3 193     Results from last 7 days   Lab Units 05/15/20  0619 05/14/20  2241   SODIUM mmol/L 138 140   POTASSIUM mmol/L 3.9 4.4   CHLORIDE mmol/L 103 103   CO2 mmol/L 22.0 23.0   BUN mg/dL 21 22   CREATININE mg/dL 0.93 1.07   CALCIUM mg/dL 9.0 9.1   BILIRUBIN mg/dL  --  0.3   ALK PHOS U/L  --  101   ALT (SGPT) U/L  --  68*   AST (SGOT) U/L  --  31   GLUCOSE mg/dL 115* 139*     Results from last 7 days   Lab Units 05/15/20  0619   SODIUM mmol/L 138   POTASSIUM mmol/L 3.9   CHLORIDE mmol/L 103   CO2 mmol/L 22.0   BUN mg/dL 21   CREATININE mg/dL 0.93   GLUCOSE mg/dL 115*   CALCIUM mg/dL 9.0     Imaging Results (Last 72 Hours)     Procedure Component Value Units Date/Time    MRI Brain With & Without Contrast [[id number]] Collected:  05/15/20 0417     Updated:  05/15/20 1022    Narrative:       MRI Brain WO W    INDICATION:   Non-small cell lung cancer with brain and spine metastases. Patient presents with worsening confusion    TECHNIQUE:   MRI of the brain with 14 cc of MultiHance IV contrast.    COMPARISON:    CT brain 5/14/2020    FINDINGS:  The pituitary gland and foramen magnum region are normal in appearance there is no abnormal diffusion.. The ventricles interspaces are within normal limits. There are a few punctate areas of increased FLAIR signal intensity consistent with small vessel  ischemic changes.. There is no abnormal enhancement      Impression:       No evidence of metastatic disease. No recent ischemic change.    Findings the preliminary report. Please see full dictation from neuroradiology    Initial dictation performed by Dr. Francisco Carr    Final interpretation:    There  is no abnormally restricted diffusion. Midline structures are unremarkable.    There are multiple tiny areas of precontrast high T1 signal intensity on surface of the brain and in the bilateral basal ganglia and thalami. A few punctate parenchymal high signal intensity lesions are also seen. These were present on prior study as  well. Following contrast administration, there is mild associated enhancement associated with several of these lesions given the precontrast high signal intensity on T1-weighted imaging enhancement is best appreciated associated with the small thalamic  lesions and the cerebellar lesions. There are 5 mm or less in dimension and is no appreciable mass effect. There is moderate white matter signal abnormality. Some of this is likely due to small vessel disease and is not appreciably changed. Other areas  of white matter signal abnormality are related to the tiny parenchymal metastases and likely reflect subtle areas of vasogenic edema. This is best appreciated at the left inferior frontal lobe where new 5 mm focus of white matter signal abnormality is  seen on the FLAIR sequence image #14. There is no extra-axial fluid collection. There is no hydrocephalus.    The major arterial intracranial flow voids are maintained. The mastoid air cells are clear. The visualized paranasal sinuses show mild mucosal thickening. There is a routine secretions in the left sphenoid sinus.    IMPRESSION:  1. Mild interval worsening in the appearance the brain. There are tiny too numerous to count foci of subtle enhancement on the surface of the brain and involving the brain parenchyma. Many of these areas of subtle enhancement are superimposed upon  precontrast T1 high signal intensity consistent with old blood product or mineral deposition. Subtle vasogenic edema can be seen associated with several lesions but there is no significant mass effect. The enhancement and subtle vasogenic edema is  essentially new from  the prior study though the areas of precontrast T1 high signal intensity were seen. Findings are again most consistent with mild progression of the multiple tiny intracranial metastases from the patient's known lung cancer. There is  likely a component of pre-existing small vessel disease as well.    I have called the patient's nurse on 3F and indicated to him that the preliminary report is erroneous and that there is intracranial metastatic disease. Since Dr. Fuentes is no longer on call, I have left a voicemail with my cell phone number with the  hospitalist service to reach the patient's current hospitalist with the results.    Signer Name: Katelyn Vigil MD   Signed: 5/15/2020 10:19 AM   Workstation Name: [id number]    [facility]    CT Abdomen Pelvis Without Contrast [[id number]] Collected:  05/14/20 2329     Updated:  05/14/20 2331    Narrative:       CT Abdomen Pelvis WO    INDICATION:   Increasing pain with known metastatic disease    TECHNIQUE:   CT of the abdomen and pelvis without IV contrast. Coronal and sagittal reconstructions were obtained.  Radiation dose reduction techniques included automated exposure control or exposure modulation based on body size. Count of known CT and cardiac nuc  med studies performed in previous 12 months: 4.     COMPARISON:   3/17/2020    FINDINGS:  Abdomen: The initial image of the study may show some faint nodules in the left lower lobe measuring 3 mm in diameter. The liver, gallbladder, spleen, pancreas, adrenal glands and kidneys are normal except for nonobstructing renal stones. There is an 8  mm stone in the left kidney and there are at least 2 stones in the right kidney measuring up to 8 mm. The aorta is normal in size. There is no adenopathy. Bowel is normal.    Pelvis: Bladder and prostate gland are normal.. There is an oval water density well-circumscribed area within the base the penis measuring 2.5 x 1.7 cm. This may represent a urethral  diverticulum. It was present 3/17/2020 without significant change.    There are numerous sclerotic lesions present in the lumbar spine and sacrum and iliac bones and they are all unchanged from the previous study      Impression:       No change in sclerotic bony metastases    Nonobstructing renal stones    No acute findings          Signer Name: Francisco Carr MD   Signed: 5/14/2020 11:29 PM   Workstation [facility]: [facility]    [facility]    CT Head Without Contrast [[id number]] Collected:  05/14/20 2325     Updated:  05/14/20 2328    Narrative:       CT Head WO    HISTORY:   Lung cancer metastatic disease.    TECHNIQUE:   Axial unenhanced head CT. Radiation dose reduction techniques included automated exposure control or exposure modulation based on body size. Count of known CT and cardiac nuc med studies performed in previous 12 months: 0.     Time of scan: 10:58 PM    COMPARISON:   None.    FINDINGS:   No intracranial hemorrhage, mass, or infarct. No hydrocephalus or extra-axial fluid collection. Brain parenchymal density is normal. The skull base, calvarium, and extracranial soft tissues are normal.      Impression:       Normal, negative unenhanced head CT.          Signer Name: Francisco Carr MD   Signed: 5/14/2020 11:25 PM   Workstation Name: [facility]    [facility]    CT Lumbar Spine Without Contrast [[id number]] Collected:  05/14/20 2325     Updated:  05/14/20 2327    Narrative:       CT Spine Lumbar WO    INDICATION:    History of lung cancer with metastatic disease. Increasing low back pain    TECHNIQUE:   CT of the lumbar spine without IV contrast. Coronal and sagittal reconstructions were obtained.  Radiation dose reduction techniques included automated exposure control or exposure modulation based on body size. Count of known CT and cardiac nuc med  studies performed in previous 12 months: 4.     COMPARISON:    CT abdomen pelvis from 2/1/2020    FINDINGS:  Once  "again there is sclerosis of L5 involving most of the few body. There is patchy sclerosis in the L3 vertebral body and fairly extensive sclerosis in the T12 vertebral body. These findings are all present on 2/1/2020. The alignment is normal. There is  no fracture visible. There are partially visualized sclerotic metastases in the sacrum and iliac bones.      Impression:       Numerous sclerotic metastases within the lumbar vertebral bodies as well as the sacrum and the iliac bones. There is no change from 2/1/2020.    No acute finding such as fracture. There is no spinal stenosis visible.    Signer Name: Francisco Carr MD   Signed: 5/14/2020 11:25 PM   Workstation Name: [email or web address]    [facility] Hospital Problems     Diagnosis   POA   • Cancer related pain [G89.3]   Unknown   • AMS (altered mental status) [R41.82]   Unknown   • Brain metastases (CMS/HCC) [C79.31]   Yes   • Pain from bone metastases (CMS/HCC) [G89.3, C79.51]   Yes   • Non-small cell lung cancer, right (CMS/HCC) [C34.91]   Yes   • Back pain [M54.9]   Yes   • Metastatic cancer (CMS/HCC) [C79.9]   Yes           Impression:   63yo M w/NSC lung ca, metastatic to the brain and spine. Now w/ increased confusion, progressive weakness, and uncontrolled back pain.  Recent concern for radiation encephalopathy.  Brain MRI this admission reveals mild progression of metastatic disease per updated Neuroradiologist read.     Symptoms:  Neoplastic pain  Encephalopathy  Expressive aphasia  Debility  Urinary incontinence  Agitation/Restlessness  Hx of opiate induced constipation    Plan:   -On gabapentin w/ PRN Percocet and IV dilaudid available.  Will monitor PRN use to \"dose find.\"  -Will go ahead and schedule senokot-S BID given hx of opiate induced constipation.  Dulcolax supp also added PRN.  -Remains on decadron and namenda (both started recently due to encephalopathy).  -Talked with Dr. Winter about recent events.  -Spoke " "with wife Nelida by phone around 1600.  She summarized recent decline and I informed her of correct brain MRI results that show mild metastatic disease progression.  Discussed rec for Hospice and Nelida concedes \"it sounds like it is finally time\" but wants to speak with her daughter and patient's sister.  Call unfortunately dropped and I was unable to reach Nelida again after 3 call back attempts to formally conclude our conversation.   (Hospice consult NOT yet placed at this time to allow Nelida to speak with family.  Patient will benefit from hospital stay through the weekend to manage symptoms.  Lives in [facility].)  -Updated Hospitalist, Dr. Tomlinson, on my conversation with wife.       Jessi Paiz MD  05/15/20  15:36            "

## 2020-05-15 NOTE — NURSING NOTE
Physician paged for status update. Patient was alert and oriented on arrival, but seemed to be slightly confused when RN transported patient to MRI approximately 15 minutes ago.Patient still answers orientation questions correctly, but wants to get up frequently, and is trying to remove lines. Patient is forgetful at times. Patient had denied pain until this time, but is now holding the lumbar region of his back when he attempted to stand and use the urinal.

## 2020-05-15 NOTE — PROGRESS NOTES
Discharge Planning Assessment  [facility]     Patient Name: Rajan Chambers  MRN: [id number]  Today's Date: 5/15/2020    Admit Date: 5/14/2020    Discharge Needs Assessment     Row Name 05/15/20 1326       Living Environment    Lives With  spouse    Current Living Arrangements  home/apartment/condo    Living Arrangement Comments  Lives in a house with his wife. He has required more assist with care needs recently from his spouse.       Discharge Needs Assessment    Equipment Currently Used at Home  none    Equipment Needed After Discharge  none    Discharge Coordination/Progress  Has done outpt radiation at [facility] recently. He is not current with HH.        Discharge Plan     Row Name 05/15/20 1329       Plan    Plan  Home vs home with HH    Patient/Family in Agreement with Plan  yes    Plan Comments  I was unable to speak with the pt. I spoke with his spouse by phone. She was in an area of poor service. She is unsure of the pts AL needs at this time. She mentioned hospice but was unsure he was at this point yet. She may be interested in HH and a walker (if needed) at AL and prefers [facility]. CM will follow.    Final Discharge Disposition Code  01 - home or self-care        Destination      Coordination has not been started for this encounter.      Durable Medical Equipment      Coordination has not been started for this encounter.      Dialysis/Infusion      Coordination has not been started for this encounter.      Home Medical Care      Coordination has not been started for this encounter.      Therapy      Coordination has not been started for this encounter.      Community Resources      Coordination has not been started for this encounter.          Demographic Summary    No documentation.       Functional Status     Row Name 05/15/20 1326       Functional Status    Usual Activity Tolerance  fair       Functional Status, IADL    Medications  assistive person    Meal Preparation  assistive person    Housekeeping   assistive person    Laundry  assistive person    Shopping  assistive person        Psychosocial    No documentation.       Abuse/Neglect    No documentation.       Legal    No documentation.       Substance Abuse    No documentation.       Patient Forms    No documentation.           Steph Blake RN

## 2020-05-15 NOTE — NURSING NOTE
RN spoke with Dr. Fuentes at this time. MD feels the new slight deviation from cognitive baseline may be what the family was concerned about. MD will enter orders to help patient calm down.

## 2020-05-15 NOTE — PLAN OF CARE
Problem: Palliative Care (Adult)  Intervention: Promote Informed Decision Making and Goal Setting  Note:   Left brochure in room. CAR Harris will call wife for goals and plan of care

## 2020-05-15 NOTE — PROGRESS NOTES
"Adult Nutrition  Assessment/PES    Patient Name:  Rajan Chambers  YOB: 1955  MRN: [id number]  Admit Date:  5/14/2020    Assessment Date:  5/15/2020      Reason for Assessment     Row Name 05/15/20 1430 05/15/20 1428       Reason for Assessment    Reason For Assessment  -- 20 mins. RD visited pt to clarify nsg adm databse documentation of \"unsure\" as r/t wt changes prior to adm.   -- RD spoke with pt who states he has not had changes in po intake nor wt prior to adm. RD Noted AMS as part of dx list this adm.          Anthropometrics     Row Name 05/15/20 1428          Anthropometrics    Weight  -- ht=69in, zc=215cf per stated wt on 5/14.  Rec obtaining actual current wt since stated wt on adm is different than the fairly stable wts recorded in EMR/Epic from Dec 2019 to April 2020.            Education/Evaluation     Row Name 05/15/20 1435 05/15/20 1432       Monitor/Evaluation       -- RD spoke with nsg staff and requested that nsg staff obtain wt; ns agrees to do so.    Row Name 05/15/20 1430          Monitor/Evaluation    Monitor  Per protocol           Electronically signed by:  Yessenia Mccartney MS,RD,LD  05/15/20 14:35  "

## 2020-05-15 NOTE — ED PROVIDER NOTES
EMERGENCY DEPARTMENT ENCOUNTER      Pt Name: Rajan Chambers  MRN: [id number]  YOB: 1955  Date of evaluation: 5/14/2020  Provider: Brian Aguilera DO    CHIEF COMPLAINT       Chief Complaint   Patient presents with   • OTHER         HISTORY OF PRESENT ILLNESS  (Location/Symptom, Timing/Onset, Context/Setting, Quality, Duration, Modifying Factors, Severity.)   Rajan Chambers is a 64 y.o. male who presents to the emergency department via EMS for evaluation of generalized pain in his lower back, and generalized abdominal pain which he notes is been worsening over the last few days.  The patient is here with multiple chronic comorbidities including a lung cancer with multiple metastases including brain mets, follows with hematology/oncology has been functionally declining.  The patient denies any chest pain or difficulty breathing, denies any fevers or chills.  He does not take chronic pain medication.  Denies any fall, injury or trauma, does admit to decreased bowel movements recently.  Denies any unilateral weakness, numbness or tingling.  Denies any sudden onset of headache.  He has no other acute systemic complaints at this time.      Nursing notes were reviewed.    REVIEW OF SYSTEMS    (2-9 systems for level 4, 10 or more for level 5)   ROS:  General:  No fevers, no chills, + generalized weakness  Cardiovascular:  No chest pain, no palpitations  Respiratory:  No shortness of breath, no cough, no wheezing  Gastrointestinal:  No pain, no nausea, no vomiting, no diarrhea  Musculoskeletal: Positive generalized lower back pain, no loss of bowel bladder function  Skin:  No rash, no easy bruising  Neurologic:  No speech problems, no headache, no extremity numbness, no extremity tingling, no extremity weakness  Psychiatric:  No anxiety  Genitourinary:  No dysuria, no hematuria    Except as noted above the remainder of the review of systems was reviewed and negative.       PAST MEDICAL HISTORY     Past  Medical History:   Diagnosis Date   • Brain cancer (CMS/HCC)    • Cancer (CMS/HCC)     NSCLC   • History of radiation therapy 01/28/2020    T12-L5, left iliac wing   • Lung nodule    • Metastatic cancer (CMS/HCC)    • Shortness of breath     RECENT ONSET         SURGICAL HISTORY       Past Surgical History:   Procedure Laterality Date   • BRONCHOSCOPY N/A 1/18/2018    Procedure: RANDALL AND BRONCHOSCOPY WITH ENDOBRONCHIAL ULTRASOUND WITH FLUORO;  Surgeon: Gustavo Haque MD;  Location: [facility] ENDOSCOPY;  Service:    • COLLATERAL LIGAMENT REPAIR, KNEE     • COLONOSCOPY  05/2019   • HERNIA REPAIR     • KNEE ARTHROSCOPY     • LASIK     • LUNG BIOPSY Right 01/10/2018    Dr. Petty @ [facility]   • SKIN CANCER EXCISION Bilateral     BASAL CELL ON NECK YESTERDAY         CURRENT MEDICATIONS       Current Facility-Administered Medications:   •  Morphine sulfate (PF) injection 4 mg, 4 mg, Intravenous, Q30 Min PRN, Brian Aguilera DO  •  ondansetron (ZOFRAN) injection 4 mg, 4 mg, Intravenous, Q30 Min PRN, Brian Aguilera DO  •  oxyCODONE-acetaminophen (PERCOCET) 5-325 MG per tablet 1 tablet, 1 tablet, Oral, Once, Brian Aguilera DO  •  [COMPLETED] Insert peripheral IV, , , Once **AND** sodium chloride 0.9 % flush 10 mL, 10 mL, Intravenous, PRN, Brian Aguilera DO  •  sodium chloride 0.9 % infusion, 125 mL/hr, Intravenous, Continuous, Brian Aguilera DO, Last Rate: 125 mL/hr at 05/14/20 2331, 125 mL/hr at 05/14/20 2331    Current Outpatient Medications:   •  acetaminophen (TYLENOL) 500 MG tablet, Take 2 tablets by mouth Every 6 (Six) Hours As Needed., Disp: , Rfl:   •  Calcium Carb-Cholecalciferol (CALCIUM/VITAMIN D) 600-400 MG-UNIT tablet, Take 2 tablets by mouth Daily., Disp: 60 tablet, Rfl: 5  •  calcium carbonate (OS-ROSIO) 600 MG tablet, Take 600 mg by mouth 2 (Two) Times a Day With Meals., Disp: , Rfl:   •  desloratadine (CLARINEX) 5 MG tablet, Take 1 tablet by mouth Daily., Disp: 90 tablet,  Rfl: 3  •  dexamethasone (DECADRON) 4 MG tablet, Take 1 tablet by mouth Daily With Breakfast. If ineffective after 2-3 days, increase to 1 tablet in the morning, and 1 in the evening, Disp: 60 tablet, Rfl: 0  •  docusate sodium (COLACE) 100 MG capsule, Take 1 capsule by mouth 2 (Two) Times a Day As Needed for Constipation., Disp: 30 capsule, Rfl: 0  •  escitalopram (LEXAPRO) 20 MG tablet, Take 1 tablet by mouth Daily for 90 days., Disp: 90 tablet, Rfl: 0  •  gabapentin (NEURONTIN) 100 MG capsule, 1-2 tabs TID as tolerated (Patient taking differently: Take 100 mg by mouth 3 (Three) Times a Day. 1-2 tabs TID as tolerated), Disp: 180 capsule, Rfl: 0  •  megestrol (MEGACE) 40 MG/ML suspension, Take 10 mL by mouth Daily for 30 days., Disp: 300 mL, Rfl: 0  •  memantine (NAMENDA) 5 MG tablet, Take 1 tablet by mouth 2 (Two) Times a Day for 90 days., Disp: 180 tablet, Rfl: 0  •  ondansetron (ZOFRAN) 4 MG tablet, TAKE 2 TABLETS BY MOUTH TWICE DAILY AS NEEDED FOR NAUSEA, Disp: , Rfl:   •  rivaroxaban (XARELTO) 20 MG tablet, Take 1 tablet by mouth Daily With Dinner., Disp: 90 tablet, Rfl: 3  •  diclofenac (VOLTAREN) 1 % gel gel, Apply 4 g topically to the appropriate area as directed 4 (Four) Times a Day., Disp: 100 g, Rfl: 3  •  fluticasone (FLONASE) 50 MCG/ACT nasal spray, 2 sprays into the nostril(s) as directed by provider Daily. Administer 2 sprays in each nostril for each dose., Disp: 3 bottle, Rfl: 3  •  methylphenidate (RITALIN) 5 MG tablet, Take 0.5 tablets by mouth 2 (Two) Times a Day for 30 days. Morning and midday for fatigue and cognition, Disp: 30 tablet, Rfl: 0  •  TAGRISSO 80 MG tablet, TAKE ONE TABLET (80 MG) BY MOUTH ONCE DAILY AT THE SAME TIME. MAY TAKEWITH OR WITHOUT FOOD. CALL [phone] FOR REFILLS, Disp: 30 tablet, Rfl: 10    ALLERGIES     Penicillin g; Ibuprofen; Penicillins; and Sulfa antibiotics    FAMILY HISTORY       Family History   Problem Relation Age of Onset   • Cancer Mother    • Breast  cancer Mother    • Emphysema Father    • Cancer Sister    • Breast cancer Sister    • Cancer Maternal Grandmother    • Breast cancer Maternal Grandmother    • Cancer Paternal Grandmother    • Breast cancer Paternal Grandmother    • Brain cancer Paternal Grandfather           SOCIAL HISTORY       Social History     Socioeconomic History   • Marital status:      Spouse name: Not on file   • Number of children: Not on file   • Years of education: Not on file   • Highest education level: Not on file   Tobacco Use   • Smoking status: Never Smoker   • Smokeless tobacco: Former User   Substance and Sexual Activity   • Alcohol use: No   • Drug use: No   • Sexual activity: Defer   Social History Narrative        Retired from the [employer]    A .    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    Chew tobacco up to 2001         PHYSICAL EXAM    (up to 7 for level 4, 8 or more for level 5)     Vitals:    05/14/20 2320 05/14/20 2330 05/14/20 2340 05/14/20 2345   BP:  134/81     BP Location:       Patient Position:       Pulse: 82 85 76 76   Resp:       Temp:       TempSrc:       SpO2: 94% 94% 94% 94%   Weight:       Height:           Physical Exam  General :Patient is awake, alert, oriented, in no acute distress, nontoxic appearing, answering questions appropriately somewhat slow in his responses.  HEENT: Pupils are equally round and reactive to light, EOMI, conjunctivae clear, sclerae white, there is no injection no icterus.  Oral mucosa is moist, no exudate. Uvula is midline  Neck: Neck is supple, full range of motion, trachea midline  Cardiac: Heart regular rate, rhythm, no murmurs, rubs, or gallops  Lungs: Lungs are clear to auscultation, there is no wheezing, rhonchi, or rales. There is no use of accessory muscles.  Chest wall: There is no tenderness to palpation over the chest wall or over ribs  Abdomen: Abdomen is soft, generalized tenderness in the morning  periumbilical region, no peritoneal signs, abdomen is soft, bowel sounds are present throughout. There is no firm or pulsatile masses, no rebound rigidity or guarding.   Musculoskeletal: 5 out of 5 strength in all 4 extremities.  There is tenderness to palpation throughout the lumbar spine, paraspinal musculature without any midline contusions, step-offs or deformities.  Patient able to raise both legs individually off the bed.  No focal muscle deficits are appreciated  Neuro: Motor intact, sensory intact, level of consciousness is normal, cerebellar function is normal, reflexes are grossly normal. No evidence of incontinence or loss of bowel or bladder function, no saddle anesthesia noted   Dermatology: Skin is warm and dry  Psych: Mentation is grossly normal, cognition is grossly normal. Affect is appropriate.      DIAGNOSTIC RESULTS     EKG: All EKG's are interpreted by the Emergency Department Physician who either signs or Co-signs this chart in the absence of a cardiologist.    ECG 12 Lead   Final Result   Test Reason : gen weakness   Blood Pressure : **/** mmHG   Vent. Rate : 072 BPM     Atrial Rate : 072 BPM      P-R Int : 150 ms          QRS Dur : 082 ms       QT Int : 394 ms       P-R-T Axes : 044 -14 006 degrees      QTc Int : 431 ms      Normal sinus rhythm   When compared with ECG of 06-MAR-2018 12:08,   No significant change was found   Confirmed by SAMEER SANCHEZ MD ([id number]) on 5/14/2020 11:10:11 PM      Referred By:             Confirmed By:SAMEER SANCHEZ MD          RADIOLOGY:   Non-plain film images such as CT, Ultrasound and MRI are read by the radiologist. Plain radiographic images are visualized and preliminarily interpreted by the emergency physician with the below findings:      [] Radiologist's Report Reviewed:  CT Lumbar Spine Without Contrast   Final Result   Numerous sclerotic metastases within the lumbar vertebral bodies as well as the sacrum and the iliac bones. There is no change from  2/1/2020.      No acute finding such as fracture. There is no spinal stenosis visible.      Signer Name: Francisco Carr MD    Signed: 5/14/2020 11:25 PM    Workstation Name: [facility]      CT Abdomen Pelvis Without Contrast   Final Result   No change in sclerotic bony metastases      Nonobstructing renal stones      No acute findings               Signer Name: Francisco Carr MD    Signed: 5/14/2020 11:29 PM    Workstation Name: [facility]      CT Head Without Contrast   Final Result   Normal, negative unenhanced head CT.               Signer Name: Francisco Carr MD    Signed: 5/14/2020 11:25 PM    Workstation Name: [facility]            ED BEDSIDE ULTRASOUND:   Performed by ED Physician - none    LABS:    I have reviewed and interpreted all of the currently available lab results from this visit (if applicable):  Results for orders placed or performed during the hospital encounter of 05/14/20   Comprehensive Metabolic Panel   Result Value Ref Range    Glucose 139 (H) 65 - 99 mg/dL    BUN 22 8 - 23 mg/dL    Creatinine 1.07 0.76 - 1.27 mg/dL    Sodium 140 136 - 145 mmol/L    Potassium 4.4 3.5 - 5.2 mmol/L    Chloride 103 98 - 107 mmol/L    CO2 23.0 22.0 - 29.0 mmol/L    Calcium 9.1 8.6 - 10.5 mg/dL    Total Protein 6.8 6.0 - 8.5 g/dL    Albumin 4.10 3.50 - 5.20 g/dL    ALT (SGPT) 68 (H) 1 - 41 U/L    AST (SGOT) 31 1 - 40 U/L    Alkaline Phosphatase 101 39 - 117 U/L    Total Bilirubin 0.3 0.2 - 1.2 mg/dL    eGFR Non African Amer 70 >60 mL/min/1.73    Globulin 2.7 gm/dL    A/G Ratio 1.5 g/dL    BUN/Creatinine Ratio 20.6 7.0 - 25.0    Anion Gap 14.0 5.0 - 15.0 mmol/L   BNP   Result Value Ref Range    proBNP 94.4 5.0 - 900.0 pg/mL   Troponin   Result Value Ref Range    Troponin T <0.010 0.000 - 0.030 ng/mL   CBC Auto Differential   Result Value Ref Range    WBC 8.63 3.40 - 10.80 10*3/mm3    RBC 4.36 4.14 - 5.80  10*6/mm3    Hemoglobin 14.7 13.0 - 17.7 g/dL    Hematocrit 42.0 37.5 - 51.0 %    MCV 96.3 79.0 - 97.0 fL    MCH 33.7 (H) 26.6 - 33.0 pg    MCHC 35.0 31.5 - 35.7 g/dL    RDW 14.6 12.3 - 15.4 %    RDW-SD 49.2 37.0 - 54.0 fl    MPV 8.5 6.0 - 12.0 fL    Platelets 203 140 - 450 10*3/mm3    Neutrophil % 74.3 42.7 - 76.0 %    Lymphocyte % 4.5 (L) 19.6 - 45.3 %    Monocyte % 14.1 (H) 5.0 - 12.0 %    Eosinophil % 0.0 (L) 0.3 - 6.2 %    Basophil % 0.5 0.0 - 1.5 %    Immature Grans % 6.6 (H) 0.0 - 0.5 %    Neutrophils, Absolute 6.41 1.70 - 7.00 10*3/mm3    Lymphocytes, Absolute 0.39 (L) 0.70 - 3.10 10*3/mm3    Monocytes, Absolute 1.22 (H) 0.10 - 0.90 10*3/mm3    Eosinophils, Absolute 0.00 0.00 - 0.40 10*3/mm3    Basophils, Absolute 0.04 0.00 - 0.20 10*3/mm3    Immature Grans, Absolute 0.57 (H) 0.00 - 0.05 10*3/mm3    nRBC 0.7 (H) 0.0 - 0.2 /100 WBC        All other labs were within normal range or not returned as of this dictation.      EMERGENCY DEPARTMENT COURSE and DIFFERENTIAL DIAGNOSIS/MDM:   Vitals:    Vitals:    05/14/20 2320 05/14/20 2330 05/14/20 2340 05/14/20 2345   BP:  134/81     BP Location:       Patient Position:       Pulse: 82 85 76 76   Resp:       Temp:       TempSrc:       SpO2: 94% 94% 94% 94%   Weight:       Height:                Patient with multiple chronic comorbidities with advanced metastatic cancer currently followed with hematology/oncology, has been progressively weakening with increasing pain in his lower back and abdomen.  Reviewing the notes from his recent family discussions, there was talk of due to his continuing functional decline regarding hospice care.  This appears to be in current discussions.  With his continued pain, decreased mobility we discussed obtaining labs and imaging.  Patient was given pain control, IV fluids.  Blood work and imaging reviewed as above.  Patient does have bony sclerosis from his multiple metastases in the spine without any signs of acute fracture.  CT of the  head unremarkable.  I also discussed the case with the patient's wife and son on the phone they note the patient has had issues with increasing pain, mental status has been waxing and waning over the last few days.  Unable to get up and get around.  They note there has been some discussion of hospice care but they were waiting their next appointment with hematology/oncology, repeat imaging to establish how his therapies and treatments have progressed and make any further decisions at that time.  Their concern is his eyes pain is uncontrolled at home, unable to fully care for him at this point we recommend admission to the hospital for consultation of the patient specialist, pain control regimen and discussion further treatment and care.  Case will be discussed with our hospitalist team for admission.          MEDICATIONS ADMINISTERED IN ED:  Medications   Morphine sulfate (PF) injection 4 mg (has no administration in time range)   ondansetron (ZOFRAN) injection 4 mg (has no administration in time range)   sodium chloride 0.9 % flush 10 mL (has no administration in time range)   sodium chloride 0.9 % infusion (125 mL/hr Intravenous New Bag 5/14/20 9270)   oxyCODONE-acetaminophen (PERCOCET) 5-325 MG per tablet 1 tablet (has no administration in time range)       PROCEDURES:  Procedures    CRITICAL CARE TIME    Total Critical Care time was 0 minutes, excluding separately reportable procedures.   There was a high probability of clinically significant/life threatening deterioration in the patient's condition which required my urgent intervention.      FINAL IMPRESSION      1. Metastatic cancer (CMS/HCC)    2. Uncontrolled pain    3. Acute midline low back pain without sciatica          DISPOSITION/PLAN     ED Disposition     ED Disposition Condition Comment    Decision to Admit  Level of Care: Telemetry [5]   Admitting Physician: MARIA DE JESUS LOPEZ [[id number]]            PATIENT REFERRED TO:  No follow-up provider  specified.    DISCHARGE MEDICATIONS:     Medication List      ASK your doctor about these medications    acetaminophen 500 MG tablet  Commonly known as:  TYLENOL     calcium carbonate 600 MG tablet  Commonly known as:  OS-ROSIO     Calcium/Vitamin D 600-400 MG-UNIT tablet  Take 2 tablets by mouth Daily.     desloratadine 5 MG tablet  Commonly known as:  Clarinex  Take 1 tablet by mouth Daily.     dexamethasone 4 MG tablet  Commonly known as:  DECADRON  Take 1 tablet by mouth Daily With Breakfast. If ineffective after 2-3   days, increase to 1 tablet in the morning, and 1 in the evening     diclofenac 1 % gel gel  Commonly known as:  VOLTAREN  Apply 4 g topically to the appropriate area as directed 4 (Four) Times a   Day.     docusate sodium 100 MG capsule  Commonly known as:  COLACE  Take 1 capsule by mouth 2 (Two) Times a Day As Needed for Constipation.     escitalopram 20 MG tablet  Commonly known as:  LEXAPRO  Take 1 tablet by mouth Daily for 90 days.     fluticasone 50 MCG/ACT nasal spray  Commonly known as:  Flonase  2 sprays into the nostril(s) as directed by provider Daily. Administer 2   sprays in each nostril for each dose.     gabapentin 100 MG capsule  Commonly known as:  NEURONTIN  1-2 tabs TID as tolerated     megestrol 40 MG/ML suspension  Commonly known as:  MEGACE  Take 10 mL by mouth Daily for 30 days.     memantine 5 MG tablet  Commonly known as:  NAMENDA  Take 1 tablet by mouth 2 (Two) Times a Day for 90 days.     methylphenidate 5 MG tablet  Commonly known as:  RITALIN  Take 0.5 tablets by mouth 2 (Two) Times a Day for 30 days. Morning and   midday for fatigue and cognition     ondansetron 4 MG tablet  Commonly known as:  ZOFRAN     rivaroxaban 20 MG tablet  Commonly known as:  XARELTO  Take 1 tablet by mouth Daily With Dinner.     Tagrisso 80 MG tablet  Generic drug:  Osimertinib Mesylate  TAKE ONE TABLET (80 MG) BY MOUTH ONCE DAILY AT THE SAME TIME. MAY TAKEWITH   OR WITHOUT FOOD. CALL  [phone] FOR REFILLS              Comment: Please note this report has been produced using speech recognition software.      Brian Aguilera DO  Attending Emergency Physician               Brian Aguilera,   05/15/20 0003

## 2020-05-15 NOTE — PLAN OF CARE
Problem: Patient Care Overview  Goal: Interprofessional Rounds/Family Conf  Flowsheets (Taken 5/15/2020 1300)  Participants: ; advanced practice nurse; nursing; physician; social work/services  Note:   New Palliative Care Team consult from Dr. Tomlinson on 5/15/2020 at 0135 and seen by palliative team nurse, ELIZABETH Watson at 5/15/2020 at 1235 and followed by CAR Harris. Patient admitted with AMS changes, back pain, he has NSCL cancer with mets to brain and bone. He c/o pain, and he is restless. Nurse states he pulled out his iv and he was working on getting his finger probe off. CAR Harris at team reports she hasn't seen him but will after team and call his wife for goals and plan of care. Palliative will continue to follow for support, symptom management and discharge needs. Patient is followed by Dr. Jacquie Winter, [facility].      Problem: Palliative Care (Adult)  Intervention: Promote Informed Decision Making and Goal Setting  Note:   Left brochure in room. CAR Harris will call wife for goals and plan of care

## 2020-05-15 NOTE — CONSULTS
Subjective     CHIEF COMPLAINT: Back pain and confusion    HISTORY OF PRESENT ILLNESS:  The patient is a 64 y.o. male, referred by Dat Tomlinson MD for metastatic adenocarcinoma of the lung.  The patient was admitted to hospitalist service last night.  Presented with progressive back pain.  Home medicine is not helping.  Is been more confused according to the wife.  He has been having progressive weakness.  The wife has not been able to take care of him.  The patient blood work was essentially stable MRI brain showed improvement in metastases.  I was consulted to further assist in his care.  When I saw the patient today.  He denied any fever chills night sweats.  His pain is under control.  He was excited.  MRI brain was better.      REVIEW OF SYSTEMS:  A 14 point review of systems was performed and is negative except as noted above.    Past Medical History:   Diagnosis Date   • Blood clot in vein    • Brain cancer (CMS/HCC)    • Cancer (CMS/HCC)     NSCLC   • History of radiation therapy 01/28/2020    T12-L5, left iliac wing   • Lung nodule    • Metastatic cancer (CMS/HCC)    • Pneumonia    • Shortness of breath     RECENT ONSET       No current facility-administered medications on file prior to encounter.      Current Outpatient Medications on File Prior to Encounter   Medication Sig Dispense Refill   • acetaminophen (TYLENOL) 500 MG tablet Take 2 tablets by mouth Every 6 (Six) Hours As Needed.     • Calcium Carb-Cholecalciferol (CALCIUM/VITAMIN D) 600-400 MG-UNIT tablet Take 2 tablets by mouth Daily. 60 tablet 5   • calcium carbonate (OS-ROSIO) 600 MG tablet Take 600 mg by mouth 2 (Two) Times a Day With Meals.     • desloratadine (CLARINEX) 5 MG tablet Take 1 tablet by mouth Daily. 90 tablet 3   • dexamethasone (DECADRON) 4 MG tablet Take 1 tablet by mouth Daily With Breakfast. If ineffective after 2-3 days, increase to 1 tablet in the morning, and 1 in the evening 60 tablet 0   • docusate sodium (COLACE) 100 MG  capsule Take 1 capsule by mouth 2 (Two) Times a Day As Needed for Constipation. 30 capsule 0   • escitalopram (LEXAPRO) 20 MG tablet Take 1 tablet by mouth Daily for 90 days. 90 tablet 0   • gabapentin (NEURONTIN) 100 MG capsule 1-2 tabs TID as tolerated (Patient taking differently: Take 100 mg by mouth 3 (Three) Times a Day. 1-2 tabs TID as tolerated) 180 capsule 0   • megestrol (MEGACE) 40 MG/ML suspension Take 10 mL by mouth Daily for 30 days. 300 mL 0   • memantine (NAMENDA) 5 MG tablet Take 1 tablet by mouth 2 (Two) Times a Day for 90 days. 180 tablet 0   • ondansetron (ZOFRAN) 4 MG tablet TAKE 2 TABLETS BY MOUTH TWICE DAILY AS NEEDED FOR NAUSEA     • rivaroxaban (XARELTO) 20 MG tablet Take 1 tablet by mouth Daily With Dinner. 90 tablet 3   • diclofenac (VOLTAREN) 1 % gel gel Apply 4 g topically to the appropriate area as directed 4 (Four) Times a Day. 100 g 3   • fluticasone (FLONASE) 50 MCG/ACT nasal spray 2 sprays into the nostril(s) as directed by provider Daily. Administer 2 sprays in each nostril for each dose. 3 bottle 3   • methylphenidate (RITALIN) 5 MG tablet Take 0.5 tablets by mouth 2 (Two) Times a Day for 30 days. Morning and midday for fatigue and cognition 30 tablet 0   • TAGRISSO 80 MG tablet TAKE ONE TABLET (80 MG) BY MOUTH ONCE DAILY AT THE SAME TIME. MAY TAKEWITH OR WITHOUT FOOD. CALL [phone] FOR REFILLS 30 tablet 10       Allergies   Allergen Reactions   • Penicillin G Hives   • Ibuprofen Itching and Dermatitis     Prickly rash on heaD   • Penicillins Rash   • Sulfa Antibiotics Rash and Itching       Past Surgical History:   Procedure Laterality Date   • ABDOMINAL SURGERY     • BRONCHOSCOPY N/A 1/18/2018    Procedure: RANDALL AND BRONCHOSCOPY WITH ENDOBRONCHIAL ULTRASOUND WITH FLUORO;  Surgeon: Gustavo Haque MD;  Location: [facility] ENDOSCOPY;  Service:    • COLLATERAL LIGAMENT REPAIR, KNEE     • COLONOSCOPY  05/2019   • ENDOSCOPY     • EYE SURGERY     • HERNIA REPAIR     • KNEE  ARTHROSCOPY     • LASIK     • LUNG BIOPSY Right 01/10/2018    Dr. Petty @ [facility]   • SKIN BIOPSY     • SKIN CANCER EXCISION Bilateral     BASAL CELL ON NECK YESTERDAY       OB History   No data available       Social History     Socioeconomic History   • Marital status:      Spouse name: Not on file   • Number of children: Not on file   • Years of education: Not on file   • Highest education level: Not on file   Tobacco Use   • Smoking status: Never Smoker   • Smokeless tobacco: Former User     Types: Snuff   Substance and Sexual Activity   • Alcohol use: No   • Drug use: No   • Sexual activity: Defer   Social History Narrative        Retired from the [employer]    A .    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    Chew tobacco up to 2001       Family History   Problem Relation Age of Onset   • Cancer Mother    • Breast cancer Mother    • Emphysema Father    • Cancer Sister    • Breast cancer Sister    • Cancer Maternal Grandmother    • Breast cancer Maternal Grandmother    • Cancer Paternal Grandmother    • Breast cancer Paternal Grandmother    • Brain cancer Paternal Grandfather        Objective     Vitals:    05/15/20 0424 05/15/20 0425 05/15/20 0600 05/15/20 0726   BP: (!) 165/110   140/92   BP Location: Right arm   Right arm   Patient Position: Lying   Lying   Pulse: 73 77 67 62   Resp: 14   15   Temp: 98 °F (36.7 °C)   98.1 °F (36.7 °C)   TempSrc: Oral   Oral   SpO2: 95% 94% 91% 92%   Weight:       Height:                ECOG Performance Status: 3 - Symptomatic, >50% confined to bed  General: well appearing male in no acute distress  Neuro/Psych: A&O x 3, gait steady, appropriate affect, strength 5/5 in all muscle groups  HEENT: sclerae anicteric, oropharynx clear  Lymphatics: no cervical, supraclavicular, or axillary adenopathy  Cardiovascular: regular rate and rhythm, no murmurs  Lungs: clear to auscultation bilaterally  Abdomen: soft, nontender,  nondistended.  No palpable organomegaly  Extremities: no lower extremity edema  Skin: no rashes, lesions, bruising, or petechiae      Admission on 05/14/2020   Component Date Value Ref Range Status   • Glucose 05/14/2020 139* 65 - 99 mg/dL Final   • BUN 05/14/2020 22  8 - 23 mg/dL Final   • Creatinine 05/14/2020 1.07  0.76 - 1.27 mg/dL Final   • Sodium 05/14/2020 140  136 - 145 mmol/L Final   • Potassium 05/14/2020 4.4  3.5 - 5.2 mmol/L Final   • Chloride 05/14/2020 103  98 - 107 mmol/L Final   • CO2 05/14/2020 23.0  22.0 - 29.0 mmol/L Final   • Calcium 05/14/2020 9.1  8.6 - 10.5 mg/dL Final   • Total Protein 05/14/2020 6.8  6.0 - 8.5 g/dL Final   • Albumin 05/14/2020 4.10  3.50 - 5.20 g/dL Final   • ALT (SGPT) 05/14/2020 68* 1 - 41 U/L Final   • AST (SGOT) 05/14/2020 31  1 - 40 U/L Final   • Alkaline Phosphatase 05/14/2020 101  39 - 117 U/L Final   • Total Bilirubin 05/14/2020 0.3  0.2 - 1.2 mg/dL Final   • eGFR Non African Amer 05/14/2020 70  >60 mL/min/1.73 Final   • Globulin 05/14/2020 2.7  gm/dL Final   • A/G Ratio 05/14/2020 1.5  g/dL Final   • BUN/Creatinine Ratio 05/14/2020 20.6  7.0 - 25.0 Final   • Anion Gap 05/14/2020 14.0  5.0 - 15.0 mmol/L Final   • proBNP 05/14/2020 94.4  5.0 - 900.0 pg/mL Final   • Troponin T 05/14/2020 <0.010  0.000 - 0.030 ng/mL Final   • WBC 05/14/2020 8.63  3.40 - 10.80 10*3/mm3 Final   • RBC 05/14/2020 4.36  4.14 - 5.80 10*6/mm3 Final   • Hemoglobin 05/14/2020 14.7  13.0 - 17.7 g/dL Final   • Hematocrit 05/14/2020 42.0  37.5 - 51.0 % Final   • MCV 05/14/2020 96.3  79.0 - 97.0 fL Final   • MCH 05/14/2020 33.7* 26.6 - 33.0 pg Final   • MCHC 05/14/2020 35.0  31.5 - 35.7 g/dL Final   • RDW 05/14/2020 14.6  12.3 - 15.4 % Final   • RDW-SD 05/14/2020 49.2  37.0 - 54.0 fl Final   • MPV 05/14/2020 8.5  6.0 - 12.0 fL Final   • Platelets 05/14/2020 203  140 - 450 10*3/mm3 Final   • Neutrophil % 05/14/2020 74.3  42.7 - 76.0 % Final   • Lymphocyte % 05/14/2020 4.5* 19.6 - 45.3 % Final   •  Monocyte % 05/14/2020 14.1* 5.0 - 12.0 % Final   • Eosinophil % 05/14/2020 0.0* 0.3 - 6.2 % Final   • Basophil % 05/14/2020 0.5  0.0 - 1.5 % Final   • Immature Grans % 05/14/2020 6.6* 0.0 - 0.5 % Final   • Neutrophils, Absolute 05/14/2020 6.41  1.70 - 7.00 10*3/mm3 Final   • Lymphocytes, Absolute 05/14/2020 0.39* 0.70 - 3.10 10*3/mm3 Final   • Monocytes, Absolute 05/14/2020 1.22* 0.10 - 0.90 10*3/mm3 Final   • Eosinophils, Absolute 05/14/2020 0.00  0.00 - 0.40 10*3/mm3 Final   • Basophils, Absolute 05/14/2020 0.04  0.00 - 0.20 10*3/mm3 Final   • Immature Grans, Absolute 05/14/2020 0.57* 0.00 - 0.05 10*3/mm3 Final   • nRBC 05/14/2020 0.7* 0.0 - 0.2 /100 WBC Final   • Glucose 05/15/2020 115* 65 - 99 mg/dL Final   • BUN 05/15/2020 21  8 - 23 mg/dL Final   • Creatinine 05/15/2020 0.93  0.76 - 1.27 mg/dL Final   • Sodium 05/15/2020 138  136 - 145 mmol/L Final   • Potassium 05/15/2020 3.9  3.5 - 5.2 mmol/L Final   • Chloride 05/15/2020 103  98 - 107 mmol/L Final   • CO2 05/15/2020 22.0  22.0 - 29.0 mmol/L Final   • Calcium 05/15/2020 9.0  8.6 - 10.5 mg/dL Final   • eGFR Non African Amer 05/15/2020 82  >60 mL/min/1.73 Final   • BUN/Creatinine Ratio 05/15/2020 22.6  7.0 - 25.0 Final   • Anion Gap 05/15/2020 13.0  5.0 - 15.0 mmol/L Final   • WBC 05/15/2020 8.88  3.40 - 10.80 10*3/mm3 Final   • RBC 05/15/2020 4.08* 4.14 - 5.80 10*6/mm3 Final   • Hemoglobin 05/15/2020 13.7  13.0 - 17.7 g/dL Final   • Hematocrit 05/15/2020 39.5  37.5 - 51.0 % Final   • MCV 05/15/2020 96.8  79.0 - 97.0 fL Final   • MCH 05/15/2020 33.6* 26.6 - 33.0 pg Final   • MCHC 05/15/2020 34.7  31.5 - 35.7 g/dL Final   • RDW 05/15/2020 14.6  12.3 - 15.4 % Final   • RDW-SD 05/15/2020 49.7  37.0 - 54.0 fl Final   • MPV 05/15/2020 8.8  6.0 - 12.0 fL Final   • Platelets 05/15/2020 193  140 - 450 10*3/mm3 Final   • Neutrophil % 05/15/2020 70.3  42.7 - 76.0 % Final   • Lymphocyte % 05/15/2020 4.4* 19.6 - 45.3 % Final   • Monocyte % 05/15/2020 19.3* 5.0 - 12.0 %  Final   • Eosinophil % 05/15/2020 0.0* 0.3 - 6.2 % Final   • Basophil % 05/15/2020 0.5  0.0 - 1.5 % Final   • Immature Grans % 05/15/2020 5.5* 0.0 - 0.5 % Final   • Neutrophils, Absolute 05/15/2020 6.25  1.70 - 7.00 10*3/mm3 Final   • Lymphocytes, Absolute 05/15/2020 0.39* 0.70 - 3.10 10*3/mm3 Final   • Monocytes, Absolute 05/15/2020 1.71* 0.10 - 0.90 10*3/mm3 Final   • Eosinophils, Absolute 05/15/2020 0.00  0.00 - 0.40 10*3/mm3 Final   • Basophils, Absolute 05/15/2020 0.04  0.00 - 0.20 10*3/mm3 Final   • Immature Grans, Absolute 05/15/2020 0.49* 0.00 - 0.05 10*3/mm3 Final   • nRBC 05/15/2020 0.8* 0.0 - 0.2 /100 WBC Final        No results found.    ASSESSMENT 64 years old gentleman with metastatic lung cancer    PROBLEM LIST   1.  Distal adenocarcinoma of the lung  2.  Brain metastases: MRI brain showed improvement  3.  Worsening weakness  4.  Altered mental status    PLAN  1.  I reviewed the patient's chart including admission note but the results and imaging report.  I explained to the patient his MRI brain is better.  2.  I discussed the case with Dr. Pederson from palliative care to coordinate patient care.  3.  I called the patient wife Nelida unfortunate she did not answer but I left a detailed voice message.  4.  Continue symptoms management.  Follow-up with me as scheduled.      Karen Vicente MD    5/15/2020

## 2020-05-15 NOTE — NURSING NOTE
Patient returned from MRI at this time. Patient back to baseline assessment, but still guarding his lower back on exertion. Patient is following commands, alert and oriented, and participating in conversation.

## 2020-05-15 NOTE — PROGRESS NOTES
"    [facility] Medicine Services  PROGRESS NOTE    Patient Name: Rajan Chambers  : 1955  MRN: [id number]    Date of Admission: 2020  Primary Care Physician: Jeimy Johnson MD    Subjective   Subjective     CC:  confusion    HPI:  Had trouble urinating earlier today. Says low back hurts    Review of Systems  Gen- No fevers, chills  CV- No chest pain, palpitations  Resp- No cough, dyspnea  GI- No N/V/D, abd pain        Objective   Objective     Vital Signs:   Temp:  [97.6 °F (36.4 °C)-98.1 °F (36.7 °C)] 98.1 °F (36.7 °C)  Heart Rate:  [] 64  Resp:  [14-18] 18  BP: (127-165)/() 127/80        Physical Exam:  Constitutional -no acute distress, non toxic, in bed  HEENT-NCAT, mucous membranes moist  CV-RRR, S1 S2 normal, no m/r/g  Resp-CTAB, no wheezes, rhonchi or rales  Abd-soft, non-tender, non-distended, normo active bowel sounds  Ext-No lower extremity cyanosis, clubbing or edema bilaterally  Neuro-alert but mentation slowed, year \"\", speech clear, moves all extremities   Psych-somewhat flat affect   Skin- No rash on exposed UE or LE bilaterally      Results Reviewed:  Results from last 7 days   Lab Units 05/15/20  0619 20  2241   WBC 10*3/mm3 8.88 8.63   HEMOGLOBIN g/dL 13.7 14.7   HEMATOCRIT % 39.5 42.0   PLATELETS 10*3/mm3 193 203     Results from last 7 days   Lab Units 05/15/20  0619 20  2241   SODIUM mmol/L 138 140   POTASSIUM mmol/L 3.9 4.4   CHLORIDE mmol/L 103 103   CO2 mmol/L 22.0 23.0   BUN mg/dL 21 22   CREATININE mg/dL 0.93 1.07   GLUCOSE mg/dL 115* 139*   CALCIUM mg/dL 9.0 9.1   ALT (SGPT) U/L  --  68*   AST (SGOT) U/L  --  31   TROPONIN T ng/mL  --  <0.010   PROBNP pg/mL  --  94.4     Estimated Creatinine Clearance: 77.2 mL/min (by C-G formula based on SCr of 0.93 mg/dL).    Microbiology Results Abnormal     None          Imaging Results (Last 24 Hours)     Procedure Component Value Units Date/Time    MRI Brain With & Without Contrast " [[id number]] Collected:  05/15/20 0417     Updated:  05/15/20 1022    Narrative:       MRI Brain WO W    INDICATION:   Non-small cell lung cancer with brain and spine metastases. Patient presents with worsening confusion    TECHNIQUE:   MRI of the brain with 14 cc of MultiHance IV contrast.    COMPARISON:    CT brain 5/14/2020    FINDINGS:  The pituitary gland and foramen magnum region are normal in appearance there is no abnormal diffusion.. The ventricles interspaces are within normal limits. There are a few punctate areas of increased FLAIR signal intensity consistent with small vessel  ischemic changes.. There is no abnormal enhancement      Impression:       No evidence of metastatic disease. No recent ischemic change.    Findings the preliminary report. Please see full dictation from neuroradiology    Initial dictation performed by Dr. Francisco Carr    Final interpretation:    There is no abnormally restricted diffusion. Midline structures are unremarkable.    There are multiple tiny areas of precontrast high T1 signal intensity on surface of the brain and in the bilateral basal ganglia and thalami. A few punctate parenchymal high signal intensity lesions are also seen. These were present on prior study as  well. Following contrast administration, there is mild associated enhancement associated with several of these lesions given the precontrast high signal intensity on T1-weighted imaging enhancement is best appreciated associated with the small thalamic  lesions and the cerebellar lesions. There are 5 mm or less in dimension and is no appreciable mass effect. There is moderate white matter signal abnormality. Some of this is likely due to small vessel disease and is not appreciably changed. Other areas  of white matter signal abnormality are related to the tiny parenchymal metastases and likely reflect subtle areas of vasogenic edema. This is best appreciated at the left inferior frontal lobe where new 5 mm focus  of white matter signal abnormality is  seen on the FLAIR sequence image #14. There is no extra-axial fluid collection. There is no hydrocephalus.    The major arterial intracranial flow voids are maintained. The mastoid air cells are clear. The visualized paranasal sinuses show mild mucosal thickening. There is a routine secretions in the left sphenoid sinus.    IMPRESSION:  1. Mild interval worsening in the appearance the brain. There are tiny too numerous to count foci of subtle enhancement on the surface of the brain and involving the brain parenchyma. Many of these areas of subtle enhancement are superimposed upon  precontrast T1 high signal intensity consistent with old blood product or mineral deposition. Subtle vasogenic edema can be seen associated with several lesions but there is no significant mass effect. The enhancement and subtle vasogenic edema is  essentially new from the prior study though the areas of precontrast T1 high signal intensity were seen. Findings are again most consistent with mild progression of the multiple tiny intracranial metastases from the patient's known lung cancer. There is  likely a component of pre-existing small vessel disease as well.    I have called the patient's nurse on 3F and indicated to him that the preliminary report is erroneous and that there is intracranial metastatic disease. Since Dr. Fuentes is no longer on call, I have left a voicemail with my cell phone number with the  hospitalist service to reach the patient's current hospitalist with the results.    Signer Name: Katelyn Vigil MD   Signed: 5/15/2020 10:19 AM   Workstation Name: [id number]    [facility]    CT Abdomen Pelvis Without Contrast [[id number]] Collected:  05/14/20 2329     Updated:  05/14/20 2331    Narrative:       CT Abdomen Pelvis WO    INDICATION:   Increasing pain with known metastatic disease    TECHNIQUE:   CT of the abdomen and pelvis without IV contrast. Coronal and  sagittal reconstructions were obtained.  Radiation dose reduction techniques included automated exposure control or exposure modulation based on body size. Count of known CT and cardiac nuc  med studies performed in previous 12 months: 4.     COMPARISON:   3/17/2020    FINDINGS:  Abdomen: The initial image of the study may show some faint nodules in the left lower lobe measuring 3 mm in diameter. The liver, gallbladder, spleen, pancreas, adrenal glands and kidneys are normal except for nonobstructing renal stones. There is an 8  mm stone in the left kidney and there are at least 2 stones in the right kidney measuring up to 8 mm. The aorta is normal in size. There is no adenopathy. Bowel is normal.    Pelvis: Bladder and prostate gland are normal.. There is an oval water density well-circumscribed area within the base the penis measuring 2.5 x 1.7 cm. This may represent a urethral diverticulum. It was present 3/17/2020 without significant change.    There are numerous sclerotic lesions present in the lumbar spine and sacrum and iliac bones and they are all unchanged from the previous study      Impression:       No change in sclerotic bony metastases    Nonobstructing renal stones    No acute findings          Signer Name: Francisco Carr MD   Signed: 5/14/2020 11:29 PM   Workstation Name: [facility]    [facility]    CT Head Without Contrast [[id number]] Collected:  05/14/20 2325     Updated:  05/14/20 2328    Narrative:       CT Head WO    HISTORY:   Lung cancer metastatic disease.    TECHNIQUE:   Axial unenhanced head CT. Radiation dose reduction techniques included automated exposure control or exposure modulation based on body size. Count of known CT and cardiac nuc med studies performed in previous 12 months: 0.     Time of scan: 10:58 PM    COMPARISON:   None.    FINDINGS:   No intracranial hemorrhage, mass, or infarct. No hydrocephalus or extra-axial fluid collection. Brain parenchymal  density is normal. The skull base, calvarium, and extracranial soft tissues are normal.      Impression:       Normal, negative unenhanced head CT.          Signer Name: Francisco Carr MD   Signed: 5/14/2020 11:25 PM   Workstation Name: [facility]    [facility]    CT Lumbar Spine Without Contrast [[id number]] Collected:  05/14/20 2325     Updated:  05/14/20 2327    Narrative:       CT Spine Lumbar WO    INDICATION:    History of lung cancer with metastatic disease. Increasing low back pain    TECHNIQUE:   CT of the lumbar spine without IV contrast. Coronal and sagittal reconstructions were obtained.  Radiation dose reduction techniques included automated exposure control or exposure modulation based on body size. Count of known CT and cardiac nuc med  studies performed in previous 12 months: 4.     COMPARISON:    CT abdomen pelvis from 2/1/2020    FINDINGS:  Once again there is sclerosis of L5 involving most of the few body. There is patchy sclerosis in the L3 vertebral body and fairly extensive sclerosis in the T12 vertebral body. These findings are all present on 2/1/2020. The alignment is normal. There is  no fracture visible. There are partially visualized sclerotic metastases in the sacrum and iliac bones.      Impression:       Numerous sclerotic metastases within the lumbar vertebral bodies as well as the sacrum and the iliac bones. There is no change from 2/1/2020.    No acute finding such as fracture. There is no spinal stenosis visible.    Signer Name: Francisco Carr MD   Signed: 5/14/2020 11:25 PM   Workstation Name: [facility]    Radiology [facility]          Results for orders placed during the hospital encounter of 09/23/19   Adult Transthoracic Echo Complete W/ Cont if Necessary Per Protocol    Narrative · Estimated EF = 55%.  · Left ventricular systolic function is normal.  · Left ventricular diastolic dysfunction.  · Mild mitral valve regurgitation is present                I have reviewed the medications:  Scheduled Meds:  calcium carb-cholecalciferol 1 tablet Oral BID With Meals   cetirizine 10 mg Oral Daily   dexamethasone 4 mg Oral Daily With Breakfast   docusate sodium 100 mg Oral BID   escitalopram 20 mg Oral Daily   gabapentin 100 mg Oral TID   megestrol 400 mg Oral Daily   melatonin 5 mg Oral Nightly   memantine 5 mg Oral BID   QUEtiapine 25 mg Oral Nightly   rivaroxaban 20 mg Oral Daily With Dinner   sodium chloride 10 mL Intravenous Q12H     Continuous Infusions:  sodium chloride 125 mL/hr Last Rate: 125 mL/hr (05/15/20 1314)     PRN Meds:.•  acetaminophen  •  HYDROmorphone  •  oxyCODONE-acetaminophen  •  [COMPLETED] Insert peripheral IV **AND** sodium chloride  •  sodium chloride    Assessment/Plan   Assessment & Plan     Active Hospital Problems    Diagnosis  POA   • Cancer related pain [G89.3]  Unknown   • AMS (altered mental status) [R41.82]  Unknown   • Brain metastases (CMS/HCC) [C79.31]  Yes   • Pain from bone metastases (CMS/HCC) [G89.3, C79.51]  Yes   • Non-small cell lung cancer, right (CMS/HCC) [C34.91]  Yes   • Back pain [M54.9]  Yes   • Metastatic cancer (CMS/HCC) [C79.9]  Yes      Resolved Hospital Problems   No resolved problems to display.       Brief Hospital Course to date:  Rajan Chambers is a 64 y.o. male with history of stage IV NSCLC with brain and spinal mets presents with worsening confusion and clinical decline    Altered mental status  - MRI brain re-interpreted by radiology today showing mild progression of metastatic disease with innumerable foci showing subtle enhancement on the surface of the brain and in the basal ganglia. Discussed finding with Oncology Dr Vicente who recommends starting solumedrol 125 mg IV daily. He will update spouse with the new radiologist report.    Cancer related pain  - continue neurontin, percocet and prn dilaudid  - inpatient Palliative to assist with pain management, follows outpatient with   [facility].    Metastatic NSCLC  - currently receiving chemo and immunotherapy    H/o DVT  - xarelto    DVT Prophylaxis:  xarelto    Daily Care Communication  Due to current limited visitation policies, an attempt will be made daily to update patient's identified best point-of-contact(s)   Dr Vicente to call spouse again this afternoon.                 Disposition: I expect the patient to be discharged TBD    CODE STATUS:   Code Status and Medical Interventions:   Ordered at: 05/15/20 0023     Limited Support to NOT Include:    Intubation     Code Status:    No CPR     Medical Interventions (Level of Support Prior to Arrest):    Limited         Electronically signed by Dat Tomlinson MD, 05/15/20, 16:00.

## 2020-05-15 NOTE — H&P
"    [facility] Medicine Services  HISTORY AND PHYSICAL    Patient Name: Rajan Chambers  : 1955  MRN: [id number]  Primary Care Physician: Jeimy Johnson MD  Date of admission: 2020      Subjective   Subjective     Chief Complaint:  \" Taty had low back pain\"    HPI:  Rajan Chambers is a 64 y.o. male with PMH of advanced nonsmall cell lung cancer with diffuse mets to brain and spine, hx of DVT on xarelto, chronic cancer related back pain who presents to the ER at the request of his family due to worsening confusion, overall clinical decline, and worsening cancer related back pain.     Patient is currently confused and therefore history is somewhat limited.  He currently tells me that his pain is tolerable, located in his low back, rated 4 out of 10 in intensity.  He occasionally has tangential thought process and word finding difficulty.    History is mostly obtained from the wife over the phone.  She states that he has had worsening confusion over the last several weeks.  She was concerned that it could be related to intracranial pressure and therefore restarted daily dexamethasone several days ago and has noted some improvement with this.  Denies any fevers.  Denies any other symptoms other than back pain.    Wife is concerned that she can no longer take care of him at home and she does not feel like his pain is adequately controlled.  She has been talking regularly with palliative care and is considering transitioning to hospice.  Several family members are resistant to this idea as they do not want him to stop treatments with chemotherapy and radiation.  She is having difficulty caring for him at home and does not feel his pain is controlled and therefore wanted to talk with oncology and palliative care further about possible hospice care.    Review of Systems   Gen- No fevers, chills  CV- No chest pain, palpitations  Resp- No cough, dyspnea  GI- No N/V/D, abd pain      All " other systems reviewed and are negative.     Personal History     Past Medical History:   Diagnosis Date   • Brain cancer (CMS/HCC)    • Cancer (CMS/HCC)     NSCLC   • History of radiation therapy 01/28/2020    T12-L5, left iliac wing   • Lung nodule    • Metastatic cancer (CMS/HCC)    • Shortness of breath     RECENT ONSET       Past Surgical History:   Procedure Laterality Date   • BRONCHOSCOPY N/A 1/18/2018    Procedure: RANDALL AND BRONCHOSCOPY WITH ENDOBRONCHIAL ULTRASOUND WITH FLUORO;  Surgeon: Gustavo Haque MD;  Location: [facility] ENDOSCOPY;  Service:    • COLLATERAL LIGAMENT REPAIR, KNEE     • COLONOSCOPY  05/2019   • HERNIA REPAIR     • KNEE ARTHROSCOPY     • LASIK     • LUNG BIOPSY Right 01/10/2018    Dr. Petty @ [facility]   • SKIN CANCER EXCISION Bilateral     BASAL CELL ON NECK YESTERDAY       Family History: family history includes Brain cancer in his paternal grandfather; Breast cancer in his maternal grandmother, mother, paternal grandmother, and sister; Cancer in his maternal grandmother, mother, paternal grandmother, and sister; Emphysema in his father. Otherwise pertinent FHx was reviewed and unremarkable.     Social History:  reports that he has never smoked. He quit smokeless tobacco use about 21 years ago. He reports that he does not drink alcohol or use drugs.  Social History     Social History Narrative        Retired from the [employer]    A .    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    Chew tobacco up to 2001       Medications:  Available home medication information reviewed.    (Not in a hospital admission)    Allergies   Allergen Reactions   • Penicillin G Hives   • Ibuprofen Itching and Dermatitis     Prickly rash on heaD   • Penicillins Rash   • Sulfa Antibiotics Rash and Itching       Objective   Objective     Vital Signs:   Temp:  [97.6 °F (36.4 °C)] 97.6 °F (36.4 °C)  Heart Rate:  [69-85] 72  Resp:  [14] 14  BP: (130-136)/(81-89)  130/86        Physical Exam   Constitutional: Awake, alert, mild discomfort  Eyes: PERRLA, sclerae anicteric, no conjunctival injection  HENT: NCAT, mucous membranes moist  Neck: Supple, no thyromegaly, no lymphadenopathy, trachea midline  Respiratory: Clear to auscultation bilaterally, nonlabored respirations   Cardiovascular: RRR, no murmurs, rubs, or gallops, palpable pedal pulses bilaterally  Gastrointestinal: Positive bowel sounds, soft, nontender, nondistended  Musculoskeletal: No bilateral ankle edema, no clubbing or cyanosis to extremities, pain in the lumbar spine to palpation.  Psychiatric: Appropriate affect, cooperative  Neurologic: Oriented x 3, strength symmetric in all extremities, Cranial Nerves grossly intact to confrontation, speech clear, slowed mentation, tangential thought process  Skin: No rashes      Results Reviewed:  I have personally reviewed current lab and radiology data.    Results from last 7 days   Lab Units 05/14/20  2241   WBC 10*3/mm3 8.63   HEMOGLOBIN g/dL 14.7   HEMATOCRIT % 42.0   PLATELETS 10*3/mm3 203     Results from last 7 days   Lab Units 05/14/20  2241   SODIUM mmol/L 140   POTASSIUM mmol/L 4.4   CHLORIDE mmol/L 103   CO2 mmol/L 23.0   BUN mg/dL 22   CREATININE mg/dL 1.07   GLUCOSE mg/dL 139*   CALCIUM mg/dL 9.1   ALT (SGPT) U/L 68*   AST (SGOT) U/L 31   TROPONIN T ng/mL <0.010   PROBNP pg/mL 94.4     Estimated Creatinine Clearance: 67.1 mL/min (by C-G formula based on SCr of 1.07 mg/dL).  Brief Urine Lab Results  (Last result in the past 365 days)      Color   Clarity   Blood   Leuk Est   Nitrite   Protein   CREAT   Urine HCG        04/28/20 0934 Dark Yellow Slightly Cloudy Large (3+) Negative Negative 30 mg/dL (1+)             Imaging Results (Last 24 Hours)     Procedure Component Value Units Date/Time    CT Abdomen Pelvis Without Contrast [[id number]] Collected:  05/14/20 2329     Updated:  05/14/20 2331    Narrative:       CT Abdomen Pelvis WO    INDICATION:      Increasing pain with known metastatic disease    TECHNIQUE:   CT of the abdomen and pelvis without IV contrast. Coronal and sagittal reconstructions were obtained.  Radiation dose reduction techniques included automated exposure control or exposure modulation based on body size. Count of known CT and cardiac nuc  med studies performed in previous 12 months: 4.     COMPARISON:   3/17/2020    FINDINGS:  Abdomen: The initial image of the study may show some faint nodules in the left lower lobe measuring 3 mm in diameter. The liver, gallbladder, spleen, pancreas, adrenal glands and kidneys are normal except for nonobstructing renal stones. There is an 8  mm stone in the left kidney and there are at least 2 stones in the right kidney measuring up to 8 mm. The aorta is normal in size. There is no adenopathy. Bowel is normal.    Pelvis: Bladder and prostate gland are normal.. There is an oval water density well-circumscribed area within the base the penis measuring 2.5 x 1.7 cm. This may represent a urethral diverticulum. It was present 3/17/2020 without significant change.    There are numerous sclerotic lesions present in the lumbar spine and sacrum and iliac bones and they are all unchanged from the previous study      Impression:       No change in sclerotic bony metastases    Nonobstructing renal stones    No acute findings          Signer Name: Francisco Carr MD   Signed: 5/14/2020 11:29 PM   Workstation [facility]: [facility]    [facility]    CT Head Without Contrast [[id number]] Collected:  05/14/20 2325     Updated:  05/14/20 2328    Narrative:       CT Head WO    HISTORY:   Lung cancer metastatic disease.    TECHNIQUE:   Axial unenhanced head CT. Radiation dose reduction techniques included automated exposure control or exposure modulation based on body size. Count of known CT and cardiac nuc med studies performed in previous 12 months: 0.     Time of scan: 10:58 PM    COMPARISON:    None.    FINDINGS:   No intracranial hemorrhage, mass, or infarct. No hydrocephalus or extra-axial fluid collection. Brain parenchymal density is normal. The skull base, calvarium, and extracranial soft tissues are normal.      Impression:       Normal, negative unenhanced head CT.          Signer Name: Francisco Carr MD   Signed: 5/14/2020 11:25 PM   Workstation Name: [facility]    Radiology [facility]    CT Lumbar Spine Without Contrast [[id number]] Collected:  05/14/20 2325     Updated:  05/14/20 2327    Narrative:       CT Spine Lumbar WO    INDICATION:    History of lung cancer with metastatic disease. Increasing low back pain    TECHNIQUE:   CT of the lumbar spine without IV contrast. Coronal and sagittal reconstructions were obtained.  Radiation dose reduction techniques included automated exposure control or exposure modulation based on body size. Count of known CT and cardiac nuc med  studies performed in previous 12 months: 4.     COMPARISON:    CT abdomen pelvis from 2/1/2020    FINDINGS:  Once again there is sclerosis of L5 involving most of the few body. There is patchy sclerosis in the L3 vertebral body and fairly extensive sclerosis in the T12 vertebral body. These findings are all present on 2/1/2020. The alignment is normal. There is  no fracture visible. There are partially visualized sclerotic metastases in the sacrum and iliac bones.      Impression:       Numerous sclerotic metastases within the lumbar vertebral bodies as well as the sacrum and the iliac bones. There is no change from 2/1/2020.    No acute finding such as fracture. There is no spinal stenosis visible.    Signer Name: Francisco Carr MD   Signed: 5/14/2020 11:25 PM   Workstation Name: [facility]    Radiology [facility]        Results for orders placed during the hospital encounter of 09/23/19   Adult Transthoracic Echo Complete W/ Cont if Necessary Per Protocol    Narrative · Estimated EF = 55%.  · Left  "ventricular systolic function is normal.  · Left ventricular diastolic dysfunction.  · Mild mitral valve regurgitation is present          Assessment/Plan   Assessment & Plan     Active Hospital Problems    Diagnosis POA   • Cancer related pain [G89.3] Unknown   • AMS (altered mental status) [R41.82] Unknown   • Brain metastases (CMS/HCC) [C79.31] Yes   • Pain from bone metastases (CMS/HCC) [G89.3, C79.51] Yes   • Non-small cell lung cancer, right (CMS/HCC) [C34.91] Yes   • Back pain [M54.9] Yes   • Metastatic cancer (CMS/HCC) [C79.9] Yes     Pt is a 64 M with PMH of advanced nonsmall cell lung cancer with diffuse mets to brain and spine, hx of DVT on xarelto, chronic cancer related back pain who presents to the ER at the request of his family due to worsening confusion, overall clinical decline, and worsening cancer related back pain.     1. Cancer related pain, intractable  --percocet for breakthrough pain, fentanyl patch 12mcg upon completion of mri  --case discussed with wife who is considered hospice, however other family members are wanting him to continue with treatment.  Wife is concerned that she does not have enough resources to care for him at home and thought that hospice would be of benefit for this reason.  --Oncology consult  --Palliative care consult, patient has palliative care at home    2.  Altered mental status  --Family reports intermittent episodes of worsening confusion, \"dazed\" affect.  --Improving somewhat after starting dexamethasone over the last few days.  Family concerned for intracranial pressure related to meds.  --Status post radiation therapy in March to the brain.  --Repeat MRI with and without contrast.    3.  History of DVT  --Continue Xarelto    DVT prophylaxis:  xarelto      Admission Communication  Due to current limited visitation policies, an attempt will be made to update patient's identified best point-of-contact(s) at admission to discuss plan of care.   Contact: " Trish   Relation: wife   Time of communication: [id number]   Notes (if applicable): reported condition, dx, and treatment. Wants update in am if possible         CODE STATUS:    Code Status and Medical Interventions:   Ordered at: 05/15/20 0023     Limited Support to NOT Include:    Intubation     Code Status:    No CPR     Medical Interventions (Level of Support Prior to Arrest):    Limited       Admission Status:  I believe this patient meets INPATIENT status due to cancer related pain.  I feel patient’s risk for adverse outcomes and need for care warrant INPATIENT evaluation and I predict the patient’s care encounter to likely last beyond 2 midnights.      Electronically signed by Garrett Fuentes DO, 05/15/20, 12:36 AM.

## 2020-05-16 LAB
BASOPHILS # BLD AUTO: 0.02 10*3/MM3 (ref 0–0.2)
BASOPHILS NFR BLD AUTO: 0.2 % (ref 0–1.5)
D-LACTATE SERPL-SCNC: 1.8 MMOL/L (ref 0.5–2)
D-LACTATE SERPL-SCNC: 4.3 MMOL/L (ref 0.5–2)
DEPRECATED RDW RBC AUTO: 51.1 FL (ref 37–54)
EOSINOPHIL # BLD AUTO: 0 10*3/MM3 (ref 0–0.4)
EOSINOPHIL NFR BLD AUTO: 0 % (ref 0.3–6.2)
ERYTHROCYTE [DISTWIDTH] IN BLOOD BY AUTOMATED COUNT: 14.8 % (ref 12.3–15.4)
HCT VFR BLD AUTO: 41.3 % (ref 37.5–51)
HGB BLD-MCNC: 14.3 G/DL (ref 13–17.7)
IMM GRANULOCYTES # BLD AUTO: 0.4 10*3/MM3 (ref 0–0.05)
IMM GRANULOCYTES NFR BLD AUTO: 3.2 % (ref 0–0.5)
LACTATE HOLD SPECIMEN: NORMAL
LYMPHOCYTES # BLD AUTO: 0.21 10*3/MM3 (ref 0.7–3.1)
LYMPHOCYTES NFR BLD AUTO: 1.7 % (ref 19.6–45.3)
MCH RBC QN AUTO: 33.8 PG (ref 26.6–33)
MCHC RBC AUTO-ENTMCNC: 34.6 G/DL (ref 31.5–35.7)
MCV RBC AUTO: 97.6 FL (ref 79–97)
MONOCYTES # BLD AUTO: 0.41 10*3/MM3 (ref 0.1–0.9)
MONOCYTES NFR BLD AUTO: 3.2 % (ref 5–12)
NEUTROPHILS # BLD AUTO: 11.59 10*3/MM3 (ref 1.7–7)
NEUTROPHILS NFR BLD AUTO: 91.7 % (ref 42.7–76)
NRBC BLD AUTO-RTO: 0.4 /100 WBC (ref 0–0.2)
PLATELET # BLD AUTO: 183 10*3/MM3 (ref 140–450)
PMV BLD AUTO: 8.7 FL (ref 6–12)
RBC # BLD AUTO: 4.23 10*6/MM3 (ref 4.14–5.8)
WBC NRBC COR # BLD: 12.63 10*3/MM3 (ref 3.4–10.8)

## 2020-05-16 PROCEDURE — 25010000002 METHYLPREDNISOLONE PER 125 MG: Performed by: INTERNAL MEDICINE

## 2020-05-16 PROCEDURE — 99233 SBSQ HOSP IP/OBS HIGH 50: CPT | Performed by: INTERNAL MEDICINE

## 2020-05-16 PROCEDURE — 83605 ASSAY OF LACTIC ACID: CPT | Performed by: INTERNAL MEDICINE

## 2020-05-16 PROCEDURE — 25010000002 HYDROMORPHONE PER 4 MG: Performed by: NURSE PRACTITIONER

## 2020-05-16 PROCEDURE — 85025 COMPLETE CBC W/AUTO DIFF WBC: CPT | Performed by: INTERNAL MEDICINE

## 2020-05-16 PROCEDURE — 99232 SBSQ HOSP IP/OBS MODERATE 35: CPT | Performed by: INTERNAL MEDICINE

## 2020-05-16 RX ORDER — FENTANYL 12 UG/H
1 PATCH TRANSDERMAL
Status: DISCONTINUED | OUTPATIENT
Start: 2020-05-16 | End: 2020-05-18 | Stop reason: HOSPADM

## 2020-05-16 RX ORDER — LORAZEPAM 0.5 MG/1
0.5 TABLET ORAL EVERY 6 HOURS PRN
Status: DISCONTINUED | OUTPATIENT
Start: 2020-05-16 | End: 2020-05-16

## 2020-05-16 RX ORDER — LORAZEPAM 0.5 MG/1
0.25 TABLET ORAL EVERY 6 HOURS PRN
Status: DISCONTINUED | OUTPATIENT
Start: 2020-05-16 | End: 2020-05-16

## 2020-05-16 RX ORDER — HYDROMORPHONE HYDROCHLORIDE 1 MG/ML
0.5 INJECTION, SOLUTION INTRAMUSCULAR; INTRAVENOUS; SUBCUTANEOUS
Status: DISCONTINUED | OUTPATIENT
Start: 2020-05-16 | End: 2020-05-18 | Stop reason: HOSPADM

## 2020-05-16 RX ORDER — OXYCODONE HYDROCHLORIDE 5 MG/1
5 TABLET ORAL EVERY 4 HOURS PRN
Status: DISCONTINUED | OUTPATIENT
Start: 2020-05-16 | End: 2020-05-18 | Stop reason: HOSPADM

## 2020-05-16 RX ORDER — OXYCODONE HYDROCHLORIDE 5 MG/1
10 TABLET ORAL EVERY 4 HOURS PRN
Status: DISCONTINUED | OUTPATIENT
Start: 2020-05-16 | End: 2020-05-18 | Stop reason: HOSPADM

## 2020-05-16 RX ORDER — HALOPERIDOL 1 MG/1
2 TABLET ORAL EVERY 4 HOURS PRN
Status: DISCONTINUED | OUTPATIENT
Start: 2020-05-16 | End: 2020-05-18 | Stop reason: HOSPADM

## 2020-05-16 RX ORDER — AMOXICILLIN 250 MG
1 CAPSULE ORAL 2 TIMES DAILY
Status: DISCONTINUED | OUTPATIENT
Start: 2020-05-16 | End: 2020-05-17

## 2020-05-16 RX ORDER — HALOPERIDOL 1 MG/1
1 TABLET ORAL EVERY 4 HOURS PRN
Status: DISCONTINUED | OUTPATIENT
Start: 2020-05-16 | End: 2020-05-18 | Stop reason: HOSPADM

## 2020-05-16 RX ORDER — BISACODYL 10 MG
10 SUPPOSITORY, RECTAL RECTAL DAILY PRN
Status: DISCONTINUED | OUTPATIENT
Start: 2020-05-16 | End: 2020-05-18 | Stop reason: HOSPADM

## 2020-05-16 RX ORDER — LORAZEPAM 0.5 MG/1
0.25 TABLET ORAL EVERY 4 HOURS PRN
Status: DISCONTINUED | OUTPATIENT
Start: 2020-05-16 | End: 2020-05-17

## 2020-05-16 RX ORDER — LORAZEPAM 0.5 MG/1
0.5 TABLET ORAL EVERY 6 HOURS PRN
Status: DISCONTINUED | OUTPATIENT
Start: 2020-05-16 | End: 2020-05-17

## 2020-05-16 RX ORDER — LORAZEPAM 0.5 MG/1
0.5 TABLET ORAL ONCE
Status: COMPLETED | OUTPATIENT
Start: 2020-05-16 | End: 2020-05-16

## 2020-05-16 RX ADMIN — MEMANTINE 5 MG: 10 TABLET ORAL at 09:41

## 2020-05-16 RX ADMIN — MEMANTINE 5 MG: 10 TABLET ORAL at 21:58

## 2020-05-16 RX ADMIN — RIVAROXABAN 20 MG: 20 TABLET, FILM COATED ORAL at 18:01

## 2020-05-16 RX ADMIN — Medication 1 TABLET: at 18:01

## 2020-05-16 RX ADMIN — SODIUM CHLORIDE, PRESERVATIVE FREE 10 ML: 5 INJECTION INTRAVENOUS at 09:42

## 2020-05-16 RX ADMIN — SENNOSIDES AND DOCUSATE SODIUM 1 TABLET: 8.6; 5 TABLET ORAL at 09:41

## 2020-05-16 RX ADMIN — CETIRIZINE HYDROCHLORIDE 10 MG: 10 TABLET, FILM COATED ORAL at 09:41

## 2020-05-16 RX ADMIN — SODIUM CHLORIDE 125 ML/HR: 9 INJECTION, SOLUTION INTRAVENOUS at 06:07

## 2020-05-16 RX ADMIN — LORAZEPAM 0.25 MG: 0.5 TABLET ORAL at 18:50

## 2020-05-16 RX ADMIN — HYDROMORPHONE HYDROCHLORIDE 0.5 MG: 1 INJECTION, SOLUTION INTRAMUSCULAR; INTRAVENOUS; SUBCUTANEOUS at 05:04

## 2020-05-16 RX ADMIN — OXYCODONE HYDROCHLORIDE AND ACETAMINOPHEN 1 TABLET: 5; 325 TABLET ORAL at 01:04

## 2020-05-16 RX ADMIN — OXYCODONE HYDROCHLORIDE AND ACETAMINOPHEN 1 TABLET: 5; 325 TABLET ORAL at 11:22

## 2020-05-16 RX ADMIN — GABAPENTIN 100 MG: 100 CAPSULE ORAL at 21:58

## 2020-05-16 RX ADMIN — MEGESTROL ACETATE 400 MG: 40 SUSPENSION ORAL at 09:41

## 2020-05-16 RX ADMIN — QUETIAPINE FUMARATE 25 MG: 25 TABLET ORAL at 21:59

## 2020-05-16 RX ADMIN — Medication 1 TABLET: at 09:41

## 2020-05-16 RX ADMIN — LORAZEPAM 0.5 MG: 0.5 TABLET ORAL at 22:03

## 2020-05-16 RX ADMIN — SODIUM CHLORIDE 1000 ML: 9 INJECTION, SOLUTION INTRAVENOUS at 15:37

## 2020-05-16 RX ADMIN — GABAPENTIN 100 MG: 100 CAPSULE ORAL at 15:37

## 2020-05-16 RX ADMIN — SENNOSIDES AND DOCUSATE SODIUM 1 TABLET: 8.6; 5 TABLET ORAL at 22:00

## 2020-05-16 RX ADMIN — ESCITALOPRAM OXALATE 20 MG: 20 TABLET ORAL at 09:41

## 2020-05-16 RX ADMIN — BISACODYL 10 MG: 10 SUPPOSITORY RECTAL at 09:41

## 2020-05-16 RX ADMIN — SODIUM CHLORIDE, PRESERVATIVE FREE 10 ML: 5 INJECTION INTRAVENOUS at 22:00

## 2020-05-16 RX ADMIN — FENTANYL 1 PATCH: 12.5 PATCH TRANSDERMAL at 15:37

## 2020-05-16 RX ADMIN — METHYLPREDNISOLONE SODIUM SUCCINATE 125 MG: 125 INJECTION, POWDER, FOR SOLUTION INTRAMUSCULAR; INTRAVENOUS at 09:41

## 2020-05-16 RX ADMIN — HYDROMORPHONE HYDROCHLORIDE 0.5 MG: 1 INJECTION, SOLUTION INTRAMUSCULAR; INTRAVENOUS; SUBCUTANEOUS at 09:59

## 2020-05-16 RX ADMIN — GABAPENTIN 100 MG: 100 CAPSULE ORAL at 09:41

## 2020-05-16 RX ADMIN — HYDROMORPHONE HYDROCHLORIDE 0.5 MG: 10 INJECTION INTRAMUSCULAR; INTRAVENOUS; SUBCUTANEOUS at 14:16

## 2020-05-16 NOTE — PROGRESS NOTES
"Palliative Care Daily Progress Note     C/C: doing \"ok\"    S: Medical record reviewed. Follow up visit for evaluation of needs and medication efficacy. Events noted. 24H PRN use noted. RN called this am reporting that wife would like to take pt home with hospice, and that pt in pain and not quite time for PRN medications and pt with urinary retention previously requiring I/O cath which is painful for pt. Pt also without PRNs for agitation/restlessness. Visit to confirm GOC with wife and adjust medications for improved symptom management.     ROS: denies pain, soa or nausea; unable to complete full ROS r/t pt AMS    O: Code Status:   Code Status and Medical Interventions:   Ordered at: 05/15/20 0023     Limited Support to NOT Include:    Intubation     Code Status:    No CPR     Medical Interventions (Level of Support Prior to Arrest):    Limited      Advanced Directives: Advance Directive Status: Patient has advance directive, copy requested   Goals of Care: Ongoing.   Palliative Performance Scale Score: 40%     /92 (BP Location: Right arm, Patient Position: Lying)   Pulse 87   Temp 98.1 °F (36.7 °C) (Axillary)   Resp 22   Ht 175.3 cm (69\")   Wt 68 kg (150 lb)   SpO2 96%   BMI 22.15 kg/m²     Intake/Output Summary (Last 24 hours) at 5/16/2020 1359  Last data filed at 5/16/2020 1315  Gross per 24 hour   Intake 1455 ml   Output 1600 ml   Net -145 ml       PE:  General Appearance:    Alert, cooperative, restless and fidgeting    HEENT:    NC/AT, EOMI, anicteric, MMM   Neck:   supple, trachea midline, no JVD   Lungs:     CTA bilat, respirations regular, even and unlabored    Heart:    RRR, normal S1 and S2, no M/R/G   Abdomen:     Normal bowel sounds, soft, mild ttp-diffuse, mildly distended   Extremities:   Moves all extremities, no edema   Pulses:   DP and radial pulses palpable and equal bilaterally   Skin:   Warm, dry   Neurologic:   A/Ox3, confusion noted with prolonged conversation, follows commands, " +expressive aphasia   Psych:   Restlessness with small frequent position changes and fidgeting as noted above       Labs:   Results from last 7 days   Lab Units 05/15/20  0619   WBC 10*3/mm3 8.88   HEMOGLOBIN g/dL 13.7   HEMATOCRIT % 39.5   PLATELETS 10*3/mm3 193     Results from last 7 days   Lab Units 05/15/20  0619   SODIUM mmol/L 138   POTASSIUM mmol/L 3.9   CHLORIDE mmol/L 103   CO2 mmol/L 22.0   BUN mg/dL 21   CREATININE mg/dL 0.93   GLUCOSE mg/dL 115*   CALCIUM mg/dL 9.0     Results from last 7 days   Lab Units 05/15/20  0619 05/14/20  2241   SODIUM mmol/L 138 140   POTASSIUM mmol/L 3.9 4.4   CHLORIDE mmol/L 103 103   CO2 mmol/L 22.0 23.0   BUN mg/dL 21 22   CREATININE mg/dL 0.93 1.07   CALCIUM mg/dL 9.0 9.1   BILIRUBIN mg/dL  --  0.3   ALK PHOS U/L  --  101   ALT (SGPT) U/L  --  68*   AST (SGOT) U/L  --  31   GLUCOSE mg/dL 115* 139*     Imaging Results (Last 72 Hours)     Procedure Component Value Units Date/Time    MRI Brain With & Without Contrast [[id number]] Collected:  05/15/20 0417     Updated:  05/15/20 1022    Narrative:       MRI Brain WO W    INDICATION:   Non-small cell lung cancer with brain and spine metastases. Patient presents with worsening confusion    TECHNIQUE:   MRI of the brain with 14 cc of MultiHance IV contrast.    COMPARISON:    CT brain 5/14/2020    FINDINGS:  The pituitary gland and foramen magnum region are normal in appearance there is no abnormal diffusion.. The ventricles interspaces are within normal limits. There are a few punctate areas of increased FLAIR signal intensity consistent with small vessel  ischemic changes.. There is no abnormal enhancement      Impression:       No evidence of metastatic disease. No recent ischemic change.    Findings the preliminary report. Please see full dictation from neuroradiology    Initial dictation performed by Dr. Francisco Carr    Final interpretation:    There is no abnormally restricted diffusion. Midline structures are  unremarkable.    There are multiple tiny areas of precontrast high T1 signal intensity on surface of the brain and in the bilateral basal ganglia and thalami. A few punctate parenchymal high signal intensity lesions are also seen. These were present on prior study as  well. Following contrast administration, there is mild associated enhancement associated with several of these lesions given the precontrast high signal intensity on T1-weighted imaging enhancement is best appreciated associated with the small thalamic  lesions and the cerebellar lesions. There are 5 mm or less in dimension and is no appreciable mass effect. There is moderate white matter signal abnormality. Some of this is likely due to small vessel disease and is not appreciably changed. Other areas  of white matter signal abnormality are related to the tiny parenchymal metastases and likely reflect subtle areas of vasogenic edema. This is best appreciated at the left inferior frontal lobe where new 5 mm focus of white matter signal abnormality is  seen on the FLAIR sequence image #14. There is no extra-axial fluid collection. There is no hydrocephalus.    The major arterial intracranial flow voids are maintained. The mastoid air cells are clear. The visualized paranasal sinuses show mild mucosal thickening. There is a routine secretions in the left sphenoid sinus.    IMPRESSION:  1. Mild interval worsening in the appearance the brain. There are tiny too numerous to count foci of subtle enhancement on the surface of the brain and involving the brain parenchyma. Many of these areas of subtle enhancement are superimposed upon  precontrast T1 high signal intensity consistent with old blood product or mineral deposition. Subtle vasogenic edema can be seen associated with several lesions but there is no significant mass effect. The enhancement and subtle vasogenic edema is  essentially new from the prior study though the areas of precontrast T1 high  signal intensity were seen. Findings are again most consistent with mild progression of the multiple tiny intracranial metastases from the patient's known lung cancer. There is  likely a component of pre-existing small vessel disease as well.    I have called the patient's nurse on 3F and indicated to him that the preliminary report is erroneous and that there is intracranial metastatic disease. Since Dr. Fuentes is no longer on call, I have left a voicemail with my cell phone number with the  hospitalist service to reach the patient's current hospitalist with the results.    Signer Name: Katelyn Vigil MD   Signed: 5/15/2020 10:19 AM   Workstation Name: [id number]    [facility]    CT Abdomen Pelvis Without Contrast [[id number]] Collected:  05/14/20 2329     Updated:  05/14/20 2331    Narrative:       CT Abdomen Pelvis WO    INDICATION:   Increasing pain with known metastatic disease    TECHNIQUE:   CT of the abdomen and pelvis without IV contrast. Coronal and sagittal reconstructions were obtained.  Radiation dose reduction techniques included automated exposure control or exposure modulation based on body size. Count of known CT and cardiac nuc  med studies performed in previous 12 months: 4.     COMPARISON:   3/17/2020    FINDINGS:  Abdomen: The initial image of the study may show some faint nodules in the left lower lobe measuring 3 mm in diameter. The liver, gallbladder, spleen, pancreas, adrenal glands and kidneys are normal except for nonobstructing renal stones. There is an 8  mm stone in the left kidney and there are at least 2 stones in the right kidney measuring up to 8 mm. The aorta is normal in size. There is no adenopathy. Bowel is normal.    Pelvis: Bladder and prostate gland are normal.. There is an oval water density well-circumscribed area within the base the penis measuring 2.5 x 1.7 cm. This may represent a urethral diverticulum. It was present 3/17/2020 without significant  change.    There are numerous sclerotic lesions present in the lumbar spine and sacrum and iliac bones and they are all unchanged from the previous study      Impression:       No change in sclerotic bony metastases    Nonobstructing renal stones    No acute findings          Signer Name: Francisco Carr MD   Signed: 5/14/2020 11:29 PM   Workstation [facility]: [facility]    [facility]    CT Head Without Contrast [[id number]] Collected:  05/14/20 2325     Updated:  05/14/20 2328    Narrative:       CT Head WO    HISTORY:   Lung cancer metastatic disease.    TECHNIQUE:   Axial unenhanced head CT. Radiation dose reduction techniques included automated exposure control or exposure modulation based on body size. Count of known CT and cardiac nuc med studies performed in previous 12 months: 0.     Time of scan: 10:58 PM    COMPARISON:   None.    FINDINGS:   No intracranial hemorrhage, mass, or infarct. No hydrocephalus or extra-axial fluid collection. Brain parenchymal density is normal. The skull base, calvarium, and extracranial soft tissues are normal.      Impression:       Normal, negative unenhanced head CT.          Signer Name: Francisco Carr MD   Signed: 5/14/2020 11:25 PM   Workstation Name: [facility]    [facility]    CT Lumbar Spine Without Contrast [[id number]] Collected:  05/14/20 2325     Updated:  05/14/20 2327    Narrative:       CT Spine Lumbar WO    INDICATION:    History of lung cancer with metastatic disease. Increasing low back pain    TECHNIQUE:   CT of the lumbar spine without IV contrast. Coronal and sagittal reconstructions were obtained.  Radiation dose reduction techniques included automated exposure control or exposure modulation based on body size. Count of known CT and cardiac nuc med  studies performed in previous 12 months: 4.     COMPARISON:    CT abdomen pelvis from 2/1/2020    FINDINGS:  Once again there is sclerosis of L5 involving most of the few  body. There is patchy sclerosis in the L3 vertebral body and fairly extensive sclerosis in the T12 vertebral body. These findings are all present on 2/1/2020. The alignment is normal. There is  no fracture visible. There are partially visualized sclerotic metastases in the sacrum and iliac bones.      Impression:       Numerous sclerotic metastases within the lumbar vertebral bodies as well as the sacrum and the iliac bones. There is no change from 2/1/2020.    No acute finding such as fracture. There is no spinal stenosis visible.    Signer Name: Francisco Carr MD   Signed: 5/14/2020 11:25 PM   Workstation Name: [email or web address]    [facility]          Diagnostics: Reviewed    A:   Patient Active Problem List   Diagnosis   • Non-smoker   • Metastatic cancer (CMS/HCC)   • Iatrogenic pneumothorax (Right)   • Non-small cell lung cancer, right (CMS/HCC)   • Back pain   • Sinus infection   • Pain from bone metastases (CMS/HCC)   • DVT (deep venous thrombosis) (CMS/HCC)   • Medication management contract agreement   • Diffuse arthralgia   • Chronic anticoagulation   • Fatigue due to treatment   • Muscular deconditioning   • Loss of balance   • Brain metastases (CMS/HCC)   • Cancer related pain   • AMS (altered mental status)       S/Sx:  1. Neoplastic Pain  - Fentanyl 12mcg (Pt with 1.5mg IV dilaudid and 20mg po oxycodone/24H= 50mg RICKY/24H); given minimal opioid use PTA will start with low dose fentanyl  - Oxycodone 5-10mg q4h PRN  - Dilaudid 0.5mg q2h PRN if po opioid ineffective  2. Agitation/Restlessness  - Haldol 1-2mg q4h PRN  - Ativan 0.25-0.5mg q4h PRN  - Continue QHS Seroquel 25mg  3. Encephalopathy  - Waxes and wanes- continue steroids- consider transition back to po decadron 4mg with BID dosing upon discharge  4. Urinary Incontinence/retention   - Place Gant cath if urinary retention continues- discussed risks and benefits with pt wife who verbalizes understanding and is agreeable  5.  Constipation  - Bisacodyl suppository daily PRN- Patient received dose this am with LBM 05/16  - Senna S 1 tab po BID        P: Had long conversation with patients wife Nelida at [phone]-discussed patient current condition, symptom management, medication adjustments, POC, and GOC. Confirms DNR/DNI and comfort only POC. Wife desires to go home with hospice, and focusing on comfort and quality of life through what we anticipate will be pts EOL-hospice consult placed. Briefly discussed hospice benefit and criteria-per wife's request also discussed hospice is and optional benefit and should patient surprise us, they can always revoke and seek additional treatment if desires. Discussed adjusting medication through the weekend for improved symptom management to ensure comfort in the home setting and plan for DC early next week once home hospice services can be arranged. Discussed above POC with pt wife who verbalizes understanding and is in agreement.  Please call for needs. Palliative Care Team will continue to follow patient.     Concepcion Oquendo, APRN  5/16/2020  Time spent:40 min    EMR Dragon/Transcription disclaimer:  Much of this encounter note is an electronic transcription/translation of spoken language to printed text. Electronic translation of spoken language may permit erroneous, or at times, nonsensical words or phrases to be inadvertently transcribed. Although I have reviewed the note for such errors, some may still exist.

## 2020-05-16 NOTE — PLAN OF CARE
Problem: Patient Care Overview  Goal: Plan of Care Review  Outcome: Ongoing (interventions implemented as appropriate)  Flowsheets (Taken 5/16/2020 1711)  Plan of Care Reviewed With: patient  Outcome Summary: pt remains oriented to self, room air, NSR-sinus tach, VSS. IV fluids maintained, 1,000 mL bolus given per MD order. Leigh cath placed today, 650 mL of urine returned initially after placing leigh. PRN dulcolax suppository given today, last BM today 5/16. upon initial assessment this morning, pt was noted to have scant rectal bleeding, none has been noted since. Fentanyl patch placed today, IV dilaudid given x 2, percocet given x 1.will continue to monitor.

## 2020-05-16 NOTE — PROGRESS NOTES
Hospice contacted patients wife today and discussed home hospice plan. He lives in the [facility] area and a referral was sent . He needs the following equipment Bed, BedSide table, Walker, Bedside toilet. Plans will be made for discharge to home ASAP.

## 2020-05-16 NOTE — PLAN OF CARE
Problem: Patient Care Overview  Goal: Plan of Care Review  Outcome: Ongoing (interventions implemented as appropriate)  Flowsheets (Taken 5/16/2020 0530)  Outcome Summary: Pt oriented to self and place. VSS on RA. PRN medication given for pain. Pt does not verbalize much but answers yes/no questions. 3 very large incontinent episodes. NSR on tele. Will continue to monitor.     Problem: Pain, Acute (Adult)  Goal: Identify Related Risk Factors and Signs and Symptoms  Outcome: Ongoing (interventions implemented as appropriate)  Flowsheets (Taken 5/16/2020 0450)  Related Risk Factors (Acute Pain): disease process;knowledge deficit;persistent pain;positioning;procedure/treatment;terminal illness  Signs and Symptoms (Acute Pain): facial mask of pain/grimace;fatigue/weakness;guarding/abnormal posturing/positioning;moaning;verbalization of pain descriptors

## 2020-05-16 NOTE — DISCHARGE PLACEMENT REQUEST
"Rajan Dumas (64 y.o. Male)     Date of Birth Social Security Number Address Home Phone MRN    1955  [address]  [place] [address] [phone] [id number]    Jehovah's witness Marital Status          Latter day        Admission Date Admission Type Admitting Provider Attending Provider Department, Room/Bed    5/14/20 Emergency Dat Tomlinson MD Sloan, Walker E, MD [address], S324/1    Discharge Date Discharge Disposition Discharge Destination                       Attending Provider:  Dat Tomlinson MD    Allergies:  Penicillin G, Ibuprofen, Penicillins, Sulfa Antibiotics    Isolation:  None   Infection:  None   Code Status:  No CPR    Ht:  175.3 cm (69\")   Wt:  68 kg (150 lb)    Admission Cmt:  None   Principal Problem:  None                Active Insurance as of 5/14/2020     Primary Coverage     Payor Plan Insurance Group Employer/Plan Group    MEDICARE MEDICARE A & B      Payor Plan Address Payor Plan Phone Number Payor Plan Fax Number Effective Dates    [address] [phone]  5/1/2020 - None Entered    [facility] [id number]       Subscriber Name Subscriber Birth Date Member ID       RAJAN DUMAS 1955 [id number]           Secondary Coverage     Payor Plan Insurance Group Employer/Plan Group    [place] [id number]     Payor Plan Address Payor Plan Phone Number Payor Plan Fax Number Effective Dates    [address]   1/1/2016 - None Entered    [facility] [id number]       Subscriber Name Subscriber Birth Date Member ID       RAJAN DUMAS 1955 [id number]                 Emergency Contacts      (Rel.) Home Phone Work Phone Mobile Phone    Nelida Dumas (Spouse) -- -- [phone]    Ana Elise (Sister) -- -- [phone]            Emergency Contact Information     Name Relation Home Work Mobile    Nelida Dumas Spouse   [phone]    ArikRadhaa Sister   [phone]          Insurance Information                MEDICARE/MEDICARE A " "& B Phone: [phone]    Subscriber: Rajan Chambers Subscriber#: [id number]    Group#:  Precert#:         [employer] [facility] Phone:     Subscriber: Rajan Chambers Subscriber#: [id number]    Group#: [id number] Precert#:              History & Physical      Garrett Fuentes DO at 05/15/20 0036              [facility] Medicine Services  HISTORY AND PHYSICAL    Patient Name: Rajan Chambers  : 1955  MRN: [id number]  Primary Care Physician: Jeimy Johnson MD  Date of admission: 2020      Subjective   Subjective     Chief Complaint:  \" Taty had low back pain\"    HPI:  Rajan Chambers is a 64 y.o. male with PMH of advanced nonsmall cell lung cancer with diffuse mets to brain and spine, hx of DVT on xarelto, chronic cancer related back pain who presents to the ER at the request of his family due to worsening confusion, overall clinical decline, and worsening cancer related back pain.     Patient is currently confused and therefore history is somewhat limited.  He currently tells me that his pain is tolerable, located in his low back, rated 4 out of 10 in intensity.  He occasionally has tangential thought process and word finding difficulty.    History is mostly obtained from the wife over the phone.  She states that he has had worsening confusion over the last several weeks.  She was concerned that it could be related to intracranial pressure and therefore restarted daily dexamethasone several days ago and has noted some improvement with this.  Denies any fevers.  Denies any other symptoms other than back pain.    Wife is concerned that she can no longer take care of him at home and she does not feel like his pain is adequately controlled.  She has been talking regularly with palliative care and is considering transitioning to hospice.  Several family members are resistant to this idea as they do not want him to stop treatments with chemotherapy and radiation.  She is having " difficulty caring for him at home and does not feel his pain is controlled and therefore wanted to talk with oncology and palliative care further about possible hospice care.    Review of Systems   Gen- No fevers, chills  CV- No chest pain, palpitations  Resp- No cough, dyspnea  GI- No N/V/D, abd pain      All other systems reviewed and are negative.     Personal History     Past Medical History:   Diagnosis Date   • Brain cancer (CMS/HCC)    • Cancer (CMS/HCC)     NSCLC   • History of radiation therapy 01/28/2020    T12-L5, left iliac wing   • Lung nodule    • Metastatic cancer (CMS/HCC)    • Shortness of breath     RECENT ONSET       Past Surgical History:   Procedure Laterality Date   • BRONCHOSCOPY N/A 1/18/2018    Procedure: RANDALL AND BRONCHOSCOPY WITH ENDOBRONCHIAL ULTRASOUND WITH FLUORO;  Surgeon: Gustavo Haque MD;  Location: [facility] ENDOSCOPY;  Service:    • COLLATERAL LIGAMENT REPAIR, KNEE     • COLONOSCOPY  05/2019   • HERNIA REPAIR     • KNEE ARTHROSCOPY     • LASIK     • LUNG BIOPSY Right 01/10/2018    Dr. Petty @ [facility]   • SKIN CANCER EXCISION Bilateral     BASAL CELL ON NECK YESTERDAY       Family History: family history includes Brain cancer in his paternal grandfather; Breast cancer in his maternal grandmother, mother, paternal grandmother, and sister; Cancer in his maternal grandmother, mother, paternal grandmother, and sister; Emphysema in his father. Otherwise pertinent FHx was reviewed and unremarkable.     Social History:  reports that he has never smoked. He quit smokeless tobacco use about 21 years ago. He reports that he does not drink alcohol or use drugs.  Social History     Social History Narrative        Retired from the [employer]    A .    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    Chew tobacco up to 2001       Medications:  Available home medication information reviewed.    (Not in a hospital admission)    Allergies   Allergen  Reactions   • Penicillin G Hives   • Ibuprofen Itching and Dermatitis     Prickly rash on heaD   • Penicillins Rash   • Sulfa Antibiotics Rash and Itching       Objective   Objective     Vital Signs:   Temp:  [97.6 °F (36.4 °C)] 97.6 °F (36.4 °C)  Heart Rate:  [69-85] 72  Resp:  [14] 14  BP: (130-136)/(81-89) 130/86        Physical Exam   Constitutional: Awake, alert, mild discomfort  Eyes: PERRLA, sclerae anicteric, no conjunctival injection  HENT: NCAT, mucous membranes moist  Neck: Supple, no thyromegaly, no lymphadenopathy, trachea midline  Respiratory: Clear to auscultation bilaterally, nonlabored respirations   Cardiovascular: RRR, no murmurs, rubs, or gallops, palpable pedal pulses bilaterally  Gastrointestinal: Positive bowel sounds, soft, nontender, nondistended  Musculoskeletal: No bilateral ankle edema, no clubbing or cyanosis to extremities, pain in the lumbar spine to palpation.  Psychiatric: Appropriate affect, cooperative  Neurologic: Oriented x 3, strength symmetric in all extremities, Cranial Nerves grossly intact to confrontation, speech clear, slowed mentation, tangential thought process  Skin: No rashes      Results Reviewed:  I have personally reviewed current lab and radiology data.    Results from last 7 days   Lab Units 05/14/20  2241   WBC 10*3/mm3 8.63   HEMOGLOBIN g/dL 14.7   HEMATOCRIT % 42.0   PLATELETS 10*3/mm3 203     Results from last 7 days   Lab Units 05/14/20  2241   SODIUM mmol/L 140   POTASSIUM mmol/L 4.4   CHLORIDE mmol/L 103   CO2 mmol/L 23.0   BUN mg/dL 22   CREATININE mg/dL 1.07   GLUCOSE mg/dL 139*   CALCIUM mg/dL 9.1   ALT (SGPT) U/L 68*   AST (SGOT) U/L 31   TROPONIN T ng/mL <0.010   PROBNP pg/mL 94.4     Estimated Creatinine Clearance: 67.1 mL/min (by C-G formula based on SCr of 1.07 mg/dL).  Brief Urine Lab Results  (Last result in the past 365 days)      Color   Clarity   Blood   Leuk Est   Nitrite   Protein   CREAT   Urine HCG        04/28/20 0934 Dark Yellow Slightly  Cloudy Large (3+) Negative Negative 30 mg/dL (1+)             Imaging Results (Last 24 Hours)     Procedure Component Value Units Date/Time    CT Abdomen Pelvis Without Contrast [[id number]] Collected:  05/14/20 2329     Updated:  05/14/20 2331    Narrative:       CT Abdomen Pelvis WO    INDICATION:   Increasing pain with known metastatic disease    TECHNIQUE:   CT of the abdomen and pelvis without IV contrast. Coronal and sagittal reconstructions were obtained.  Radiation dose reduction techniques included automated exposure control or exposure modulation based on body size. Count of known CT and cardiac nuc  med studies performed in previous 12 months: 4.     COMPARISON:   3/17/2020    FINDINGS:  Abdomen: The initial image of the study may show some faint nodules in the left lower lobe measuring 3 mm in diameter. The liver, gallbladder, spleen, pancreas, adrenal glands and kidneys are normal except for nonobstructing renal stones. There is an 8  mm stone in the left kidney and there are at least 2 stones in the right kidney measuring up to 8 mm. The aorta is normal in size. There is no adenopathy. Bowel is normal.    Pelvis: Bladder and prostate gland are normal.. There is an oval water density well-circumscribed area within the base the penis measuring 2.5 x 1.7 cm. This may represent a urethral diverticulum. It was present 3/17/2020 without significant change.    There are numerous sclerotic lesions present in the lumbar spine and sacrum and iliac bones and they are all unchanged from the previous study      Impression:       No change in sclerotic bony metastases    Nonobstructing renal stones    No acute findings          Signer Name: Francisco Carr MD   Signed: 5/14/2020 11:29 PM   Workstation Name: [facility]    [facility]    CT Head Without Contrast [[id number]] Collected:  05/14/20 2325     Updated:  05/14/20 2328    Narrative:       CT Head WO    HISTORY:   Lung cancer metastatic  disease.    TECHNIQUE:   Axial unenhanced head CT. Radiation dose reduction techniques included automated exposure control or exposure modulation based on body size. Count of known CT and cardiac nuc med studies performed in previous 12 months: 0.     Time of scan: 10:58 PM    COMPARISON:   None.    FINDINGS:   No intracranial hemorrhage, mass, or infarct. No hydrocephalus or extra-axial fluid collection. Brain parenchymal density is normal. The skull base, calvarium, and extracranial soft tissues are normal.      Impression:       Normal, negative unenhanced head CT.          Signer Name: Francisco Carr MD   Signed: 5/14/2020 11:25 PM   Workstation Name: [place]    [facility]    CT Lumbar Spine Without Contrast [[id number]] Collected:  05/14/20 2325     Updated:  05/14/20 2327    Narrative:       CT Spine Lumbar WO    INDICATION:    History of lung cancer with metastatic disease. Increasing low back pain    TECHNIQUE:   CT of the lumbar spine without IV contrast. Coronal and sagittal reconstructions were obtained.  Radiation dose reduction techniques included automated exposure control or exposure modulation based on body size. Count of known CT and cardiac nuc med  studies performed in previous 12 months: 4.     COMPARISON:    CT abdomen pelvis from 2/1/2020    FINDINGS:  Once again there is sclerosis of L5 involving most of the few body. There is patchy sclerosis in the L3 vertebral body and fairly extensive sclerosis in the T12 vertebral body. These findings are all present on 2/1/2020. The alignment is normal. There is  no fracture visible. There are partially visualized sclerotic metastases in the sacrum and iliac bones.      Impression:       Numerous sclerotic metastases within the lumbar vertebral bodies as well as the sacrum and the iliac bones. There is no change from 2/1/2020.    No acute finding such as fracture. There is no spinal stenosis visible.    Signer Name: Francisco Carr  "MD   Signed: 5/14/2020 11:25 PM   Workstation Name: [email or web address]    [facility] [place]        Results for orders placed during the hospital encounter of 09/23/19   Adult Transthoracic Echo Complete W/ Cont if Necessary Per Protocol    Narrative · Estimated EF = 55%.  · Left ventricular systolic function is normal.  · Left ventricular diastolic dysfunction.  · Mild mitral valve regurgitation is present          Assessment/Plan   Assessment & Plan     Active Hospital Problems    Diagnosis POA   • Cancer related pain [G89.3] Unknown   • AMS (altered mental status) [R41.82] Unknown   • Brain metastases (CMS/HCC) [C79.31] Yes   • Pain from bone metastases (CMS/HCC) [G89.3, C79.51] Yes   • Non-small cell lung cancer, right (CMS/HCC) [C34.91] Yes   • Back pain [M54.9] Yes   • Metastatic cancer (CMS/HCC) [C79.9] Yes     Pt is a 64 M with PMH of advanced nonsmall cell lung cancer with diffuse mets to brain and spine, hx of DVT on xarelto, chronic cancer related back pain who presents to the ER at the request of his family due to worsening confusion, overall clinical decline, and worsening cancer related back pain.     1. Cancer related pain, intractable  --percocet for breakthrough pain, fentanyl patch 12mcg upon completion of mri  --case discussed with wife who is considered hospice, however other family members are wanting him to continue with treatment.  Wife is concerned that she does not have enough resources to care for him at home and thought that hospice would be of benefit for this reason.  --Oncology consult  --Palliative care consult, patient has palliative care at home    2.  Altered mental status  --Family reports intermittent episodes of worsening confusion, \"dazed\" affect.  --Improving somewhat after starting dexamethasone over the last few days.  Family concerned for intracranial pressure related to meds.  --Status post radiation therapy in March to the brain.  --Repeat MRI with and without " contrast.    3.  History of DVT  --Continue Xarelto    DVT prophylaxis:  xarelto      Admission Communication  Due to current limited visitation policies, an attempt will be made to update patient's identified best point-of-contact(s) at admission to discuss plan of care.   Contact: Mrs. Chambers   Relation: wife   Time of communication: 3624   Notes (if applicable): reported condition, dx, and treatment. Wants update in am if possible         CODE STATUS:    Code Status and Medical Interventions:   Ordered at: 05/15/20 0023     Limited Support to NOT Include:    Intubation     Code Status:    No CPR     Medical Interventions (Level of Support Prior to Arrest):    Limited       Admission Status:  I believe this patient meets INPATIENT status due to cancer related pain.  I feel patient’s risk for adverse outcomes and need for care warrant INPATIENT evaluation and I predict the patient’s care encounter to likely last beyond 2 midnights.      Electronically signed by Garrett Fuentes DO, 05/15/20, 12:36 AM.      Electronically signed by Garrett Fuentes DO at 05/15/20 0212

## 2020-05-16 NOTE — PROGRESS NOTES
[facility] Medicine Services  PROGRESS NOTE    Patient Name: Rajan Chambers  : 1955  MRN: [id number]    Date of Admission: 2020  Primary Care Physician: Jeimy Johnson MD    Subjective   Subjective     CC:  confusion    HPI:  Says stomach hurts, didn't like in and out cath earlier this morning    Review of Systems  Unable to obtain and patient very limited in word use.    Objective   Objective     Vital Signs:   Temp:  [97.5 °F (36.4 °C)-98.3 °F (36.8 °C)] 98.1 °F (36.7 °C)  Heart Rate:  [] 87  Resp:  [18-22] 22  BP: (126-155)/(62-92) 155/92        Physical Exam:    Constitutional -uncomfortable appearing, in bed  HEENT-NCAT, mucous membranes moist  CV-tachycardic, regular  Resp-grossly clear bilaterlly  Abd-soft, non-tender to palpation in all four quadrants, no rebound or guarding, non-distended, BS+   Ext-No lower extremity cyanosis, clubbing or edema bilaterally  Neuro-awake but almost non verbal, will rarely say 1-2 words with prompting, moves all extremities   Psych-restless affect   Skin- No rash on exposed UE or LE bilaterally    Results Reviewed:  Results from last 7 days   Lab Units 20  1420 05/15/20  0620  2241   WBC 10*3/mm3 12.63* 8.88 8.63   HEMOGLOBIN g/dL 14.3 13.7 14.7   HEMATOCRIT % 41.3 39.5 42.0   PLATELETS 10*3/mm3 183 193 203     Results from last 7 days   Lab Units 05/15/20  0619 20  2241   SODIUM mmol/L 138 140   POTASSIUM mmol/L 3.9 4.4   CHLORIDE mmol/L 103 103   CO2 mmol/L 22.0 23.0   BUN mg/dL 21 22   CREATININE mg/dL 0.93 1.07   GLUCOSE mg/dL 115* 139*   CALCIUM mg/dL 9.0 9.1   ALT (SGPT) U/L  --  68*   AST (SGOT) U/L  --  31   TROPONIN T ng/mL  --  <0.010   PROBNP pg/mL  --  94.4     Estimated Creatinine Clearance: 77.2 mL/min (by C-G formula based on SCr of 0.93 mg/dL).    Microbiology Results Abnormal     None          Imaging Results (Last 24 Hours)     ** No results found for the last 24 hours. **                 Results for orders placed during the hospital encounter of 09/23/19   Adult Transthoracic Echo Complete W/ Cont if Necessary Per Protocol    Narrative · Estimated EF = 55%.  · Left ventricular systolic function is normal.  · Left ventricular diastolic dysfunction.  · Mild mitral valve regurgitation is present          I have reviewed the medications:  Scheduled Meds:    calcium carb-cholecalciferol 1 tablet Oral BID With Meals   cetirizine 10 mg Oral Daily   fentaNYL 1 patch Transdermal Q72H   And      [START ON 5/17/2020] Check Fentanyl Patch Placement 1 each Does not apply Q12H   escitalopram 20 mg Oral Daily   gabapentin 100 mg Oral TID   megestrol 400 mg Oral Daily   melatonin 5 mg Oral Nightly   memantine 5 mg Oral BID   methylPREDNISolone sodium succinate 125 mg Intravenous Daily   QUEtiapine 25 mg Oral Nightly   rivaroxaban 20 mg Oral Daily With Dinner   senna-docusate sodium 1 tablet Oral BID   sodium chloride 10 mL Intravenous Q12H     Continuous Infusions:    sodium chloride 125 mL/hr Last Rate: 125 mL/hr (05/16/20 0607)     PRN Meds:.•  acetaminophen  •  bisacodyl  •  haloperidol **OR** haloperidol  •  HYDROmorphone  •  LORazepam **OR** LORazepam  •  oxyCODONE **OR** oxyCODONE  •  [COMPLETED] Insert peripheral IV **AND** sodium chloride  •  sodium chloride    Assessment/Plan   Assessment & Plan     Active Hospital Problems    Diagnosis  POA   • Cancer related pain [G89.3]  Unknown   • AMS (altered mental status) [R41.82]  Unknown   • Brain metastases (CMS/HCC) [C79.31]  Yes   • Pain from bone metastases (CMS/HCC) [G89.3, C79.51]  Yes   • Non-small cell lung cancer, right (CMS/HCC) [C34.91]  Yes   • Back pain [M54.9]  Yes   • Metastatic cancer (CMS/HCC) [C79.9]  Yes      Resolved Hospital Problems   No resolved problems to display.       Brief Hospital Course to date:  Rajan Chambers is a 64 y.o. male with history of stage IV NSCLC with brain and spinal mets presents with worsening confusion and  clinical decline    Stage IV NSCLC with brain and spinal metastases  - MRI brain shows mild progression of metastatic disease with innumerable foci showing subtle enhancement on the surface of the brain and in the basal ganglia.   - Discussed finding with Oncology Dr Vicente who recommends starting solumedrol 125 mg IV daily.   - Spouse has discussed with Palliative care, family wishes comfort to be primary goal with eventual home Hospice -- tenative plans for discharge early next week.    Cancer related pain  - patient appears uncomfortable this afternoon, discussed with Palliative Care NP this afternoon for medication adjustments    Urinary Retention  - will place leigh catheter, patient did not tolerate I/O catheterization well    Abdominal pain  - unclear etiology: patient cannot localize, and no tenderness on exam. CT Abdomen pelvis at admission (5/14) shows only non obstructing stones and a possible uretheral diverticulum.   - nursing reports some red blood at rectum when suppository placed this am. No further bleeding noted. Unclear if this represents a possible hemorrhoidal bleed (patient on xarelto) vs other process.  - recent UA bland  - checking lactic acid and cbc   >>> lactate now back and elevated at 4.3, will bolus IV fluids and repeat lactate draw later this afternoon.   >>> CBC shows elevated WBC count (12) which is expected given steroid use  >>> hemoglobin stable at 14.3  >>> patient pain improved after leigh placement, his nurse says it passed easily with > 600 mls urine returned    H/o DVT  - xarelto continued for now unless signs of additional GI bleeding develop    DVT Prophylaxis:  xarelto    Daily Care Communication  Due to current limited visitation policies, an attempt will be made daily to update patient's identified best point-of-contact(s)   Discussed with Spouse Nelida by phone. We reviewed MRI brain findings and her husbands current confusion and discomfort. She desires home hospice, but  has concerns whether she can manage her 's symptoms adequately at home. I have assured her that we will endeavor to find and acceptable plan of care.  I have also called the house supervisor, and arrangements have been made to allow Mrs Chambers to visit in the patient's room.                  Disposition: I expect the patient to be discharged TBD    CODE STATUS:   Code Status and Medical Interventions:   Ordered at: 05/15/20 0023     Limited Support to NOT Include:    Intubation     Code Status:    No CPR     Medical Interventions (Level of Support Prior to Arrest):    Limited         Electronically signed by Dat Tomlinson MD, 05/16/20, 14:44.

## 2020-05-16 NOTE — PROGRESS NOTES
DATE OF VISIT: 5/16/2020    Chief Complaint: Followup for metastatic lung cancer     SUBJECTIVE: The patient has been doing fairly well.  He  denied any fever or  chills, no night sweats, denied any headaches    REVIEW OF SYSTEMS: All the other 9 systems are reviewed by me and negative  except what is mentioned in HPI.     PAST MEDICAL HISTORY/SOCIAL HISTORY/FAMILY HISTORY: Unchanged from my prior documentation done on May 15, 2020      Current Facility-Administered Medications:   •  acetaminophen (TYLENOL) tablet 500 mg, 500 mg, Oral, Q6H PRN, Jocelin Hsu APRN  •  bisacodyl (DULCOLAX) suppository 10 mg, 10 mg, Rectal, Daily PRN, Jessi Paiz MD, 10 mg at 05/16/20 0941  •  calcium carb-cholecalciferol 600-800 MG-UNIT tablet 1 tablet, 1 tablet, Oral, BID With Meals, Jocelin Hsu APRN, 1 tablet at 05/16/20 0941  •  cetirizine (zyrTEC) tablet 10 mg, 10 mg, Oral, Daily, Jocelin Hsu APRN, 10 mg at 05/16/20 0941  •  escitalopram (LEXAPRO) tablet 20 mg, 20 mg, Oral, Daily, Jocelin Hsu APRN, 20 mg at 05/16/20 0941  •  gabapentin (NEURONTIN) capsule 100 mg, 100 mg, Oral, TID, Jocelin Hsu APRN, 100 mg at 05/16/20 0941  •  HYDROmorphone (DILAUDID) injection 0.5 mg, 0.5 mg, Intravenous, Q3H PRN, Jocelin Hsu APRN, 0.5 mg at 05/16/20 0959  •  megestrol (MEGACE) 40 MG/ML suspension 400 mg, 400 mg, Oral, Daily, Jocelin Hsu APRN, 400 mg at 05/16/20 0941  •  melatonin tablet 5 mg, 5 mg, Oral, Nightly, Garrett Fuentes, DO, 5 mg at 05/15/20 2123  •  memantine (NAMENDA) tablet 5 mg, 5 mg, Oral, BID, Jocelin Hsu APRN, 5 mg at 05/16/20 0941  •  methylPREDNISolone sodium succinate (SOLU-Medrol) injection 125 mg, 125 mg, Intravenous, Daily, Dat Tomlinson MD, 125 mg at 05/16/20 0941  •  oxyCODONE-acetaminophen (PERCOCET) 5-325 MG per tablet 1 tablet, 1 tablet, Oral, Q4H PRN, Jocelin Hsu, APRN, 1 tablet at 05/16/20 0104  •  QUEtiapine (SEROquel) tablet 25 mg, 25 mg, Oral, Nightly,  "Garrett Fuentes, , 25 mg at 05/15/20 2123  •  rivaroxaban (XARELTO) tablet 20 mg, 20 mg, Oral, Daily With Dinner, Jocelin Hsu APRN, 20 mg at 05/15/20 1719  •  sennosides-docusate (PERICOLACE) 8.6-50 MG per tablet 1 tablet, 1 tablet, Oral, BID, Jessi Paiz MD, 1 tablet at 05/16/20 0941  •  [COMPLETED] Insert peripheral IV, , , Once **AND** sodium chloride 0.9 % flush 10 mL, 10 mL, Intravenous, PRN, Brian Aguilera DO  •  sodium chloride 0.9 % flush 10 mL, 10 mL, Intravenous, Q12H, Jocelin Hsu APRN, 10 mL at 05/16/20 0942  •  sodium chloride 0.9 % flush 10 mL, 10 mL, Intravenous, PRN, Jocelin Hsu APRN  •  sodium chloride 0.9 % infusion, 125 mL/hr, Intravenous, Continuous, Brian Aguilera DO, Last Rate: 125 mL/hr at 05/16/20 0607, 125 mL/hr at 05/16/20 0607    PHYSICAL EXAMINATION:   /84 (BP Location: Left arm, Patient Position: Lying)   Pulse 70   Temp 97.5 °F (36.4 °C) (Oral)   Resp 18   Ht 175.3 cm (69\")   Wt 68 kg (150 lb)   SpO2 96%   BMI 22.15 kg/m²    ECOG Performance Status: 2 - Symptomatic, <50% confined to bed  GENERAL: Age appropriate. No acute distress.   NEURO/PSYCH: A&O x 3, strength 5/5 in all muscle groups  HEENT: Head atraumatic, normocephalic.   NECK: Supple. No JVD. No lymphadenopathy.   LUNGS: Clear to auscultation bilaterally. No wheezing. No rhonchi.   HEART: Regular rate and rhythm. S1, S2, no murmurs.   ABDOMEN: Soft, nontender, nondistended. Bowel sounds positive. No  hepatosplenomegaly.   EXTREMITIES: No clubbing, cyanosis, or edema.   SKIN: No rashes. No purpura.       Admission on 05/14/2020   Component Date Value Ref Range Status   • Glucose 05/14/2020 139* 65 - 99 mg/dL Final   • BUN 05/14/2020 22  8 - 23 mg/dL Final   • Creatinine 05/14/2020 1.07  0.76 - 1.27 mg/dL Final   • Sodium 05/14/2020 140  136 - 145 mmol/L Final   • Potassium 05/14/2020 4.4  3.5 - 5.2 mmol/L Final   • Chloride 05/14/2020 103  98 - 107 mmol/L Final   • CO2 " 05/14/2020 23.0  22.0 - 29.0 mmol/L Final   • Calcium 05/14/2020 9.1  8.6 - 10.5 mg/dL Final   • Total Protein 05/14/2020 6.8  6.0 - 8.5 g/dL Final   • Albumin 05/14/2020 4.10  3.50 - 5.20 g/dL Final   • ALT (SGPT) 05/14/2020 68* 1 - 41 U/L Final   • AST (SGOT) 05/14/2020 31  1 - 40 U/L Final   • Alkaline Phosphatase 05/14/2020 101  39 - 117 U/L Final   • Total Bilirubin 05/14/2020 0.3  0.2 - 1.2 mg/dL Final   • eGFR Non African Amer 05/14/2020 70  >60 mL/min/1.73 Final   • Globulin 05/14/2020 2.7  gm/dL Final   • A/G Ratio 05/14/2020 1.5  g/dL Final   • BUN/Creatinine Ratio 05/14/2020 20.6  7.0 - 25.0 Final   • Anion Gap 05/14/2020 14.0  5.0 - 15.0 mmol/L Final   • proBNP 05/14/2020 94.4  5.0 - 900.0 pg/mL Final   • Troponin T 05/14/2020 <0.010  0.000 - 0.030 ng/mL Final   • WBC 05/14/2020 8.63  3.40 - 10.80 10*3/mm3 Final   • RBC 05/14/2020 4.36  4.14 - 5.80 10*6/mm3 Final   • Hemoglobin 05/14/2020 14.7  13.0 - 17.7 g/dL Final   • Hematocrit 05/14/2020 42.0  37.5 - 51.0 % Final   • MCV 05/14/2020 96.3  79.0 - 97.0 fL Final   • MCH 05/14/2020 33.7* 26.6 - 33.0 pg Final   • MCHC 05/14/2020 35.0  31.5 - 35.7 g/dL Final   • RDW 05/14/2020 14.6  12.3 - 15.4 % Final   • RDW-SD 05/14/2020 49.2  37.0 - 54.0 fl Final   • MPV 05/14/2020 8.5  6.0 - 12.0 fL Final   • Platelets 05/14/2020 203  140 - 450 10*3/mm3 Final   • Neutrophil % 05/14/2020 74.3  42.7 - 76.0 % Final   • Lymphocyte % 05/14/2020 4.5* 19.6 - 45.3 % Final   • Monocyte % 05/14/2020 14.1* 5.0 - 12.0 % Final   • Eosinophil % 05/14/2020 0.0* 0.3 - 6.2 % Final   • Basophil % 05/14/2020 0.5  0.0 - 1.5 % Final   • Immature Grans % 05/14/2020 6.6* 0.0 - 0.5 % Final   • Neutrophils, Absolute 05/14/2020 6.41  1.70 - 7.00 10*3/mm3 Final   • Lymphocytes, Absolute 05/14/2020 0.39* 0.70 - 3.10 10*3/mm3 Final   • Monocytes, Absolute 05/14/2020 1.22* 0.10 - 0.90 10*3/mm3 Final   • Eosinophils, Absolute 05/14/2020 0.00  0.00 - 0.40 10*3/mm3 Final   • Basophils, Absolute  05/14/2020 0.04  0.00 - 0.20 10*3/mm3 Final   • Immature Grans, Absolute 05/14/2020 0.57* 0.00 - 0.05 10*3/mm3 Final   • nRBC 05/14/2020 0.7* 0.0 - 0.2 /100 WBC Final   • Glucose 05/15/2020 115* 65 - 99 mg/dL Final   • BUN 05/15/2020 21  8 - 23 mg/dL Final   • Creatinine 05/15/2020 0.93  0.76 - 1.27 mg/dL Final   • Sodium 05/15/2020 138  136 - 145 mmol/L Final   • Potassium 05/15/2020 3.9  3.5 - 5.2 mmol/L Final   • Chloride 05/15/2020 103  98 - 107 mmol/L Final   • CO2 05/15/2020 22.0  22.0 - 29.0 mmol/L Final   • Calcium 05/15/2020 9.0  8.6 - 10.5 mg/dL Final   • eGFR Non African Amer 05/15/2020 82  >60 mL/min/1.73 Final   • BUN/Creatinine Ratio 05/15/2020 22.6  7.0 - 25.0 Final   • Anion Gap 05/15/2020 13.0  5.0 - 15.0 mmol/L Final   • WBC 05/15/2020 8.88  3.40 - 10.80 10*3/mm3 Final   • RBC 05/15/2020 4.08* 4.14 - 5.80 10*6/mm3 Final   • Hemoglobin 05/15/2020 13.7  13.0 - 17.7 g/dL Final   • Hematocrit 05/15/2020 39.5  37.5 - 51.0 % Final   • MCV 05/15/2020 96.8  79.0 - 97.0 fL Final   • MCH 05/15/2020 33.6* 26.6 - 33.0 pg Final   • MCHC 05/15/2020 34.7  31.5 - 35.7 g/dL Final   • RDW 05/15/2020 14.6  12.3 - 15.4 % Final   • RDW-SD 05/15/2020 49.7  37.0 - 54.0 fl Final   • MPV 05/15/2020 8.8  6.0 - 12.0 fL Final   • Platelets 05/15/2020 193  140 - 450 10*3/mm3 Final   • Neutrophil % 05/15/2020 70.3  42.7 - 76.0 % Final   • Lymphocyte % 05/15/2020 4.4* 19.6 - 45.3 % Final   • Monocyte % 05/15/2020 19.3* 5.0 - 12.0 % Final   • Eosinophil % 05/15/2020 0.0* 0.3 - 6.2 % Final   • Basophil % 05/15/2020 0.5  0.0 - 1.5 % Final   • Immature Grans % 05/15/2020 5.5* 0.0 - 0.5 % Final   • Neutrophils, Absolute 05/15/2020 6.25  1.70 - 7.00 10*3/mm3 Final   • Lymphocytes, Absolute 05/15/2020 0.39* 0.70 - 3.10 10*3/mm3 Final   • Monocytes, Absolute 05/15/2020 1.71* 0.10 - 0.90 10*3/mm3 Final   • Eosinophils, Absolute 05/15/2020 0.00  0.00 - 0.40 10*3/mm3 Final   • Basophils, Absolute 05/15/2020 0.04  0.00 - 0.20 10*3/mm3 Final    • Immature Grans, Absolute 05/15/2020 0.49* 0.00 - 0.05 10*3/mm3 Final   • nRBC 05/15/2020 0.8* 0.0 - 0.2 /100 WBC Final       No results found.    ASSESSMENT: The patient is a very pleasant 64 y.o. male  with metastatic lung cancer      PLAN:  1. Metastatic lung cancer: MRI brain showed progressive disease. CT abdomen and pelvis is stable. Will continue solumedrol 125 mg IV daily. If no improvement in mental status by Monday will arrange for home with hospice.       Karen Vicente MD  5/16/2020

## 2020-05-17 PROCEDURE — 25010000002 METHYLPREDNISOLONE PER 125 MG: Performed by: INTERNAL MEDICINE

## 2020-05-17 PROCEDURE — 99232 SBSQ HOSP IP/OBS MODERATE 35: CPT | Performed by: INTERNAL MEDICINE

## 2020-05-17 RX ORDER — AMOXICILLIN 250 MG
2 CAPSULE ORAL 2 TIMES DAILY
Status: DISCONTINUED | OUTPATIENT
Start: 2020-05-17 | End: 2020-05-18 | Stop reason: HOSPADM

## 2020-05-17 RX ORDER — LORAZEPAM 0.5 MG/1
0.5 TABLET ORAL EVERY 4 HOURS PRN
Status: DISCONTINUED | OUTPATIENT
Start: 2020-05-17 | End: 2020-05-18 | Stop reason: HOSPADM

## 2020-05-17 RX ORDER — LORAZEPAM 0.5 MG/1
0.25 TABLET ORAL EVERY 4 HOURS PRN
Status: DISCONTINUED | OUTPATIENT
Start: 2020-05-17 | End: 2020-05-18 | Stop reason: HOSPADM

## 2020-05-17 RX ADMIN — SENNOSIDES AND DOCUSATE SODIUM 2 TABLET: 8.6; 5 TABLET ORAL at 22:37

## 2020-05-17 RX ADMIN — OXYCODONE HYDROCHLORIDE 5 MG: 5 TABLET ORAL at 15:37

## 2020-05-17 RX ADMIN — SENNOSIDES AND DOCUSATE SODIUM 1 TABLET: 8.6; 5 TABLET ORAL at 09:08

## 2020-05-17 RX ADMIN — Medication 1 TABLET: at 18:09

## 2020-05-17 RX ADMIN — MEMANTINE 5 MG: 10 TABLET ORAL at 22:38

## 2020-05-17 RX ADMIN — GABAPENTIN 100 MG: 100 CAPSULE ORAL at 22:37

## 2020-05-17 RX ADMIN — ESCITALOPRAM OXALATE 20 MG: 20 TABLET ORAL at 09:08

## 2020-05-17 RX ADMIN — MEGESTROL ACETATE 400 MG: 40 SUSPENSION ORAL at 09:33

## 2020-05-17 RX ADMIN — OXYCODONE HYDROCHLORIDE 5 MG: 5 TABLET ORAL at 09:33

## 2020-05-17 RX ADMIN — METHYLPREDNISOLONE SODIUM SUCCINATE 125 MG: 125 INJECTION, POWDER, FOR SOLUTION INTRAMUSCULAR; INTRAVENOUS at 09:08

## 2020-05-17 RX ADMIN — GABAPENTIN 100 MG: 100 CAPSULE ORAL at 15:21

## 2020-05-17 RX ADMIN — CETIRIZINE HYDROCHLORIDE 10 MG: 10 TABLET, FILM COATED ORAL at 09:08

## 2020-05-17 RX ADMIN — LORAZEPAM 0.25 MG: 0.5 TABLET ORAL at 15:56

## 2020-05-17 RX ADMIN — SODIUM CHLORIDE, PRESERVATIVE FREE 10 ML: 5 INJECTION INTRAVENOUS at 22:39

## 2020-05-17 RX ADMIN — QUETIAPINE FUMARATE 25 MG: 25 TABLET ORAL at 22:38

## 2020-05-17 RX ADMIN — RIVAROXABAN 20 MG: 20 TABLET, FILM COATED ORAL at 18:09

## 2020-05-17 RX ADMIN — MEMANTINE 5 MG: 10 TABLET ORAL at 09:08

## 2020-05-17 RX ADMIN — LORAZEPAM 0.25 MG: 0.5 TABLET ORAL at 09:33

## 2020-05-17 RX ADMIN — Medication 1 TABLET: at 09:32

## 2020-05-17 RX ADMIN — GABAPENTIN 100 MG: 100 CAPSULE ORAL at 09:08

## 2020-05-17 NOTE — PROGRESS NOTES
Hospice  nurse visit, spoke with patient and wife, Nelida regarding hospice care at home. Patient would like to go home ASAP. Referral and equipment order sent to [facility]  [phone]. Spoke to on call nurse and someone from [facility] will be contacting patients wife Nelida. Patient will be traveling by car. Plan is for discharge Monday if possible and is clinically ready. Wife request after equipment is delivered. Please call Freda Lobo RN at [phone] or Tess Myles RN  at [phone] with any questions.

## 2020-05-17 NOTE — PROGRESS NOTES
[facility] Medicine Services  PROGRESS NOTE    Patient Name: Rajan Chambers  : 1955  MRN: [id number]    Date of Admission: 2020  Primary Care Physician: Jeimy Johnson MD    Subjective   Subjective     CC:  confusion    HPI:  No stomach pain today, feels much better    Review of Systems  Unable to obtain due to patient limited word use    Objective   Objective     Vital Signs:   Temp:  [97.7 °F (36.5 °C)-98.2 °F (36.8 °C)] 98.2 °F (36.8 °C)  Heart Rate:  [63-79] 78  Resp:  [16-22] 16  BP: (133-148)/(71-88) 133/81        Physical Exam:    Constitutional -no acute distress, non toxic, in bed  HEENT-NCAT, mucous membranes moist  CV-RRR, S1 S2 normal, no m/r/g  Resp-CTAB, no wheezes, rhonchi or rales  Abd-soft, non-tender, non-distended, normo active bowel sounds  Ext-trace edema LE bilat  -leigh with clear yellow urine  Neuro-alert, speech halting, moves all extremities   Psych-normal affect   Skin- No rash on exposed UE or LE bilaterally      Results Reviewed:  Results from last 7 days   Lab Units 20  1420 05/15/20  0620  2241   WBC 10*3/mm3 12.63* 8.88 8.63   HEMOGLOBIN g/dL 14.3 13.7 14.7   HEMATOCRIT % 41.3 39.5 42.0   PLATELETS 10*3/mm3 183 193 203     Results from last 7 days   Lab Units 05/15/20  0619 20  2241   SODIUM mmol/L 138 140   POTASSIUM mmol/L 3.9 4.4   CHLORIDE mmol/L 103 103   CO2 mmol/L 22.0 23.0   BUN mg/dL 21 22   CREATININE mg/dL 0.93 1.07   GLUCOSE mg/dL 115* 139*   CALCIUM mg/dL 9.0 9.1   ALT (SGPT) U/L  --  68*   AST (SGOT) U/L  --  31   TROPONIN T ng/mL  --  <0.010   PROBNP pg/mL  --  94.4     Estimated Creatinine Clearance: 77.2 mL/min (by C-G formula based on SCr of 0.93 mg/dL).    Microbiology Results Abnormal     None          Imaging Results (Last 24 Hours)     ** No results found for the last 24 hours. **          Results for orders placed during the hospital encounter of 19   Adult Transthoracic Echo Complete W/  Cont if Necessary Per Protocol    Narrative · Estimated EF = 55%.  · Left ventricular systolic function is normal.  · Left ventricular diastolic dysfunction.  · Mild mitral valve regurgitation is present          I have reviewed the medications:  Scheduled Meds:    calcium carb-cholecalciferol 1 tablet Oral BID With Meals   cetirizine 10 mg Oral Daily   fentaNYL 1 patch Transdermal Q72H   And      Check Fentanyl Patch Placement 1 each Does not apply Q12H   escitalopram 20 mg Oral Daily   gabapentin 100 mg Oral TID   megestrol 400 mg Oral Daily   melatonin 5 mg Oral Nightly   memantine 5 mg Oral BID   methylPREDNISolone sodium succinate 125 mg Intravenous Daily   QUEtiapine 25 mg Oral Nightly   rivaroxaban 20 mg Oral Daily With Dinner   senna-docusate sodium 2 tablet Oral BID   sodium chloride 10 mL Intravenous Q12H     Continuous Infusions:    sodium chloride 125 mL/hr Last Rate: 125 mL/hr (05/16/20 0607)     PRN Meds:.•  acetaminophen  •  bisacodyl  •  haloperidol **OR** haloperidol  •  HYDROmorphone  •  LORazepam **OR** LORazepam  •  oxyCODONE **OR** oxyCODONE  •  [COMPLETED] Insert peripheral IV **AND** sodium chloride  •  sodium chloride    Assessment/Plan   Assessment & Plan     Active Hospital Problems    Diagnosis  POA   • Cancer related pain [G89.3]  Unknown   • AMS (altered mental status) [R41.82]  Unknown   • Brain metastases (CMS/HCC) [C79.31]  Yes   • Pain from bone metastases (CMS/HCC) [G89.3, C79.51]  Yes   • Non-small cell lung cancer, right (CMS/HCC) [C34.91]  Yes   • Back pain [M54.9]  Yes   • Metastatic cancer (CMS/HCC) [C79.9]  Yes      Resolved Hospital Problems   No resolved problems to display.       Brief Hospital Course to date:  Rajan Chambers is a 64 y.o. 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   - continue solumedrol 125 mg IV daily.   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                 Disposition: I expect the patient to be discharged TBD    CODE STATUS:   Code Status and Medical Interventions:   Ordered at: 05/15/20 0023     Limited Support to NOT Include:    Intubation     Code Status:    No CPR     Medical Interventions (Level of Support Prior to Arrest):    Limited         Electronically signed by Dat Tomlinson MD, 05/17/20, 12:48.

## 2020-05-17 NOTE — DISCHARGE PLACEMENT REQUEST
"Rajan Dumas (64 y.o. Male)     Date of Birth Social Security Number Address Home Phone MRN    1955  [address]  [place] [address] [phone] [id number]    Restoration Marital Status          Yarsanism        Admission Date Admission Type Admitting Provider Attending Provider Department, Room/Bed    5/14/20 Emergency Dat Tomlinson MD Sloan, Walker E, MD [address], S324/1    Discharge Date Discharge Disposition Discharge Destination                       Attending Provider:  Dat Tomlinson MD    Allergies:  Penicillin G, Ibuprofen, Penicillins, Sulfa Antibiotics    Isolation:  None   Infection:  None   Code Status:  No CPR    Ht:  175.3 cm (69\")   Wt:  68 kg (150 lb)    Admission Cmt:  None   Principal Problem:  None                Active Insurance as of 5/14/2020     Primary Coverage     Payor Plan Insurance Group Employer/Plan Group    MEDICARE MEDICARE A & B      Payor Plan Address Payor Plan Phone Number Payor Plan Fax Number Effective Dates    [address] [phone]  5/1/2020 - None Entered    [facility] [id number]       Subscriber Name Subscriber Birth Date Member ID       RAJAN DUMAS 1955 [id number]           Secondary Coverage     Payor Plan Insurance Group Employer/Plan Group    [place] [id number]     Payor Plan Address Payor Plan Phone Number Payor Plan Fax Number Effective Dates    [address]   1/1/2016 - None Entered    [facility] [id number]       Subscriber Name Subscriber Birth Date Member ID       RAJAN DUMAS 1955 [id number]                 Emergency Contacts      (Rel.) Home Phone Work Phone Mobile Phone    Nelida Dumas (Spouse) -- -- [phone]    Ana Elise (Sister) -- -- [phone]            Emergency Contact Information     Name Relation Home Work Mobile    Nelida Dumas Spouse   [phone]    ArikRadhaa Sister   [phone]          Insurance Information                MEDICARE/MEDICARE A " "& B Phone: [phone]    Subscriber: Rajan Chambers Subscriber#: [id number]    Group#:  Precert#:         GAGAN BLUE CROSS/GAGAN [facility] Phone:     Subscriber: Rajan Chambers Subscriber#: [id number]    Group#: [id number] Precert#:              History & Physical      Garrett Fuentes DO at 05/15/20 0036              [facility] Medicine Services  HISTORY AND PHYSICAL    Patient Name: Rajan Chambers  : 1955  MRN: [id number]  Primary Care Physician: Jeimy Johnson MD  Date of admission: 2020      Subjective   Subjective     Chief Complaint:  \" Taty had low back pain\"    HPI:  Rajan Chambers is a 64 y.o. male with PMH of advanced nonsmall cell lung cancer with diffuse mets to brain and spine, hx of DVT on xarelto, chronic cancer related back pain who presents to the ER at the request of his family due to worsening confusion, overall clinical decline, and worsening cancer related back pain.     Patient is currently confused and therefore history is somewhat limited.  He currently tells me that his pain is tolerable, located in his low back, rated 4 out of 10 in intensity.  He occasionally has tangential thought process and word finding difficulty.    History is mostly obtained from the wife over the phone.  She states that he has had worsening confusion over the last several weeks.  She was concerned that it could be related to intracranial pressure and therefore restarted daily dexamethasone several days ago and has noted some improvement with this.  Denies any fevers.  Denies any other symptoms other than back pain.    Wife is concerned that she can no longer take care of him at home and she does not feel like his pain is adequately controlled.  She has been talking regularly with palliative care and is considering transitioning to hospice.  Several family members are resistant to this idea as they do not want him to stop treatments with chemotherapy and radiation.  She is having " difficulty caring for him at home and does not feel his pain is controlled and therefore wanted to talk with oncology and palliative care further about possible hospice care.    Review of Systems   Gen- No fevers, chills  CV- No chest pain, palpitations  Resp- No cough, dyspnea  GI- No N/V/D, abd pain      All other systems reviewed and are negative.     Personal History     Past Medical History:   Diagnosis Date   • Brain cancer (CMS/HCC)    • Cancer (CMS/HCC)     NSCLC   • History of radiation therapy 01/28/2020    T12-L5, left iliac wing   • Lung nodule    • Metastatic cancer (CMS/HCC)    • Shortness of breath     RECENT ONSET       Past Surgical History:   Procedure Laterality Date   • BRONCHOSCOPY N/A 1/18/2018    Procedure: RANDALL AND BRONCHOSCOPY WITH ENDOBRONCHIAL ULTRASOUND WITH FLUORO;  Surgeon: Gustavo Haque MD;  Location: [facility] ENDOSCOPY;  Service:    • COLLATERAL LIGAMENT REPAIR, KNEE     • COLONOSCOPY  05/2019   • HERNIA REPAIR     • KNEE ARTHROSCOPY     • LASIK     • LUNG BIOPSY Right 01/10/2018    Dr. Petty @ [facility]   • SKIN CANCER EXCISION Bilateral     BASAL CELL ON NECK YESTERDAY       Family History: family history includes Brain cancer in his paternal grandfather; Breast cancer in his maternal grandmother, mother, paternal grandmother, and sister; Cancer in his maternal grandmother, mother, paternal grandmother, and sister; Emphysema in his father. Otherwise pertinent FHx was reviewed and unremarkable.     Social History:  reports that he has never smoked. He quit smokeless tobacco use about 21 years ago. He reports that he does not drink alcohol or use drugs.  Social History     Social History Narrative        Retired from [employer]    A .    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    Chew tobacco up to 2001       Medications:  Available home medication information reviewed.    (Not in a hospital admission)    Allergies   Allergen  Reactions   • Penicillin G Hives   • Ibuprofen Itching and Dermatitis     Prickly rash on heaD   • Penicillins Rash   • Sulfa Antibiotics Rash and Itching       Objective   Objective     Vital Signs:   Temp:  [97.6 °F (36.4 °C)] 97.6 °F (36.4 °C)  Heart Rate:  [69-85] 72  Resp:  [14] 14  BP: (130-136)/(81-89) 130/86        Physical Exam   Constitutional: Awake, alert, mild discomfort  Eyes: PERRLA, sclerae anicteric, no conjunctival injection  HENT: NCAT, mucous membranes moist  Neck: Supple, no thyromegaly, no lymphadenopathy, trachea midline  Respiratory: Clear to auscultation bilaterally, nonlabored respirations   Cardiovascular: RRR, no murmurs, rubs, or gallops, palpable pedal pulses bilaterally  Gastrointestinal: Positive bowel sounds, soft, nontender, nondistended  Musculoskeletal: No bilateral ankle edema, no clubbing or cyanosis to extremities, pain in the lumbar spine to palpation.  Psychiatric: Appropriate affect, cooperative  Neurologic: Oriented x 3, strength symmetric in all extremities, Cranial Nerves grossly intact to confrontation, speech clear, slowed mentation, tangential thought process  Skin: No rashes      Results Reviewed:  I have personally reviewed current lab and radiology data.    Results from last 7 days   Lab Units 05/14/20  2241   WBC 10*3/mm3 8.63   HEMOGLOBIN g/dL 14.7   HEMATOCRIT % 42.0   PLATELETS 10*3/mm3 203     Results from last 7 days   Lab Units 05/14/20  2241   SODIUM mmol/L 140   POTASSIUM mmol/L 4.4   CHLORIDE mmol/L 103   CO2 mmol/L 23.0   BUN mg/dL 22   CREATININE mg/dL 1.07   GLUCOSE mg/dL 139*   CALCIUM mg/dL 9.1   ALT (SGPT) U/L 68*   AST (SGOT) U/L 31   TROPONIN T ng/mL <0.010   PROBNP pg/mL 94.4     Estimated Creatinine Clearance: 67.1 mL/min (by C-G formula based on SCr of 1.07 mg/dL).  Brief Urine Lab Results  (Last result in the past 365 days)      Color   Clarity   Blood   Leuk Est   Nitrite   Protein   CREAT   Urine HCG        04/28/20 0934 Dark Yellow Slightly  Cloudy Large (3+) Negative Negative 30 mg/dL (1+)             Imaging Results (Last 24 Hours)     Procedure Component Value Units Date/Time    CT Abdomen Pelvis Without Contrast [[id number]] Collected:  05/14/20 2329     Updated:  05/14/20 2331    Narrative:       CT Abdomen Pelvis WO    INDICATION:   Increasing pain with known metastatic disease    TECHNIQUE:   CT of the abdomen and pelvis without IV contrast. Coronal and sagittal reconstructions were obtained.  Radiation dose reduction techniques included automated exposure control or exposure modulation based on body size. Count of known CT and cardiac nuc  med studies performed in previous 12 months: 4.     COMPARISON:   3/17/2020    FINDINGS:  Abdomen: The initial image of the study may show some faint nodules in the left lower lobe measuring 3 mm in diameter. The liver, gallbladder, spleen, pancreas, adrenal glands and kidneys are normal except for nonobstructing renal stones. There is an 8  mm stone in the left kidney and there are at least 2 stones in the right kidney measuring up to 8 mm. The aorta is normal in size. There is no adenopathy. Bowel is normal.    Pelvis: Bladder and prostate gland are normal.. There is an oval water density well-circumscribed area within the base the penis measuring 2.5 x 1.7 cm. This may represent a urethral diverticulum. It was present 3/17/2020 without significant change.    There are numerous sclerotic lesions present in the lumbar spine and sacrum and iliac bones and they are all unchanged from the previous study      Impression:       No change in sclerotic bony metastases    Nonobstructing renal stones    No acute findings          Signer Name: Francisco Carr MD   Signed: 5/14/2020 11:29 PM   Workstation Name: [email or web address]    [facility]    CT Head Without Contrast [[id number]] Collected:  05/14/20 2325     Updated:  05/14/20 2328    Narrative:       CT Head WO    HISTORY:   Lung cancer metastatic  disease.    TECHNIQUE:   Axial unenhanced head CT. Radiation dose reduction techniques included automated exposure control or exposure modulation based on body size. Count of known CT and cardiac nuc med studies performed in previous 12 months: 0.     Time of scan: 10:58 PM    COMPARISON:   None.    FINDINGS:   No intracranial hemorrhage, mass, or infarct. No hydrocephalus or extra-axial fluid collection. Brain parenchymal density is normal. The skull base, calvarium, and extracranial soft tissues are normal.      Impression:       Normal, negative unenhanced head CT.          Signer Name: Francisco Carr MD   Signed: 5/14/2020 11:25 PM   Workstation Name: [email or web address]    [facility]    CT Lumbar Spine Without Contrast [[id number]] Collected:  05/14/20 2325     Updated:  05/14/20 2327    Narrative:       CT Spine Lumbar WO    INDICATION:    History of lung cancer with metastatic disease. Increasing low back pain    TECHNIQUE:   CT of the lumbar spine without IV contrast. Coronal and sagittal reconstructions were obtained.  Radiation dose reduction techniques included automated exposure control or exposure modulation based on body size. Count of known CT and cardiac nuc med  studies performed in previous 12 months: 4.     COMPARISON:    CT abdomen pelvis from 2/1/2020    FINDINGS:  Once again there is sclerosis of L5 involving most of the few body. There is patchy sclerosis in the L3 vertebral body and fairly extensive sclerosis in the T12 vertebral body. These findings are all present on 2/1/2020. The alignment is normal. There is  no fracture visible. There are partially visualized sclerotic metastases in the sacrum and iliac bones.      Impression:       Numerous sclerotic metastases within the lumbar vertebral bodies as well as the sacrum and the iliac bones. There is no change from 2/1/2020.    No acute finding such as fracture. There is no spinal stenosis visible.    Signer Name: Francisco Carr  "MD   Signed: 5/14/2020 11:25 PM   Workstation Name: [email or web address]    [facility]        Results for orders placed during the hospital encounter of 09/23/19   Adult Transthoracic Echo Complete W/ Cont if Necessary Per Protocol    Narrative · Estimated EF = 55%.  · Left ventricular systolic function is normal.  · Left ventricular diastolic dysfunction.  · Mild mitral valve regurgitation is present          Assessment/Plan   Assessment & Plan     Active Hospital Problems    Diagnosis POA   • Cancer related pain [G89.3] Unknown   • AMS (altered mental status) [R41.82] Unknown   • Brain metastases (CMS/HCC) [C79.31] Yes   • Pain from bone metastases (CMS/HCC) [G89.3, C79.51] Yes   • Non-small cell lung cancer, right (CMS/HCC) [C34.91] Yes   • Back pain [M54.9] Yes   • Metastatic cancer (CMS/HCC) [C79.9] Yes     Pt is a 64 M with PMH of advanced nonsmall cell lung cancer with diffuse mets to brain and spine, hx of DVT on xarelto, chronic cancer related back pain who presents to the ER at the request of his family due to worsening confusion, overall clinical decline, and worsening cancer related back pain.     1. Cancer related pain, intractable  --percocet for breakthrough pain, fentanyl patch 12mcg upon completion of mri  --case discussed with wife who is considered hospice, however other family members are wanting him to continue with treatment.  Wife is concerned that she does not have enough resources to care for him at home and thought that hospice would be of benefit for this reason.  --Oncology consult  --Palliative care consult, patient has palliative care at home    2.  Altered mental status  --Family reports intermittent episodes of worsening confusion, \"dazed\" affect.  --Improving somewhat after starting dexamethasone over the last few days.  Family concerned for intracranial pressure related to meds.  --Status post radiation therapy in March to the brain.  --Repeat MRI with and without " contrast.    3.  History of DVT  --Continue Xarelto    DVT prophylaxis:  xarelto      Admission Communication  Due to current limited visitation policies, an attempt will be made to update patient's identified best point-of-contact(s) at admission to discuss plan of care.   Contact: Mrs. Chambers   Relation: wife   Time of communication: 1287   Notes (if applicable): reported condition, dx, and treatment. Wants update in am if possible         CODE STATUS:    Code Status and Medical Interventions:   Ordered at: 05/15/20 0023     Limited Support to NOT Include:    Intubation     Code Status:    No CPR     Medical Interventions (Level of Support Prior to Arrest):    Limited       Admission Status:  I believe this patient meets INPATIENT status due to cancer related pain.  I feel patient’s risk for adverse outcomes and need for care warrant INPATIENT evaluation and I predict the patient’s care encounter to likely last beyond 2 midnights.      Electronically signed by Garrett Fuentes DO, 05/15/20, 12:36 AM.      Electronically signed by Garrett Fuentes DO at 05/15/20 0212

## 2020-05-17 NOTE — PLAN OF CARE
Problem: Patient Care Overview  Goal: Interprofessional Rounds/Family Conf  Outcome: Ongoing (interventions implemented as appropriate)  Flowsheets (Taken 5/16/2020 1896)  Participants: nursing; advanced practice nurse  Note:   Nelida was called and told that she has been granted to visit and stay with patient for end of life care. She was informed that if she leaves she will not be allowed back in to see patient. She verbalized understanding and will bring her clothes and medications that she needs. Patient will either go home with hospice or be admitted to inpatient.

## 2020-05-17 NOTE — PROGRESS NOTES
"Palliative Care Daily Progress Note     C/C: denies, reports overall comfort    S: Medical record reviewed. Follow up visit for evaluation of needs and medication efficacy. Events noted. 24H PRN use noted. Patient is brighter and reports feeling \"a lot better\" today. Denies abdominal discomfort. Received some IVF yesterday and lactate improved this am from 4.3 to 1.8. Slept well overnight after PRN Ativan dosing. Wife at bedside. Planning for home with hospice tomorrow.     ROS: denies pain, soa, nausea, anxiety. Reports overall comfort and denies concerns or needs at this time.     O: Code Status:   Code Status and Medical Interventions:   Ordered at: 05/15/20 0023     Limited Support to NOT Include:    Intubation     Code Status:    No CPR     Medical Interventions (Level of Support Prior to Arrest):    Limited      Advanced Directives: Advance Directive Status: Patient has advance directive, copy requested   Goals of Care: Ongoing.   Palliative Performance Scale Score: 30%     /81   Pulse 78   Temp 98.2 °F (36.8 °C) (Oral)   Resp 16   Ht 175.3 cm (69\")   Wt 68 kg (150 lb)   SpO2 95%   BMI 22.15 kg/m²     Intake/Output Summary (Last 24 hours) at 5/17/2020 1116  Last data filed at 5/17/2020 0933  Gross per 24 hour   Intake 1200 ml   Output 3275 ml   Net -2075 ml       PE:  General Appearance:    Alert, cooperative, calm   HEENT:    NC/AT, EOMI, anicteric, MMM   Neck:   supple, trachea midline, no JVD   Lungs:     CTA bilat, respirations regular, even and unlabored    Heart:    RRR, normal S1 and S2, no M/R/G   Abdomen:     Normal bowel sounds, soft, NT, mildly distended   Extremities:   Moves all extremities, no edema   Pulses:   DP and radial pulses palpable and equal bilaterally   Skin:   Warm, dry   Neurologic:   A/Ox3, more alert today, follows commands, +expressive aphasia- improved speech and clarity today   Psych:   Calm       Labs:   Results from last 7 days   Lab Units 05/16/20  1420   WBC " 10*3/mm3 12.63*   HEMOGLOBIN g/dL 14.3   HEMATOCRIT % 41.3   PLATELETS 10*3/mm3 183     Results from last 7 days   Lab Units 05/15/20  0619   SODIUM mmol/L 138   POTASSIUM mmol/L 3.9   CHLORIDE mmol/L 103   CO2 mmol/L 22.0   BUN mg/dL 21   CREATININE mg/dL 0.93   GLUCOSE mg/dL 115*   CALCIUM mg/dL 9.0     Results from last 7 days   Lab Units 05/15/20  0619 05/14/20  2241   SODIUM mmol/L 138 140   POTASSIUM mmol/L 3.9 4.4   CHLORIDE mmol/L 103 103   CO2 mmol/L 22.0 23.0   BUN mg/dL 21 22   CREATININE mg/dL 0.93 1.07   CALCIUM mg/dL 9.0 9.1   BILIRUBIN mg/dL  --  0.3   ALK PHOS U/L  --  101   ALT (SGPT) U/L  --  68*   AST (SGOT) U/L  --  31   GLUCOSE mg/dL 115* 139*     Imaging Results (Last 72 Hours)     Procedure Component Value Units Date/Time    MRI Brain With & Without Contrast [[id number]] Collected:  05/15/20 0417     Updated:  05/15/20 1022    Narrative:       MRI Brain WO W    INDICATION:   Non-small cell lung cancer with brain and spine metastases. Patient presents with worsening confusion    TECHNIQUE:   MRI of the brain with 14 cc of MultiHance IV contrast.    COMPARISON:    CT brain 5/14/2020    FINDINGS:  The pituitary gland and foramen magnum region are normal in appearance there is no abnormal diffusion.. The ventricles interspaces are within normal limits. There are a few punctate areas of increased FLAIR signal intensity consistent with small vessel  ischemic changes.. There is no abnormal enhancement      Impression:       No evidence of metastatic disease. No recent ischemic change.    Findings the preliminary report. Please see full dictation from neuroradiology    Initial dictation performed by Dr. Francisco Carr    Final interpretation:    There is no abnormally restricted diffusion. Midline structures are unremarkable.    There are multiple tiny areas of precontrast high T1 signal intensity on surface of the brain and in the bilateral basal ganglia and thalami. A few punctate parenchymal high signal  intensity lesions are also seen. These were present on prior study as  well. Following contrast administration, there is mild associated enhancement associated with several of these lesions given the precontrast high signal intensity on T1-weighted imaging enhancement is best appreciated associated with the small thalamic  lesions and the cerebellar lesions. There are 5 mm or less in dimension and is no appreciable mass effect. There is moderate white matter signal abnormality. Some of this is likely due to small vessel disease and is not appreciably changed. Other areas  of white matter signal abnormality are related to the tiny parenchymal metastases and likely reflect subtle areas of vasogenic edema. This is best appreciated at the left inferior frontal lobe where new 5 mm focus of white matter signal abnormality is  seen on the FLAIR sequence image #14. There is no extra-axial fluid collection. There is no hydrocephalus.    The major arterial intracranial flow voids are maintained. The mastoid air cells are clear. The visualized paranasal sinuses show mild mucosal thickening. There is a routine secretions in the left sphenoid sinus.    IMPRESSION:  1. Mild interval worsening in the appearance the brain. There are tiny too numerous to count foci of subtle enhancement on the surface of the brain and involving the brain parenchyma. Many of these areas of subtle enhancement are superimposed upon  precontrast T1 high signal intensity consistent with old blood product or mineral deposition. Subtle vasogenic edema can be seen associated with several lesions but there is no significant mass effect. The enhancement and subtle vasogenic edema is  essentially new from the prior study though the areas of precontrast T1 high signal intensity were seen. Findings are again most consistent with mild progression of the multiple tiny intracranial metastases from the patient's known lung cancer. There is  likely a component of  pre-existing small vessel disease as well.    I have called the patient's nurse on 3F and indicated to him that the preliminary report is erroneous and that there is intracranial metastatic disease. Since Dr. Fuentes is no longer on call, I have left a voicemail with my cell phone number with the  hospitalist service to reach the patient's current hospitalist with the results.    Signer Name: Katelyn Vigil MD   Signed: 5/15/2020 10:19 AM   Workstation Name: [id number]    [facility]    CT Abdomen Pelvis Without Contrast [[id number]] Collected:  05/14/20 2329     Updated:  05/14/20 2331    Narrative:       CT Abdomen Pelvis WO    INDICATION:   Increasing pain with known metastatic disease    TECHNIQUE:   CT of the abdomen and pelvis without IV contrast. Coronal and sagittal reconstructions were obtained.  Radiation dose reduction techniques included automated exposure control or exposure modulation based on body size. Count of known CT and cardiac nuc  med studies performed in previous 12 months: 4.     COMPARISON:   3/17/2020    FINDINGS:  Abdomen: The initial image of the study may show some faint nodules in the left lower lobe measuring 3 mm in diameter. The liver, gallbladder, spleen, pancreas, adrenal glands and kidneys are normal except for nonobstructing renal stones. There is an 8  mm stone in the left kidney and there are at least 2 stones in the right kidney measuring up to 8 mm. The aorta is normal in size. There is no adenopathy. Bowel is normal.    Pelvis: Bladder and prostate gland are normal.. There is an oval water density well-circumscribed area within the base the penis measuring 2.5 x 1.7 cm. This may represent a urethral diverticulum. It was present 3/17/2020 without significant change.    There are numerous sclerotic lesions present in the lumbar spine and sacrum and iliac bones and they are all unchanged from the previous study      Impression:       No change in sclerotic  bony metastases    Nonobstructing renal stones    No acute findings          Signer Name: Francisco Carr MD   Signed: 5/14/2020 11:29 PM   Workstation Name: [facility]    CT Head Without Contrast [[id number]] Collected:  05/14/20 2325     Updated:  05/14/20 2328    Narrative:       CT Head WO    HISTORY:   Lung cancer metastatic disease.    TECHNIQUE:   Axial unenhanced head CT. Radiation dose reduction techniques included automated exposure control or exposure modulation based on body size. Count of known CT and cardiac nuc med studies performed in previous 12 months: 0.     Time of scan: 10:58 PM    COMPARISON:   None.    FINDINGS:   No intracranial hemorrhage, mass, or infarct. No hydrocephalus or extra-axial fluid collection. Brain parenchymal density is normal. The skull base, calvarium, and extracranial soft tissues are normal.      Impression:       Normal, negative unenhanced head CT.          Signer Name: Francisco Carr MD   Signed: 5/14/2020 11:25 PM   Workstation Name: [facility]    CT Lumbar Spine Without Contrast [[id number]] Collected:  05/14/20 2325     Updated:  05/14/20 2327    Narrative:       CT Spine Lumbar WO    INDICATION:    History of lung cancer with metastatic disease. Increasing low back pain    TECHNIQUE:   CT of the lumbar spine without IV contrast. Coronal and sagittal reconstructions were obtained.  Radiation dose reduction techniques included automated exposure control or exposure modulation based on body size. Count of known CT and cardiac nuc med  studies performed in previous 12 months: 4.     COMPARISON:    CT abdomen pelvis from 2/1/2020    FINDINGS:  Once again there is sclerosis of L5 involving most of the few body. There is patchy sclerosis in the L3 vertebral body and fairly extensive sclerosis in the T12 vertebral body. These findings are all present on 2/1/2020. The alignment is normal. There is  no  fracture visible. There are partially visualized sclerotic metastases in the sacrum and iliac bones.      Impression:       Numerous sclerotic metastases within the lumbar vertebral bodies as well as the sacrum and the iliac bones. There is no change from 2/1/2020.    No acute finding such as fracture. There is no spinal stenosis visible.    Signer Name: Francisco Carr MD   Signed: 5/14/2020 11:25 PM   Workstation Name: [facility]    [facility] [place]          Diagnostics: Reviewed    A:   Patient Active Problem List   Diagnosis   • Non-smoker   • Metastatic cancer (CMS/HCC)   • Iatrogenic pneumothorax (Right)   • Non-small cell lung cancer, right (CMS/HCC)   • Back pain   • Sinus infection   • Pain from bone metastases (CMS/HCC)   • DVT (deep venous thrombosis) (CMS/HCC)   • Medication management contract agreement   • Diffuse arthralgia   • Chronic anticoagulation   • Fatigue due to treatment   • Muscular deconditioning   • Loss of balance   • Brain metastases (CMS/HCC)   • Cancer related pain   • AMS (altered mental status)       S/Sx:  1. Neoplastic Pain  - Fentanyl 12mcg every 72hrs  - Oxycodone 5-10mg q4h PRN  - Dilaudid 0.5mg q2h PRN if po opioid ineffective  2. Agitation/Restlessness  - Haldol 1-2mg q4h PRN  - Ativan 0.25-0.5mg po q4h PRN  - Continue QHS Seroquel 25mg  3. Encephalopathy  - Waxes and wanes- continue steroids- consider transition back to po decadron 4mg with BID dosing upon discharge  4. Urinary Incontinence/retention   - Gant catheter placed yesterday and pt is tolerating well  5. Constipation  - Bisacodyl suppository daily PRN- LBM 05/16  - Increased Senna S 2 tab po BID        P: Wife at bedside. Patient slept well after higher dose ativan overnight. RN gave lower dose this am and appears effective. Pt verbalizes slept well overnight and is excited to be going home tomorrow. Hospice following to assist with discharge planning. Collaborated with bedside RN who denies  additional concerns or needs at this time. Discussed above POC with pt wife who verbalizes understanding and is in agreement. Discussed with Hospice liaison TIMBO Lobo. Please call for needs. Palliative Care Team will continue to follow patient.     Concepcion Oquendo, APRN  5/17/2020  Time spent:25 min    EMR Dragon/Transcription disclaimer:  Much of this encounter note is an electronic transcription/translation of spoken language to printed text. Electronic translation of spoken language may permit erroneous, or at times, nonsensical words or phrases to be inadvertently transcribed. Although I have reviewed the note for such errors, some may still exist.

## 2020-05-18 ENCOUNTER — READMISSION MANAGEMENT (OUTPATIENT)
Dept: CALL CENTER | Facility: HOSPITAL | Age: 65
End: 2020-05-18

## 2020-05-18 VITALS
OXYGEN SATURATION: 94 % | WEIGHT: 150 LBS | BODY MASS INDEX: 22.22 KG/M2 | HEART RATE: 90 BPM | DIASTOLIC BLOOD PRESSURE: 95 MMHG | HEIGHT: 69 IN | RESPIRATION RATE: 20 BRPM | TEMPERATURE: 97.7 F | SYSTOLIC BLOOD PRESSURE: 147 MMHG

## 2020-05-18 PROCEDURE — 25010000002 METHYLPREDNISOLONE PER 125 MG: Performed by: INTERNAL MEDICINE

## 2020-05-18 PROCEDURE — 99239 HOSP IP/OBS DSCHRG MGMT >30: CPT | Performed by: NURSE PRACTITIONER

## 2020-05-18 RX ORDER — LORAZEPAM 0.5 MG/1
0.25 TABLET ORAL EVERY 4 HOURS PRN
Start: 2020-05-18

## 2020-05-18 RX ORDER — AMOXICILLIN 250 MG
2 CAPSULE ORAL 2 TIMES DAILY
Start: 2020-05-18

## 2020-05-18 RX ORDER — FENTANYL 12 UG/H
1 PATCH TRANSDERMAL
Qty: 3 PATCH | Refills: 0
Start: 2020-05-19 | End: 2020-05-26

## 2020-05-18 RX ORDER — OXYCODONE HYDROCHLORIDE 10 MG/1
10 TABLET ORAL EVERY 4 HOURS PRN
Start: 2020-05-18 | End: 2020-05-26

## 2020-05-18 RX ORDER — DEXAMETHASONE 4 MG/1
4 TABLET ORAL
Qty: 60 TABLET | Refills: 0 | Status: SHIPPED | OUTPATIENT
Start: 2020-05-18

## 2020-05-18 RX ORDER — QUETIAPINE FUMARATE 25 MG/1
25 TABLET, FILM COATED ORAL NIGHTLY
Qty: 30 TABLET | Refills: 0 | Status: SHIPPED | OUTPATIENT
Start: 2020-05-18

## 2020-05-18 RX ADMIN — SENNOSIDES AND DOCUSATE SODIUM 2 TABLET: 8.6; 5 TABLET ORAL at 08:42

## 2020-05-18 RX ADMIN — MEGESTROL ACETATE 400 MG: 40 SUSPENSION ORAL at 08:41

## 2020-05-18 RX ADMIN — ESCITALOPRAM OXALATE 20 MG: 20 TABLET ORAL at 08:42

## 2020-05-18 RX ADMIN — MEMANTINE 5 MG: 10 TABLET ORAL at 08:42

## 2020-05-18 RX ADMIN — Medication 1 TABLET: at 08:41

## 2020-05-18 RX ADMIN — OXYCODONE HYDROCHLORIDE 10 MG: 5 TABLET ORAL at 08:41

## 2020-05-18 RX ADMIN — GABAPENTIN 100 MG: 100 CAPSULE ORAL at 08:41

## 2020-05-18 RX ADMIN — METHYLPREDNISOLONE SODIUM SUCCINATE 125 MG: 125 INJECTION, POWDER, FOR SOLUTION INTRAMUSCULAR; INTRAVENOUS at 08:41

## 2020-05-18 RX ADMIN — CETIRIZINE HYDROCHLORIDE 10 MG: 10 TABLET, FILM COATED ORAL at 08:42

## 2020-05-18 NOTE — DISCHARGE PLACEMENT REQUEST
"Rajan Chambers (64 y.o. Male)   Discharge Summary    Date of Birth Social Security Number Address Home Phone MRN    1955  [address]  [place] [address] [phone] [id number]    Faith Marital Status          Confucianism        Admission Date Admission Type Admitting Provider Attending Provider Department, Room/Bed    20 Emergency Dat Tomlinson MD  [facility] 3F, S324/1    Discharge Date Discharge Disposition Discharge Destination        2020 Home or Self Care              Attending Provider:  (none)   Allergies:  Penicillin G, Ibuprofen, Penicillins, Sulfa Antibiotics    Isolation:  None   Infection:  None   Code Status:  Prior    Ht:  175.3 cm (69\")   Wt:  68 kg (150 lb)    Admission Cmt:  None   Principal Problem:  None                Active Insurance as of 2020     Primary Coverage     Payor Plan Insurance Group Employer/Plan Group    MEDICARE MEDICARE A & B      Payor Plan Address Payor Plan Phone Number Payor Plan Fax Number Effective Dates    [address] [phone]  2020 - None Entered    [facility] [id number]       Subscriber Name Subscriber Birth Date Member ID       RAJAN CHAMBERS 1955 [id number]           Secondary Coverage     Payor Plan Insurance Group Employer/Plan Group    [place] [id number]     Payor Plan Address Payor Plan Phone Number Payor Plan Fax Number Effective Dates    [address]   2016 - None Entered    [facility] [id number]       Subscriber Name Subscriber Birth Date Member ID       RAJAN CHAMBERS 1955 [id number]                 Emergency Contacts      (Rel.) Home Phone Work Phone Mobile Phone    Nelida Chambers (Spouse) -- -- [phone]    Ana Elise (Sister) -- -- [phone]               Discharge Summary      Virginia Mota, APRN at 20 0951              [facility] Medicine Services  DISCHARGE SUMMARY    Patient Name: Raajn Chambers  : " 1955  MRN: [id number]    Date of Admission: 5/14/2020  9:21 PM  Date of Discharge:  5/18/2020  Primary Care Physician: Jeimy Johnson MD    Consults     Date and Time Order Name Status Description    5/15/2020 0135 Inpatient Hematology & Oncology Consult Completed     5/15/2020 0135 Inpatient Palliative Care MD Consult Completed           Hospital Course     Presenting Problem:   Metastatic cancer (CMS/HCC) [C79.9]    Active Hospital Problems    Diagnosis  POA   • Cancer related pain [G89.3]  Unknown   • AMS (altered mental status) [R41.82]  Unknown   • Brain metastases (CMS/HCC) [C79.31]  Yes   • Pain from bone metastases (CMS/HCC) [G89.3, C79.51]  Yes   • Non-small cell lung cancer, right (CMS/HCC) [C34.91]  Yes   • Back pain [M54.9]  Yes   • Metastatic cancer (CMS/HCC) [C79.9]  Yes      Resolved Hospital Problems   No resolved problems to display.          Hospital Course:  Rajan Chambers is a 64 y.o. 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   -  patient was given solumedrol 125 mg IV daily with no significant  improvement.   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      Abdominal pain  - unclear etiology: however now resolved after IV fluid administration and leigh catheter placement  --  CT Abdomen pelvis at admission  (5/14) shows only non obstructing stones and a possible uretheral diverticulum.      H/o DVT  - xarelto continued for now unless signs of additional GI bleeding develop    Patient has remained stable and will be discharged home today with hospice.       Discharge Follow Up Recommendations for outpatient labs/diagnostics:   PCP follow up if patient and family want follow up     Day of Discharge     HPI:   Patient sitting up in chair in NAD with wife at bedside. He feels good. Appetite fair. Pain controlled. No acute events overnight per nursing. Patient and wife both want to be discharged today with hospice.     Review of Systems  Gen- No fevers, chills  CV- No chest pain, palpitations  Resp- No cough, dyspnea  GI- No N/V/D, abd pain        Vital Signs:   Temp:  [97.6 °F (36.4 °C)-98.1 °F (36.7 °C)] 97.7 °F (36.5 °C)  Heart Rate:  [85-96] 90  Resp:  [18-20] 20  BP: (123-147)/(83-95) 147/95     Physical Exam:  Constitutional: No acute distress, awake, alert, sitting up in chair   HENT: NCAT, mucous membranes moist  Respiratory: Clear to auscultation bilaterally, respiratory effort normal room air   Cardiovascular: RRR, no murmurs, rubs, or gallops, palpable pedal pulses bilaterally  Gastrointestinal: Positive bowel sounds, soft, nontender, nondistended  Musculoskeletal: trace bilateral ankle edema  Psychiatric: Appropriate affect, cooperative  Neurologic: alert, strength symmetric in all extremities, Cranial Nerves grossly intact to confrontation, speech halting   Skin: No rashes      Pertinent  and/or Most Recent Results     Results from last 7 days   Lab Units 05/16/20  1420 05/15/20  0619 05/14/20  2241   WBC 10*3/mm3 12.63* 8.88 8.63   HEMOGLOBIN g/dL 14.3 13.7 14.7   HEMATOCRIT % 41.3 39.5 42.0   PLATELETS 10*3/mm3 183 193 203   SODIUM mmol/L  --  138 140   POTASSIUM mmol/L  --  3.9 4.4   CHLORIDE mmol/L  --  103 103   CO2 mmol/L  --  22.0 23.0   BUN mg/dL  --  21 22   CREATININE mg/dL  --  0.93 1.07   GLUCOSE  mg/dL  --  115* 139*   CALCIUM mg/dL  --  9.0 9.1     Results from last 7 days   Lab Units 05/14/20  2241   BILIRUBIN mg/dL 0.3   ALK PHOS U/L 101   ALT (SGPT) U/L 68*   AST (SGOT) U/L 31           Invalid input(s): TG, LDLCALC, LDLREALC  Results from last 7 days   Lab Units 05/16/20  1812 05/16/20  1420 05/14/20  2241   PROBNP pg/mL  --   --  94.4   TROPONIN T ng/mL  --   --  <0.010   LACTATE mmol/L 1.8 4.3*  --        Brief Urine Lab Results  (Last result in the past 365 days)      Color   Clarity   Blood   Leuk Est   Nitrite   Protein   CREAT   Urine HCG        04/28/20 0934 Dark Yellow Slightly Cloudy Large (3+) Negative Negative 30 mg/dL (1+)               Microbiology Results Abnormal     None          Imaging Results (All)     Procedure Component Value Units Date/Time    MRI Brain With & Without Contrast [[id number]] Collected:  05/15/20 0417     Updated:  05/15/20 1022    Narrative:       MRI Brain WO W    INDICATION:   Non-small cell lung cancer with brain and spine metastases. Patient presents with worsening confusion    TECHNIQUE:   MRI of the brain with 14 cc of MultiHance IV contrast.    COMPARISON:    CT brain 5/14/2020    FINDINGS:  The pituitary gland and foramen magnum region are normal in appearance there is no abnormal diffusion.. The ventricles interspaces are within normal limits. There are a few punctate areas of increased FLAIR signal intensity consistent with small vessel  ischemic changes.. There is no abnormal enhancement      Impression:       No evidence of metastatic disease. No recent ischemic change.    Findings the preliminary report. Please see full dictation from neuroradiology    Initial dictation performed by Dr. Francisco Carr    Final interpretation:    There is no abnormally restricted diffusion. Midline structures are unremarkable.    There are multiple tiny areas of precontrast high T1 signal intensity on surface of the brain and in the bilateral basal ganglia and thalami. A few  punctate parenchymal high signal intensity lesions are also seen. These were present on prior study as  well. Following contrast administration, there is mild associated enhancement associated with several of these lesions given the precontrast high signal intensity on T1-weighted imaging enhancement is best appreciated associated with the small thalamic  lesions and the cerebellar lesions. There are 5 mm or less in dimension and is no appreciable mass effect. There is moderate white matter signal abnormality. Some of this is likely due to small vessel disease and is not appreciably changed. Other areas  of white matter signal abnormality are related to the tiny parenchymal metastases and likely reflect subtle areas of vasogenic edema. This is best appreciated at the left inferior frontal lobe where new 5 mm focus of white matter signal abnormality is  seen on the FLAIR sequence image #14. There is no extra-axial fluid collection. There is no hydrocephalus.    The major arterial intracranial flow voids are maintained. The mastoid air cells are clear. The visualized paranasal sinuses show mild mucosal thickening. There is a routine secretions in the left sphenoid sinus.    IMPRESSION:  1. Mild interval worsening in the appearance the brain. There are tiny too numerous to count foci of subtle enhancement on the surface of the brain and involving the brain parenchyma. Many of these areas of subtle enhancement are superimposed upon  precontrast T1 high signal intensity consistent with old blood product or mineral deposition. Subtle vasogenic edema can be seen associated with several lesions but there is no significant mass effect. The enhancement and subtle vasogenic edema is  essentially new from the prior study though the areas of precontrast T1 high signal intensity were seen. Findings are again most consistent with mild progression of the multiple tiny intracranial metastases from the patient's known lung cancer.  There is  likely a component of pre-existing small vessel disease as well.    I have called the patient's nurse on 3F and indicated to him that the preliminary report is erroneous and that there is intracranial metastatic disease. Since Dr. Fuentes is no longer on call, I have left a voicemail with my cell phone number with the  hospitalist service to reach the patient's current hospitalist with the results.    Signer Name: Katelyn Vigil MD   Signed: 5/15/2020 10:19 AM   Workstation Name: [id number]    [facility]    CT Abdomen Pelvis Without Contrast [[id number]] Collected:  05/14/20 2329     Updated:  05/14/20 2331    Narrative:       CT Abdomen Pelvis WO    INDICATION:   Increasing pain with known metastatic disease    TECHNIQUE:   CT of the abdomen and pelvis without IV contrast. Coronal and sagittal reconstructions were obtained.  Radiation dose reduction techniques included automated exposure control or exposure modulation based on body size. Count of known CT and cardiac nuc  med studies performed in previous 12 months: 4.     COMPARISON:   3/17/2020    FINDINGS:  Abdomen: The initial image of the study may show some faint nodules in the left lower lobe measuring 3 mm in diameter. The liver, gallbladder, spleen, pancreas, adrenal glands and kidneys are normal except for nonobstructing renal stones. There is an 8  mm stone in the left kidney and there are at least 2 stones in the right kidney measuring up to 8 mm. The aorta is normal in size. There is no adenopathy. Bowel is normal.    Pelvis: Bladder and prostate gland are normal.. There is an oval water density well-circumscribed area within the base the penis measuring 2.5 x 1.7 cm. This may represent a urethral diverticulum. It was present 3/17/2020 without significant change.    There are numerous sclerotic lesions present in the lumbar spine and sacrum and iliac bones and they are all unchanged from the previous study      Impression:        No change in sclerotic bony metastases    Nonobstructing renal stones    No acute findings          Signer Name: Francisco Carr MD   Signed: 5/14/2020 11:29 PM   Workstation Name: [facility]    CT Head Without Contrast [[id number]] Collected:  05/14/20 2325     Updated:  05/14/20 2328    Narrative:       CT Head WO    HISTORY:   Lung cancer metastatic disease.    TECHNIQUE:   Axial unenhanced head CT. Radiation dose reduction techniques included automated exposure control or exposure modulation based on body size. Count of known CT and cardiac nuc med studies performed in previous 12 months: 0.     Time of scan: 10:58 PM    COMPARISON:   None.    FINDINGS:   No intracranial hemorrhage, mass, or infarct. No hydrocephalus or extra-axial fluid collection. Brain parenchymal density is normal. The skull base, calvarium, and extracranial soft tissues are normal.      Impression:       Normal, negative unenhanced head CT.          Signer Name: Francisco Carr MD   Signed: 5/14/2020 11:25 PM   Workstation Name: [facility]    CT Lumbar Spine Without Contrast [[id number]] Collected:  05/14/20 2325     Updated:  05/14/20 2327    Narrative:       CT Spine Lumbar WO    INDICATION:    History of lung cancer with metastatic disease. Increasing low back pain    TECHNIQUE:   CT of the lumbar spine without IV contrast. Coronal and sagittal reconstructions were obtained.  Radiation dose reduction techniques included automated exposure control or exposure modulation based on body size. Count of known CT and cardiac nuc med  studies performed in previous 12 months: 4.     COMPARISON:    CT abdomen pelvis from 2/1/2020    FINDINGS:  Once again there is sclerosis of L5 involving most of the few body. There is patchy sclerosis in the L3 vertebral body and fairly extensive sclerosis in the T12 vertebral body. These findings are all present on 2/1/2020. The alignment  is normal. There is  no fracture visible. There are partially visualized sclerotic metastases in the sacrum and iliac bones.      Impression:       Numerous sclerotic metastases within the lumbar vertebral bodies as well as the sacrum and the iliac bones. There is no change from 2/1/2020.    No acute finding such as fracture. There is no spinal stenosis visible.    Signer Name: Francisco Carr MD   Signed: 5/14/2020 11:25 PM   Workstation Name: [facility]-    [facility]          Results for orders placed during the hospital encounter of 03/06/18   Duplex Venous Lower Extremity - Right CAR    Narrative · Acute right lower extremity deep vein thrombosis noted in the mid   femoral, distal femoral, popliteal, posterior tibial, peroneal and   gastrocnemius/soleal.  · Acute right lower extremity superficial thrombophlebitis noted in the   small saphenous.  · All other right sided veins appeared normal.       -Dr. Catalan notified of preliminary results.           Results for orders placed during the hospital encounter of 03/06/18   Duplex Venous Lower Extremity - Right CAR    Narrative · Acute right lower extremity deep vein thrombosis noted in the mid   femoral, distal femoral, popliteal, posterior tibial, peroneal and   gastrocnemius/soleal.  · Acute right lower extremity superficial thrombophlebitis noted in the   small saphenous.  · All other right sided veins appeared normal.       -Dr. Catalan notified of preliminary results.           Results for orders placed during the hospital encounter of 09/23/19   Adult Transthoracic Echo Complete W/ Cont if Necessary Per Protocol    Narrative · Estimated EF = 55%.  · Left ventricular systolic function is normal.  · Left ventricular diastolic dysfunction.  · Mild mitral valve regurgitation is present          Plan for Follow-up of Pending Labs/Results:     Discharge Details        Discharge Medications      New Medications      Instructions Start Date      fentaNYL 12 MCG/HR  Commonly known as:  DURAGESIC   1 patch, Transdermal, Every 72 Hours   Start Date:  May 19, 2020     LORazepam 0.5 MG tablet  Commonly known as:  ATIVAN   0.25 mg, Oral, Every 4 Hours PRN      oxyCODONE 10 MG tablet  Commonly known as:  ROXICODONE   10 mg, Oral, Every 4 Hours PRN      QUEtiapine 25 MG tablet  Commonly known as:  SEROquel   25 mg, Oral, Nightly      sennosides-docusate 8.6-50 MG per tablet  Commonly known as:  PERICOLACE   2 tablets, Oral, 2 Times Daily         Changes to Medications      Instructions Start Date   dexamethasone 4 MG tablet  Commonly known as:  DECADRON  What changed:  additional instructions   4 mg, Oral, Daily With Breakfast      gabapentin 100 MG capsule  Commonly known as:  NEURONTIN  What changed:    · how much to take  · how to take this  · when to take this   1-2 tabs TID as tolerated         Continue These Medications      Instructions Start Date   acetaminophen 500 MG tablet  Commonly known as:  TYLENOL   2 tablets, Oral, Every 6 Hours PRN      Calcium/Vitamin D 600-400 MG-UNIT tablet   2 tablets, Oral, Daily      desloratadine 5 MG tablet  Commonly known as:  Clarinex   5 mg, Oral, Daily      docusate sodium 100 MG capsule  Commonly known as:  COLACE   100 mg, Oral, 2 Times Daily PRN      escitalopram 20 MG tablet  Commonly known as:  LEXAPRO   20 mg, Oral, Daily      fluticasone 50 MCG/ACT nasal spray  Commonly known as:  Flonase   2 sprays, Nasal, Daily, Administer 2 sprays in each nostril for each dose.      megestrol 40 MG/ML suspension  Commonly known as:  MEGACE   400 mg, Oral, Daily      memantine 5 MG tablet  Commonly known as:  NAMENDA   5 mg, Oral, 2 Times Daily      methylphenidate 5 MG tablet  Commonly known as:  RITALIN   2.5 mg, Oral, 2 Times Daily, Morning and midday for fatigue and cognition      ondansetron 4 MG tablet  Commonly known as:  ZOFRAN   TAKE 2 TABLETS BY MOUTH TWICE DAILY AS NEEDED FOR NAUSEA      rivaroxaban 20 MG  tablet  Commonly known as:  XARELTO   20 mg, Oral, Daily With Dinner         Stop These Medications    calcium carbonate 600 MG tablet  Commonly known as:  OS-ROSIO     diclofenac 1 % gel gel  Commonly known as:  VOLTAREN     Tagrisso 80 MG tablet  Generic drug:  Osimertinib Mesylate            Allergies   Allergen Reactions   • Penicillin G Hives   • Ibuprofen Itching and Dermatitis     Prickly rash on heaD   • Penicillins Rash   • Sulfa Antibiotics Rash and Itching         Discharge Disposition:  Home or Self Care    Diet:  Hospital:  Diet Order   Procedures   • Diet Regular       Activity:  Activity Instructions     Activity as Tolerated            Restrictions or Other Recommendations:         CODE STATUS:    Code Status and Medical Interventions:   Ordered at: 05/15/20 0023     Limited Support to NOT Include:    Intubation     Code Status:    No CPR     Medical Interventions (Level of Support Prior to Arrest):    Limited       Future Appointments   Date Time Provider Department Center   5/19/2020  9:00 AM Nieves Garcia APRN [facility]   5/19/2020 10:00 AM CHAIR 20 [facility]   6/9/2020  9:00 AM Jacquie Winter MD [facility] None       Additional Instructions for the Follow-ups that You Need to Schedule     Discharge Follow-up with PCP   As directed       Currently Documented PCP:    Jeimy Johnson MD    PCP Phone Number:    [phone]     Follow Up Details:  follow up with PCP as needed               Time Spent on Discharge:  35 minutes    Electronically signed by CAR Mccormack, 05/18/20, 9:52 AM.        Electronically signed by Virginia Mota APRN at 05/18/20 1940

## 2020-05-18 NOTE — PROGRESS NOTES
Continued Stay Note  [facility]     Patient Name: Rajan Chambers  MRN: [id number]  Today's Date: 5/18/2020    Admit Date: 5/14/2020    Discharge Plan     Row Name 05/18/20 1023       Plan    Plan  Home with [facility]    Final Discharge Disposition Code  50 - home with hospice    Final Note  Visit made to pt, pt sitting up in recliner with eyes closed, appears to be sleeping. Wife present, wife stated has spoken with [facility] this morning, equipment will be delivered today prior to pt arriving home. Wife stated has hospice number to call when pt arrives home. Palliative care has written scripts-Ativan, fentanyl patch, oxycodone, and neurontin, for pt. Spoke with nurse with [facility] regarding the scripts and the fentanyl patch needing to be changed tomorrow, nurse stated [facility] will fill the scripts for pt. Informed wife to give the scripts to the hospice nurse today, wife verbalized understanding. Wife to transport pt home via private vehicle. Please call [phone] if can be of further assistance        Discharge Codes    No documentation.       Expected Discharge Date and Time     Expected Discharge Date Expected Discharge Time    May 18, 2020             Tess Myles RN

## 2020-05-18 NOTE — PLAN OF CARE
Problem: Patient Care Overview  Goal: Interprofessional Rounds/Family Conf  5/18/2020 1625 by Sweta Chacko, RN  Outcome: Ongoing (interventions implemented as appropriate)  Flowsheets (Taken 5/18/2020 1300)  Participants: ; nursing; advanced practice nurse; social work/services  Note:   Palliative Team Meeting Attendance 1300:  Dr. True Castellanos, Hieu Blood, APRN, Sweta Chacko, RN, CHPN, Tess Myles RN/, Susanna Maravilla RN, CHPN and [facility].  Patient sitting up in chair asleep. Wife, Nelida at bedside. She is ready to take him home. Discussed if she can transport safely and get him into house. She talked to family and they states yes. Scripts were given to nurse. Patient will be discharge home with [facility].  5/18/2020 1625 by Sweat Chacko, RN  Reactivated

## 2020-05-18 NOTE — PLAN OF CARE
Problem: Patient Care Overview  Goal: Plan of Care Review  Outcome: Outcome(s) achieved  Flowsheets (Taken 5/18/2020 1221)  Plan of Care Reviewed With: patient; spouse  Outcome Summary: pt. dc'd home with Hospice

## 2020-05-18 NOTE — DISCHARGE SUMMARY
[facility]  DISCHARGE SUMMARY    Patient Name: Rajan Chambers  : 1955  MRN: [id number]    Date of Admission: 2020  9:21 PM  Date of Discharge:  2020  Primary Care Physician: Jeimy Johnson MD    Consults     Date and Time Order Name Status Description    5/15/2020 0135 Inpatient Hematology & Oncology Consult Completed     5/15/2020 0135 Inpatient Palliative Care MD Consult Completed           Hospital Course     Presenting Problem:   Metastatic cancer (CMS/HCC) [C79.9]    Active Hospital Problems    Diagnosis  POA   • Cancer related pain [G89.3]  Unknown   • AMS (altered mental status) [R41.82]  Unknown   • Brain metastases (CMS/HCC) [C79.31]  Yes   • Pain from bone metastases (CMS/HCC) [G89.3, C79.51]  Yes   • Non-small cell lung cancer, right (CMS/HCC) [C34.91]  Yes   • Back pain [M54.9]  Yes   • Metastatic cancer (CMS/HCC) [C79.9]  Yes      Resolved Hospital Problems   No resolved problems to display.          Hospital Course:  Rajan Chambers is a 64 y.o. 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   - patient was given solumedrol 125 mg IV daily with no significant  improvement.   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      Abdominal pain  - unclear etiology:  however now resolved after IV fluid administration and leigh catheter placement  --  CT Abdomen pelvis at admission (5/14) shows only non obstructing stones and a possible uretheral diverticulum.      H/o DVT  - xarelto continued for now unless signs of additional GI bleeding develop    Patient has remained stable and will be discharged home today with hospice.       Discharge Follow Up Recommendations for outpatient labs/diagnostics:   PCP follow up if patient and family want follow up     Day of Discharge     HPI:   Patient sitting up in chair in NAD with wife at bedside. He feels good. Appetite fair. Pain controlled. No acute events overnight per nursing. Patient and wife both want to be discharged today with hospice.     Review of Systems  Gen- No fevers, chills  CV- No chest pain, palpitations  Resp- No cough, dyspnea  GI- No N/V/D, abd pain        Vital Signs:   Temp:  [97.6 °F (36.4 °C)-98.1 °F (36.7 °C)] 97.7 °F (36.5 °C)  Heart Rate:  [85-96] 90  Resp:  [18-20] 20  BP: (123-147)/(83-95) 147/95     Physical Exam:  Constitutional: No acute distress, awake, alert, sitting up in chair   HENT: NCAT, mucous membranes moist  Respiratory: Clear to auscultation bilaterally, respiratory effort normal room air   Cardiovascular: RRR, no murmurs, rubs, or gallops, palpable pedal pulses bilaterally  Gastrointestinal: Positive bowel sounds, soft, nontender, nondistended  Musculoskeletal: trace bilateral ankle edema  Psychiatric: Appropriate affect, cooperative  Neurologic: alert, strength symmetric in all extremities, Cranial Nerves grossly intact to confrontation, speech halting   Skin: No rashes      Pertinent  and/or Most Recent Results     Results from last 7 days   Lab Units 05/16/20  1420 05/15/20  0619 05/14/20  2241   WBC 10*3/mm3 12.63* 8.88 8.63   HEMOGLOBIN g/dL 14.3 13.7 14.7   HEMATOCRIT % 41.3 39.5 42.0   PLATELETS 10*3/mm3 183 193 203   SODIUM mmol/L  --  138 140   POTASSIUM mmol/L  --  3.9 4.4   CHLORIDE  mmol/L  --  103 103   CO2 mmol/L  --  22.0 23.0   BUN mg/dL  --  21 22   CREATININE mg/dL  --  0.93 1.07   GLUCOSE mg/dL  --  115* 139*   CALCIUM mg/dL  --  9.0 9.1     Results from last 7 days   Lab Units 05/14/20  2241   BILIRUBIN mg/dL 0.3   ALK PHOS U/L 101   ALT (SGPT) U/L 68*   AST (SGOT) U/L 31           Invalid input(s): TG, LDLCALC, LDLREALC  Results from last 7 days   Lab Units 05/16/20  1812 05/16/20  1420 05/14/20  2241   PROBNP pg/mL  --   --  94.4   TROPONIN T ng/mL  --   --  <0.010   LACTATE mmol/L 1.8 4.3*  --        Brief Urine Lab Results  (Last result in the past 365 days)      Color   Clarity   Blood   Leuk Est   Nitrite   Protein   CREAT   Urine HCG        04/28/20 0934 Dark Yellow Slightly Cloudy Large (3+) Negative Negative 30 mg/dL (1+)               Microbiology Results Abnormal     None          Imaging Results (All)     Procedure Component Value Units Date/Time    MRI Brain With & Without Contrast [[id number]] Collected:  05/15/20 0417     Updated:  05/15/20 1022    Narrative:       MRI Brain WO W    INDICATION:   Non-small cell lung cancer with brain and spine metastases. Patient presents with worsening confusion    TECHNIQUE:   MRI of the brain with 14 cc of MultiHance IV contrast.    COMPARISON:    CT brain 5/14/2020    FINDINGS:  The pituitary gland and foramen magnum region are normal in appearance there is no abnormal diffusion.. The ventricles interspaces are within normal limits. There are a few punctate areas of increased FLAIR signal intensity consistent with small vessel  ischemic changes.. There is no abnormal enhancement      Impression:       No evidence of metastatic disease. No recent ischemic change.    Findings the preliminary report. Please see full dictation from neuroradiology    Initial dictation performed by Dr. Francisco Carr    Final interpretation:    There is no abnormally restricted diffusion. Midline structures are unremarkable.    There are multiple tiny areas of  precontrast high T1 signal intensity on surface of the brain and in the bilateral basal ganglia and thalami. A few punctate parenchymal high signal intensity lesions are also seen. These were present on prior study as  well. Following contrast administration, there is mild associated enhancement associated with several of these lesions given the precontrast high signal intensity on T1-weighted imaging enhancement is best appreciated associated with the small thalamic  lesions and the cerebellar lesions. There are 5 mm or less in dimension and is no appreciable mass effect. There is moderate white matter signal abnormality. Some of this is likely due to small vessel disease and is not appreciably changed. Other areas  of white matter signal abnormality are related to the tiny parenchymal metastases and likely reflect subtle areas of vasogenic edema. This is best appreciated at the left inferior frontal lobe where new 5 mm focus of white matter signal abnormality is  seen on the FLAIR sequence image #14. There is no extra-axial fluid collection. There is no hydrocephalus.    The major arterial intracranial flow voids are maintained. The mastoid air cells are clear. The visualized paranasal sinuses show mild mucosal thickening. There is a routine secretions in the left sphenoid sinus.    IMPRESSION:  1. Mild interval worsening in the appearance the brain. There are tiny too numerous to count foci of subtle enhancement on the surface of the brain and involving the brain parenchyma. Many of these areas of subtle enhancement are superimposed upon  precontrast T1 high signal intensity consistent with old blood product or mineral deposition. Subtle vasogenic edema can be seen associated with several lesions but there is no significant mass effect. The enhancement and subtle vasogenic edema is  essentially new from the prior study though the areas of precontrast T1 high signal intensity were seen. Findings are again most  consistent with mild progression of the multiple tiny intracranial metastases from the patient's known lung cancer. There is  likely a component of pre-existing small vessel disease as well.    I have called the patient's nurse on 3F and indicated to him that the preliminary report is erroneous and that there is intracranial metastatic disease. Since Dr. Fuentes is no longer on call, I have left a voicemail with my cell phone number with the  hospitalist service to reach the patient's current hospitalist with the results.    Signer Name: Katelyn Vigil MD   Signed: 5/15/2020 10:19 AM   Workstation Name: [id number]    [facility]    CT Abdomen Pelvis Without Contrast [[id number]] Collected:  05/14/20 2329     Updated:  05/14/20 2331    Narrative:       CT Abdomen Pelvis WO    INDICATION:   Increasing pain with known metastatic disease    TECHNIQUE:   CT of the abdomen and pelvis without IV contrast. Coronal and sagittal reconstructions were obtained.  Radiation dose reduction techniques included automated exposure control or exposure modulation based on body size. Count of known CT and cardiac nuc  med studies performed in previous 12 months: 4.     COMPARISON:   3/17/2020    FINDINGS:  Abdomen: The initial image of the study may show some faint nodules in the left lower lobe measuring 3 mm in diameter. The liver, gallbladder, spleen, pancreas, adrenal glands and kidneys are normal except for nonobstructing renal stones. There is an 8  mm stone in the left kidney and there are at least 2 stones in the right kidney measuring up to 8 mm. The aorta is normal in size. There is no adenopathy. Bowel is normal.    Pelvis: Bladder and prostate gland are normal.. There is an oval water density well-circumscribed area within the base the penis measuring 2.5 x 1.7 cm. This may represent a urethral diverticulum. It was present 3/17/2020 without significant change.    There are numerous sclerotic lesions present  in the lumbar spine and sacrum and iliac bones and they are all unchanged from the previous study      Impression:       No change in sclerotic bony metastases    Nonobstructing renal stones    No acute findings          Signer Name: Francisco Carr MD   Signed: 5/14/2020 11:29 PM   Workstation Name: [facility]    CT Head Without Contrast [[id number]] Collected:  05/14/20 2325     Updated:  05/14/20 2328    Narrative:       CT Head WO    HISTORY:   Lung cancer metastatic disease.    TECHNIQUE:   Axial unenhanced head CT. Radiation dose reduction techniques included automated exposure control or exposure modulation based on body size. Count of known CT and cardiac nuc med studies performed in previous 12 months: 0.     Time of scan: 10:58 PM    COMPARISON:   None.    FINDINGS:   No intracranial hemorrhage, mass, or infarct. No hydrocephalus or extra-axial fluid collection. Brain parenchymal density is normal. The skull base, calvarium, and extracranial soft tissues are normal.      Impression:       Normal, negative unenhanced head CT.          Signer Name: Francisco Carr MD   Signed: 5/14/2020 11:25 PM   Workstation Name: [email or web address]    CT Lumbar Spine Without Contrast [[id number]] Collected:  05/14/20 2325     Updated:  05/14/20 2327    Narrative:       CT Spine Lumbar WO    INDICATION:    History of lung cancer with metastatic disease. Increasing low back pain    TECHNIQUE:   CT of the lumbar spine without IV contrast. Coronal and sagittal reconstructions were obtained.  Radiation dose reduction techniques included automated exposure control or exposure modulation based on body size. Count of known CT and cardiac nuc med  studies performed in previous 12 months: 4.     COMPARISON:    CT abdomen pelvis from 2/1/2020    FINDINGS:  Once again there is sclerosis of L5 involving most of the few body. There is patchy sclerosis in the L3 vertebral body  and fairly extensive sclerosis in the T12 vertebral body. These findings are all present on 2/1/2020. The alignment is normal. There is  no fracture visible. There are partially visualized sclerotic metastases in the sacrum and iliac bones.      Impression:       Numerous sclerotic metastases within the lumbar vertebral bodies as well as the sacrum and the iliac bones. There is no change from 2/1/2020.    No acute finding such as fracture. There is no spinal stenosis visible.    Signer Name: Francisco Carr MD   Signed: 5/14/2020 11:25 PM   Workstation Name: [id number]    [facility]          Results for orders placed during the hospital encounter of 03/06/18   Duplex Venous Lower Extremity - Right CAR    Narrative · Acute right lower extremity deep vein thrombosis noted in the mid   femoral, distal femoral, popliteal, posterior tibial, peroneal and   gastrocnemius/soleal.  · Acute right lower extremity superficial thrombophlebitis noted in the   small saphenous.  · All other right sided veins appeared normal.       -Dr. Catalan notified of preliminary results.           Results for orders placed during the hospital encounter of 03/06/18   Duplex Venous Lower Extremity - Right CAR    Narrative · Acute right lower extremity deep vein thrombosis noted in the mid   femoral, distal femoral, popliteal, posterior tibial, peroneal and   gastrocnemius/soleal.  · Acute right lower extremity superficial thrombophlebitis noted in the   small saphenous.  · All other right sided veins appeared normal.       -Dr. Catalan notified of preliminary results.           Results for orders placed during the hospital encounter of 09/23/19   Adult Transthoracic Echo Complete W/ Cont if Necessary Per Protocol    Narrative · Estimated EF = 55%.  · Left ventricular systolic function is normal.  · Left ventricular diastolic dysfunction.  · Mild mitral valve regurgitation is present          Plan for Follow-up of Pending  Labs/Results:     Discharge Details        Discharge Medications      New Medications      Instructions Start Date   fentaNYL 12 MCG/HR  Commonly known as:  DURAGESIC   1 patch, Transdermal, Every 72 Hours   Start Date:  May 19, 2020     LORazepam 0.5 MG tablet  Commonly known as:  ATIVAN   0.25 mg, Oral, Every 4 Hours PRN      oxyCODONE 10 MG tablet  Commonly known as:  ROXICODONE   10 mg, Oral, Every 4 Hours PRN      QUEtiapine 25 MG tablet  Commonly known as:  SEROquel   25 mg, Oral, Nightly      sennosides-docusate 8.6-50 MG per tablet  Commonly known as:  PERICOLACE   2 tablets, Oral, 2 Times Daily         Changes to Medications      Instructions Start Date   dexamethasone 4 MG tablet  Commonly known as:  DECADRON  What changed:  additional instructions   4 mg, Oral, Daily With Breakfast      gabapentin 100 MG capsule  Commonly known as:  NEURONTIN  What changed:    · how much to take  · how to take this  · when to take this   1-2 tabs TID as tolerated         Continue These Medications      Instructions Start Date   acetaminophen 500 MG tablet  Commonly known as:  TYLENOL   2 tablets, Oral, Every 6 Hours PRN      Calcium/Vitamin D 600-400 MG-UNIT tablet   2 tablets, Oral, Daily      desloratadine 5 MG tablet  Commonly known as:  Clarinex   5 mg, Oral, Daily      docusate sodium 100 MG capsule  Commonly known as:  COLACE   100 mg, Oral, 2 Times Daily PRN      escitalopram 20 MG tablet  Commonly known as:  LEXAPRO   20 mg, Oral, Daily      fluticasone 50 MCG/ACT nasal spray  Commonly known as:  Flonase   2 sprays, Nasal, Daily, Administer 2 sprays in each nostril for each dose.      megestrol 40 MG/ML suspension  Commonly known as:  MEGACE   400 mg, Oral, Daily      memantine 5 MG tablet  Commonly known as:  NAMENDA   5 mg, Oral, 2 Times Daily      methylphenidate 5 MG tablet  Commonly known as:  RITALIN   2.5 mg, Oral, 2 Times Daily, Morning and midday for fatigue and cognition      ondansetron 4 MG  tablet  Commonly known as:  ZOFRAN   TAKE 2 TABLETS BY MOUTH TWICE DAILY AS NEEDED FOR NAUSEA      rivaroxaban 20 MG tablet  Commonly known as:  XARELTO   20 mg, Oral, Daily With Dinner         Stop These Medications    calcium carbonate 600 MG tablet  Commonly known as:  OS-ROSIO     diclofenac 1 % gel gel  Commonly known as:  VOLTAREN     Tagrisso 80 MG tablet  Generic drug:  Osimertinib Mesylate            Allergies   Allergen Reactions   • Penicillin G Hives   • Ibuprofen Itching and Dermatitis     Prickly rash on heaD   • Penicillins Rash   • Sulfa Antibiotics Rash and Itching         Discharge Disposition:  Home or Self Care    Diet:  Hospital:  Diet Order   Procedures   • Diet Regular       Activity:  Activity Instructions     Activity as Tolerated            Restrictions or Other Recommendations:         CODE STATUS:    Code Status and Medical Interventions:   Ordered at: 05/15/20 0023     Limited Support to NOT Include:    Intubation     Code Status:    No CPR     Medical Interventions (Level of Support Prior to Arrest):    Limited       Future Appointments   Date Time Provider Department Center   5/19/2020  9:00 AM Nieves Garcia APRN [facility]   5/19/2020 10:00 AM CHAIR 20  [facility]   6/9/2020  9:00 AM Jacquie Winter MD [facility] None       Additional Instructions for the Follow-ups that You Need to Schedule     Discharge Follow-up with PCP   As directed       Currently Documented PCP:    Jeimy Johnson MD    PCP Phone Number:    [phone]     Follow Up Details:  follow up with PCP as needed               Time Spent on Discharge:  35 minutes    Electronically signed by CAR Mccormack, 05/18/20, 9:52 AM.

## 2020-05-19 ENCOUNTER — TELEPHONE (OUTPATIENT)
Dept: ONCOLOGY | Facility: CLINIC | Age: 65
End: 2020-05-19

## 2020-05-19 ENCOUNTER — APPOINTMENT (OUTPATIENT)
Dept: ONCOLOGY | Facility: HOSPITAL | Age: 65
End: 2020-05-19

## 2020-05-19 NOTE — TELEPHONE ENCOUNTER
Stacy with [facility].    Needs last 3 office notes faxed to her please.    [phone] Fax  [phone] Phone

## 2020-05-21 LAB
CYTO UR: NORMAL
LAB AP CASE REPORT: NORMAL
LAB AP CLINICAL INFORMATION: NORMAL
PATH REPORT.ADDENDUM SPEC: NORMAL
PATH REPORT.FINAL DX SPEC: NORMAL
PATH REPORT.GROSS SPEC: NORMAL

## 2020-06-01 LAB
CYTO UR: NORMAL
DX PRELIMINARY: NORMAL
LAB AP CARIS, ADDENDUM 2: NORMAL
LAB AP CARIS, ADDENDUM: NORMAL
LAB AP CASE REPORT: NORMAL
LAB AP CLINICAL INFORMATION: NORMAL
LAB AP DIAGNOSIS COMMENT: NORMAL
LAB AP SPECIAL STAINS: NORMAL
Lab: NORMAL
Lab: NORMAL
PATH REPORT.ADDENDUM SPEC: NORMAL
PATH REPORT.FINAL DX SPEC: NORMAL
PATH REPORT.GROSS SPEC: NORMAL

## 2021-06-09 NOTE — PLAN OF CARE
Ongoing SW/CM Assessment/Plan of Care Note     See SW/CM flowsheets for goals and other objective data.    Patient/Family discharge goal (s):  Goal #1: Psychosocial needs assessed  Goal #2: Home Care arranged or issues addressed       PT Recommendation:  Recommendation for Discharge: PT WI: Intensive therapy program, Post acute therapy(pending progress)       OT Recommendation:  Recommendations for Discharge: OT WI: Sub-acute nursing home, 24 Hour assist       SLP Recommendation:  Recommendations for Discharge: SLP: resume home health speech therapy services for COM/COG    Disposition:  Planned Discharge Destination: Home/apartment with Spouse/SO    Progress note:     Rehab team consult entered this date.   SW spoke to patient's AHCPOA patient's spouse Jez. Provided updated regarding Rehab team consult. Discussed subacute as an alternate discharge option if patient is declined to IRP. At this time Jez declines subacute placement. Jez confirmed he provides 24 hour assist for patient at home.   KELLY will continue to follow.        Oral Chemotherapy Teaching      Patient Name/:  Rajan Chambers   1955  Oral Chemotherapy Regimen:  Osimertinib 80mg daily  Date Started Medication: 18    Initial Teaching Follow Up Comments     Safety     Storage instructions (away from children; away from heat/cold, sunlight, or moisture), handling - use of gloves (caregivers), washing hands after touching pills, managing waste     “How are you storing your medications?”, reminders on storage, proper handling (caregivers using gloves, washing hands, away from children, managing waste, etc.), disposal of medication with D/C or dosage change    Briefly discussed proper storage and handling of the medications. Patient indicated understanding, as this was a review of previous teaching.     Adherence      patient and/or caregiver on how to take medication, take with/without food, assess their adherence potential, stress importance of adherence, ways to manage adherence (pill boxes, phone reminders, calendars), what to do if miss a dose   “How are you taking your medication?” “How are you remembering to take your medication?”, “How many doses have you missed?”, determine reasons for non-adherence (not remembering, side effects, etc), ways to improve, overadherence? Remind patient of ways to improve/maintain adherence   Discussed regimen and confirmed that patient will be taking 80mg daily starting 18. Suggested taking medication at same time everyday to improve adherence.     Side Effects/Adverse Reactions      patient on potential side effects, s/s, ways to manage, when to call MD/seek help     Determine if patient experiencing side effects, ways to manage  Patient had concerns and questions regarding side effects - warranting this additional educational call.  After discussing potential heart flutters and shortness of breath and the appropriate time to seek medical attention the patient seemed much more comfortable with the medication and  ready to start.      Miscellaneous     Food interactions, DDIs, financial issues Determine if patient started any new medications since being placed on oral chemo (analyze for DDI) Discussed logistics of seeking medical attention if needed and encouraged patient to go to nearest Emergency Department in this event.     Additional Notes:  Encouraged patient to call with any further questions.

## 2023-02-20 NOTE — OUTREACH NOTE
Prep Survey      Responses   [facility] facility patient discharged from?  [facility]   Is LACE score < 7 ?  No   Eligibility  Not Eligible   What are the reasons patient is not eligible?  Hospice/Pallative Care   Does the patient have one of the following disease processes/diagnoses(primary or secondary)?  Other   Prep survey completed?  Yes          Tamia Snow RN         [70: Both greater restriction of and less time spent in play activity.] : 70: Both greater restriction of and less time spent in play activity.

## 2024-01-17 NOTE — PROGRESS NOTES
Chief Complaint   Follow-up EGFR related to study non-small cell lung cancer-see chief complaint from my note of May 30, 2018    PROBLEM LIST   Patient Active Problem List   Diagnosis   • Non-smoker   • Metastatic cancer (Lung and bone mets)   • Iatrogenic pneumothorax (Right)       HISTORY OF PRESENT ILLNESS:   Feels well.  He feels that he is 100% of what he was a year ago.  He's had no headaches or visual disturbance cough shortness of breath.    Past Medical History, Past Surgical History, Social History, Family History have been reviewed and are without significant changes except as mentioned.      Medications:      Current Outpatient Prescriptions:   •  cetirizine (zyrTEC) 10 MG tablet, Take 1 tablet by mouth Daily., Disp: 30 tablet, Rfl: 5  •  escitalopram (LEXAPRO) 20 MG tablet, Take 1 tablet by mouth Daily., Disp: 30 tablet, Rfl: 5  •  HYDROcod Polst-CPM Polst ER (TUSSIONEX PENNKINETIC ER) 10-8 MG/5ML ER suspension, Take 5 mL by mouth Every 12 (Twelve) Hours As Needed for Cough for up to 20 doses., Disp: 473 mL, Rfl: 0  •  ondansetron (ZOFRAN) 8 MG tablet, Take 1 tablet by mouth Every 8 (Eight) Hours As Needed for Nausea or Vomiting., Disp: 30 tablet, Rfl: 3  •  Osimertinib Mesylate 80 MG tablet, Take 80 mg by mouth Daily., Disp: 30 tablet, Rfl: 3  •  Osimertinib Mesylate 80 MG tablet, Take 80 mg by mouth Daily., Disp: 30 tablet, Rfl: 3  •  Osimertinib Mesylate 80 MG tablet, Take 80 mg by mouth Daily., Disp: 30 tablet, Rfl: 11  •  rivaroxaban (XARELTO) 20 MG tablet, Take 1 tablet by mouth Daily With Dinner., Disp: 90 tablet, Rfl: 3  No current facility-administered medications for this visit.     ALLERGIES:    Allergies   Allergen Reactions   • Ibuprofen Itching and Dermatitis     Prickly rash on heaD   • Penicillin G Hives   • Penicillins Rash   • Sulfa Antibiotics Rash and Itching       ROS:  Review of Systems   Constitutional: Negative for activity change and appetite change.        Overall vigor  "excellent   HENT: Negative for mouth sores, sinus pressure and voice change.    Eyes: Negative for visual disturbance.   Respiratory: Negative for shortness of breath.    Cardiovascular: Negative for chest pain.   Gastrointestinal: Negative for abdominal pain and vomiting.   Genitourinary: Negative for dysuria.   Musculoskeletal: Negative for arthralgias and myalgias.   Skin: Negative for color change.   Neurological: Negative for dizziness, syncope and headaches.   Hematological: Negative for adenopathy.   Psychiatric/Behavioral: Negative for confusion, sleep disturbance and suicidal ideas. The patient is not nervous/anxious.            Objective:    /77   Pulse 71   Temp 97.5 °F (36.4 °C)   Resp 18   Ht 177.8 cm (70\")   Wt 104 kg (229 lb)   BMI 32.86 kg/m²     Physical Exam   Constitutional: He is oriented to person, place, and time. He appears well-developed and well-nourished. No distress.   He appears healthy as one of his horses   HENT:   Head: Normocephalic.   Mouth/Throat: Oropharynx is clear and moist.   Eyes: Conjunctivae and EOM are normal. No scleral icterus.   Neck: Normal range of motion. Neck supple. No thyromegaly present.   Cardiovascular: Normal rate, regular rhythm and normal heart sounds.    No murmur heard.  Pulmonary/Chest: Effort normal and breath sounds normal. He has no wheezes. He has no rales.   Abdominal: Soft. Bowel sounds are normal. He exhibits no distension and no mass. There is no tenderness.   Musculoskeletal: He exhibits no edema or tenderness.   Lymphadenopathy:     He has no cervical adenopathy.   Neurological: He is alert and oriented to person, place, and time. He displays normal reflexes. No cranial nerve deficit.   Skin: Skin is warm and dry. No rash noted.   Psychiatric: He has a normal mood and affect. Judgment normal.   Vitals reviewed.             RECENT LABS:  Hematology WBC   Date Value Ref Range Status   07/11/2018 4.60 3.50 - 10.80 10*3/mm3 Final     " Comment:     Verified by repeat analysis.      Hemoglobin   Date Value Ref Range Status   07/11/2018 15.6 13.1 - 17.5 g/dL Final     Hematocrit   Date Value Ref Range Status   07/11/2018 48.4 38.9 - 50.9 % Final     MCV   Date Value Ref Range Status   07/11/2018 94.6 80.0 - 99.0 fL Final     RDW   Date Value Ref Range Status   07/11/2018 14.4 11.3 - 14.5 % Final     MPV   Date Value Ref Range Status   07/11/2018 8.1 6.0 - 12.0 fL Final     Platelets   Date Value Ref Range Status   07/11/2018 131 (L) 150 - 450 10*3/mm3 Final     Immature Grans %   Date Value Ref Range Status   03/06/2018 0.2 0.0 - 0.6 % Final     Neutrophils, Absolute   Date Value Ref Range Status   07/11/2018 3.60 1.50 - 8.30 10*3/mm3 Final     Lymphocytes, Absolute   Date Value Ref Range Status   07/11/2018 0.90 0.60 - 4.80 10*3/mm3 Final     Monocytes, Absolute   Date Value Ref Range Status   07/11/2018 0.20 0.00 - 1.00 10*3/mm3 Final     Eosinophils, Absolute   Date Value Ref Range Status   03/06/2018 0.10 0.00 - 0.30 10*3/mm3 Final     Basophils, Absolute   Date Value Ref Range Status   03/06/2018 0.04 0.00 - 0.20 10*3/mm3 Final     Immature Grans, Absolute   Date Value Ref Range Status   03/06/2018 0.02 0.00 - 0.03 10*3/mm3 Final       Glucose   Date Value Ref Range Status   05/30/2018 135 (H) 70 - 100 mg/dL Final     Sodium   Date Value Ref Range Status   05/30/2018 140 132 - 146 mmol/L Final     Potassium   Date Value Ref Range Status   05/30/2018 4.0 3.5 - 5.5 mmol/L Final     CO2   Date Value Ref Range Status   05/30/2018 28.0 20.0 - 31.0 mmol/L Final     Chloride   Date Value Ref Range Status   05/30/2018 107 99 - 109 mmol/L Final     Anion Gap   Date Value Ref Range Status   05/30/2018 5.0 3.0 - 11.0 mmol/L Final     Creatinine   Date Value Ref Range Status   07/10/2018 1.20 0.60 - 1.30 mg/dL Final     Comment:     Serial Number: [id number]:  [id number]     BUN   Date Value Ref Range Status   05/30/2018 17 9 - 23 mg/dL Final      BUN/Creatinine Ratio   Date Value Ref Range Status   05/30/2018 14.2 7.0 - 25.0 Final     Calcium   Date Value Ref Range Status   05/30/2018 8.9 8.7 - 10.4 mg/dL Final     eGFR Non  Amer   Date Value Ref Range Status   05/30/2018 61 >60 mL/min/1.73 Final     Alkaline Phosphatase   Date Value Ref Range Status   05/30/2018 134 (H) 25 - 100 U/L Final     Total Protein   Date Value Ref Range Status   05/30/2018 7.1 5.7 - 8.2 g/dL Final     ALT (SGPT)   Date Value Ref Range Status   05/30/2018 34 7 - 40 U/L Final     AST (SGOT)   Date Value Ref Range Status   05/30/2018 35 (H) 0 - 33 U/L Final     Total Bilirubin   Date Value Ref Range Status   05/30/2018 0.5 0.3 - 1.2 mg/dL Final     Albumin   Date Value Ref Range Status   05/30/2018 4.30 3.20 - 4.80 g/dL Final     Globulin   Date Value Ref Range Status   05/30/2018 2.8 gm/dL Final     A/G Ratio   Date Value Ref Range Status   05/30/2018 1.5 1.5 - 2.5 g/dL Final          Perf Status:0    Assessment/Plan    Diagnoses and all orders for this visit:    Metastatic cancer (Lung and bone mets)      The patient is doing splendidly.  He has very minimal cutaneous side effects from his vigorous so.  I went over his scans with him.  His MRI is essentially normal.  He still has some abnormalities on his PET/CT but it is as stable as a rock.  I am delighted for him.    At this point, I will see him back in 6 weeks.  I will plan on getting a PET/CT without an MRI in about 3 months or so.  He'll be seen Dr. Vicente  probably at that point.  After that, assuming things are stable, I think he can probably go to 6 month intervals.      Gustavo Catalan MD , 7/11/2018    CC:             Detail Level: Detailed Depth Of Biopsy: dermis Was A Bandage Applied: Yes Size Of Lesion In Cm: 0 Biopsy Type: H and E Biopsy Method: Dermablade Anesthesia Type: 1% lidocaine with epinephrine Anesthesia Volume In Cc: 0.5 Hemostasis: Aluminum Chloride and Electrocautery Wound Care: Vaseline Dressing: bandage Destruction After The Procedure: No Type Of Destruction Used: Curettage Lab: 6 Lab Facility: 3 Billing Type: Third-Party Bill Information: Selecting Yes will display possible errors in your note based on the variables you have selected. This validation is only offered as a suggestion for you. PLEASE NOTE THAT THE VALIDATION TEXT WILL BE REMOVED WHEN YOU FINALIZE YOUR NOTE. IF YOU WANT TO FAX A PRELIMINARY NOTE YOU WILL NEED TO TOGGLE THIS TO 'NO' IF YOU DO NOT WANT IT IN YOUR FAXED NOTE.

## (undated) DEVICE — KT BRONCH NAV EDGE 180D EXT WO/ ADAPT OLYMPUS

## (undated) DEVICE — SINGLE USE BIOPSY VALVE MAJ-210: Brand: SINGLE USE BIOPSY VALVE (STERILE)

## (undated) DEVICE — Device: Brand: SINGLE USE ASPIRATION NEEDLE NA-U401SX

## (undated) DEVICE — ST EXT MICROBORE FIX M LL 38IN

## (undated) DEVICE — TRAP,MUCUS SPECIMEN,40CC: Brand: MEDLINE

## (undated) DEVICE — MEDI-VAC NON-CONDUCTIVE SUCTION TUBING: Brand: CARDINAL HEALTH

## (undated) DEVICE — 3-WAY STOPCOCK: Brand: MAXGUARD

## (undated) DEVICE — MEDI-VAC YANKAUER SUCTION HANDLE W/BULBOUS TIP: Brand: CARDINAL HEALTH

## (undated) DEVICE — FRCP BIOP SUPERDIMENSION PREMARK

## (undated) DEVICE — SINGLE USE SUCTION VALVE MAJ-209: Brand: SINGLE USE SUCTION VALVE (STERILE)

## (undated) DEVICE — CANN NASL CO2 DIVIDED A/

## (undated) DEVICE — BOWL UTIL STRL 32OZ

## (undated) DEVICE — BRUSH CYTO BRONCOSCOPE

## (undated) DEVICE — Device: Brand: BALLOON

## (undated) DEVICE — ADAPT SWVL FIBROPTIC BRONCH

## (undated) DEVICE — AIRWY 90MM NO9